# Patient Record
Sex: MALE | Race: ASIAN | NOT HISPANIC OR LATINO | Employment: OTHER | URBAN - METROPOLITAN AREA
[De-identification: names, ages, dates, MRNs, and addresses within clinical notes are randomized per-mention and may not be internally consistent; named-entity substitution may affect disease eponyms.]

---

## 2017-01-04 ENCOUNTER — COMMUNICATION - HEALTHEAST (OUTPATIENT)
Dept: HOME HEALTH SERVICES | Facility: HOME HEALTH | Age: 82
End: 2017-01-04

## 2017-01-04 ENCOUNTER — HOME CARE/HOSPICE - HEALTHEAST (OUTPATIENT)
Dept: HOME HEALTH SERVICES | Facility: HOME HEALTH | Age: 82
End: 2017-01-04

## 2017-01-09 ENCOUNTER — COMMUNICATION - HEALTHEAST (OUTPATIENT)
Dept: HOME HEALTH SERVICES | Facility: HOME HEALTH | Age: 82
End: 2017-01-09

## 2017-02-06 ENCOUNTER — HOME CARE/HOSPICE - HEALTHEAST (OUTPATIENT)
Dept: HOME HEALTH SERVICES | Facility: HOME HEALTH | Age: 82
End: 2017-02-06

## 2017-02-27 ENCOUNTER — COMMUNICATION - HEALTHEAST (OUTPATIENT)
Dept: HOME HEALTH SERVICES | Facility: HOME HEALTH | Age: 82
End: 2017-02-27

## 2017-02-27 ENCOUNTER — HOME CARE/HOSPICE - HEALTHEAST (OUTPATIENT)
Dept: HOME HEALTH SERVICES | Facility: HOME HEALTH | Age: 82
End: 2017-02-27

## 2017-02-28 ENCOUNTER — AMBULATORY - HEALTHEAST (OUTPATIENT)
Dept: FAMILY MEDICINE | Facility: CLINIC | Age: 82
End: 2017-02-28

## 2017-02-28 DIAGNOSIS — H26.9 CATARACT: ICD-10-CM

## 2017-03-03 ENCOUNTER — RECORDS - HEALTHEAST (OUTPATIENT)
Dept: ADMINISTRATIVE | Facility: OTHER | Age: 82
End: 2017-03-03

## 2017-03-06 ENCOUNTER — HOME CARE/HOSPICE - HEALTHEAST (OUTPATIENT)
Dept: HOME HEALTH SERVICES | Facility: HOME HEALTH | Age: 82
End: 2017-03-06

## 2017-03-12 ENCOUNTER — COMMUNICATION - HEALTHEAST (OUTPATIENT)
Dept: FAMILY MEDICINE | Facility: CLINIC | Age: 82
End: 2017-03-12

## 2017-03-12 DIAGNOSIS — Z91.09 ENVIRONMENTAL ALLERGIES: ICD-10-CM

## 2017-03-12 DIAGNOSIS — M54.2 NECK PAIN: ICD-10-CM

## 2017-03-14 ENCOUNTER — COMMUNICATION - HEALTHEAST (OUTPATIENT)
Dept: FAMILY MEDICINE | Facility: CLINIC | Age: 82
End: 2017-03-14

## 2017-03-29 ENCOUNTER — COMMUNICATION - HEALTHEAST (OUTPATIENT)
Dept: NURSING | Facility: CLINIC | Age: 82
End: 2017-03-29

## 2017-03-30 ENCOUNTER — AMBULATORY - HEALTHEAST (OUTPATIENT)
Dept: CARE COORDINATION | Facility: CLINIC | Age: 82
End: 2017-03-30

## 2017-04-04 ENCOUNTER — HOME CARE/HOSPICE - HEALTHEAST (OUTPATIENT)
Dept: HOME HEALTH SERVICES | Facility: HOME HEALTH | Age: 82
End: 2017-04-04

## 2017-04-13 ENCOUNTER — OFFICE VISIT - HEALTHEAST (OUTPATIENT)
Dept: FAMILY MEDICINE | Facility: CLINIC | Age: 82
End: 2017-04-13

## 2017-04-13 DIAGNOSIS — R05.9 COUGH: ICD-10-CM

## 2017-04-15 ENCOUNTER — COMMUNICATION - HEALTHEAST (OUTPATIENT)
Dept: FAMILY MEDICINE | Facility: CLINIC | Age: 82
End: 2017-04-15

## 2017-04-15 DIAGNOSIS — Z91.09 ENVIRONMENTAL ALLERGIES: ICD-10-CM

## 2017-04-19 ENCOUNTER — OFFICE VISIT - HEALTHEAST (OUTPATIENT)
Dept: FAMILY MEDICINE | Facility: CLINIC | Age: 82
End: 2017-04-19

## 2017-04-19 ENCOUNTER — COMMUNICATION - HEALTHEAST (OUTPATIENT)
Dept: FAMILY MEDICINE | Facility: CLINIC | Age: 82
End: 2017-04-19

## 2017-04-19 DIAGNOSIS — R63.6 UNDERWEIGHT DUE TO INADEQUATE CALORIC INTAKE: ICD-10-CM

## 2017-04-19 DIAGNOSIS — I48.20 CHRONIC ATRIAL FIBRILLATION (H): ICD-10-CM

## 2017-04-19 DIAGNOSIS — R05.9 COUGH: ICD-10-CM

## 2017-04-19 DIAGNOSIS — M62.81 MUSCLE WEAKNESS (GENERALIZED): ICD-10-CM

## 2017-04-19 DIAGNOSIS — D72.829 LEUKOCYTOSIS: ICD-10-CM

## 2017-04-19 DIAGNOSIS — Z91.09 ENVIRONMENTAL ALLERGIES: ICD-10-CM

## 2017-04-26 ENCOUNTER — COMMUNICATION - HEALTHEAST (OUTPATIENT)
Dept: FAMILY MEDICINE | Facility: CLINIC | Age: 82
End: 2017-04-26

## 2017-04-28 ENCOUNTER — HOME CARE/HOSPICE - HEALTHEAST (OUTPATIENT)
Dept: HOME HEALTH SERVICES | Facility: HOME HEALTH | Age: 82
End: 2017-04-28

## 2017-04-28 ENCOUNTER — COMMUNICATION - HEALTHEAST (OUTPATIENT)
Dept: HOME HEALTH SERVICES | Facility: HOME HEALTH | Age: 82
End: 2017-04-28

## 2017-05-01 ENCOUNTER — COMMUNICATION - HEALTHEAST (OUTPATIENT)
Dept: FAMILY MEDICINE | Facility: CLINIC | Age: 82
End: 2017-05-01

## 2017-05-03 ENCOUNTER — OFFICE VISIT - HEALTHEAST (OUTPATIENT)
Dept: FAMILY MEDICINE | Facility: CLINIC | Age: 82
End: 2017-05-03

## 2017-05-03 DIAGNOSIS — M54.2 NECK PAIN: ICD-10-CM

## 2017-05-03 DIAGNOSIS — I48.20 CHRONIC ATRIAL FIBRILLATION (H): ICD-10-CM

## 2017-05-03 DIAGNOSIS — R63.6 UNDERWEIGHT DUE TO INADEQUATE CALORIC INTAKE: ICD-10-CM

## 2017-05-03 DIAGNOSIS — N48.29 PENILE INFLAMMATION: ICD-10-CM

## 2017-05-03 DIAGNOSIS — Z93.59 SUPRAPUBIC CATHETER (H): ICD-10-CM

## 2017-05-10 ENCOUNTER — HOME CARE/HOSPICE - HEALTHEAST (OUTPATIENT)
Dept: HOME HEALTH SERVICES | Facility: HOME HEALTH | Age: 82
End: 2017-05-10

## 2017-05-23 ENCOUNTER — OFFICE VISIT - HEALTHEAST (OUTPATIENT)
Dept: FAMILY MEDICINE | Facility: CLINIC | Age: 82
End: 2017-05-23

## 2017-05-23 DIAGNOSIS — M62.81 MUSCLE WEAKNESS (GENERALIZED): ICD-10-CM

## 2017-05-23 DIAGNOSIS — F43.21 GRIEF: ICD-10-CM

## 2017-05-23 DIAGNOSIS — G47.00 INSOMNIA: ICD-10-CM

## 2017-05-23 DIAGNOSIS — M54.2 NECK PAIN: ICD-10-CM

## 2017-05-23 ASSESSMENT — MIFFLIN-ST. JEOR: SCORE: 1070.31

## 2017-05-31 ENCOUNTER — RECORDS - HEALTHEAST (OUTPATIENT)
Dept: ADMINISTRATIVE | Facility: OTHER | Age: 82
End: 2017-05-31

## 2017-06-05 ENCOUNTER — HOME CARE/HOSPICE - HEALTHEAST (OUTPATIENT)
Dept: HOME HEALTH SERVICES | Facility: HOME HEALTH | Age: 82
End: 2017-06-05

## 2017-06-16 ENCOUNTER — OFFICE VISIT - HEALTHEAST (OUTPATIENT)
Dept: FAMILY MEDICINE | Facility: CLINIC | Age: 82
End: 2017-06-16

## 2017-06-16 DIAGNOSIS — M54.2 NECK PAIN: ICD-10-CM

## 2017-06-16 DIAGNOSIS — G47.00 INSOMNIA: ICD-10-CM

## 2017-06-16 DIAGNOSIS — L29.9 PRURITUS: ICD-10-CM

## 2017-06-16 DIAGNOSIS — F43.21 GRIEF: ICD-10-CM

## 2017-06-23 ENCOUNTER — AMBULATORY - HEALTHEAST (OUTPATIENT)
Dept: NURSING | Facility: CLINIC | Age: 82
End: 2017-06-23

## 2017-06-26 ENCOUNTER — COMMUNICATION - HEALTHEAST (OUTPATIENT)
Dept: HOME HEALTH SERVICES | Facility: HOME HEALTH | Age: 82
End: 2017-06-26

## 2017-06-26 ENCOUNTER — HOME CARE/HOSPICE - HEALTHEAST (OUTPATIENT)
Dept: HOME HEALTH SERVICES | Facility: HOME HEALTH | Age: 82
End: 2017-06-26

## 2017-07-05 ENCOUNTER — HOME CARE/HOSPICE - HEALTHEAST (OUTPATIENT)
Dept: HOME HEALTH SERVICES | Facility: HOME HEALTH | Age: 82
End: 2017-07-05

## 2017-07-27 ENCOUNTER — OFFICE VISIT - HEALTHEAST (OUTPATIENT)
Dept: FAMILY MEDICINE | Facility: CLINIC | Age: 82
End: 2017-07-27

## 2017-07-27 DIAGNOSIS — M54.2 NECK PAIN: ICD-10-CM

## 2017-07-27 DIAGNOSIS — F43.21 GRIEF: ICD-10-CM

## 2017-08-02 ENCOUNTER — COMMUNICATION - HEALTHEAST (OUTPATIENT)
Dept: FAMILY MEDICINE | Facility: CLINIC | Age: 82
End: 2017-08-02

## 2017-08-02 DIAGNOSIS — Z93.59 SUPRAPUBIC CATHETER (H): ICD-10-CM

## 2017-08-07 ENCOUNTER — HOME CARE/HOSPICE - HEALTHEAST (OUTPATIENT)
Dept: HOME HEALTH SERVICES | Facility: HOME HEALTH | Age: 82
End: 2017-08-07

## 2017-08-24 ENCOUNTER — COMMUNICATION - HEALTHEAST (OUTPATIENT)
Dept: HOME HEALTH SERVICES | Facility: HOME HEALTH | Age: 82
End: 2017-08-24

## 2017-08-24 ENCOUNTER — HOME CARE/HOSPICE - HEALTHEAST (OUTPATIENT)
Dept: HOME HEALTH SERVICES | Facility: HOME HEALTH | Age: 82
End: 2017-08-24

## 2017-08-25 ENCOUNTER — HOME CARE/HOSPICE - HEALTHEAST (OUTPATIENT)
Dept: HOME HEALTH SERVICES | Facility: HOME HEALTH | Age: 82
End: 2017-08-25

## 2017-09-08 ENCOUNTER — HOME CARE/HOSPICE - HEALTHEAST (OUTPATIENT)
Dept: HOME HEALTH SERVICES | Facility: HOME HEALTH | Age: 82
End: 2017-09-08

## 2017-09-25 ENCOUNTER — COMMUNICATION - HEALTHEAST (OUTPATIENT)
Dept: FAMILY MEDICINE | Facility: CLINIC | Age: 82
End: 2017-09-25

## 2017-09-25 DIAGNOSIS — M54.2 NECK PAIN: ICD-10-CM

## 2017-09-27 ENCOUNTER — COMMUNICATION - HEALTHEAST (OUTPATIENT)
Dept: FAMILY MEDICINE | Facility: CLINIC | Age: 82
End: 2017-09-27

## 2017-09-27 DIAGNOSIS — I48.20 CHRONIC ATRIAL FIBRILLATION (H): ICD-10-CM

## 2017-09-27 DIAGNOSIS — M54.2 NECK PAIN: ICD-10-CM

## 2017-09-27 DIAGNOSIS — Z91.09 ENVIRONMENTAL ALLERGIES: ICD-10-CM

## 2017-10-02 ENCOUNTER — OFFICE VISIT - HEALTHEAST (OUTPATIENT)
Dept: FAMILY MEDICINE | Facility: CLINIC | Age: 82
End: 2017-10-02

## 2017-10-02 DIAGNOSIS — Z71.85 IMMUNIZATION COUNSELING: ICD-10-CM

## 2017-10-02 DIAGNOSIS — M62.81 MUSCLE WEAKNESS (GENERALIZED): ICD-10-CM

## 2017-10-02 DIAGNOSIS — M54.2 NECK PAIN: ICD-10-CM

## 2017-10-02 DIAGNOSIS — Z91.09 ENVIRONMENTAL ALLERGIES: ICD-10-CM

## 2017-10-02 DIAGNOSIS — I48.20 CHRONIC ATRIAL FIBRILLATION (H): ICD-10-CM

## 2017-10-05 ENCOUNTER — HOME CARE/HOSPICE - HEALTHEAST (OUTPATIENT)
Dept: HOME HEALTH SERVICES | Facility: HOME HEALTH | Age: 82
End: 2017-10-05

## 2017-10-23 ENCOUNTER — HOME CARE/HOSPICE - HEALTHEAST (OUTPATIENT)
Dept: HOME HEALTH SERVICES | Facility: HOME HEALTH | Age: 82
End: 2017-10-23

## 2017-10-23 ENCOUNTER — COMMUNICATION - HEALTHEAST (OUTPATIENT)
Dept: HOME HEALTH SERVICES | Facility: HOME HEALTH | Age: 82
End: 2017-10-23

## 2017-11-01 ENCOUNTER — COMMUNICATION - HEALTHEAST (OUTPATIENT)
Dept: FAMILY MEDICINE | Facility: CLINIC | Age: 82
End: 2017-11-01

## 2017-11-01 DIAGNOSIS — N32.81 OVERACTIVE BLADDER: ICD-10-CM

## 2017-11-06 ENCOUNTER — HOME CARE/HOSPICE - HEALTHEAST (OUTPATIENT)
Dept: HOME HEALTH SERVICES | Facility: HOME HEALTH | Age: 82
End: 2017-11-06

## 2017-11-16 ENCOUNTER — COMMUNICATION - HEALTHEAST (OUTPATIENT)
Dept: FAMILY MEDICINE | Facility: CLINIC | Age: 82
End: 2017-11-16

## 2017-11-16 ENCOUNTER — HOME CARE/HOSPICE - HEALTHEAST (OUTPATIENT)
Dept: HOME HEALTH SERVICES | Facility: HOME HEALTH | Age: 82
End: 2017-11-16

## 2017-11-16 DIAGNOSIS — R63.6 UNDERWEIGHT DUE TO INADEQUATE CALORIC INTAKE: ICD-10-CM

## 2017-11-20 ENCOUNTER — OFFICE VISIT - HEALTHEAST (OUTPATIENT)
Dept: FAMILY MEDICINE | Facility: CLINIC | Age: 82
End: 2017-11-20

## 2017-11-20 ENCOUNTER — COMMUNICATION - HEALTHEAST (OUTPATIENT)
Dept: FAMILY MEDICINE | Facility: CLINIC | Age: 82
End: 2017-11-20

## 2017-11-20 DIAGNOSIS — Z09 HOSPITAL DISCHARGE FOLLOW-UP: ICD-10-CM

## 2017-11-20 DIAGNOSIS — I48.20 CHRONIC ATRIAL FIBRILLATION (H): ICD-10-CM

## 2017-11-20 DIAGNOSIS — I69.30 HISTORY OF ISCHEMIC CEREBROVASCULAR ACCIDENT (CVA) WITH RESIDUAL DEFICIT: ICD-10-CM

## 2017-11-24 ENCOUNTER — COMMUNICATION - HEALTHEAST (OUTPATIENT)
Dept: FAMILY MEDICINE | Facility: CLINIC | Age: 82
End: 2017-11-24

## 2017-11-24 DIAGNOSIS — R06.2 WHEEZING: ICD-10-CM

## 2017-11-30 ENCOUNTER — RECORDS - HEALTHEAST (OUTPATIENT)
Dept: ADMINISTRATIVE | Facility: OTHER | Age: 82
End: 2017-11-30

## 2017-12-04 ENCOUNTER — RECORDS - HEALTHEAST (OUTPATIENT)
Dept: ADMINISTRATIVE | Facility: OTHER | Age: 82
End: 2017-12-04

## 2017-12-08 ENCOUNTER — RECORDS - HEALTHEAST (OUTPATIENT)
Dept: ADMINISTRATIVE | Facility: OTHER | Age: 82
End: 2017-12-08

## 2017-12-11 ENCOUNTER — OFFICE VISIT - HEALTHEAST (OUTPATIENT)
Dept: FAMILY MEDICINE | Facility: CLINIC | Age: 82
End: 2017-12-11

## 2017-12-11 DIAGNOSIS — I48.20 CHRONIC ATRIAL FIBRILLATION (H): ICD-10-CM

## 2017-12-11 DIAGNOSIS — I69.30 HISTORY OF ISCHEMIC CEREBROVASCULAR ACCIDENT (CVA) WITH RESIDUAL DEFICIT: ICD-10-CM

## 2017-12-11 DIAGNOSIS — Z93.59 SUPRAPUBIC CATHETER (H): ICD-10-CM

## 2017-12-11 DIAGNOSIS — M54.2 NECK PAIN: ICD-10-CM

## 2017-12-11 DIAGNOSIS — I63.9 ACUTE CEREBRAL INFARCTION (H): ICD-10-CM

## 2017-12-11 DIAGNOSIS — K64.9 HEMORRHOID: ICD-10-CM

## 2017-12-27 ENCOUNTER — OFFICE VISIT - HEALTHEAST (OUTPATIENT)
Dept: FAMILY MEDICINE | Facility: CLINIC | Age: 82
End: 2017-12-27

## 2017-12-27 ENCOUNTER — COMMUNICATION - HEALTHEAST (OUTPATIENT)
Dept: FAMILY MEDICINE | Facility: CLINIC | Age: 82
End: 2017-12-27

## 2017-12-27 DIAGNOSIS — R05.9 COUGH: ICD-10-CM

## 2017-12-27 DIAGNOSIS — R09.81 SINUS CONGESTION: ICD-10-CM

## 2018-01-08 ENCOUNTER — OFFICE VISIT - HEALTHEAST (OUTPATIENT)
Dept: FAMILY MEDICINE | Facility: CLINIC | Age: 83
End: 2018-01-08

## 2018-01-08 DIAGNOSIS — R63.0 ANOREXIA: ICD-10-CM

## 2018-01-08 DIAGNOSIS — I63.9 ACUTE CEREBRAL INFARCTION (H): ICD-10-CM

## 2018-01-08 DIAGNOSIS — H54.7 DECREASED VISION: ICD-10-CM

## 2018-01-08 DIAGNOSIS — Z93.59 SUPRAPUBIC CATHETER (H): ICD-10-CM

## 2018-01-08 ASSESSMENT — MIFFLIN-ST. JEOR: SCORE: 1061.24

## 2018-01-25 ENCOUNTER — RECORDS - HEALTHEAST (OUTPATIENT)
Dept: ADMINISTRATIVE | Facility: OTHER | Age: 83
End: 2018-01-25

## 2018-02-05 ENCOUNTER — RECORDS - HEALTHEAST (OUTPATIENT)
Dept: ADMINISTRATIVE | Facility: OTHER | Age: 83
End: 2018-02-05

## 2018-02-13 ENCOUNTER — COMMUNICATION - HEALTHEAST (OUTPATIENT)
Dept: FAMILY MEDICINE | Facility: CLINIC | Age: 83
End: 2018-02-13

## 2018-02-19 ENCOUNTER — RECORDS - HEALTHEAST (OUTPATIENT)
Dept: ADMINISTRATIVE | Facility: OTHER | Age: 83
End: 2018-02-19

## 2018-02-19 ENCOUNTER — OFFICE VISIT - HEALTHEAST (OUTPATIENT)
Dept: FAMILY MEDICINE | Facility: CLINIC | Age: 83
End: 2018-02-19

## 2018-02-19 DIAGNOSIS — R63.0 ANOREXIA: ICD-10-CM

## 2018-02-19 DIAGNOSIS — Z93.59 SUPRAPUBIC CATHETER (H): ICD-10-CM

## 2018-02-19 DIAGNOSIS — Z09 HOSPITAL DISCHARGE FOLLOW-UP: ICD-10-CM

## 2018-02-19 DIAGNOSIS — I63.9 ACUTE CEREBRAL INFARCTION (H): ICD-10-CM

## 2018-02-19 DIAGNOSIS — M62.81 MUSCLE WEAKNESS (GENERALIZED): ICD-10-CM

## 2018-02-19 DIAGNOSIS — N39.0 UTI (URINARY TRACT INFECTION): ICD-10-CM

## 2018-02-19 DIAGNOSIS — K64.9 HEMORRHOID: ICD-10-CM

## 2018-02-19 DIAGNOSIS — I48.20 CHRONIC ATRIAL FIBRILLATION (H): ICD-10-CM

## 2018-02-21 ENCOUNTER — RECORDS - HEALTHEAST (OUTPATIENT)
Dept: ADMINISTRATIVE | Facility: OTHER | Age: 83
End: 2018-02-21

## 2018-03-02 ENCOUNTER — RECORDS - HEALTHEAST (OUTPATIENT)
Dept: ADMINISTRATIVE | Facility: OTHER | Age: 83
End: 2018-03-02

## 2018-04-02 ENCOUNTER — RECORDS - HEALTHEAST (OUTPATIENT)
Dept: ADMINISTRATIVE | Facility: OTHER | Age: 83
End: 2018-04-02

## 2018-04-10 ENCOUNTER — RECORDS - HEALTHEAST (OUTPATIENT)
Dept: ADMINISTRATIVE | Facility: OTHER | Age: 83
End: 2018-04-10

## 2018-04-29 ENCOUNTER — COMMUNICATION - HEALTHEAST (OUTPATIENT)
Dept: FAMILY MEDICINE | Facility: CLINIC | Age: 83
End: 2018-04-29

## 2018-04-29 DIAGNOSIS — M54.2 NECK PAIN: ICD-10-CM

## 2018-05-06 ENCOUNTER — OFFICE VISIT - HEALTHEAST (OUTPATIENT)
Dept: FAMILY MEDICINE | Facility: CLINIC | Age: 83
End: 2018-05-06

## 2018-05-06 DIAGNOSIS — R05.9 COUGH: ICD-10-CM

## 2018-06-11 ENCOUNTER — RECORDS - HEALTHEAST (OUTPATIENT)
Dept: ADMINISTRATIVE | Facility: OTHER | Age: 83
End: 2018-06-11

## 2018-07-02 ENCOUNTER — COMMUNICATION - HEALTHEAST (OUTPATIENT)
Dept: FAMILY MEDICINE | Facility: CLINIC | Age: 83
End: 2018-07-02

## 2018-07-02 DIAGNOSIS — M54.2 NECK PAIN: ICD-10-CM

## 2018-07-09 ENCOUNTER — COMMUNICATION - HEALTHEAST (OUTPATIENT)
Dept: FAMILY MEDICINE | Facility: CLINIC | Age: 83
End: 2018-07-09

## 2018-07-10 ENCOUNTER — AMBULATORY - HEALTHEAST (OUTPATIENT)
Dept: FAMILY MEDICINE | Facility: CLINIC | Age: 83
End: 2018-07-10

## 2018-07-10 ENCOUNTER — OFFICE VISIT - HEALTHEAST (OUTPATIENT)
Dept: FAMILY MEDICINE | Facility: CLINIC | Age: 83
End: 2018-07-10

## 2018-07-10 DIAGNOSIS — R05.9 COUGH: ICD-10-CM

## 2018-07-26 LAB — INR PPP: 2.4 (ref 0.9–1.1)

## 2018-07-27 ENCOUNTER — COMMUNICATION - HEALTHEAST (OUTPATIENT)
Dept: FAMILY MEDICINE | Facility: CLINIC | Age: 83
End: 2018-07-27

## 2018-07-27 DIAGNOSIS — N28.0 RENAL INFARCT (H): ICD-10-CM

## 2018-07-27 LAB — INR PPP: 1.8 (ref 0.9–1.1)

## 2018-07-28 ENCOUNTER — COMMUNICATION - HEALTHEAST (OUTPATIENT)
Dept: SCHEDULING | Facility: CLINIC | Age: 83
End: 2018-07-28

## 2018-07-30 ENCOUNTER — COMMUNICATION - HEALTHEAST (OUTPATIENT)
Dept: FAMILY MEDICINE | Facility: CLINIC | Age: 83
End: 2018-07-30

## 2018-07-30 DIAGNOSIS — N28.0 RENAL INFARCT (H): ICD-10-CM

## 2018-07-30 LAB — INR PPP: 5.4 (ref 0.9–1.1)

## 2018-07-31 ENCOUNTER — OFFICE VISIT - HEALTHEAST (OUTPATIENT)
Dept: FAMILY MEDICINE | Facility: CLINIC | Age: 83
End: 2018-07-31

## 2018-07-31 DIAGNOSIS — I10 ACCELERATED HYPERTENSION: ICD-10-CM

## 2018-07-31 DIAGNOSIS — I63.9 ACUTE CEREBRAL INFARCTION (H): ICD-10-CM

## 2018-07-31 DIAGNOSIS — K64.9 HEMORRHOID: ICD-10-CM

## 2018-07-31 DIAGNOSIS — R05.9 COUGH: ICD-10-CM

## 2018-07-31 DIAGNOSIS — Z09 HOSPITAL DISCHARGE FOLLOW-UP: ICD-10-CM

## 2018-07-31 DIAGNOSIS — N28.0 RENAL INFARCT (H): ICD-10-CM

## 2018-07-31 DIAGNOSIS — Z93.59 SUPRAPUBIC CATHETER (H): ICD-10-CM

## 2018-08-01 ENCOUNTER — COMMUNICATION - HEALTHEAST (OUTPATIENT)
Dept: FAMILY MEDICINE | Facility: CLINIC | Age: 83
End: 2018-08-01

## 2018-08-01 ENCOUNTER — RECORDS - HEALTHEAST (OUTPATIENT)
Dept: ADMINISTRATIVE | Facility: OTHER | Age: 83
End: 2018-08-01

## 2018-08-01 DIAGNOSIS — N28.0 RENAL INFARCT (H): ICD-10-CM

## 2018-08-01 LAB — INR PPP: 1.5 (ref 0.9–1.1)

## 2018-08-03 ENCOUNTER — COMMUNICATION - HEALTHEAST (OUTPATIENT)
Dept: FAMILY MEDICINE | Facility: CLINIC | Age: 83
End: 2018-08-03

## 2018-08-03 DIAGNOSIS — N28.0 RENAL INFARCT (H): ICD-10-CM

## 2018-08-03 LAB — INR PPP: 2.1 (ref 0.9–1.1)

## 2018-08-06 ENCOUNTER — COMMUNICATION - HEALTHEAST (OUTPATIENT)
Dept: FAMILY MEDICINE | Facility: CLINIC | Age: 83
End: 2018-08-06

## 2018-08-06 DIAGNOSIS — R05.9 COUGH: ICD-10-CM

## 2018-08-06 DIAGNOSIS — N28.0 RENAL INFARCT (H): ICD-10-CM

## 2018-08-06 LAB — INR PPP: 2.9 (ref 0.9–1.1)

## 2018-08-09 ENCOUNTER — COMMUNICATION - HEALTHEAST (OUTPATIENT)
Dept: FAMILY MEDICINE | Facility: CLINIC | Age: 83
End: 2018-08-09

## 2018-08-09 DIAGNOSIS — N28.0 RENAL INFARCT (H): ICD-10-CM

## 2018-08-09 LAB — INR PPP: 3.2 (ref 0.9–1.1)

## 2018-08-13 ENCOUNTER — COMMUNICATION - HEALTHEAST (OUTPATIENT)
Dept: FAMILY MEDICINE | Facility: CLINIC | Age: 83
End: 2018-08-13

## 2018-08-13 DIAGNOSIS — N28.0 RENAL INFARCT (H): ICD-10-CM

## 2018-08-13 LAB — INR PPP: 3.9 (ref 0.9–1.1)

## 2018-08-14 ENCOUNTER — OFFICE VISIT - HEALTHEAST (OUTPATIENT)
Dept: FAMILY MEDICINE | Facility: CLINIC | Age: 83
End: 2018-08-14

## 2018-08-14 DIAGNOSIS — I48.20 CHRONIC ATRIAL FIBRILLATION (H): ICD-10-CM

## 2018-08-14 DIAGNOSIS — M54.2 NECK PAIN: ICD-10-CM

## 2018-08-14 DIAGNOSIS — Z93.59 SUPRAPUBIC CATHETER (H): ICD-10-CM

## 2018-08-14 DIAGNOSIS — M62.81 MUSCLE WEAKNESS (GENERALIZED): ICD-10-CM

## 2018-08-14 DIAGNOSIS — N28.0 RENAL INFARCT (H): ICD-10-CM

## 2018-08-17 ENCOUNTER — COMMUNICATION - HEALTHEAST (OUTPATIENT)
Dept: FAMILY MEDICINE | Facility: CLINIC | Age: 83
End: 2018-08-17

## 2018-08-17 DIAGNOSIS — N28.0 RENAL INFARCT (H): ICD-10-CM

## 2018-08-17 LAB — INR PPP: 1.5 (ref 0.9–1.1)

## 2018-08-22 ENCOUNTER — COMMUNICATION - HEALTHEAST (OUTPATIENT)
Dept: FAMILY MEDICINE | Facility: CLINIC | Age: 83
End: 2018-08-22

## 2018-08-22 DIAGNOSIS — N28.0 RENAL INFARCT (H): ICD-10-CM

## 2018-08-22 LAB — INR PPP: 1.8 (ref 0.9–1.1)

## 2018-08-28 ENCOUNTER — COMMUNICATION - HEALTHEAST (OUTPATIENT)
Dept: ANTICOAGULATION | Facility: CLINIC | Age: 83
End: 2018-08-28

## 2018-08-28 DIAGNOSIS — N28.0 RENAL INFARCT (H): ICD-10-CM

## 2018-08-31 ENCOUNTER — COMMUNICATION - HEALTHEAST (OUTPATIENT)
Dept: FAMILY MEDICINE | Facility: CLINIC | Age: 83
End: 2018-08-31

## 2018-08-31 DIAGNOSIS — N28.0 RENAL INFARCT (H): ICD-10-CM

## 2018-08-31 LAB — INR PPP: 4 (ref 0.9–1.1)

## 2018-09-05 ENCOUNTER — OFFICE VISIT - HEALTHEAST (OUTPATIENT)
Dept: FAMILY MEDICINE | Facility: CLINIC | Age: 83
End: 2018-09-05

## 2018-09-05 DIAGNOSIS — L60.8 DEFORMITY OF TOENAIL: ICD-10-CM

## 2018-09-05 DIAGNOSIS — Z93.59 SUPRAPUBIC CATHETER (H): ICD-10-CM

## 2018-09-05 DIAGNOSIS — H04.123 DRY EYES: ICD-10-CM

## 2018-09-05 DIAGNOSIS — R10.9 ABDOMINAL PAIN: ICD-10-CM

## 2018-09-06 ENCOUNTER — COMMUNICATION - HEALTHEAST (OUTPATIENT)
Dept: FAMILY MEDICINE | Facility: CLINIC | Age: 83
End: 2018-09-06

## 2018-09-07 ENCOUNTER — COMMUNICATION - HEALTHEAST (OUTPATIENT)
Dept: FAMILY MEDICINE | Facility: CLINIC | Age: 83
End: 2018-09-07

## 2018-09-07 DIAGNOSIS — N28.0 RENAL INFARCT (H): ICD-10-CM

## 2018-09-07 LAB — INR PPP: 1.2 (ref 0.9–1.1)

## 2018-09-11 ENCOUNTER — COMMUNICATION - HEALTHEAST (OUTPATIENT)
Dept: FAMILY MEDICINE | Facility: CLINIC | Age: 83
End: 2018-09-11

## 2018-09-11 DIAGNOSIS — N28.0 RENAL INFARCT (H): ICD-10-CM

## 2018-09-11 LAB — INR PPP: 1.6 (ref 0.9–1.1)

## 2018-09-18 ENCOUNTER — COMMUNICATION - HEALTHEAST (OUTPATIENT)
Dept: FAMILY MEDICINE | Facility: CLINIC | Age: 83
End: 2018-09-18

## 2018-09-18 DIAGNOSIS — N28.0 RENAL INFARCT (H): ICD-10-CM

## 2018-09-18 LAB — INR PPP: 1.7 (ref 0.9–1.1)

## 2018-09-24 ENCOUNTER — COMMUNICATION - HEALTHEAST (OUTPATIENT)
Dept: FAMILY MEDICINE | Facility: CLINIC | Age: 83
End: 2018-09-24

## 2018-09-25 ENCOUNTER — COMMUNICATION - HEALTHEAST (OUTPATIENT)
Dept: FAMILY MEDICINE | Facility: CLINIC | Age: 83
End: 2018-09-25

## 2018-09-25 DIAGNOSIS — N28.0 RENAL INFARCT (H): ICD-10-CM

## 2018-09-25 LAB — INR PPP: 1.8 (ref 0.9–1.1)

## 2018-10-02 ENCOUNTER — COMMUNICATION - HEALTHEAST (OUTPATIENT)
Dept: FAMILY MEDICINE | Facility: CLINIC | Age: 83
End: 2018-10-02

## 2018-10-02 DIAGNOSIS — N28.0 RENAL INFARCT (H): ICD-10-CM

## 2018-10-02 LAB — INR PPP: 1.9 (ref 0.9–1.1)

## 2018-10-07 ENCOUNTER — COMMUNICATION - HEALTHEAST (OUTPATIENT)
Dept: FAMILY MEDICINE | Facility: CLINIC | Age: 83
End: 2018-10-07

## 2018-10-07 DIAGNOSIS — Z91.09 ENVIRONMENTAL ALLERGIES: ICD-10-CM

## 2018-10-09 ENCOUNTER — COMMUNICATION - HEALTHEAST (OUTPATIENT)
Dept: FAMILY MEDICINE | Facility: CLINIC | Age: 83
End: 2018-10-09

## 2018-10-09 DIAGNOSIS — N28.0 RENAL INFARCT (H): ICD-10-CM

## 2018-10-09 LAB — INR PPP: 2.5 (ref 0.9–1.1)

## 2018-10-16 ENCOUNTER — COMMUNICATION - HEALTHEAST (OUTPATIENT)
Dept: FAMILY MEDICINE | Facility: CLINIC | Age: 83
End: 2018-10-16

## 2018-10-16 DIAGNOSIS — N28.0 RENAL INFARCT (H): ICD-10-CM

## 2018-10-16 LAB — INR PPP: 2.3 (ref 0.9–1.1)

## 2018-10-23 ENCOUNTER — AMBULATORY - HEALTHEAST (OUTPATIENT)
Dept: NURSING | Facility: CLINIC | Age: 83
End: 2018-10-23

## 2018-10-23 ENCOUNTER — COMMUNICATION - HEALTHEAST (OUTPATIENT)
Dept: FAMILY MEDICINE | Facility: CLINIC | Age: 83
End: 2018-10-23

## 2018-10-23 DIAGNOSIS — N28.0 RENAL INFARCT (H): ICD-10-CM

## 2018-10-23 DIAGNOSIS — I10 ACCELERATED HYPERTENSION: ICD-10-CM

## 2018-10-23 LAB — INR PPP: 1.9 (ref 0.9–1.1)

## 2018-11-01 ENCOUNTER — COMMUNICATION - HEALTHEAST (OUTPATIENT)
Dept: FAMILY MEDICINE | Facility: CLINIC | Age: 83
End: 2018-11-01

## 2018-11-01 DIAGNOSIS — N28.0 RENAL INFARCT (H): ICD-10-CM

## 2018-11-01 LAB — INR PPP: 2.5 (ref 0.9–1.1)

## 2018-11-08 ENCOUNTER — COMMUNICATION - HEALTHEAST (OUTPATIENT)
Dept: FAMILY MEDICINE | Facility: CLINIC | Age: 83
End: 2018-11-08

## 2018-11-08 DIAGNOSIS — N28.0 RENAL INFARCT (H): ICD-10-CM

## 2018-11-08 LAB — INR PPP: 2 (ref 0.9–1.1)

## 2018-11-15 ENCOUNTER — COMMUNICATION - HEALTHEAST (OUTPATIENT)
Dept: FAMILY MEDICINE | Facility: CLINIC | Age: 83
End: 2018-11-15

## 2018-11-15 DIAGNOSIS — N28.0 RENAL INFARCT (H): ICD-10-CM

## 2018-11-15 LAB — INR PPP: 2.5 (ref 0.9–1.1)

## 2018-11-17 ENCOUNTER — RECORDS - HEALTHEAST (OUTPATIENT)
Dept: ADMINISTRATIVE | Facility: OTHER | Age: 83
End: 2018-11-17

## 2018-11-29 ENCOUNTER — COMMUNICATION - HEALTHEAST (OUTPATIENT)
Dept: FAMILY MEDICINE | Facility: CLINIC | Age: 83
End: 2018-11-29

## 2018-11-29 DIAGNOSIS — N28.0 RENAL INFARCT (H): ICD-10-CM

## 2018-11-29 LAB — INR PPP: 2.3 (ref 0.9–1.1)

## 2018-12-10 ENCOUNTER — COMMUNICATION - HEALTHEAST (OUTPATIENT)
Dept: FAMILY MEDICINE | Facility: CLINIC | Age: 83
End: 2018-12-10

## 2018-12-13 ENCOUNTER — COMMUNICATION - HEALTHEAST (OUTPATIENT)
Dept: FAMILY MEDICINE | Facility: CLINIC | Age: 83
End: 2018-12-13

## 2018-12-13 DIAGNOSIS — N28.0 RENAL INFARCT (H): ICD-10-CM

## 2018-12-13 LAB — INR PPP: 1.8 (ref 0.9–1.1)

## 2018-12-18 ENCOUNTER — RECORDS - HEALTHEAST (OUTPATIENT)
Dept: ADMINISTRATIVE | Facility: OTHER | Age: 83
End: 2018-12-18

## 2018-12-19 ENCOUNTER — RECORDS - HEALTHEAST (OUTPATIENT)
Dept: ADMINISTRATIVE | Facility: OTHER | Age: 83
End: 2018-12-19

## 2018-12-20 ENCOUNTER — RECORDS - HEALTHEAST (OUTPATIENT)
Dept: ADMINISTRATIVE | Facility: OTHER | Age: 83
End: 2018-12-20

## 2018-12-24 ENCOUNTER — COMMUNICATION - HEALTHEAST (OUTPATIENT)
Dept: ANTICOAGULATION | Facility: CLINIC | Age: 83
End: 2018-12-24

## 2018-12-24 ENCOUNTER — COMMUNICATION - HEALTHEAST (OUTPATIENT)
Dept: FAMILY MEDICINE | Facility: CLINIC | Age: 83
End: 2018-12-24

## 2018-12-24 DIAGNOSIS — N28.0 RENAL INFARCT (H): ICD-10-CM

## 2018-12-31 ENCOUNTER — COMMUNICATION - HEALTHEAST (OUTPATIENT)
Dept: FAMILY MEDICINE | Facility: CLINIC | Age: 83
End: 2018-12-31

## 2018-12-31 DIAGNOSIS — N28.0 RENAL INFARCT (H): ICD-10-CM

## 2018-12-31 LAB — INR PPP: 1.7 (ref 0.9–1.1)

## 2019-01-02 ENCOUNTER — OFFICE VISIT - HEALTHEAST (OUTPATIENT)
Dept: FAMILY MEDICINE | Facility: CLINIC | Age: 84
End: 2019-01-02

## 2019-01-02 DIAGNOSIS — R05.9 COUGH: ICD-10-CM

## 2019-01-02 DIAGNOSIS — Z09 HOSPITAL DISCHARGE FOLLOW-UP: ICD-10-CM

## 2019-01-02 DIAGNOSIS — K56.609 SMALL BOWEL OBSTRUCTION (H): ICD-10-CM

## 2019-01-02 DIAGNOSIS — R39.14 BENIGN PROSTATIC HYPERPLASIA WITH INCOMPLETE BLADDER EMPTYING: ICD-10-CM

## 2019-01-02 DIAGNOSIS — N40.1 BENIGN PROSTATIC HYPERPLASIA WITH INCOMPLETE BLADDER EMPTYING: ICD-10-CM

## 2019-01-02 DIAGNOSIS — Z93.59 SUPRAPUBIC CATHETER (H): ICD-10-CM

## 2019-01-02 DIAGNOSIS — I48.20 CHRONIC ATRIAL FIBRILLATION (H): ICD-10-CM

## 2019-01-05 ENCOUNTER — RECORDS - HEALTHEAST (OUTPATIENT)
Dept: ADMINISTRATIVE | Facility: OTHER | Age: 84
End: 2019-01-05

## 2019-01-07 ENCOUNTER — COMMUNICATION - HEALTHEAST (OUTPATIENT)
Dept: FAMILY MEDICINE | Facility: CLINIC | Age: 84
End: 2019-01-07

## 2019-01-07 DIAGNOSIS — N28.0 RENAL INFARCT (H): ICD-10-CM

## 2019-01-07 LAB — INR PPP: 2.8 (ref 0.9–1.1)

## 2019-01-09 ENCOUNTER — RECORDS - HEALTHEAST (OUTPATIENT)
Dept: ADMINISTRATIVE | Facility: OTHER | Age: 84
End: 2019-01-09

## 2019-01-11 ENCOUNTER — COMMUNICATION - HEALTHEAST (OUTPATIENT)
Dept: FAMILY MEDICINE | Facility: CLINIC | Age: 84
End: 2019-01-11

## 2019-01-11 DIAGNOSIS — Z93.59 SUPRAPUBIC CATHETER (H): ICD-10-CM

## 2019-01-11 DIAGNOSIS — R05.9 COUGH: ICD-10-CM

## 2019-01-14 ENCOUNTER — COMMUNICATION - HEALTHEAST (OUTPATIENT)
Dept: FAMILY MEDICINE | Facility: CLINIC | Age: 84
End: 2019-01-14

## 2019-01-14 DIAGNOSIS — N28.0 RENAL INFARCT (H): ICD-10-CM

## 2019-01-14 LAB — INR PPP: 2.9 (ref 0.9–1.1)

## 2019-01-21 ENCOUNTER — COMMUNICATION - HEALTHEAST (OUTPATIENT)
Dept: FAMILY MEDICINE | Facility: CLINIC | Age: 84
End: 2019-01-21

## 2019-01-21 DIAGNOSIS — N28.0 RENAL INFARCT (H): ICD-10-CM

## 2019-01-21 LAB — INR PPP: 1.8 (ref 0.9–1.1)

## 2019-01-28 ENCOUNTER — COMMUNICATION - HEALTHEAST (OUTPATIENT)
Dept: FAMILY MEDICINE | Facility: CLINIC | Age: 84
End: 2019-01-28

## 2019-01-28 ENCOUNTER — RECORDS - HEALTHEAST (OUTPATIENT)
Dept: ADMINISTRATIVE | Facility: OTHER | Age: 84
End: 2019-01-28

## 2019-01-28 DIAGNOSIS — N28.0 RENAL INFARCT (H): ICD-10-CM

## 2019-01-28 LAB — INR PPP: 3.9 (ref 0.9–1.1)

## 2019-02-04 ENCOUNTER — RECORDS - HEALTHEAST (OUTPATIENT)
Dept: ADMINISTRATIVE | Facility: OTHER | Age: 84
End: 2019-02-04

## 2019-02-04 ENCOUNTER — COMMUNICATION - HEALTHEAST (OUTPATIENT)
Dept: FAMILY MEDICINE | Facility: CLINIC | Age: 84
End: 2019-02-04

## 2019-02-04 DIAGNOSIS — N28.0 RENAL INFARCT (H): ICD-10-CM

## 2019-02-04 DIAGNOSIS — I10 ACCELERATED HYPERTENSION: ICD-10-CM

## 2019-02-04 LAB — INR PPP: 1.8 (ref 0.9–1.1)

## 2019-02-11 ENCOUNTER — COMMUNICATION - HEALTHEAST (OUTPATIENT)
Dept: FAMILY MEDICINE | Facility: CLINIC | Age: 84
End: 2019-02-11

## 2019-02-11 DIAGNOSIS — N28.0 RENAL INFARCT (H): ICD-10-CM

## 2019-02-11 LAB — INR PPP: 2.3 (ref 0.9–1.1)

## 2019-02-14 ENCOUNTER — OFFICE VISIT - HEALTHEAST (OUTPATIENT)
Dept: FAMILY MEDICINE | Facility: CLINIC | Age: 84
End: 2019-02-14

## 2019-02-14 DIAGNOSIS — N39.0 URINARY TRACT INFECTION WITHOUT HEMATURIA, SITE UNSPECIFIED: ICD-10-CM

## 2019-02-14 DIAGNOSIS — M62.81 MUSCLE WEAKNESS (GENERALIZED): ICD-10-CM

## 2019-02-14 DIAGNOSIS — Z93.59 SUPRAPUBIC CATHETER (H): ICD-10-CM

## 2019-02-14 DIAGNOSIS — I48.20 CHRONIC ATRIAL FIBRILLATION (H): ICD-10-CM

## 2019-02-18 ENCOUNTER — COMMUNICATION - HEALTHEAST (OUTPATIENT)
Dept: FAMILY MEDICINE | Facility: CLINIC | Age: 84
End: 2019-02-18

## 2019-02-18 DIAGNOSIS — N28.0 RENAL INFARCT (H): ICD-10-CM

## 2019-02-18 LAB — INR PPP: 2.2 (ref 0.9–1.1)

## 2019-02-19 ENCOUNTER — RECORDS - HEALTHEAST (OUTPATIENT)
Dept: ADMINISTRATIVE | Facility: OTHER | Age: 84
End: 2019-02-19

## 2019-02-25 ENCOUNTER — COMMUNICATION - HEALTHEAST (OUTPATIENT)
Dept: FAMILY MEDICINE | Facility: CLINIC | Age: 84
End: 2019-02-25

## 2019-02-25 DIAGNOSIS — I48.20 CHRONIC ATRIAL FIBRILLATION (H): ICD-10-CM

## 2019-02-25 DIAGNOSIS — N28.0 RENAL INFARCT (H): ICD-10-CM

## 2019-02-25 LAB — INR PPP: 2.1 (ref 0.9–1.1)

## 2019-03-11 ENCOUNTER — COMMUNICATION - HEALTHEAST (OUTPATIENT)
Dept: FAMILY MEDICINE | Facility: CLINIC | Age: 84
End: 2019-03-11

## 2019-03-11 DIAGNOSIS — N28.0 RENAL INFARCT (H): ICD-10-CM

## 2019-03-11 LAB — INR PPP: 2.3 (ref 0.9–1.1)

## 2019-03-19 ENCOUNTER — RECORDS - HEALTHEAST (OUTPATIENT)
Dept: ADMINISTRATIVE | Facility: OTHER | Age: 84
End: 2019-03-19

## 2019-03-25 ENCOUNTER — AMBULATORY - HEALTHEAST (OUTPATIENT)
Dept: FAMILY MEDICINE | Facility: CLINIC | Age: 84
End: 2019-03-25

## 2019-03-25 ENCOUNTER — COMMUNICATION - HEALTHEAST (OUTPATIENT)
Dept: FAMILY MEDICINE | Facility: CLINIC | Age: 84
End: 2019-03-25

## 2019-03-25 DIAGNOSIS — I48.20 CHRONIC ATRIAL FIBRILLATION (H): ICD-10-CM

## 2019-03-25 DIAGNOSIS — Z93.59 SUPRAPUBIC CATHETER (H): ICD-10-CM

## 2019-03-25 DIAGNOSIS — N28.0 RENAL INFARCT (H): ICD-10-CM

## 2019-03-25 LAB — INR PPP: 2.4 (ref 0.9–1.1)

## 2019-04-08 ENCOUNTER — AMBULATORY - HEALTHEAST (OUTPATIENT)
Dept: FAMILY MEDICINE | Facility: CLINIC | Age: 84
End: 2019-04-08

## 2019-04-08 ENCOUNTER — COMMUNICATION - HEALTHEAST (OUTPATIENT)
Dept: FAMILY MEDICINE | Facility: CLINIC | Age: 84
End: 2019-04-08

## 2019-04-08 DIAGNOSIS — N28.0 RENAL INFARCT (H): ICD-10-CM

## 2019-04-08 DIAGNOSIS — I48.20 CHRONIC ATRIAL FIBRILLATION (H): ICD-10-CM

## 2019-04-08 LAB — INR PPP: 2.8 (ref 0.9–1.1)

## 2019-04-09 ENCOUNTER — COMMUNICATION - HEALTHEAST (OUTPATIENT)
Dept: FAMILY MEDICINE | Facility: CLINIC | Age: 84
End: 2019-04-09

## 2019-04-15 ENCOUNTER — RECORDS - HEALTHEAST (OUTPATIENT)
Dept: ADMINISTRATIVE | Facility: OTHER | Age: 84
End: 2019-04-15

## 2019-04-22 ENCOUNTER — COMMUNICATION - HEALTHEAST (OUTPATIENT)
Dept: FAMILY MEDICINE | Facility: CLINIC | Age: 84
End: 2019-04-22

## 2019-04-22 DIAGNOSIS — N28.0 RENAL INFARCT (H): ICD-10-CM

## 2019-04-22 LAB — INR PPP: 2 (ref 0.9–1.1)

## 2019-04-29 ENCOUNTER — OFFICE VISIT - HEALTHEAST (OUTPATIENT)
Dept: FAMILY MEDICINE | Facility: CLINIC | Age: 84
End: 2019-04-29

## 2019-04-29 DIAGNOSIS — M62.81 MUSCLE WEAKNESS (GENERALIZED): ICD-10-CM

## 2019-05-06 ENCOUNTER — COMMUNICATION - HEALTHEAST (OUTPATIENT)
Dept: FAMILY MEDICINE | Facility: CLINIC | Age: 84
End: 2019-05-06

## 2019-05-06 DIAGNOSIS — N28.0 RENAL INFARCT (H): ICD-10-CM

## 2019-05-06 LAB — INR PPP: 2.2 (ref 0.9–1.1)

## 2019-05-08 ENCOUNTER — COMMUNICATION - HEALTHEAST (OUTPATIENT)
Dept: ANTICOAGULATION | Facility: CLINIC | Age: 84
End: 2019-05-08

## 2019-05-08 DIAGNOSIS — N28.0 RENAL INFARCT (H): ICD-10-CM

## 2019-05-08 DIAGNOSIS — I48.91 ATRIAL FIBRILLATION (H): ICD-10-CM

## 2019-05-20 ENCOUNTER — COMMUNICATION - HEALTHEAST (OUTPATIENT)
Dept: FAMILY MEDICINE | Facility: CLINIC | Age: 84
End: 2019-05-20

## 2019-05-20 DIAGNOSIS — N28.0 RENAL INFARCT (H): ICD-10-CM

## 2019-05-20 LAB — INR PPP: 2.5 (ref 0.9–1.1)

## 2019-05-22 ENCOUNTER — RECORDS - HEALTHEAST (OUTPATIENT)
Dept: ADMINISTRATIVE | Facility: OTHER | Age: 84
End: 2019-05-22

## 2019-05-23 ENCOUNTER — RECORDS - HEALTHEAST (OUTPATIENT)
Dept: ADMINISTRATIVE | Facility: OTHER | Age: 84
End: 2019-05-23

## 2019-05-28 ENCOUNTER — COMMUNICATION - HEALTHEAST (OUTPATIENT)
Dept: GERIATRIC MEDICINE | Facility: CLINIC | Age: 84
End: 2019-05-28

## 2019-06-03 ENCOUNTER — RECORDS - HEALTHEAST (OUTPATIENT)
Dept: ADMINISTRATIVE | Facility: OTHER | Age: 84
End: 2019-06-03

## 2019-06-05 ENCOUNTER — COMMUNICATION - HEALTHEAST (OUTPATIENT)
Dept: CARE COORDINATION | Facility: CLINIC | Age: 84
End: 2019-06-05

## 2019-06-07 ENCOUNTER — COMMUNICATION - HEALTHEAST (OUTPATIENT)
Dept: FAMILY MEDICINE | Facility: CLINIC | Age: 84
End: 2019-06-07

## 2019-06-07 ENCOUNTER — COMMUNICATION - HEALTHEAST (OUTPATIENT)
Dept: ANTICOAGULATION | Facility: CLINIC | Age: 84
End: 2019-06-07

## 2019-06-07 DIAGNOSIS — N28.0 RENAL INFARCT (H): ICD-10-CM

## 2019-06-10 ENCOUNTER — COMMUNICATION - HEALTHEAST (OUTPATIENT)
Dept: FAMILY MEDICINE | Facility: CLINIC | Age: 84
End: 2019-06-10

## 2019-06-10 DIAGNOSIS — N28.0 RENAL INFARCT (H): ICD-10-CM

## 2019-06-10 LAB — INR PPP: 1.7 (ref 0.9–1.1)

## 2019-06-11 ENCOUNTER — OFFICE VISIT - HEALTHEAST (OUTPATIENT)
Dept: PHARMACY | Facility: CLINIC | Age: 84
End: 2019-06-11

## 2019-06-11 ENCOUNTER — OFFICE VISIT - HEALTHEAST (OUTPATIENT)
Dept: FAMILY MEDICINE | Facility: CLINIC | Age: 84
End: 2019-06-11

## 2019-06-11 DIAGNOSIS — R15.9 INCONTINENCE OF FECES, UNSPECIFIED FECAL INCONTINENCE TYPE: ICD-10-CM

## 2019-06-11 DIAGNOSIS — I10 HYPERTENSION, UNSPECIFIED TYPE: ICD-10-CM

## 2019-06-11 DIAGNOSIS — K56.609 SMALL BOWEL OBSTRUCTION (H): ICD-10-CM

## 2019-06-11 DIAGNOSIS — I48.21 PERMANENT ATRIAL FIBRILLATION (H): ICD-10-CM

## 2019-06-11 DIAGNOSIS — Z09 HOSPITAL DISCHARGE FOLLOW-UP: ICD-10-CM

## 2019-06-11 DIAGNOSIS — J69.0 ASPIRATION PNEUMONIA OF BOTH LOWER LOBES DUE TO VOMIT (H): ICD-10-CM

## 2019-06-11 DIAGNOSIS — Z86.73 HISTORY OF CVA (CEREBROVASCULAR ACCIDENT): ICD-10-CM

## 2019-06-11 DIAGNOSIS — Z93.59 SUPRAPUBIC CATHETER (H): ICD-10-CM

## 2019-06-11 DIAGNOSIS — I48.20 CHRONIC ATRIAL FIBRILLATION (H): ICD-10-CM

## 2019-06-11 DIAGNOSIS — A04.72 C. DIFFICILE COLITIS: ICD-10-CM

## 2019-06-11 DIAGNOSIS — G62.9 NEUROPATHY: ICD-10-CM

## 2019-06-11 DIAGNOSIS — K56.609 SBO (SMALL BOWEL OBSTRUCTION) (H): ICD-10-CM

## 2019-06-12 ENCOUNTER — COMMUNICATION - HEALTHEAST (OUTPATIENT)
Dept: FAMILY MEDICINE | Facility: CLINIC | Age: 84
End: 2019-06-12

## 2019-06-13 ENCOUNTER — RECORDS - HEALTHEAST (OUTPATIENT)
Dept: ADMINISTRATIVE | Facility: OTHER | Age: 84
End: 2019-06-13

## 2019-06-13 ENCOUNTER — COMMUNICATION - HEALTHEAST (OUTPATIENT)
Dept: GERIATRIC MEDICINE | Facility: CLINIC | Age: 84
End: 2019-06-13

## 2019-06-14 ENCOUNTER — RECORDS - HEALTHEAST (OUTPATIENT)
Dept: ADMINISTRATIVE | Facility: OTHER | Age: 84
End: 2019-06-14

## 2019-06-17 ENCOUNTER — COMMUNICATION - HEALTHEAST (OUTPATIENT)
Dept: FAMILY MEDICINE | Facility: CLINIC | Age: 84
End: 2019-06-17

## 2019-06-17 DIAGNOSIS — N28.0 RENAL INFARCT (H): ICD-10-CM

## 2019-06-17 DIAGNOSIS — I48.91 ATRIAL FIBRILLATION (H): ICD-10-CM

## 2019-06-17 LAB — INR PPP: 2.3 (ref 0.9–1.1)

## 2019-06-24 ENCOUNTER — COMMUNICATION - HEALTHEAST (OUTPATIENT)
Dept: FAMILY MEDICINE | Facility: CLINIC | Age: 84
End: 2019-06-24

## 2019-06-24 DIAGNOSIS — I48.91 ATRIAL FIBRILLATION (H): ICD-10-CM

## 2019-06-24 DIAGNOSIS — N28.0 RENAL INFARCT (H): ICD-10-CM

## 2019-06-24 LAB — INR PPP: 2.5 (ref 0.9–1.1)

## 2019-06-26 ENCOUNTER — RECORDS - HEALTHEAST (OUTPATIENT)
Dept: ADMINISTRATIVE | Facility: OTHER | Age: 84
End: 2019-06-26

## 2019-06-26 ENCOUNTER — OFFICE VISIT - HEALTHEAST (OUTPATIENT)
Dept: FAMILY MEDICINE | Facility: CLINIC | Age: 84
End: 2019-06-26

## 2019-06-26 DIAGNOSIS — A04.72 C. DIFFICILE COLITIS: ICD-10-CM

## 2019-06-26 DIAGNOSIS — I48.21 PERMANENT ATRIAL FIBRILLATION (H): ICD-10-CM

## 2019-06-26 DIAGNOSIS — E63.9 POOR NUTRITION: ICD-10-CM

## 2019-06-26 DIAGNOSIS — J69.0 ASPIRATION PNEUMONIA OF BOTH LOWER LOBES DUE TO VOMIT (H): ICD-10-CM

## 2019-06-26 LAB
ERYTHROCYTE [DISTWIDTH] IN BLOOD BY AUTOMATED COUNT: 12.1 % (ref 11–14.5)
HCT VFR BLD AUTO: 39.6 % (ref 40–54)
HGB BLD-MCNC: 13.2 G/DL (ref 14–18)
MCH RBC QN AUTO: 33.1 PG (ref 27–34)
MCHC RBC AUTO-ENTMCNC: 33.3 G/DL (ref 32–36)
MCV RBC AUTO: 99 FL (ref 80–100)
PLATELET # BLD AUTO: 180 THOU/UL (ref 140–440)
PMV BLD AUTO: 8.6 FL (ref 7–10)
RBC # BLD AUTO: 3.99 MILL/UL (ref 4.4–6.2)
WBC: 8.1 THOU/UL (ref 4–11)

## 2019-06-28 ENCOUNTER — COMMUNICATION - HEALTHEAST (OUTPATIENT)
Dept: FAMILY MEDICINE | Facility: CLINIC | Age: 84
End: 2019-06-28

## 2019-07-01 ENCOUNTER — COMMUNICATION - HEALTHEAST (OUTPATIENT)
Dept: FAMILY MEDICINE | Facility: CLINIC | Age: 84
End: 2019-07-01

## 2019-07-01 DIAGNOSIS — N28.0 RENAL INFARCT (H): ICD-10-CM

## 2019-07-01 DIAGNOSIS — I48.21 PERMANENT ATRIAL FIBRILLATION (H): ICD-10-CM

## 2019-07-01 LAB — INR PPP: 3.1 (ref 0.9–1.1)

## 2019-07-08 ENCOUNTER — COMMUNICATION - HEALTHEAST (OUTPATIENT)
Dept: FAMILY MEDICINE | Facility: CLINIC | Age: 84
End: 2019-07-08

## 2019-07-08 DIAGNOSIS — N28.0 RENAL INFARCT (H): ICD-10-CM

## 2019-07-08 DIAGNOSIS — I48.21 PERMANENT ATRIAL FIBRILLATION (H): ICD-10-CM

## 2019-07-08 LAB — INR PPP: 2.6 (ref 0.9–1.1)

## 2019-07-15 ENCOUNTER — COMMUNICATION - HEALTHEAST (OUTPATIENT)
Dept: FAMILY MEDICINE | Facility: CLINIC | Age: 84
End: 2019-07-15

## 2019-07-15 DIAGNOSIS — I48.21 PERMANENT ATRIAL FIBRILLATION (H): ICD-10-CM

## 2019-07-15 DIAGNOSIS — N28.0 RENAL INFARCT (H): ICD-10-CM

## 2019-07-15 LAB — INR PPP: 2.9 (ref 0.9–1.1)

## 2019-07-18 ENCOUNTER — COMMUNICATION - HEALTHEAST (OUTPATIENT)
Dept: ANTICOAGULATION | Facility: CLINIC | Age: 84
End: 2019-07-18

## 2019-07-22 ENCOUNTER — COMMUNICATION - HEALTHEAST (OUTPATIENT)
Dept: FAMILY MEDICINE | Facility: CLINIC | Age: 84
End: 2019-07-22

## 2019-07-22 DIAGNOSIS — I48.21 PERMANENT ATRIAL FIBRILLATION (H): ICD-10-CM

## 2019-07-22 DIAGNOSIS — N28.0 RENAL INFARCT (H): ICD-10-CM

## 2019-07-22 LAB — INR PPP: 2.9 (ref 0.9–1.1)

## 2019-07-23 ENCOUNTER — OFFICE VISIT - HEALTHEAST (OUTPATIENT)
Dept: FAMILY MEDICINE | Facility: CLINIC | Age: 84
End: 2019-07-23

## 2019-07-23 DIAGNOSIS — J69.0 ASPIRATION PNEUMONIA OF BOTH LOWER LOBES DUE TO VOMIT (H): ICD-10-CM

## 2019-07-23 DIAGNOSIS — I48.21 PERMANENT ATRIAL FIBRILLATION (H): ICD-10-CM

## 2019-07-23 DIAGNOSIS — M54.2 NECK PAIN: ICD-10-CM

## 2019-07-23 DIAGNOSIS — M62.81 MUSCLE WEAKNESS (GENERALIZED): ICD-10-CM

## 2019-07-29 ENCOUNTER — COMMUNICATION - HEALTHEAST (OUTPATIENT)
Dept: FAMILY MEDICINE | Facility: CLINIC | Age: 84
End: 2019-07-29

## 2019-07-29 DIAGNOSIS — I48.21 PERMANENT ATRIAL FIBRILLATION (H): ICD-10-CM

## 2019-07-29 DIAGNOSIS — N28.0 RENAL INFARCT (H): ICD-10-CM

## 2019-07-29 LAB — INR PPP: 2.9 (ref 0.9–1.1)

## 2019-08-05 ENCOUNTER — RECORDS - HEALTHEAST (OUTPATIENT)
Dept: ADMINISTRATIVE | Facility: OTHER | Age: 84
End: 2019-08-05

## 2019-08-12 ENCOUNTER — COMMUNICATION - HEALTHEAST (OUTPATIENT)
Dept: FAMILY MEDICINE | Facility: CLINIC | Age: 84
End: 2019-08-12

## 2019-08-12 DIAGNOSIS — I48.21 PERMANENT ATRIAL FIBRILLATION (H): ICD-10-CM

## 2019-08-12 DIAGNOSIS — N28.0 RENAL INFARCT (H): ICD-10-CM

## 2019-08-12 LAB — INR PPP: 3.3 (ref 0.9–1.1)

## 2019-08-14 ENCOUNTER — COMMUNICATION - HEALTHEAST (OUTPATIENT)
Dept: FAMILY MEDICINE | Facility: CLINIC | Age: 84
End: 2019-08-14

## 2019-08-14 ENCOUNTER — OFFICE VISIT - HEALTHEAST (OUTPATIENT)
Dept: FAMILY MEDICINE | Facility: CLINIC | Age: 84
End: 2019-08-14

## 2019-08-14 DIAGNOSIS — M62.81 MUSCLE WEAKNESS (GENERALIZED): ICD-10-CM

## 2019-08-14 DIAGNOSIS — I48.20 CHRONIC ATRIAL FIBRILLATION (H): ICD-10-CM

## 2019-08-14 DIAGNOSIS — I10 ACCELERATED HYPERTENSION: ICD-10-CM

## 2019-08-14 DIAGNOSIS — Z93.59 SUPRAPUBIC CATHETER (H): ICD-10-CM

## 2019-08-19 ENCOUNTER — COMMUNICATION - HEALTHEAST (OUTPATIENT)
Dept: FAMILY MEDICINE | Facility: CLINIC | Age: 84
End: 2019-08-19

## 2019-08-19 DIAGNOSIS — N28.0 RENAL INFARCT (H): ICD-10-CM

## 2019-08-19 DIAGNOSIS — I48.21 PERMANENT ATRIAL FIBRILLATION (H): ICD-10-CM

## 2019-08-19 LAB — INR PPP: 3.1 (ref 0.9–1.1)

## 2019-08-20 ENCOUNTER — COMMUNICATION - HEALTHEAST (OUTPATIENT)
Dept: FAMILY MEDICINE | Facility: CLINIC | Age: 84
End: 2019-08-20

## 2019-08-20 DIAGNOSIS — Z93.59 SUPRAPUBIC CATHETER (H): ICD-10-CM

## 2019-08-26 ENCOUNTER — COMMUNICATION - HEALTHEAST (OUTPATIENT)
Dept: FAMILY MEDICINE | Facility: CLINIC | Age: 84
End: 2019-08-26

## 2019-08-26 DIAGNOSIS — N28.0 RENAL INFARCT (H): ICD-10-CM

## 2019-08-26 DIAGNOSIS — I48.21 PERMANENT ATRIAL FIBRILLATION (H): ICD-10-CM

## 2019-08-26 LAB — INR PPP: 2.3 (ref 0.9–1.1)

## 2019-08-29 ENCOUNTER — COMMUNICATION - HEALTHEAST (OUTPATIENT)
Dept: GERIATRIC MEDICINE | Facility: CLINIC | Age: 84
End: 2019-08-29

## 2019-08-30 ENCOUNTER — COMMUNICATION - HEALTHEAST (OUTPATIENT)
Dept: GERIATRIC MEDICINE | Facility: CLINIC | Age: 84
End: 2019-08-30

## 2019-09-03 ENCOUNTER — COMMUNICATION - HEALTHEAST (OUTPATIENT)
Dept: FAMILY MEDICINE | Facility: CLINIC | Age: 84
End: 2019-09-03

## 2019-09-03 ENCOUNTER — COMMUNICATION - HEALTHEAST (OUTPATIENT)
Dept: ANTICOAGULATION | Facility: CLINIC | Age: 84
End: 2019-09-03

## 2019-09-03 DIAGNOSIS — I48.91 ATRIAL FIBRILLATION (H): ICD-10-CM

## 2019-09-03 DIAGNOSIS — N28.0 RENAL INFARCT (H): ICD-10-CM

## 2019-09-03 LAB — INR PPP: 3 (ref 0.9–1.1)

## 2019-09-09 ENCOUNTER — RECORDS - HEALTHEAST (OUTPATIENT)
Dept: ADMINISTRATIVE | Facility: OTHER | Age: 84
End: 2019-09-09

## 2019-09-09 ENCOUNTER — COMMUNICATION - HEALTHEAST (OUTPATIENT)
Dept: FAMILY MEDICINE | Facility: CLINIC | Age: 84
End: 2019-09-09

## 2019-09-09 ENCOUNTER — OFFICE VISIT - HEALTHEAST (OUTPATIENT)
Dept: FAMILY MEDICINE | Facility: CLINIC | Age: 84
End: 2019-09-09

## 2019-09-09 DIAGNOSIS — I48.91 ATRIAL FIBRILLATION (H): ICD-10-CM

## 2019-09-09 DIAGNOSIS — Z93.59 SUPRAPUBIC CATHETER (H): ICD-10-CM

## 2019-09-09 DIAGNOSIS — N28.0 RENAL INFARCT (H): ICD-10-CM

## 2019-09-09 DIAGNOSIS — Z71.85 IMMUNIZATION COUNSELING: ICD-10-CM

## 2019-09-09 DIAGNOSIS — I48.91 ATRIAL FIBRILLATION, UNSPECIFIED TYPE (H): ICD-10-CM

## 2019-09-09 DIAGNOSIS — I10 BENIGN ESSENTIAL HYPERTENSION: ICD-10-CM

## 2019-09-09 LAB — INR PPP: 2.6 (ref 0.9–1.1)

## 2019-09-11 ENCOUNTER — RECORDS - HEALTHEAST (OUTPATIENT)
Dept: ADMINISTRATIVE | Facility: OTHER | Age: 84
End: 2019-09-11

## 2019-09-16 ENCOUNTER — COMMUNICATION - HEALTHEAST (OUTPATIENT)
Dept: FAMILY MEDICINE | Facility: CLINIC | Age: 84
End: 2019-09-16

## 2019-09-16 DIAGNOSIS — N28.0 RENAL INFARCT (H): ICD-10-CM

## 2019-09-16 DIAGNOSIS — I48.91 ATRIAL FIBRILLATION, UNSPECIFIED TYPE (H): ICD-10-CM

## 2019-09-16 LAB — INR PPP: 2.5 (ref 0.9–1.1)

## 2019-09-27 ENCOUNTER — COMMUNICATION - HEALTHEAST (OUTPATIENT)
Dept: FAMILY MEDICINE | Facility: CLINIC | Age: 84
End: 2019-09-27

## 2019-10-01 ENCOUNTER — COMMUNICATION - HEALTHEAST (OUTPATIENT)
Dept: FAMILY MEDICINE | Facility: CLINIC | Age: 84
End: 2019-10-01

## 2019-10-01 DIAGNOSIS — I48.91 ATRIAL FIBRILLATION, UNSPECIFIED TYPE (H): ICD-10-CM

## 2019-10-01 DIAGNOSIS — N28.0 RENAL INFARCT (H): ICD-10-CM

## 2019-10-01 LAB — INR PPP: 2.2 (ref 0.9–1.1)

## 2019-10-03 ENCOUNTER — COMMUNICATION - HEALTHEAST (OUTPATIENT)
Dept: FAMILY MEDICINE | Facility: CLINIC | Age: 84
End: 2019-10-03

## 2019-10-03 DIAGNOSIS — Z91.09 ENVIRONMENTAL ALLERGIES: ICD-10-CM

## 2019-10-04 ENCOUNTER — COMMUNICATION - HEALTHEAST (OUTPATIENT)
Dept: FAMILY MEDICINE | Facility: CLINIC | Age: 84
End: 2019-10-04

## 2019-10-04 DIAGNOSIS — R05.9 COUGH: ICD-10-CM

## 2019-10-10 ENCOUNTER — RECORDS - HEALTHEAST (OUTPATIENT)
Dept: ADMINISTRATIVE | Facility: OTHER | Age: 84
End: 2019-10-10

## 2019-10-13 ENCOUNTER — COMMUNICATION - HEALTHEAST (OUTPATIENT)
Dept: FAMILY MEDICINE | Facility: CLINIC | Age: 84
End: 2019-10-13

## 2019-10-13 DIAGNOSIS — Z91.09 ENVIRONMENTAL ALLERGIES: ICD-10-CM

## 2019-10-14 ENCOUNTER — COMMUNICATION - HEALTHEAST (OUTPATIENT)
Dept: FAMILY MEDICINE | Facility: CLINIC | Age: 84
End: 2019-10-14

## 2019-10-14 DIAGNOSIS — I48.91 ATRIAL FIBRILLATION, UNSPECIFIED TYPE (H): ICD-10-CM

## 2019-10-14 DIAGNOSIS — N28.0 RENAL INFARCT (H): ICD-10-CM

## 2019-10-14 LAB — INR PPP: 3.1 (ref 0.9–1.1)

## 2019-10-21 ENCOUNTER — COMMUNICATION - HEALTHEAST (OUTPATIENT)
Dept: FAMILY MEDICINE | Facility: CLINIC | Age: 84
End: 2019-10-21

## 2019-10-21 DIAGNOSIS — Z91.09 ENVIRONMENTAL ALLERGIES: ICD-10-CM

## 2019-10-28 ENCOUNTER — COMMUNICATION - HEALTHEAST (OUTPATIENT)
Dept: FAMILY MEDICINE | Facility: CLINIC | Age: 84
End: 2019-10-28

## 2019-10-28 ENCOUNTER — COMMUNICATION - HEALTHEAST (OUTPATIENT)
Dept: SCHEDULING | Facility: CLINIC | Age: 84
End: 2019-10-28

## 2019-10-28 DIAGNOSIS — N28.0 RENAL INFARCT (H): ICD-10-CM

## 2019-10-28 DIAGNOSIS — I48.91 ATRIAL FIBRILLATION, UNSPECIFIED TYPE (H): ICD-10-CM

## 2019-10-28 LAB — INR PPP: 3.7 (ref 0.9–1.1)

## 2019-10-31 ENCOUNTER — COMMUNICATION - HEALTHEAST (OUTPATIENT)
Dept: ANTICOAGULATION | Facility: CLINIC | Age: 84
End: 2019-10-31

## 2019-11-04 ENCOUNTER — COMMUNICATION - HEALTHEAST (OUTPATIENT)
Dept: FAMILY MEDICINE | Facility: CLINIC | Age: 84
End: 2019-11-04

## 2019-11-04 DIAGNOSIS — I48.91 ATRIAL FIBRILLATION, UNSPECIFIED TYPE (H): ICD-10-CM

## 2019-11-04 DIAGNOSIS — N28.0 RENAL INFARCT (H): ICD-10-CM

## 2019-11-04 LAB — INR PPP: 3.1 (ref 0.9–1.1)

## 2019-11-08 ENCOUNTER — COMMUNICATION - HEALTHEAST (OUTPATIENT)
Dept: FAMILY MEDICINE | Facility: CLINIC | Age: 84
End: 2019-11-08

## 2019-11-08 DIAGNOSIS — Z93.59 SUPRAPUBIC CATHETER (H): ICD-10-CM

## 2019-11-11 ENCOUNTER — COMMUNICATION - HEALTHEAST (OUTPATIENT)
Dept: FAMILY MEDICINE | Facility: CLINIC | Age: 84
End: 2019-11-11

## 2019-11-11 DIAGNOSIS — N28.0 RENAL INFARCT (H): ICD-10-CM

## 2019-11-11 DIAGNOSIS — I48.91 ATRIAL FIBRILLATION, UNSPECIFIED TYPE (H): ICD-10-CM

## 2019-11-11 LAB — INR PPP: 2.5 (ref 0.9–1.1)

## 2019-11-18 ENCOUNTER — COMMUNICATION - HEALTHEAST (OUTPATIENT)
Dept: FAMILY MEDICINE | Facility: CLINIC | Age: 84
End: 2019-11-18

## 2019-11-18 ENCOUNTER — RECORDS - HEALTHEAST (OUTPATIENT)
Dept: ADMINISTRATIVE | Facility: OTHER | Age: 84
End: 2019-11-18

## 2019-11-18 DIAGNOSIS — I48.91 ATRIAL FIBRILLATION, UNSPECIFIED TYPE (H): ICD-10-CM

## 2019-11-18 DIAGNOSIS — N28.0 RENAL INFARCT (H): ICD-10-CM

## 2019-11-18 LAB — INR PPP: 2.3 (ref 0.9–1.1)

## 2019-11-20 ENCOUNTER — RECORDS - HEALTHEAST (OUTPATIENT)
Dept: ADMINISTRATIVE | Facility: OTHER | Age: 84
End: 2019-11-20

## 2019-11-25 ENCOUNTER — COMMUNICATION - HEALTHEAST (OUTPATIENT)
Dept: FAMILY MEDICINE | Facility: CLINIC | Age: 84
End: 2019-11-25

## 2019-11-25 DIAGNOSIS — N28.0 RENAL INFARCT (H): ICD-10-CM

## 2019-11-25 DIAGNOSIS — I48.91 ATRIAL FIBRILLATION, UNSPECIFIED TYPE (H): ICD-10-CM

## 2019-11-25 LAB — INR PPP: 3 (ref 0.9–1.1)

## 2019-12-02 ENCOUNTER — COMMUNICATION - HEALTHEAST (OUTPATIENT)
Dept: GERIATRIC MEDICINE | Facility: CLINIC | Age: 84
End: 2019-12-02

## 2019-12-02 ENCOUNTER — COMMUNICATION - HEALTHEAST (OUTPATIENT)
Dept: FAMILY MEDICINE | Facility: CLINIC | Age: 84
End: 2019-12-02

## 2019-12-02 DIAGNOSIS — N28.0 RENAL INFARCT (H): ICD-10-CM

## 2019-12-02 DIAGNOSIS — I48.91 ATRIAL FIBRILLATION, UNSPECIFIED TYPE (H): ICD-10-CM

## 2019-12-02 LAB — INR PPP: 2.4 (ref 0.9–1.1)

## 2019-12-03 ENCOUNTER — RECORDS - HEALTHEAST (OUTPATIENT)
Dept: ADMINISTRATIVE | Facility: OTHER | Age: 84
End: 2019-12-03

## 2019-12-09 ENCOUNTER — COMMUNICATION - HEALTHEAST (OUTPATIENT)
Dept: GERIATRIC MEDICINE | Facility: CLINIC | Age: 84
End: 2019-12-09

## 2019-12-09 ENCOUNTER — RECORDS - HEALTHEAST (OUTPATIENT)
Dept: ADMINISTRATIVE | Facility: OTHER | Age: 84
End: 2019-12-09

## 2019-12-11 ENCOUNTER — COMMUNICATION - HEALTHEAST (OUTPATIENT)
Dept: FAMILY MEDICINE | Facility: CLINIC | Age: 84
End: 2019-12-11

## 2019-12-12 ENCOUNTER — COMMUNICATION - HEALTHEAST (OUTPATIENT)
Dept: ANTICOAGULATION | Facility: CLINIC | Age: 84
End: 2019-12-12

## 2019-12-12 DIAGNOSIS — N28.0 RENAL INFARCT (H): ICD-10-CM

## 2019-12-12 DIAGNOSIS — I48.91 ATRIAL FIBRILLATION, UNSPECIFIED TYPE (H): ICD-10-CM

## 2019-12-13 ENCOUNTER — COMMUNICATION - HEALTHEAST (OUTPATIENT)
Dept: CARE COORDINATION | Facility: CLINIC | Age: 84
End: 2019-12-13

## 2019-12-13 DIAGNOSIS — R05.9 COUGH: ICD-10-CM

## 2019-12-13 RX ORDER — BENZONATATE 200 MG/1
200 CAPSULE ORAL 3 TIMES DAILY PRN
Qty: 20 CAPSULE | Refills: 0 | Status: ON HOLD | OUTPATIENT
Start: 2019-12-13 | End: 2021-07-12

## 2019-12-16 ENCOUNTER — RECORDS - HEALTHEAST (OUTPATIENT)
Dept: ADMINISTRATIVE | Facility: OTHER | Age: 84
End: 2019-12-16

## 2019-12-16 ENCOUNTER — COMMUNICATION - HEALTHEAST (OUTPATIENT)
Dept: FAMILY MEDICINE | Facility: CLINIC | Age: 84
End: 2019-12-16

## 2019-12-16 DIAGNOSIS — N28.0 RENAL INFARCT (H): ICD-10-CM

## 2019-12-16 DIAGNOSIS — I48.91 ATRIAL FIBRILLATION, UNSPECIFIED TYPE (H): ICD-10-CM

## 2019-12-16 LAB — INR PPP: 2.8 (ref 0.9–1.1)

## 2019-12-17 ENCOUNTER — OFFICE VISIT - HEALTHEAST (OUTPATIENT)
Dept: FAMILY MEDICINE | Facility: CLINIC | Age: 84
End: 2019-12-17

## 2019-12-17 DIAGNOSIS — R05.9 COUGH: ICD-10-CM

## 2019-12-17 DIAGNOSIS — Z93.59 SUPRAPUBIC CATHETER (H): ICD-10-CM

## 2019-12-17 DIAGNOSIS — I48.91 ATRIAL FIBRILLATION, UNSPECIFIED TYPE (H): ICD-10-CM

## 2019-12-17 DIAGNOSIS — K56.609 SBO (SMALL BOWEL OBSTRUCTION) (H): ICD-10-CM

## 2019-12-17 DIAGNOSIS — Z09 HOSPITAL DISCHARGE FOLLOW-UP: ICD-10-CM

## 2019-12-17 ASSESSMENT — PATIENT HEALTH QUESTIONNAIRE - PHQ9: SUM OF ALL RESPONSES TO PHQ QUESTIONS 1-9: 16

## 2019-12-23 ENCOUNTER — COMMUNICATION - HEALTHEAST (OUTPATIENT)
Dept: FAMILY MEDICINE | Facility: CLINIC | Age: 84
End: 2019-12-23

## 2019-12-23 DIAGNOSIS — I48.91 ATRIAL FIBRILLATION (H): ICD-10-CM

## 2019-12-23 DIAGNOSIS — N28.0 RENAL INFARCT (H): ICD-10-CM

## 2019-12-23 LAB — INR PPP: 3.1 (ref 0.9–1.1)

## 2019-12-26 ENCOUNTER — RECORDS - HEALTHEAST (OUTPATIENT)
Dept: ADMINISTRATIVE | Facility: OTHER | Age: 84
End: 2019-12-26

## 2019-12-30 ENCOUNTER — RECORDS - HEALTHEAST (OUTPATIENT)
Dept: ADMINISTRATIVE | Facility: OTHER | Age: 84
End: 2019-12-30

## 2019-12-30 ENCOUNTER — COMMUNICATION - HEALTHEAST (OUTPATIENT)
Dept: FAMILY MEDICINE | Facility: CLINIC | Age: 84
End: 2019-12-30

## 2019-12-30 DIAGNOSIS — I48.91 ATRIAL FIBRILLATION (H): ICD-10-CM

## 2019-12-30 DIAGNOSIS — N28.0 RENAL INFARCT (H): ICD-10-CM

## 2019-12-30 LAB — INR PPP: 2.6 (ref 0.9–1.1)

## 2020-01-06 ENCOUNTER — COMMUNICATION - HEALTHEAST (OUTPATIENT)
Dept: FAMILY MEDICINE | Facility: CLINIC | Age: 85
End: 2020-01-06

## 2020-01-06 ENCOUNTER — RECORDS - HEALTHEAST (OUTPATIENT)
Dept: ADMINISTRATIVE | Facility: OTHER | Age: 85
End: 2020-01-06

## 2020-01-06 DIAGNOSIS — N28.0 RENAL INFARCT (H): ICD-10-CM

## 2020-01-06 DIAGNOSIS — I48.91 ATRIAL FIBRILLATION (H): ICD-10-CM

## 2020-01-06 LAB — INR PPP: 3 (ref 0.9–1.1)

## 2020-01-09 ENCOUNTER — OFFICE VISIT - HEALTHEAST (OUTPATIENT)
Dept: FAMILY MEDICINE | Facility: CLINIC | Age: 85
End: 2020-01-09

## 2020-01-09 DIAGNOSIS — K56.609 SBO (SMALL BOWEL OBSTRUCTION) (H): ICD-10-CM

## 2020-01-09 DIAGNOSIS — I48.20 CHRONIC ATRIAL FIBRILLATION (H): ICD-10-CM

## 2020-01-09 DIAGNOSIS — M62.81 MUSCLE WEAKNESS (GENERALIZED): ICD-10-CM

## 2020-01-09 DIAGNOSIS — R05.9 COUGH: ICD-10-CM

## 2020-01-09 DIAGNOSIS — Z93.59 SUPRAPUBIC CATHETER (H): ICD-10-CM

## 2020-01-09 DIAGNOSIS — I10 BENIGN ESSENTIAL HYPERTENSION: ICD-10-CM

## 2020-01-13 ENCOUNTER — COMMUNICATION - HEALTHEAST (OUTPATIENT)
Dept: FAMILY MEDICINE | Facility: CLINIC | Age: 85
End: 2020-01-13

## 2020-01-13 DIAGNOSIS — I48.91 ATRIAL FIBRILLATION (H): ICD-10-CM

## 2020-01-13 DIAGNOSIS — N28.0 RENAL INFARCT (H): ICD-10-CM

## 2020-01-13 LAB — INR PPP: 3.7 (ref 0.9–1.1)

## 2020-01-20 ENCOUNTER — COMMUNICATION - HEALTHEAST (OUTPATIENT)
Dept: FAMILY MEDICINE | Facility: CLINIC | Age: 85
End: 2020-01-20

## 2020-01-20 DIAGNOSIS — N28.0 RENAL INFARCT (H): ICD-10-CM

## 2020-01-20 DIAGNOSIS — I48.91 ATRIAL FIBRILLATION (H): ICD-10-CM

## 2020-01-20 LAB — INR PPP: 2.3 (ref 0.9–1.1)

## 2020-01-27 ENCOUNTER — COMMUNICATION - HEALTHEAST (OUTPATIENT)
Dept: FAMILY MEDICINE | Facility: CLINIC | Age: 85
End: 2020-01-27

## 2020-01-27 DIAGNOSIS — N28.0 RENAL INFARCT (H): ICD-10-CM

## 2020-01-27 DIAGNOSIS — I48.91 ATRIAL FIBRILLATION (H): ICD-10-CM

## 2020-01-27 LAB — INR PPP: 2.8 (ref 0.9–1.1)

## 2020-02-03 ENCOUNTER — COMMUNICATION - HEALTHEAST (OUTPATIENT)
Dept: FAMILY MEDICINE | Facility: CLINIC | Age: 85
End: 2020-02-03

## 2020-02-03 DIAGNOSIS — N28.0 RENAL INFARCT (H): ICD-10-CM

## 2020-02-03 DIAGNOSIS — I48.91 ATRIAL FIBRILLATION (H): ICD-10-CM

## 2020-02-03 LAB — INR PPP: 2.8 (ref 0.9–1.1)

## 2020-02-17 ENCOUNTER — COMMUNICATION - HEALTHEAST (OUTPATIENT)
Dept: FAMILY MEDICINE | Facility: CLINIC | Age: 85
End: 2020-02-17

## 2020-02-17 DIAGNOSIS — I48.91 ATRIAL FIBRILLATION (H): ICD-10-CM

## 2020-02-17 DIAGNOSIS — N28.0 RENAL INFARCT (H): ICD-10-CM

## 2020-02-17 LAB — INR PPP: 3 (ref 0.9–1.1)

## 2020-02-20 ENCOUNTER — COMMUNICATION - HEALTHEAST (OUTPATIENT)
Dept: GERIATRIC MEDICINE | Facility: CLINIC | Age: 85
End: 2020-02-20

## 2020-02-24 ASSESSMENT — ACTIVITIES OF DAILY LIVING (ADL)
DEPENDENT_IADLS:: CLEANING;COOKING;LAUNDRY;SHOPPING;MEAL PREPARATION;MEDICATION MANAGEMENT;MONEY MANAGEMENT;TRANSPORTATION;INCONTINENCE

## 2020-02-28 ENCOUNTER — COMMUNICATION - HEALTHEAST (OUTPATIENT)
Dept: GERIATRIC MEDICINE | Facility: CLINIC | Age: 85
End: 2020-02-28

## 2020-03-02 ENCOUNTER — COMMUNICATION - HEALTHEAST (OUTPATIENT)
Dept: FAMILY MEDICINE | Facility: CLINIC | Age: 85
End: 2020-03-02

## 2020-03-02 DIAGNOSIS — N28.0 RENAL INFARCT (H): ICD-10-CM

## 2020-03-02 DIAGNOSIS — I48.91 ATRIAL FIBRILLATION (H): ICD-10-CM

## 2020-03-02 LAB — INR PPP: 2.5 (ref 0.9–1.1)

## 2020-03-16 ENCOUNTER — COMMUNICATION - HEALTHEAST (OUTPATIENT)
Dept: FAMILY MEDICINE | Facility: CLINIC | Age: 85
End: 2020-03-16

## 2020-03-16 DIAGNOSIS — N28.0 RENAL INFARCT (H): ICD-10-CM

## 2020-03-16 DIAGNOSIS — I48.91 ATRIAL FIBRILLATION (H): ICD-10-CM

## 2020-03-16 LAB — INR PPP: 2.2 (ref 0.9–1.1)

## 2020-03-26 ENCOUNTER — RECORDS - HEALTHEAST (OUTPATIENT)
Dept: ADMINISTRATIVE | Facility: OTHER | Age: 85
End: 2020-03-26

## 2020-04-07 ENCOUNTER — COMMUNICATION - HEALTHEAST (OUTPATIENT)
Dept: FAMILY MEDICINE | Facility: CLINIC | Age: 85
End: 2020-04-07

## 2020-04-07 DIAGNOSIS — K64.9 HEMORRHOID: ICD-10-CM

## 2020-04-07 RX ORDER — HYDROCORTISONE 2.5 %
CREAM (GRAM) TOPICAL
Qty: 28 G | Refills: 2 | Status: SHIPPED | OUTPATIENT
Start: 2020-04-07 | End: 2021-12-08

## 2020-04-13 ENCOUNTER — COMMUNICATION - HEALTHEAST (OUTPATIENT)
Dept: FAMILY MEDICINE | Facility: CLINIC | Age: 85
End: 2020-04-13

## 2020-04-13 DIAGNOSIS — I48.91 ATRIAL FIBRILLATION (H): ICD-10-CM

## 2020-04-13 DIAGNOSIS — N28.0 RENAL INFARCT (H): ICD-10-CM

## 2020-04-13 LAB — INR PPP: 2.8 (ref 0.9–1.1)

## 2020-04-14 ENCOUNTER — COMMUNICATION - HEALTHEAST (OUTPATIENT)
Dept: ANTICOAGULATION | Facility: CLINIC | Age: 85
End: 2020-04-14

## 2020-04-14 DIAGNOSIS — I48.20 CHRONIC ATRIAL FIBRILLATION (H): ICD-10-CM

## 2020-04-14 DIAGNOSIS — N28.0 RENAL INFARCT (H): ICD-10-CM

## 2020-04-14 DIAGNOSIS — I63.9 ACUTE CEREBRAL INFARCTION (H): ICD-10-CM

## 2020-04-15 ENCOUNTER — RECORDS - HEALTHEAST (OUTPATIENT)
Dept: ADMINISTRATIVE | Facility: OTHER | Age: 85
End: 2020-04-15

## 2020-04-21 ENCOUNTER — COMMUNICATION - HEALTHEAST (OUTPATIENT)
Dept: FAMILY MEDICINE | Facility: CLINIC | Age: 85
End: 2020-04-21

## 2020-04-21 DIAGNOSIS — R05.9 COUGH: ICD-10-CM

## 2020-05-11 ENCOUNTER — COMMUNICATION - HEALTHEAST (OUTPATIENT)
Dept: FAMILY MEDICINE | Facility: CLINIC | Age: 85
End: 2020-05-11

## 2020-05-11 DIAGNOSIS — I48.20 CHRONIC ATRIAL FIBRILLATION (H): ICD-10-CM

## 2020-05-11 DIAGNOSIS — I48.91 ATRIAL FIBRILLATION (H): ICD-10-CM

## 2020-05-11 DIAGNOSIS — N28.0 RENAL INFARCT (H): ICD-10-CM

## 2020-05-11 LAB — INR PPP: 2.1 (ref 0.9–1.1)

## 2020-05-13 ENCOUNTER — COMMUNICATION - HEALTHEAST (OUTPATIENT)
Dept: FAMILY MEDICINE | Facility: CLINIC | Age: 85
End: 2020-05-13

## 2020-05-13 DIAGNOSIS — I48.20 CHRONIC ATRIAL FIBRILLATION (H): ICD-10-CM

## 2020-06-08 ENCOUNTER — COMMUNICATION - HEALTHEAST (OUTPATIENT)
Dept: FAMILY MEDICINE | Facility: CLINIC | Age: 85
End: 2020-06-08

## 2020-06-08 DIAGNOSIS — I48.91 ATRIAL FIBRILLATION (H): ICD-10-CM

## 2020-06-08 DIAGNOSIS — N28.0 RENAL INFARCT (H): ICD-10-CM

## 2020-06-08 LAB — INR PPP: 1.7 (ref 0.9–1.1)

## 2020-06-17 ENCOUNTER — RECORDS - HEALTHEAST (OUTPATIENT)
Dept: ADMINISTRATIVE | Facility: OTHER | Age: 85
End: 2020-06-17

## 2020-06-22 ENCOUNTER — COMMUNICATION - HEALTHEAST (OUTPATIENT)
Dept: FAMILY MEDICINE | Facility: CLINIC | Age: 85
End: 2020-06-22

## 2020-06-22 DIAGNOSIS — N28.0 RENAL INFARCT (H): ICD-10-CM

## 2020-06-22 DIAGNOSIS — I48.91 ATRIAL FIBRILLATION (H): ICD-10-CM

## 2020-06-22 LAB — INR PPP: 1.8 (ref 0.9–1.1)

## 2020-06-23 ENCOUNTER — COMMUNICATION - HEALTHEAST (OUTPATIENT)
Dept: FAMILY MEDICINE | Facility: CLINIC | Age: 85
End: 2020-06-23

## 2020-06-29 ENCOUNTER — COMMUNICATION - HEALTHEAST (OUTPATIENT)
Dept: FAMILY MEDICINE | Facility: CLINIC | Age: 85
End: 2020-06-29

## 2020-06-29 DIAGNOSIS — N28.0 RENAL INFARCT (H): ICD-10-CM

## 2020-06-29 DIAGNOSIS — I48.91 ATRIAL FIBRILLATION (H): ICD-10-CM

## 2020-06-29 LAB — INR PPP: 2 (ref 0.9–1.1)

## 2020-07-02 ENCOUNTER — RECORDS - HEALTHEAST (OUTPATIENT)
Dept: ADMINISTRATIVE | Facility: OTHER | Age: 85
End: 2020-07-02

## 2020-07-06 ENCOUNTER — COMMUNICATION - HEALTHEAST (OUTPATIENT)
Dept: FAMILY MEDICINE | Facility: CLINIC | Age: 85
End: 2020-07-06

## 2020-07-06 DIAGNOSIS — I48.91 ATRIAL FIBRILLATION (H): ICD-10-CM

## 2020-07-06 DIAGNOSIS — N28.0 RENAL INFARCT (H): ICD-10-CM

## 2020-07-06 LAB — INR PPP: 2.2 (ref 0.9–1.1)

## 2020-07-08 ENCOUNTER — RECORDS - HEALTHEAST (OUTPATIENT)
Dept: ADMINISTRATIVE | Facility: OTHER | Age: 85
End: 2020-07-08

## 2020-07-13 ENCOUNTER — COMMUNICATION - HEALTHEAST (OUTPATIENT)
Dept: FAMILY MEDICINE | Facility: CLINIC | Age: 85
End: 2020-07-13

## 2020-07-13 DIAGNOSIS — E63.9 POOR NUTRITION: ICD-10-CM

## 2020-07-20 ENCOUNTER — COMMUNICATION - HEALTHEAST (OUTPATIENT)
Dept: FAMILY MEDICINE | Facility: CLINIC | Age: 85
End: 2020-07-20

## 2020-07-20 DIAGNOSIS — N28.0 RENAL INFARCT (H): ICD-10-CM

## 2020-07-20 DIAGNOSIS — I48.91 ATRIAL FIBRILLATION (H): ICD-10-CM

## 2020-07-20 LAB — INR PPP: 1.9 (ref 0.9–1.1)

## 2020-07-21 ENCOUNTER — OFFICE VISIT - HEALTHEAST (OUTPATIENT)
Dept: FAMILY MEDICINE | Facility: CLINIC | Age: 85
End: 2020-07-21

## 2020-07-21 DIAGNOSIS — I48.20 CHRONIC ATRIAL FIBRILLATION (H): ICD-10-CM

## 2020-07-21 DIAGNOSIS — E44.0 MODERATE PROTEIN-CALORIE MALNUTRITION (H): ICD-10-CM

## 2020-07-21 DIAGNOSIS — N28.0 RENAL INFARCT (H): ICD-10-CM

## 2020-07-21 DIAGNOSIS — Z86.73 HISTORY OF CVA (CEREBROVASCULAR ACCIDENT): ICD-10-CM

## 2020-07-21 DIAGNOSIS — Z91.09 ENVIRONMENTAL ALLERGIES: ICD-10-CM

## 2020-07-21 DIAGNOSIS — I10 BENIGN ESSENTIAL HYPERTENSION: ICD-10-CM

## 2020-07-21 DIAGNOSIS — I48.91 ATRIAL FIBRILLATION, UNSPECIFIED TYPE (H): ICD-10-CM

## 2020-07-21 DIAGNOSIS — I10 ACCELERATED HYPERTENSION: ICD-10-CM

## 2020-07-21 DIAGNOSIS — J44.9 CHRONIC OBSTRUCTIVE PULMONARY DISEASE, UNSPECIFIED COPD TYPE (H): ICD-10-CM

## 2020-07-21 ASSESSMENT — PATIENT HEALTH QUESTIONNAIRE - PHQ9: SUM OF ALL RESPONSES TO PHQ QUESTIONS 1-9: 0

## 2020-07-24 ENCOUNTER — AMBULATORY - HEALTHEAST (OUTPATIENT)
Dept: LAB | Facility: CLINIC | Age: 85
End: 2020-07-24

## 2020-07-24 DIAGNOSIS — E44.0 MODERATE PROTEIN-CALORIE MALNUTRITION (H): ICD-10-CM

## 2020-07-24 DIAGNOSIS — I10 BENIGN ESSENTIAL HYPERTENSION: ICD-10-CM

## 2020-07-24 DIAGNOSIS — I48.91 ATRIAL FIBRILLATION, UNSPECIFIED TYPE (H): ICD-10-CM

## 2020-07-24 LAB
ALBUMIN SERPL-MCNC: 3.9 G/DL (ref 3.5–5)
ALP SERPL-CCNC: 71 U/L (ref 45–120)
ALT SERPL W P-5'-P-CCNC: 24 U/L (ref 0–45)
ANION GAP SERPL CALCULATED.3IONS-SCNC: 11 MMOL/L (ref 5–18)
AST SERPL W P-5'-P-CCNC: 23 U/L (ref 0–40)
BILIRUB SERPL-MCNC: 0.8 MG/DL (ref 0–1)
BUN SERPL-MCNC: 10 MG/DL (ref 8–28)
CALCIUM SERPL-MCNC: 8.4 MG/DL (ref 8.5–10.5)
CHLORIDE BLD-SCNC: 101 MMOL/L (ref 98–107)
CO2 SERPL-SCNC: 25 MMOL/L (ref 22–31)
CREAT SERPL-MCNC: 1.16 MG/DL (ref 0.7–1.3)
DIGOXIN LEVEL LHE- HISTORICAL: 0.7 NG/ML (ref 0.5–2)
ERYTHROCYTE [DISTWIDTH] IN BLOOD BY AUTOMATED COUNT: 12.1 % (ref 11–14.5)
GFR SERPL CREATININE-BSD FRML MDRD: 58 ML/MIN/1.73M2
GLUCOSE BLD-MCNC: 105 MG/DL (ref 70–125)
HCT VFR BLD AUTO: 44.4 % (ref 40–54)
HGB BLD-MCNC: 14.5 G/DL (ref 14–18)
MCH RBC QN AUTO: 32.5 PG (ref 27–34)
MCHC RBC AUTO-ENTMCNC: 32.7 G/DL (ref 32–36)
MCV RBC AUTO: 100 FL (ref 80–100)
PLATELET # BLD AUTO: 187 THOU/UL (ref 140–440)
PMV BLD AUTO: 8.8 FL (ref 7–10)
POTASSIUM BLD-SCNC: 4.6 MMOL/L (ref 3.5–5)
PROT SERPL-MCNC: 7.6 G/DL (ref 6–8)
RBC # BLD AUTO: 4.46 MILL/UL (ref 4.4–6.2)
SODIUM SERPL-SCNC: 137 MMOL/L (ref 136–145)
TSH SERPL DL<=0.005 MIU/L-ACNC: 0.49 UIU/ML (ref 0.3–5)
WBC: 9.1 THOU/UL (ref 4–11)

## 2020-07-27 ENCOUNTER — COMMUNICATION - HEALTHEAST (OUTPATIENT)
Dept: FAMILY MEDICINE | Facility: CLINIC | Age: 85
End: 2020-07-27

## 2020-08-03 ENCOUNTER — COMMUNICATION - HEALTHEAST (OUTPATIENT)
Dept: FAMILY MEDICINE | Facility: CLINIC | Age: 85
End: 2020-08-03

## 2020-08-03 DIAGNOSIS — N28.0 RENAL INFARCT (H): ICD-10-CM

## 2020-08-03 DIAGNOSIS — I48.91 ATRIAL FIBRILLATION, UNSPECIFIED TYPE (H): ICD-10-CM

## 2020-08-03 LAB — INR PPP: 2.2 (ref 0.9–1.1)

## 2020-08-12 ENCOUNTER — COMMUNICATION - HEALTHEAST (OUTPATIENT)
Dept: FAMILY MEDICINE | Facility: CLINIC | Age: 85
End: 2020-08-12

## 2020-08-12 DIAGNOSIS — E63.9 POOR NUTRITION: ICD-10-CM

## 2020-08-13 ENCOUNTER — RECORDS - HEALTHEAST (OUTPATIENT)
Dept: ADMINISTRATIVE | Facility: OTHER | Age: 85
End: 2020-08-13

## 2020-08-17 ENCOUNTER — COMMUNICATION - HEALTHEAST (OUTPATIENT)
Dept: FAMILY MEDICINE | Facility: CLINIC | Age: 85
End: 2020-08-17

## 2020-08-17 DIAGNOSIS — N28.0 RENAL INFARCT (H): ICD-10-CM

## 2020-08-17 DIAGNOSIS — I48.91 ATRIAL FIBRILLATION, UNSPECIFIED TYPE (H): ICD-10-CM

## 2020-08-17 LAB — INR PPP: 2.2 (ref 0.9–1.1)

## 2020-08-28 ENCOUNTER — COMMUNICATION - HEALTHEAST (OUTPATIENT)
Dept: FAMILY MEDICINE | Facility: CLINIC | Age: 85
End: 2020-08-28

## 2020-08-28 DIAGNOSIS — H04.123 DRY EYES: ICD-10-CM

## 2020-09-01 ENCOUNTER — COMMUNICATION - HEALTHEAST (OUTPATIENT)
Dept: GERIATRIC MEDICINE | Facility: CLINIC | Age: 85
End: 2020-09-01

## 2020-09-01 RX ORDER — POLYVINYL ALCOHOL, POVIDONE .6; .5 G/100ML; G/100ML
SOLUTION/ DROPS INTRAOCULAR
Qty: 15 ML | Refills: 3 | Status: SHIPPED | OUTPATIENT
Start: 2020-09-01

## 2020-09-14 ENCOUNTER — COMMUNICATION - HEALTHEAST (OUTPATIENT)
Dept: FAMILY MEDICINE | Facility: CLINIC | Age: 85
End: 2020-09-14

## 2020-09-14 DIAGNOSIS — N28.0 RENAL INFARCT (H): ICD-10-CM

## 2020-09-14 DIAGNOSIS — I48.91 ATRIAL FIBRILLATION, UNSPECIFIED TYPE (H): ICD-10-CM

## 2020-09-14 LAB — INR PPP: 2.5 (ref 0.9–1.1)

## 2020-09-15 ENCOUNTER — COMMUNICATION - HEALTHEAST (OUTPATIENT)
Dept: GERIATRIC MEDICINE | Facility: CLINIC | Age: 85
End: 2020-09-15

## 2020-09-15 ENCOUNTER — AMBULATORY - HEALTHEAST (OUTPATIENT)
Dept: ANTICOAGULATION | Facility: CLINIC | Age: 85
End: 2020-09-15

## 2020-09-16 ENCOUNTER — OFFICE VISIT - HEALTHEAST (OUTPATIENT)
Dept: FAMILY MEDICINE | Facility: CLINIC | Age: 85
End: 2020-09-16

## 2020-09-16 DIAGNOSIS — I48.91 ATRIAL FIBRILLATION, UNSPECIFIED TYPE (H): ICD-10-CM

## 2020-09-16 DIAGNOSIS — K21.9 GASTROESOPHAGEAL REFLUX DISEASE WITHOUT ESOPHAGITIS: ICD-10-CM

## 2020-09-16 DIAGNOSIS — I10 BENIGN ESSENTIAL HYPERTENSION: ICD-10-CM

## 2020-09-16 DIAGNOSIS — J44.9 CHRONIC OBSTRUCTIVE PULMONARY DISEASE, UNSPECIFIED COPD TYPE (H): ICD-10-CM

## 2020-10-06 ENCOUNTER — COMMUNICATION - HEALTHEAST (OUTPATIENT)
Dept: FAMILY MEDICINE | Facility: CLINIC | Age: 85
End: 2020-10-06

## 2020-10-06 DIAGNOSIS — I10 BENIGN ESSENTIAL HYPERTENSION: ICD-10-CM

## 2020-10-08 ENCOUNTER — RECORDS - HEALTHEAST (OUTPATIENT)
Dept: ADMINISTRATIVE | Facility: OTHER | Age: 85
End: 2020-10-08

## 2020-10-11 ENCOUNTER — COMMUNICATION - HEALTHEAST (OUTPATIENT)
Dept: FAMILY MEDICINE | Facility: CLINIC | Age: 85
End: 2020-10-11

## 2020-10-11 DIAGNOSIS — E63.9 POOR NUTRITION: ICD-10-CM

## 2020-10-12 ENCOUNTER — COMMUNICATION - HEALTHEAST (OUTPATIENT)
Dept: FAMILY MEDICINE | Facility: CLINIC | Age: 85
End: 2020-10-12

## 2020-10-12 DIAGNOSIS — N28.0 RENAL INFARCT (H): ICD-10-CM

## 2020-10-12 DIAGNOSIS — I48.91 ATRIAL FIBRILLATION, UNSPECIFIED TYPE (H): ICD-10-CM

## 2020-10-12 LAB — INR PPP: 3.7 (ref 0.9–1.1)

## 2020-10-19 ENCOUNTER — COMMUNICATION - HEALTHEAST (OUTPATIENT)
Dept: FAMILY MEDICINE | Facility: CLINIC | Age: 85
End: 2020-10-19

## 2020-10-19 DIAGNOSIS — I48.91 ATRIAL FIBRILLATION, UNSPECIFIED TYPE (H): ICD-10-CM

## 2020-10-19 DIAGNOSIS — N28.0 RENAL INFARCT (H): ICD-10-CM

## 2020-10-19 LAB — INR PPP: 4 (ref 0.9–1.1)

## 2020-10-22 ENCOUNTER — COMMUNICATION - HEALTHEAST (OUTPATIENT)
Dept: FAMILY MEDICINE | Facility: CLINIC | Age: 85
End: 2020-10-22

## 2020-10-22 DIAGNOSIS — N28.0 RENAL INFARCT (H): ICD-10-CM

## 2020-10-22 DIAGNOSIS — I48.91 ATRIAL FIBRILLATION, UNSPECIFIED TYPE (H): ICD-10-CM

## 2020-10-22 LAB — INR PPP: 2.3 (ref 0.9–1.1)

## 2020-10-26 ENCOUNTER — COMMUNICATION - HEALTHEAST (OUTPATIENT)
Dept: FAMILY MEDICINE | Facility: CLINIC | Age: 85
End: 2020-10-26

## 2020-10-26 DIAGNOSIS — N28.0 RENAL INFARCT (H): ICD-10-CM

## 2020-10-26 DIAGNOSIS — I48.91 ATRIAL FIBRILLATION, UNSPECIFIED TYPE (H): ICD-10-CM

## 2020-10-26 LAB — INR PPP: 2.3 (ref 0.9–1.1)

## 2020-10-29 ENCOUNTER — OFFICE VISIT - HEALTHEAST (OUTPATIENT)
Dept: FAMILY MEDICINE | Facility: CLINIC | Age: 85
End: 2020-10-29

## 2020-10-29 DIAGNOSIS — I48.91 ATRIAL FIBRILLATION, UNSPECIFIED TYPE (H): ICD-10-CM

## 2020-10-29 DIAGNOSIS — Z93.59 SUPRAPUBIC CATHETER (H): ICD-10-CM

## 2020-10-29 DIAGNOSIS — I10 BENIGN ESSENTIAL HYPERTENSION: ICD-10-CM

## 2020-10-29 DIAGNOSIS — N28.0 RENAL INFARCT (H): ICD-10-CM

## 2020-10-29 DIAGNOSIS — J44.9 CHRONIC OBSTRUCTIVE PULMONARY DISEASE, UNSPECIFIED COPD TYPE (H): ICD-10-CM

## 2020-10-29 DIAGNOSIS — Z86.73 HISTORY OF CVA (CEREBROVASCULAR ACCIDENT): ICD-10-CM

## 2020-11-02 ENCOUNTER — COMMUNICATION - HEALTHEAST (OUTPATIENT)
Dept: FAMILY MEDICINE | Facility: CLINIC | Age: 85
End: 2020-11-02

## 2020-11-02 DIAGNOSIS — I48.91 ATRIAL FIBRILLATION, UNSPECIFIED TYPE (H): ICD-10-CM

## 2020-11-02 DIAGNOSIS — N28.0 RENAL INFARCT (H): ICD-10-CM

## 2020-11-02 LAB — INR PPP: 1.7 (ref 0.9–1.1)

## 2020-11-09 ENCOUNTER — COMMUNICATION - HEALTHEAST (OUTPATIENT)
Dept: SCHEDULING | Facility: CLINIC | Age: 85
End: 2020-11-09

## 2020-11-09 DIAGNOSIS — N28.0 RENAL INFARCT (H): ICD-10-CM

## 2020-11-09 DIAGNOSIS — I48.91 ATRIAL FIBRILLATION, UNSPECIFIED TYPE (H): ICD-10-CM

## 2020-11-09 LAB — INR PPP: 2.1 (ref 0.9–1.1)

## 2020-11-16 ENCOUNTER — COMMUNICATION - HEALTHEAST (OUTPATIENT)
Dept: FAMILY MEDICINE | Facility: CLINIC | Age: 85
End: 2020-11-16

## 2020-11-16 DIAGNOSIS — N28.0 RENAL INFARCT (H): ICD-10-CM

## 2020-11-16 DIAGNOSIS — I48.91 ATRIAL FIBRILLATION, UNSPECIFIED TYPE (H): ICD-10-CM

## 2020-11-16 LAB — INR PPP: 2.4 (ref 0.9–1.1)

## 2020-11-23 ENCOUNTER — COMMUNICATION - HEALTHEAST (OUTPATIENT)
Dept: FAMILY MEDICINE | Facility: CLINIC | Age: 85
End: 2020-11-23

## 2020-11-23 DIAGNOSIS — N28.0 RENAL INFARCT (H): ICD-10-CM

## 2020-11-23 LAB — INR PPP: 2.5 (ref 0.9–1.1)

## 2020-12-02 ENCOUNTER — RECORDS - HEALTHEAST (OUTPATIENT)
Dept: ADMINISTRATIVE | Facility: OTHER | Age: 85
End: 2020-12-02

## 2020-12-03 ENCOUNTER — COMMUNICATION - HEALTHEAST (OUTPATIENT)
Dept: GERIATRIC MEDICINE | Facility: CLINIC | Age: 85
End: 2020-12-03

## 2020-12-03 ENCOUNTER — AMBULATORY - HEALTHEAST (OUTPATIENT)
Dept: ANTICOAGULATION | Facility: CLINIC | Age: 85
End: 2020-12-03

## 2020-12-07 ENCOUNTER — COMMUNICATION - HEALTHEAST (OUTPATIENT)
Dept: FAMILY MEDICINE | Facility: CLINIC | Age: 85
End: 2020-12-07

## 2020-12-07 DIAGNOSIS — I48.20 CHRONIC ATRIAL FIBRILLATION (H): ICD-10-CM

## 2020-12-07 DIAGNOSIS — N28.0 RENAL INFARCT (H): ICD-10-CM

## 2020-12-07 DIAGNOSIS — I48.91 ATRIAL FIBRILLATION (H): ICD-10-CM

## 2020-12-07 LAB — INR PPP: 2.1 (ref 0.9–1.1)

## 2020-12-14 ENCOUNTER — COMMUNICATION - HEALTHEAST (OUTPATIENT)
Dept: FAMILY MEDICINE | Facility: CLINIC | Age: 85
End: 2020-12-14

## 2020-12-14 DIAGNOSIS — N28.0 RENAL INFARCT (H): ICD-10-CM

## 2020-12-14 DIAGNOSIS — I48.91 ATRIAL FIBRILLATION (H): ICD-10-CM

## 2020-12-14 LAB — INR PPP: 1.7 (ref 0.9–1.1)

## 2020-12-17 ENCOUNTER — COMMUNICATION - HEALTHEAST (OUTPATIENT)
Dept: ADMINISTRATIVE | Facility: CLINIC | Age: 85
End: 2020-12-17

## 2020-12-17 DIAGNOSIS — K21.9 GASTROESOPHAGEAL REFLUX DISEASE WITHOUT ESOPHAGITIS: ICD-10-CM

## 2020-12-21 ENCOUNTER — COMMUNICATION - HEALTHEAST (OUTPATIENT)
Dept: ANTICOAGULATION | Facility: CLINIC | Age: 85
End: 2020-12-21

## 2020-12-21 ENCOUNTER — COMMUNICATION - HEALTHEAST (OUTPATIENT)
Dept: FAMILY MEDICINE | Facility: CLINIC | Age: 85
End: 2020-12-21

## 2020-12-21 DIAGNOSIS — M62.81 MUSCLE WEAKNESS (GENERALIZED): ICD-10-CM

## 2020-12-21 DIAGNOSIS — E63.9 POOR NUTRITION: ICD-10-CM

## 2020-12-21 DIAGNOSIS — I48.20 CHRONIC ATRIAL FIBRILLATION (H): ICD-10-CM

## 2020-12-21 DIAGNOSIS — N28.0 RENAL INFARCT (H): ICD-10-CM

## 2020-12-21 DIAGNOSIS — I48.91 ATRIAL FIBRILLATION (H): ICD-10-CM

## 2020-12-21 DIAGNOSIS — K21.9 GASTROESOPHAGEAL REFLUX DISEASE WITHOUT ESOPHAGITIS: ICD-10-CM

## 2020-12-21 LAB — INR PPP: 1.8 (ref 0.9–1.1)

## 2020-12-28 ENCOUNTER — COMMUNICATION - HEALTHEAST (OUTPATIENT)
Dept: FAMILY MEDICINE | Facility: CLINIC | Age: 85
End: 2020-12-28

## 2020-12-28 DIAGNOSIS — N28.0 RENAL INFARCT (H): ICD-10-CM

## 2020-12-28 DIAGNOSIS — I48.91 ATRIAL FIBRILLATION (H): ICD-10-CM

## 2020-12-28 LAB — INR PPP: 2.3 (ref 0.9–1.1)

## 2021-01-04 ENCOUNTER — COMMUNICATION - HEALTHEAST (OUTPATIENT)
Dept: FAMILY MEDICINE | Facility: CLINIC | Age: 86
End: 2021-01-04

## 2021-01-04 DIAGNOSIS — N28.0 RENAL INFARCT (H): ICD-10-CM

## 2021-01-04 LAB — INR PPP: 2.5 (ref 0.9–1.1)

## 2021-01-07 ENCOUNTER — RECORDS - HEALTHEAST (OUTPATIENT)
Dept: ADMINISTRATIVE | Facility: OTHER | Age: 86
End: 2021-01-07

## 2021-01-13 ENCOUNTER — OFFICE VISIT - HEALTHEAST (OUTPATIENT)
Dept: FAMILY MEDICINE | Facility: CLINIC | Age: 86
End: 2021-01-13

## 2021-01-13 DIAGNOSIS — K21.9 GASTROESOPHAGEAL REFLUX DISEASE WITHOUT ESOPHAGITIS: ICD-10-CM

## 2021-01-13 DIAGNOSIS — E44.0 MODERATE PROTEIN-CALORIE MALNUTRITION (H): ICD-10-CM

## 2021-01-13 DIAGNOSIS — I48.21 PERMANENT ATRIAL FIBRILLATION (H): ICD-10-CM

## 2021-01-13 DIAGNOSIS — Z91.09 ENVIRONMENTAL ALLERGIES: ICD-10-CM

## 2021-01-13 DIAGNOSIS — I10 BENIGN ESSENTIAL HYPERTENSION: ICD-10-CM

## 2021-01-13 DIAGNOSIS — Z93.59 SUPRAPUBIC CATHETER (H): ICD-10-CM

## 2021-01-13 DIAGNOSIS — J44.9 CHRONIC OBSTRUCTIVE PULMONARY DISEASE, UNSPECIFIED COPD TYPE (H): ICD-10-CM

## 2021-01-13 DIAGNOSIS — I48.20 CHRONIC ATRIAL FIBRILLATION (H): ICD-10-CM

## 2021-01-13 RX ORDER — WARFARIN SODIUM 2.5 MG/1
2.5 TABLET ORAL SEE ADMIN INSTRUCTIONS
Qty: 90 TABLET | Refills: 1 | Status: SHIPPED | OUTPATIENT
Start: 2021-01-13 | End: 2021-07-20

## 2021-01-13 RX ORDER — ALBUTEROL SULFATE 90 UG/1
AEROSOL, METERED RESPIRATORY (INHALATION)
Qty: 1 EACH | Refills: 3 | Status: SHIPPED | OUTPATIENT
Start: 2021-01-13 | End: 2023-05-25

## 2021-01-18 ENCOUNTER — COMMUNICATION - HEALTHEAST (OUTPATIENT)
Dept: SCHEDULING | Facility: CLINIC | Age: 86
End: 2021-01-18

## 2021-01-18 DIAGNOSIS — N28.0 RENAL INFARCT (H): ICD-10-CM

## 2021-01-18 LAB — INR PPP: 2.4 (ref 0.9–1.1)

## 2021-01-25 ENCOUNTER — OFFICE VISIT - HEALTHEAST (OUTPATIENT)
Dept: FAMILY MEDICINE | Facility: CLINIC | Age: 86
End: 2021-01-25

## 2021-01-25 ENCOUNTER — COMMUNICATION - HEALTHEAST (OUTPATIENT)
Dept: GERIATRIC MEDICINE | Facility: CLINIC | Age: 86
End: 2021-01-25

## 2021-01-25 DIAGNOSIS — J44.9 CHRONIC OBSTRUCTIVE PULMONARY DISEASE, UNSPECIFIED COPD TYPE (H): ICD-10-CM

## 2021-01-25 DIAGNOSIS — E44.0 MODERATE PROTEIN-CALORIE MALNUTRITION (H): ICD-10-CM

## 2021-01-25 DIAGNOSIS — I48.21 PERMANENT ATRIAL FIBRILLATION (H): ICD-10-CM

## 2021-01-25 DIAGNOSIS — I10 BENIGN ESSENTIAL HYPERTENSION: ICD-10-CM

## 2021-01-29 ENCOUNTER — COMMUNICATION - HEALTHEAST (OUTPATIENT)
Dept: GERIATRIC MEDICINE | Facility: CLINIC | Age: 86
End: 2021-01-29

## 2021-02-01 ENCOUNTER — COMMUNICATION - HEALTHEAST (OUTPATIENT)
Dept: FAMILY MEDICINE | Facility: CLINIC | Age: 86
End: 2021-02-01

## 2021-02-01 DIAGNOSIS — I48.91 ATRIAL FIBRILLATION (H): ICD-10-CM

## 2021-02-01 DIAGNOSIS — N28.0 RENAL INFARCT (H): ICD-10-CM

## 2021-02-01 LAB — INR PPP: 3 (ref 0.9–1.1)

## 2021-02-02 ENCOUNTER — COMMUNICATION - HEALTHEAST (OUTPATIENT)
Dept: FAMILY MEDICINE | Facility: CLINIC | Age: 86
End: 2021-02-02

## 2021-02-15 ENCOUNTER — COMMUNICATION - HEALTHEAST (OUTPATIENT)
Dept: FAMILY MEDICINE | Facility: CLINIC | Age: 86
End: 2021-02-15

## 2021-02-15 ENCOUNTER — COMMUNICATION - HEALTHEAST (OUTPATIENT)
Dept: ANTICOAGULATION | Facility: CLINIC | Age: 86
End: 2021-02-15

## 2021-02-15 DIAGNOSIS — I48.20 CHRONIC ATRIAL FIBRILLATION (H): ICD-10-CM

## 2021-02-15 DIAGNOSIS — K21.9 GASTROESOPHAGEAL REFLUX DISEASE WITHOUT ESOPHAGITIS: ICD-10-CM

## 2021-02-15 DIAGNOSIS — N28.0 RENAL INFARCT (H): ICD-10-CM

## 2021-02-15 DIAGNOSIS — E63.9 POOR NUTRITION: ICD-10-CM

## 2021-02-15 DIAGNOSIS — M62.81 MUSCLE WEAKNESS (GENERALIZED): ICD-10-CM

## 2021-02-15 LAB — INR PPP: 2.6 (ref 0.9–1.1)

## 2021-02-15 RX ORDER — MULTIVIT,CALC,MINS/IRON/FOLIC 9MG-400MCG
1 TABLET ORAL DAILY
Qty: 30 TABLET | Refills: 3 | Status: ON HOLD | OUTPATIENT
Start: 2021-02-15 | End: 2021-07-12

## 2021-02-15 RX ORDER — DIGOXIN 125 MCG
125 TABLET ORAL DAILY
Qty: 30 TABLET | Refills: 3 | Status: SHIPPED | OUTPATIENT
Start: 2021-02-15 | End: 2021-07-20

## 2021-02-15 RX ORDER — PANTOPRAZOLE SODIUM 40 MG/1
40 TABLET, DELAYED RELEASE ORAL DAILY
Qty: 30 TABLET | Refills: 3 | Status: ON HOLD | OUTPATIENT
Start: 2021-02-15 | End: 2021-07-12

## 2021-02-15 RX ORDER — WARFARIN SODIUM 1 MG/1
TABLET ORAL
Qty: 150 TABLET | Refills: 1 | Status: SHIPPED | OUTPATIENT
Start: 2021-02-15 | End: 2021-07-20

## 2021-03-01 ENCOUNTER — COMMUNICATION - HEALTHEAST (OUTPATIENT)
Dept: SCHEDULING | Facility: CLINIC | Age: 86
End: 2021-03-01

## 2021-03-01 ENCOUNTER — COMMUNICATION - HEALTHEAST (OUTPATIENT)
Dept: FAMILY MEDICINE | Facility: CLINIC | Age: 86
End: 2021-03-01

## 2021-03-01 DIAGNOSIS — L98.9 SKIN SORE: ICD-10-CM

## 2021-03-01 DIAGNOSIS — N28.0 RENAL INFARCT (H): ICD-10-CM

## 2021-03-01 DIAGNOSIS — I48.91 ATRIAL FIBRILLATION (H): ICD-10-CM

## 2021-03-01 LAB — INR PPP: 2.5 (ref 0.9–1.1)

## 2021-03-25 ENCOUNTER — AMBULATORY - HEALTHEAST (OUTPATIENT)
Dept: ANTICOAGULATION | Facility: CLINIC | Age: 86
End: 2021-03-25

## 2021-03-25 DIAGNOSIS — N28.0 RENAL INFARCT (H): ICD-10-CM

## 2021-03-25 DIAGNOSIS — I48.20 CHRONIC ATRIAL FIBRILLATION (H): ICD-10-CM

## 2021-03-29 ENCOUNTER — COMMUNICATION - HEALTHEAST (OUTPATIENT)
Dept: FAMILY MEDICINE | Facility: CLINIC | Age: 86
End: 2021-03-29

## 2021-03-29 DIAGNOSIS — N28.0 RENAL INFARCT (H): ICD-10-CM

## 2021-03-29 LAB — INR PPP: 1.3 (ref 0.9–1.1)

## 2021-03-31 ENCOUNTER — COMMUNICATION - HEALTHEAST (OUTPATIENT)
Dept: FAMILY MEDICINE | Facility: CLINIC | Age: 86
End: 2021-03-31

## 2021-04-05 ENCOUNTER — COMMUNICATION - HEALTHEAST (OUTPATIENT)
Dept: FAMILY MEDICINE | Facility: CLINIC | Age: 86
End: 2021-04-05

## 2021-04-05 DIAGNOSIS — N28.0 RENAL INFARCT (H): ICD-10-CM

## 2021-04-05 DIAGNOSIS — I48.91 ATRIAL FIBRILLATION (H): ICD-10-CM

## 2021-04-05 LAB — INR PPP: 2.9 (ref 0.9–1.1)

## 2021-04-12 ENCOUNTER — COMMUNICATION - HEALTHEAST (OUTPATIENT)
Dept: SCHEDULING | Facility: CLINIC | Age: 86
End: 2021-04-12

## 2021-04-12 ENCOUNTER — RECORDS - HEALTHEAST (OUTPATIENT)
Dept: ADMINISTRATIVE | Facility: OTHER | Age: 86
End: 2021-04-12

## 2021-04-12 DIAGNOSIS — N28.0 RENAL INFARCT (H): ICD-10-CM

## 2021-04-12 LAB — INR PPP: 2.2 (ref 0.9–1.1)

## 2021-04-22 ENCOUNTER — COMMUNICATION - HEALTHEAST (OUTPATIENT)
Dept: FAMILY MEDICINE | Facility: CLINIC | Age: 86
End: 2021-04-22

## 2021-04-22 DIAGNOSIS — K21.9 GASTROESOPHAGEAL REFLUX DISEASE WITHOUT ESOPHAGITIS: ICD-10-CM

## 2021-04-23 RX ORDER — FAMOTIDINE 20 MG/1
TABLET, FILM COATED ORAL
Qty: 90 TABLET | Refills: 3 | Status: SHIPPED | OUTPATIENT
Start: 2021-04-23 | End: 2021-07-20

## 2021-04-26 ENCOUNTER — COMMUNICATION - HEALTHEAST (OUTPATIENT)
Dept: SCHEDULING | Facility: CLINIC | Age: 86
End: 2021-04-26

## 2021-04-26 DIAGNOSIS — N28.0 RENAL INFARCT (H): ICD-10-CM

## 2021-04-26 LAB — INR PPP: 2.2 (ref 0.9–1.1)

## 2021-05-03 ENCOUNTER — RECORDS - HEALTHEAST (OUTPATIENT)
Dept: ADMINISTRATIVE | Facility: OTHER | Age: 86
End: 2021-05-03

## 2021-05-03 ENCOUNTER — AMBULATORY - HEALTHEAST (OUTPATIENT)
Dept: FAMILY MEDICINE | Facility: CLINIC | Age: 86
End: 2021-05-03

## 2021-05-03 DIAGNOSIS — Z91.09 ENVIRONMENTAL ALLERGIES: ICD-10-CM

## 2021-05-03 RX ORDER — LORATADINE 10 MG/1
10 TABLET ORAL DAILY
Qty: 90 TABLET | Refills: 1 | Status: SHIPPED | OUTPATIENT
Start: 2021-05-03 | End: 2021-07-20

## 2021-05-10 ENCOUNTER — COMMUNICATION - HEALTHEAST (OUTPATIENT)
Dept: FAMILY MEDICINE | Facility: CLINIC | Age: 86
End: 2021-05-10

## 2021-05-10 DIAGNOSIS — N28.0 RENAL INFARCT (H): ICD-10-CM

## 2021-05-10 LAB — INR PPP: 1.9 (ref 0.9–1.1)

## 2021-05-24 ENCOUNTER — COMMUNICATION - HEALTHEAST (OUTPATIENT)
Dept: SCHEDULING | Facility: CLINIC | Age: 86
End: 2021-05-24

## 2021-05-24 DIAGNOSIS — N28.0 RENAL INFARCT (H): ICD-10-CM

## 2021-05-24 LAB — INR PPP: 1.3 (ref 0.9–1.1)

## 2021-05-26 ASSESSMENT — PATIENT HEALTH QUESTIONNAIRE - PHQ9: SUM OF ALL RESPONSES TO PHQ QUESTIONS 1-9: 16

## 2021-05-27 ENCOUNTER — COMMUNICATION - HEALTHEAST (OUTPATIENT)
Dept: FAMILY MEDICINE | Facility: CLINIC | Age: 86
End: 2021-05-27

## 2021-05-27 VITALS — DIASTOLIC BLOOD PRESSURE: 70 MMHG | SYSTOLIC BLOOD PRESSURE: 118 MMHG

## 2021-05-27 DIAGNOSIS — M62.81 MUSCLE WEAKNESS (GENERALIZED): ICD-10-CM

## 2021-05-27 RX ORDER — ACETAMINOPHEN 500 MG
TABLET ORAL
Qty: 100 TABLET | Refills: 1 | Status: ON HOLD | OUTPATIENT
Start: 2021-05-27 | End: 2021-07-12

## 2021-05-27 ASSESSMENT — PATIENT HEALTH QUESTIONNAIRE - PHQ9: SUM OF ALL RESPONSES TO PHQ QUESTIONS 1-9: 0

## 2021-05-27 NOTE — TELEPHONE ENCOUNTER
ANTICOAGULATION  MANAGEMENT- Home Care/Care Facility Result    Assessment     Today's INR result of 2.4 is Therapeutic (goal INR of 2.0-3.0)        Warfarin taken as previously instructed    No new diet changes affecting INR    No new medication/supplements affecting INR    Continues to tolerate warfarin with no reported s/s of bleeding or thromboembolism     Previous INR was Therapeutic     Faxed this encounter to [facility] [phone]    Plan:     Spoke with home care nurse Clair discussed INR result and instructed:     Warfarin Dosing Instructions: Continue current warfarin dose    2.5 mg every Sun, Tue, Fri; 3 mg all other days         Next INR to be drawn: 2 weeks.     Education provided: importance of taking warfarin as instructed    Clair verbalizes understanding and agrees to warfarin dosing plan.   ?   Bari rGewal RN    Subjective/Objective:      Benny Carrillo, a 89 y.o. male is established on warfarin.     Home care/care facility RN's report of Benny INR, recent warfarin dosing, diet changes, medication changes, and symptoms is documented below.    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

## 2021-05-27 NOTE — TELEPHONE ENCOUNTER
Called Renetta to relay provider's message. Was redirected to voicemail, so left detailed message and call back number for additional questions.

## 2021-05-27 NOTE — TELEPHONE ENCOUNTER
INR result is 2.4  INR   Date Value Ref Range Status   03/11/2019 2.30 (!) 0.9 - 1.1 Final       Will the patient be seen, or did they already see, MD or CNP today? No    Most Recent Warfarin dose day/week  Sunday Monday Tuesday Wednesday Thursday Friday Saturday    3 2.5 3 3 2.5 3     Sunday Monday Tuesday Wednesday Thursday Friday Saturday   2.5             Has the patient missed any doses of Coumadin, Warfarin, Jantoven in the past 7 days? No    Has the patients medications changed since the last visit? No    Has the patient experienced any bleeding recently? No    Has the patient experienced any injuries or illness recently? No    Has the patient experienced any 'new' shortness of breath, severe headaches, or changes in vision recently? No    Has the patient had any changes in their diet, or alcohol consumption? No    Is the patient here today to prepare for any type of upcoming surgery, procedure, or for a cardioversion procedure? No    What phone number can we reach the patient at today? [facility] [phone]

## 2021-05-27 NOTE — TELEPHONE ENCOUNTER
Orders being requested: Skilled nursing visit for one time a week per clinic's INR direction  Reason service is needed/diagnosis: INR  When are orders needed by: per caller, whenever the clinic can call her back  Where to send Orders: Phone:  [phone]  Okay to leave detailed message?  Yes      Orders being requested: Skilled nursing for one time per month  Reason service is needed/diagnosis: suprapubic catheter changes  When are orders needed by: per caller, whenever the clinic can call her back  Where to send Orders: Phone:  [phone]  Okay to leave detailed message?  Yes

## 2021-05-27 NOTE — TELEPHONE ENCOUNTER
ANTICOAGULATION  MANAGEMENT- Home Care/Care Facility Result    Assessment     Today's INR result of 2.8 is Therapeutic (goal INR of 2.0-3.0)        Warfarin taken as previously instructed    No new diet changes affecting INR    No new medication/supplements affecting INR    Continues to tolerate warfarin with no reported s/s of bleeding or thromboembolism     Previous INR was Therapeutic     Faxed this encounter to [facility] [phone]    Plan:     Spoke with home care nurse Clair discussed INR result and instructed:     Warfarin Dosing Instructions: Continue current warfarin dose    2.5 mg every Sun, Tue, Fri; 3 mg all other days         Next INR to be drawn: 2 weeks.     Education provided: importance of taking warfarin as instructed    Cliar verbalizes understanding and agrees to warfarin dosing plan.   ?   Bari Grewal RN    Subjective/Objective:      Benny Carrillo, a 89 y.o. male is established on warfarin.     Home care/care facility RN's report of Benny INR, recent warfarin dosing, diet changes, medication changes, and symptoms is documented below.    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

## 2021-05-27 NOTE — TELEPHONE ENCOUNTER
RN cannot approve Refill Request    RN can NOT refill this medication med is not covered by policy/route to provider.         Lashae Torres, [facility] Triage/Med Refill 4/9/2019    Requested Prescriptions   Pending Prescriptions Disp Refills     warfarin (COUMADIN/JANTOVEN) 1 MG tablet [Pharmacy Med Name: WARFARIN 1MG (ONE)    TAB] 225 tablet 1     Sig: TAKE 2 & 1/2 (TWO & ONE-HALF) TABLETS BY MOUTH ONCE DAILY OR  AS  DIRECTED.  ADJUST  DOSE  BASED  ON  INR.       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]           Comments:            Anticoagulation Care Providers     Provider Role Specialty Phone number    Alcides Castillo MD Referring Family Medicine [phone]                Passed - Provider visit in last year     Last office visit with prescriber/PCP: 2/14/2019 Alcides Castillo MD OR same dept: 2/14/2019 Alcides Castillo MD OR same specialty: 2/14/2019 Alcides Castillo MD  Last physical: 12/10/2015 Last MTM visit: Visit date not found    Next appt within 3 mo: Visit date not found Next physical within 3 mo: Visit date not found  Prescriber OR PCP: Alcides Castillo MD  Last diagnosis associated with med order: 1. Renal infarct (H)  - warfarin (COUMADIN/JANTOVEN) 1 MG tablet [Pharmacy Med Name: WARFARIN 1MG (ONE)    TAB]; TAKE 2 & 1/2 (TWO & ONE-HALF) TABLETS BY MOUTH ONCE DAILY OR  AS  DIRECTED.  ADJUST  DOSE  BASED  ON  INR.  Dispense: 225 tablet; Refill: 1    If protocol passes may refill for 6 months if within 3 months of last  provider visit (or a total of 9 months).

## 2021-05-27 NOTE — TELEPHONE ENCOUNTER
INR result is 2.8  INR   Date Value Ref Range Status   03/25/2019 2.40 (!) 0.9 - 1.1 Final       Will the patient be seen, or did they already see, MD or CNP today? No    Most Recent Warfarin dose day/week  Sunday Monday Tuesday Wednesday Thursday Friday Saturday    3 2.5 3 3 2.5 3     Sunday Monday Tuesday Wednesday Thursday Friday Saturday   2.5             Has the patient missed any doses of Coumadin, Warfarin, Jantoven in the past 7 days? No    Has the patients medications changed since the last visit? No    Has the patient experienced any bleeding recently? No    Has the patient experienced any injuries or illness recently? No    Has the patient experienced any 'new' shortness of breath, severe headaches, or changes in vision recently? No    Has the patient had any changes in their diet, or alcohol consumption? No    Is the patient here today to prepare for any type of upcoming surgery, procedure, or for a cardioversion procedure? No    What phone number can we reach the patient at today? Clair with [facility] stat nursing [phone] .

## 2021-05-28 ENCOUNTER — COMMUNICATION - HEALTHEAST (OUTPATIENT)
Dept: FAMILY MEDICINE | Facility: CLINIC | Age: 86
End: 2021-05-28

## 2021-05-28 DIAGNOSIS — I48.91 ATRIAL FIBRILLATION (H): ICD-10-CM

## 2021-05-28 DIAGNOSIS — N28.0 RENAL INFARCT (H): ICD-10-CM

## 2021-05-28 LAB — INR PPP: 1.5 (ref 0.9–1.1)

## 2021-05-28 NOTE — TELEPHONE ENCOUNTER
INR result is   2.2    Will the patient be seen, or did they already see, MD or CNP today? No    Most Recent Warfarin dose day/week  Sunday Monday Tuesday Wednesday Thursday Friday Saturday    3 2.5 3 3 2.5 3     Sunday Monday Tuesday Wednesday Thursday Friday Saturday   2.5             Has the patient missed any doses of Coumadin, Warfarin, Jantoven in the past 7 days? No    Has the patients medications changed since the last visit? No    Has the patient experienced any bleeding recently? No    Has the patient experienced any injuries or illness recently? No    Has the patient experienced any 'new' shortness of breath, severe headaches, or changes in vision recently? No    Has the patient had any changes in their diet, or alcohol consumption? No    Is the patient here today to prepare for any type of upcoming surgery, procedure, or for a cardioversion procedure? No    What phone number can we reach the patient at today? [phone]

## 2021-05-28 NOTE — PROGRESS NOTES
Subjective:  89 y.o. male with concerns of neck pain.  Has been relatively well controlled in the past with apap  Ran out and wants refill, though his bottle indicates that he has refills available.  No upper extremity numbness or tingling.    Outpatient Medications Prior to Visit   Medication Sig Dispense Refill     albuterol (PROAIR HFA;PROVENTIL HFA;VENTOLIN HFA) 90 mcg/actuation inhaler Inhale 2 puffs every 6 (six) hours as needed for wheezing. 1 Inhaler 6     amLODIPine (NORVASC) 5 MG tablet TAKE 1/2 (ONE-HALF) TABLET BY MOUTH ONCE DAILY 45 tablet 3     artificial tears, hypromellose, (GONIOVISC) 2.5 % ophthalmic solution 2 gtts to each eye two times a day prn dry eyes 15 mL 6     digoxin (LANOXIN) 125 mcg tablet TAKE 1 TABLET BY MOUTH ONCE DAILY 90 tablet 1     finasteride (PROSCAR) 5 mg tablet Take 1 tablet (5 mg total) by mouth daily. 30 tablet 6     guaiFENesin (ROBITUSSIN) 100 mg/5 mL syrup Take 10 mL (200 mg total) by mouth 3 (three) times a day as needed for cough. 240 mL 0     hydrocortisone 2.5 % cream Apply to affected area 1-2 times daily as needed 30 g 2     loratadine (CLARITIN) 10 mg tablet Take 1 tablet (10 mg total) by mouth daily. 90 tablet 3     multivitamin with minerals (THERA-M) 9 mg iron-400 mcg Tab tablet Take 1 tablet by mouth daily. 30 tablet 11     oxybutynin (DITROPAN XL) 10 MG ER tablet TAKE ONE TABLET BY MOUTH ONCE DAILY 90 tablet 1     TAB-A-JAMISON per tablet TAKE 1 TABLET BY MOUTH ONCE DAILY 30 tablet 6     warfarin (COUMADIN/JANTOVEN) 1 MG tablet Take 2.5 to 3 tablets daily as directed 225 tablet 1     warfarin (COUMADIN/JANTOVEN) 1 MG tablet Take 2.5-3 tablets (2.5-3 mg total) by mouth See Admin Instructions. Take 3mg Monday/Thursday and 2.5mg ROW 90 tablet 5     acetaminophen (TYLENOL) 500 MG tablet Take 1 tablet (500 mg total) by mouth every 6 (six) hours as needed for pain. 80 tablet 2     No facility-administered medications prior to visit.       Social History     Tobacco Use    Smoking Status Never Smoker   Smokeless Tobacco Never Used      Objective:  /72 (Patient Site: Right Arm, Patient Position: Sitting, Cuff Size: Adult Regular)   Pulse 62   Resp 16   Wt 114 lb (51.7 kg)   BMI 19.57 kg/m    GENERAL: alert, not distressed  CHEST: clear, no rales, rhonchi, or wheezes  CARDIAC: regular without murmur, gallop, or rub    Assessment and Plan:   1. Muscle Weakness Generalized  - acetaminophen (TYLENOL) 500 MG tablet; Take 1 tablet (500 mg total) by mouth every 6 (six) hours as needed for pain.  Dispense: 100 tablet; Refill: 2     This visit was conducted with the aid of a professional .

## 2021-05-28 NOTE — TELEPHONE ENCOUNTER
ANTICOAGULATION  MANAGEMENT- Home Care/Care Facility Result    Assessment     Today's INR result of 2.50 is Therapeutic (goal INR of 2.0-3.0)        Warfarin taken as previously instructed    No new diet changes affecting INR    No new medication/supplements affecting INR    Continues to tolerate warfarin with no reported s/s of bleeding or thromboembolism     Previous INR was Therapeutic at 2.20 on 5/16/19.  (last 9 consecutive INR's therapeutic.)    Plan:     Spoke with Clair from [facility] discussed INR result and instructed:     Warfarin Dosing Instructions:   - Continue current warfarin dose 2.5 mg daily on Sun/Tues/Fri; and 3 mg daily rest of week.    Next INR to be drawn:  2 wks.  Scheduled for 6/3/19.    Education provided: importance of consistent vitamin K intake, target INR goal and significance of current INR result and importance of notifying clinic for changes in medications    Clair verbalizes understanding and agrees to warfarin dosing plan.   ?   Maribel Whitaker RN    Subjective/Objective:      Benny Carrillo, a 89 y.o. male is established on warfarin.     Home care/care facility RN's report of Benny INR, recent warfarin dosing, diet changes, medication changes, and symptoms is documented below.    Additional findings: verbally confirmed home dose with Clair and updated on anticoagulation calendar    - Clair and Benny reports doing well with no new complaints.    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

## 2021-05-28 NOTE — TELEPHONE ENCOUNTER
ANTICOAGULATION  MANAGEMENT- Home Care/Care Facility Result    Assessment     Today's INR result of 2.0 is Therapeutic (goal INR of 2.0-3.0)        Warfarin taken as previously instructed    No new diet changes affecting INR    No new medication/supplements affecting INR    Continues to tolerate warfarin with no reported s/s of bleeding or thromboembolism     Previous INR was Therapeutic     Faxed this encounter to [facility] [phone]    Plan:     Spoke with home care nurse Clair discussed INR result and instructed:     Warfarin Dosing Instructions: Continue current warfarin dose    2.5 mg every Sun, Tue, Fri; 3 mg all other days         Next INR to be drawn: 2 weeks.     Education provided: importance of taking warfarin as instructed    Clair verbalizes understanding and agrees to warfarin dosing plan.   ?   Bari Grewal RN    Subjective/Objective:      Benny Carrillo, a 89 y.o. male is established on warfarin.     Home care/care facility RN's report of Benny INR, recent warfarin dosing, diet changes, medication changes, and symptoms is documented below.    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

## 2021-05-28 NOTE — TELEPHONE ENCOUNTER
INR result is 2.5  INR   Date Value Ref Range Status   05/06/2019 2.20 (!) 0.9 - 1.1 Final       Will the patient be seen, or did they already see, MD or CNP today? No    Most Recent Warfarin dose day/week  Sunday Monday Tuesday Wednesday Thursday Friday Saturday    3 2.5 3 2.5 3 3     Sunday Monday Tuesday Wednesday Thursday Friday Saturday   2.5             Has the patient missed any doses of Coumadin, Warfarin, Jantoven in the past 7 days? No    Has the patients medications changed since the last visit? No    Has the patient experienced any bleeding recently? No    Has the patient experienced any injuries or illness recently? No    Has the patient experienced any 'new' shortness of breath, severe headaches, or changes in vision recently? No    Has the patient had any changes in their diet, or alcohol consumption? No    Is the patient here today to prepare for any type of upcoming surgery, procedure, or for a cardioversion procedure? No    What phone number can we reach the patient at today? [facility] [phone]

## 2021-05-28 NOTE — TELEPHONE ENCOUNTER
ANTICOAGULATION  MANAGEMENT- Home Care/Care Facility Result    Assessment     Today's INR result of 2.20 is Therapeutic (goal INR of 2.0-3.0)        Warfarin taken as previously instructed    No new diet changes affecting INR    No new medication/supplements affecting INR    Continues to tolerate warfarin with no reported s/s of bleeding or thromboembolism     Previous INR was Therapeutic at 2.00 on 4/22/19.    Plan:     Spoke with ERAN Self from [facility] discussed INR result and instructed:     Warfarin Dosing Instructions:   - Continue current warfarin dose 2.5 mg daily on Sun/Tues/Fri; and 3 mg daily rest of week.    Next INR to be drawn:  2 wks.  Scheduledo n 5/20/19.    Education provided: importance of consistent vitamin K intake, target INR goal and significance of current INR result and importance of notifying clinic for changes in medications    ERAN Self verbalizes understanding and agrees to warfarin dosing plan.   ?   Maribel Whitaker RN    Subjective/Objective:      Benny Carrillo, a 89 y.o. male is established on warfarin.     Home care/care facility RN's report of Benny INR, recent warfarin dosing, diet changes, medication changes, and symptoms is documented below.    Additional findings: verbally confirmed home dose with ERAN Self and updated on anticoagulation calendar.   - reported Benny, is doing well with no new complants.    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

## 2021-05-28 NOTE — TELEPHONE ENCOUNTER
INR result is 2.0  INR   Date Value Ref Range Status   04/08/2019 2.80 (!) 0.9 - 1.1 Final       Will the patient be seen, or did they already see, MD or CNP today? No    Most Recent Warfarin dose day/week  Sunday Monday Tuesday Wednesday Thursday Friday Saturday    3 2.5 3 3 2.5 3     Sunday Monday Tuesday Wednesday Thursday Friday Saturday   2.5             Has the patient missed any doses of Coumadin, Warfarin, Jantoven in the past 7 days? No    Has the patients medications changed since the last visit? No    Has the patient experienced any bleeding recently? No    Has the patient experienced any injuries or illness recently? No    Has the patient experienced any 'new' shortness of breath, severe headaches, or changes in vision recently? No    Has the patient had any changes in their diet, or alcohol consumption? No    Is the patient here today to prepare for any type of upcoming surgery, procedure, or for a cardioversion procedure? No    What phone number can we reach the patient at today? [facility] [phone]

## 2021-05-28 NOTE — TELEPHONE ENCOUNTER
Anticoagulation Annual Referral Renewal Review    Benny Carrillo's chart reviewed for annual renewal of referral to anticoagulation monitoring.        Criteria for anticoagulation nurse and/or pharmacist renewal met   Warfarin indication: Atrial Fibrillation and renal infarct Yes, per indication   Current with INR monitoring/compliant Yes Yes   Date of last office visit 4/29/19 Yes, had office visit within last year   Time in Therapeutic Range (TTR) 85.97 % Yes, TTR > 60%       Benny XANDER Doyleta met all criteria for anticoagulation management program initiated renewal.  New INR standing orders and anticoagulation referral renewal placed.      Jesika Jenkins RN  11:44 AM

## 2021-05-29 NOTE — TELEPHONE ENCOUNTER
Orders being requested: Home Care  Skilled Nursing     To continue weekly visits x 2 months  Depends are being requested by family    Nurse would assist with order to [facility].   Reason service is needed/diagnosis: for medication set up, INR and monthly subpubic cath changes.  When are orders needed by: ASAP  Where to send Orders: verbal to caller  Okay to leave detailed message?  Yes

## 2021-05-29 NOTE — PROGRESS NOTES
[facility] Care Coordination Contact    [facility] Health Plan or Product Change    CC received notification that member's health plan or health plan product changed from [facility] to are [employer] effective 06/17/2019.  CC contacted member and discussed change and face-to-face visit was offered. Member declined need for home visit. CC reviewed previous Health Risk Assessment/LTCC and POC with member and no changes noted and see POC and/or Health Risk Assessment/LTCC for changes.    Called member and introduced self as member s new CC. Confirmed with member that the welcome letter with writer's name and contact information has been received.  Reviewed LTCC/Health Risk Assessment (HRA) and POC with member. No changes noted.  Transitional HRA completed. Care Plan Summary updated and reflects current services.  Required referral authorization information communicated to CMS: Not applicable  MMIS entry completed by: Care Coordinator  Writer reviewed the following with member:  ER visits: Yes -  [facility]  Hospitalizations: Yes -  [facility]  TCU stays: No  Significant health status changes: None  Falls/Injuries: No  ADL/IADL changes: No  Changes in services: No    Follow-Up Plan: Member informed of future contact, plan to f/u with member with at next regularly scheduled contact.  Contact information shared with member and family, encouraged member to call with any questions or concerns.    Mónica Sommer, WANDA  [facility]  [phone]

## 2021-05-29 NOTE — TELEPHONE ENCOUNTER
Called and left message for Carolin to call clinic back.  Okay to give verbal orders upon return call.

## 2021-05-29 NOTE — TELEPHONE ENCOUNTER
INR result is 1.7  INR   Date Value Ref Range Status   06/04/2019 1.92 (H) 0.90 - 1.10 Final       Will the patient be seen, or did they already see, MD or CNP today? No    Most Recent Warfarin dose day/week  Sunday Monday Tuesday Wednesday Thursday Friday Saturday    3 2.5 3 3 2.5 3     Sunday Monday Tuesday Wednesday Thursday Friday Saturday   2.5             Has the patient missed any doses of Coumadin, Warfarin, Jantoven in the past 7 days? No    Has the patients medications changed since the last visit? No    Has the patient experienced any bleeding recently? No    Has the patient experienced any injuries or illness recently? Yes hospitalized 5/29- 6/4/19 stomach pain, C-diff, bladder infection.    Has the patient experienced any 'new' shortness of breath, severe headaches, or changes in vision recently? No    Has the patient had any changes in their diet, or alcohol consumption? No    Is the patient here today to prepare for any type of upcoming surgery, procedure, or for a cardioversion procedure? No    What phone number can we reach the patient at today? home phone listed in demographics.

## 2021-05-29 NOTE — PROGRESS NOTES
TRANSITIONS OF CARE (CHHAYA) LOG   CHHAYA tasks should be completed by the CC within one (1) business day of notification of each transition. Follow up contact with member is required after return to their usual care setting.  Note:  If CC finds out about the transitions fifteen (15) days or more after the member has returned to their usual care setting, no CHHAYA log is needed. However, the CC should check in with the member to discuss the transition process, any changes needed to the care plan and document it in a case note.    Member [facility]:  [facility]enny [facility] [id number]   Product: MSC+ Care Coordinator Contact:  Destiny Medeiros RN, PHN  Agency/[facility]/Care System: [facility]   Transition Communication Actions from Care Management Contact   Transition #1   Notification Date: 5/28/19 Transition Date:   5/27/19 Transition From: Home     Is this the member s usual care setting?               yes Transition To: [facility]   Transition Type:  Unplanned  Reason for Admission/Comments:  Admitted for small bowel obstruction     Shared CC contact info, care plan/services with receiving setting--Date completed: 5/28/19    Notified PCP of transition--Date completed:  5/28/19     via  EMR   Transition #2   Transition #3  (if applicable)   Notification Date: 6/6/19         Transition To:  Home  Transition Date: 6/4/19    Transition Type:    Planned  Notified PCP -- Date completed: 6/6/19              Shared CC contact info, care plan/services with receiving setting or, if applicable, home care agency--Date completed:  6/6/19  *Complete additional tasks below, if this transition is a return to usual care setting.      Comments: NA   Notification Date:  NA      Transition To:  NA  Transition Date:   NA           Transition Type:    Planned  Notified PCP--Date completed: NA        Shared CC contact info, care plan/services with receiviNAng setting or, if applicable, home care  agency--Date completed: NA      *Complete additional tasks below, if this transition is a return to usual care setting.      Comments:  NA     *Complete tasks below when the member is discharging TO their usual care setting within one (1) business day of notification.  For situations where the Care Coordinator is notified of the discharge prior to the date of discharge, the Care Coordinator must follow up with the member or designated representative to confirm that discharge actually occurred and discuss required CHHAYA tasks as outlined in the CHHAYA Instructions.  (This includes situations where it may be a  new  usual care setting for the member. (i.e., a community member who decides upon permanent nursing home placement following hospitalization and rehab).    Date completed: 6/6/19  Communicated with member or their designated representative about the following:  care transition process; about changes to the member s health status; plan of care updates; education about transitions and how to prevent unplanned transitions/readmissions  Four Pillars for Optimal Transition:    Check  Yes  - if the member, family member and/or SNF/facility staff manages the following:    If  No  provide explanation in the comments section.          [x]  Yes     []  No     Does the member have a follow-up appointment scheduled with primary care or specialist? (Mental health hospitalizations--the appt. should be w/in 7 days)   [x]  Yes     []  No     Can the member manage their medications or is there a system in place to manage medications (e.g. home care set-up)?         [x]  Yes     []  No     Can the member verbalize warning signs and symptoms to watch for and how to respond?         [x]  Yes     []  No     Does the member use a Personal Health Care Record?  Check  Yes  if visit summary, discharge summary, and/or healthcare summary are being used as a PHR.                                                                                                                                                                                     [x] Yes      [] No      Have you updated the member s care plan?  If  No  provide explanation in comments.   Comments: NA       [facility] Care Coordination Contact    CC received notification of Hospital admission.  Hospital admission occurred on 5/28/19 at [facility] with Dx of SBO  CC reached out to family Steven  regarding transition but no voice mail set up and  was unable to leave a voice mail.  No alternative phone number listed in Epic.     Reviewed and update care plan as needed.  Notified community service providers and placed services PCA on hold as needed.  Transition log initiated.   PCP notified of hospitalization via EMR.    ERAN Ferreira  [facility]   [phone]    [facility] Care Coordination Contact    CC received notification of discharge to home. Discharge occurred on (Date 6/4/19) from hospital.   CC contacted family Granddaughter Steven and reviewed discharge summary.  Member has a follow-up appointment with PCP in 7 days: Yes: scheduled on 6/10/19   Member has had a change in condition: No  Home visit needed: No  Care plan reviewed and updated.  The following home based services PCA, SNV were resumed.  New referrals placed: No  Transition log completed.   PCP notified of transition back to home via EMR.    Destiny Medeiros RN SULAIMAN  [facility]   [phone]

## 2021-05-29 NOTE — TELEPHONE ENCOUNTER
ANTICOAGULATION  MANAGEMENT- Home Care/Care Facility Result    Assessment     Today's INR result of 1.7 is Subtherapeutic (goal INR of 2.0-3.0)        Warfarin taken as previously instructed    No new diet changes affecting INR    Interaction between Prednisone and Vancomycin and warfarin may be affecting INR     Finished 4 days of Prednisone on 6/8.    Will finish Vanco tmr.     Continues to tolerate warfarin with no reported s/s of bleeding or thromboembolism     Previous INR was Subtherapeutic     Faxed this encounter to [facility] [phone]    Plan:     Spoke with home care nurse Renetta discussed INR result and instructed:     Warfarin Dosing Instructions: Take booster dose of 4 mg today only then continue current warfarin dose    2.5 mg every Sun, Tue, Fri; 3 mg all other days       Next INR to be drawn: 1 week.     Education provided: importance of taking warfarin as instructed    Renetta verbalizes understanding and agrees to warfarin dosing plan.   ?   Bari Grewal RN    Subjective/Objective:      Benny Carrillo, a 89 y.o. male is established on warfarin.     Home care/care facility RN's report of Benny INR, recent warfarin dosing, diet changes, medication changes, and symptoms is documented below.    Additional findings: verbally confirmed home dose with Renetta and updated on anticoagulation calendar    Anticoagulation Episode Summary     Current INR goal:   2.0-3.0   TTR:   74.3 % (9.1 mo)   Next INR check:   6/17/2019   INR from last check:   1.70! 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

## 2021-05-29 NOTE — TELEPHONE ENCOUNTER
ANTICOAGULATION  MANAGEMENT- Home Care/Care Facility Result    Assessment     Today's INR result of 2.3 is Therapeutic (goal INR of 2.0-3.0)        Warfarin taken as previously instructed    No new diet changes affecting INR    No new medication/supplements affecting INR     Patient finished vancomycin and prednisone last week    Continues to tolerate warfarin with no reported s/s of bleeding or thromboembolism     Previous INR was Subtherapeutic    Plan:     Spoke with Clair for home care discussed INR result and instructed:     Warfarin Dosing Instructions: Continue current warfarin dose    2.5 mg every Sun, Tue, Fri; 3 mg all other days     Next INR to be drawn: 1 week    Education provided: target INR goal and significance of current INR result    Clair RN verbalizes understanding and agrees to warfarin dosing plan.     Orders/encounter faxed to home care  ?   Radhika Crooks RN per [facility] protocol for Alcides Castillo MD      Subjective/Objective:      Benny Carrillo, a 89 y.o. male is established on warfarin.     Home care/care facility RN's report of Benny INR, recent warfarin dosing, diet changes, medication changes, and symptoms is documented below.    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

## 2021-05-29 NOTE — PROGRESS NOTES
Subjective: Patient comes in for evaluation he was hospitalized at [facility] May 27 through 6/4/2019.  Her graph he had small bowel obstruction he had aspiration pneumonia and had C. difficile colitis.  He was on Zosyn during hospital stay also then started on vancomycin liquid 125 mg 4 times a day for 10 days he is just finishing.    He saw the pharmacist today and amlodipine put on hold his pressures 118/62 he is on 5 mg half tablet a day    Patient has atrial fibrillation continues on warfarin his INR was 1.7 yesterday.    He was started on some gabapentin in the hospital for some burning legs but symptoms have improved and he is using it as needed.  I spoke with the son regarding this and I think we should just hold off for now as he is 89 years old.  If he gets recurrent symptoms can restart that.    Patient is done with the prednisone.    He has had previous CVA has chronic atrial fibrillation.    He does need some adult pull-up's size medium for his stool incontinence 150/month.  I did give a prescription.  They state that they will be going to [facility] so this was faxed to [facility].    Tobacco status: He  reports that he has never smoked. He has never used smokeless tobacco.    Patient Active Problem List    Diagnosis Date Noted     Incontinence of feces, unspecified fecal incontinence type 06/11/2019     Aspiration pneumonia of both lower lobes due to vomit (H)      Acute respiratory failure with hypoxia (H)      History of CVA (cerebrovascular accident)      Permanent atrial fibrillation (H)      SBO (small bowel obstruction) (H) 05/27/2019     Small bowel obstruction (H) 12/21/2018     Renal infarct (H) 07/22/2018     Acute pulmonary edema (H)      Acute pyelonephritis 02/10/2018     Urinary tract infection associated with cystostomy catheter, initial encounter (H) 02/10/2018     Sepsis secondary to UTI (H) 02/10/2018     Moderate malnutrition (H)      History of ischemic  cerebrovascular accident (CVA) with residual deficit 11/20/2017     Accelerated hypertension      Chronic retention of urine      Acute nonintractable headache, unspecified headache type      Acute cerebral infarction (H) 11/14/2017     Chest pain 09/14/2016     Gastroesophageal reflux disease without esophagitis 09/14/2016     Suprapubic catheter (H) 09/14/2016     Leukocytosis, unspecified type 09/14/2016     Heartburn 09/14/2016     Underweight due to inadequate caloric intake 07/28/2015     Chronic atrial fibrillation (H)      Muscle Weakness Generalized        Current Outpatient Medications   Medication Sig Dispense Refill     acetaminophen (TYLENOL) 500 MG tablet Take 1 tablet (500 mg total) by mouth every 6 (six) hours as needed for pain. 100 tablet 2     albuterol (PROAIR HFA;PROVENTIL HFA;VENTOLIN HFA) 90 mcg/actuation inhaler Inhale 2 puffs every 6 (six) hours as needed for wheezing. 1 Inhaler 6     artificial tears, hypromellose, (GONIOVISC) 2.5 % ophthalmic solution 2 gtts to each eye two times a day prn dry eyes 15 mL 6     digoxin (LANOXIN) 125 mcg tablet TAKE 1 TABLET BY MOUTH ONCE DAILY 90 tablet 1     finasteride (PROSCAR) 5 mg tablet Take 1 tablet (5 mg total) by mouth daily. 30 tablet 6     gabapentin (NEURONTIN) 100 MG capsule Take 100 mg by mouth 2 (two) times a day as needed. 20 capsule 0     guaiFENesin (ROBITUSSIN) 100 mg/5 mL syrup Take 10 mL (200 mg total) by mouth 3 (three) times a day as needed for cough. 240 mL 0     hydrocortisone 2.5 % cream Apply to affected area 1-2 times daily as needed 30 g 2     loratadine (CLARITIN) 10 mg tablet Take 1 tablet (10 mg total) by mouth daily. 90 tablet 3     multivitamin with minerals (THERA-M) 9 mg iron-400 mcg Tab tablet Take 1 tablet by mouth daily. 30 tablet 11     oxybutynin (DITROPAN XL) 10 MG ER tablet TAKE ONE TABLET BY MOUTH ONCE DAILY 90 tablet 1     vancomycin (FIRVANQ) 50 mg/mL oral solution Take 2.5 mL (125 mg total) by mouth 4 (four)  times a day for 9 days. 1 Bottle 0     warfarin (COUMADIN/JANTOVEN) 1 MG tablet Take 2.5-3 tablets (2.5-3 mg total) by mouth See Admin Instructions. Take 3mg Monday/Thursday and 2.5mg ROW (Patient taking differently: Take 2.5-3 mg by mouth See Admin Instructions. Take 2.5 mg Sunday, Tuesday, Friday and 3 mg rest of week      ) 90 tablet 5     No current facility-administered medications for this visit.        ROS:   10 point review of systems positive as outlined above, otherwise negative    Objective:    /62 (Patient Site: Left Arm, Patient Position: Sitting, Cuff Size: Adult Regular)   Pulse 64   Resp 24   Wt 116 lb (52.6 kg)   BMI 18.72 kg/m    Body mass index is 18.72 kg/m .      General appearance no acute distress    HEENT neck is negative oropharynx is clear    Heart was irregularly irregular rate 60-70    Lungs are clear throughout no rales or rhonchi.    Abdomen was soft bowel sounds normal no guarding or rebound    Extremities without edema skin was normal.    Patient denies any vomiting.    INR from yesterday 1.7    Chest x-ray from the hospital showed improving right lower lobe pneumonia.    CT of the abdomen also reviewed, this showed a small bowel obstruction on admission 5/7/2019.    Patient had normal renal function on 6/4/2019, normal potassium.    On 6/2/2019 hemoglobin was 11.9    Results for orders placed or performed in visit on 06/10/19   INR   Result Value Ref Range    INR 1.70 (!) 0.9 - 1.1       Assessment:  1. Hospital discharge follow-up     2. Small bowel obstruction (H)     3. Chronic atrial fibrillation (H)     4. History of CVA (cerebrovascular accident)     5. Aspiration pneumonia of both lower lobes due to vomit (H)     6. C. difficile colitis      5/2019   7. Incontinence of feces, unspecified fecal incontinence type       Hospital discharge follow-up for small bowel obstruction which is now resolved    Also had aspiration pneumonia treated clinically resolved    C.  difficile colitis treated clinically resolved has 1 day left.    Previous history of CVA noted.    Hypertension, will hold on amlodipine recheck in 2 to 3 weeks    Plan: With follow-up will check CBC and recheck chest x-ray, recheck blood pressure    This transcription uses voice recognition software, which may contain typographical errors.

## 2021-05-29 NOTE — TELEPHONE ENCOUNTER
Dr. Castillo:    Do you agree with telephone orders as outlined on Benny Carrillo?  I will be faxing orders to [facility].  Thank you.    Danay Whitaker, RN, BSN, PHN.

## 2021-05-29 NOTE — PROGRESS NOTES
MTM Transition of Care Encounter  Assessment & Plan                                                     Post Discharge Medication Reconciliation Status: discharge medications reconciled and changed, per note/orders (see AVS)    Medication Adherence/Access: No concerns noted.     SBO: Improved - pt now having regular bowel movements once daily.   -Complete course of Vancomycin.     Aspiration Pneumonia/Wheezing: Partially improved - pt not having shortness of breath. Symptoms improved since discharge. Reviewed albuterol is a rescue inhaler and not very effective for preventing shortness of breath.   -Pt to use Albuterol only as needed     Chronic Atrial Fibrillation: Unimproved - last INR below goal of 2.0-3.0.   -Continue warfarin per HE anticoag team.       Hypertension: Partially improved - BP well below goal <140/90 with very low dose of amlodipine. Likely okay to discontinue. Can f/u with HHN in 2-3 weeks for results of home BP readings to determine continued need.   -Hold Amlodipine.     Neuropathy: Stable - per pt report, symptoms well controlled with PRN use of gabapentin.   -Continue current therapy     Catheter: Improved - per pt no longer having urinary symptoms.   -Continue current therapy       Follow Up  No follow-ups on file.  PharmD to follow-up with HHN in 2 weeks.   Pt to schedule with PharmD as needed.       Subjective & Objective                                                       Benny Carrillo is a 89 y.o. male coming in for a transitions of care visit. he was discharged from [facility] on 6/4/19 for Small Bowel Obstruction.    Chief Complaint: Medication Management -     Medication Adherence/Access: Has a home health nurse that sets up medications weekly. [phone] - Clair       SBO: previous small bowel obstruction admitted to the hospital with small bowel obstruction, then vomited with aspiration causing acute respiratory failure. Hospitalization complicated by C diff associated diarrhea;  started on PO vanco on 6/3. Using Vancomycin 50 mg/mL - 2.5 mL (125 mg) four times a day x 9 days. Today will be the last day. No longer have diarrhea. One bowel movement per day in the early movement.        Aspiration Pneumonia/Wheezing: Prescribed Prednisone 5 mg burst which pt has completed. Does have albuterol HFA 2 puffs Q6H PRN. Using Albuterol daily to prevent shortness of breath.     Chronic Atrial Fibrillation: Using Digoxin 125 mcg daily, warfarin per HE anticoag team. Denies bleeding/bruising. Denies signs/sx of clot.     Lab Results   Component Value Date    INR 1.70 (!) 06/10/2019    INR 1.92 (H) 06/04/2019    INR 1.70 (H) 06/03/2019     Pulse Readings from Last 3 Encounters:   06/04/19 82   04/29/19 62   02/14/19 64       Hypertension: Using amlodipine 5 mg daily 0.5 tab daily. Denies dizziness/lightheadedness.      BP Readings from Last 3 Encounters:   06/11/19 98/62   06/04/19 (!) 130/92   04/29/19 106/72       Neuropathy: Using gabapentin 100 mg two times a day PRN. Does have drowsiness but does find it helpful to control pain. Doing well with PRN dosing.     Catheter: Using finasteride 5 mg daily, and oxybutynin 10 mg ER daily. Has nurse who changes the catheter monthly. Used to have frequent urination with burning, but this is now gone.       PMH: reviewed in EPIC   Allergies/ADRs: reviewed in EPIC   Alcohol:   Social History     Substance and Sexual Activity   Alcohol Use No     Tobacco:   Social History     Tobacco Use   Smoking Status Never Smoker   Smokeless Tobacco Never Used     Recent Vitals:   BP Readings from Last 3 Encounters:   06/04/19 (!) 130/92   04/29/19 106/72   02/14/19 106/70      Wt Readings from Last 3 Encounters:   06/03/19 117 lb 1.6 oz (53.1 kg)   04/29/19 114 lb (51.7 kg)   02/14/19 120 lb (54.4 kg)     ----------------    The patient declined an after visit summary    I spent 30 minutes with this patient today;  . All changes were made via collaborative practice agreement  with Alcides Castillo MD. A copy of the visit note was provided to the patient's provider.     Sergei Langford, GilbertoD  Medication Therapy Management (MTM) Pharmacist  [facility]        Current Outpatient Medications   Medication Sig Dispense Refill     acetaminophen (TYLENOL) 500 MG tablet Take 1 tablet (500 mg total) by mouth every 6 (six) hours as needed for pain. 100 tablet 2     albuterol (PROAIR HFA;PROVENTIL HFA;VENTOLIN HFA) 90 mcg/actuation inhaler Inhale 2 puffs every 6 (six) hours as needed for wheezing. 1 Inhaler 6     amLODIPine (NORVASC) 5 MG tablet TAKE 1/2 (ONE-HALF) TABLET BY MOUTH ONCE DAILY 45 tablet 3     artificial tears, hypromellose, (GONIOVISC) 2.5 % ophthalmic solution 2 gtts to each eye two times a day prn dry eyes 15 mL 6     digoxin (LANOXIN) 125 mcg tablet TAKE 1 TABLET BY MOUTH ONCE DAILY 90 tablet 1     finasteride (PROSCAR) 5 mg tablet Take 1 tablet (5 mg total) by mouth daily. 30 tablet 6     gabapentin (NEURONTIN) 100 MG capsule Take 100 mg by mouth 2 (two) times a day as needed. 20 capsule 0     guaiFENesin (ROBITUSSIN) 100 mg/5 mL syrup Take 10 mL (200 mg total) by mouth 3 (three) times a day as needed for cough. 240 mL 0     hydrocortisone 2.5 % cream Apply to affected area 1-2 times daily as needed 30 g 2     loratadine (CLARITIN) 10 mg tablet Take 1 tablet (10 mg total) by mouth daily. 90 tablet 3     multivitamin with minerals (THERA-M) 9 mg iron-400 mcg Tab tablet Take 1 tablet by mouth daily. 30 tablet 11     oxybutynin (DITROPAN XL) 10 MG ER tablet TAKE ONE TABLET BY MOUTH ONCE DAILY 90 tablet 1     predniSONE (DELTASONE) 5 MG tablet Take 10 mg daily for 2 days; then 5 mg daily for 2 days then stop. 6 tablet 0     TAB-A-JAMISON per tablet TAKE 1 TABLET BY MOUTH ONCE DAILY 30 tablet 6     vancomycin (FIRVANQ) 50 mg/mL oral solution Take 2.5 mL (125 mg total) by mouth 4 (four) times a day for 9 days. 1 Bottle 0     warfarin (COUMADIN/JANTOVEN) 1 MG tablet  Take 2.5-3 tablets (2.5-3 mg total) by mouth See Admin Instructions. Take 3mg Monday/Thursday and 2.5mg ROW (Patient taking differently: Take 2.5-3 mg by mouth See Admin Instructions. Take 2.5 mg Sunday, Tuesday, Friday and 3 mg rest of week      ) 90 tablet 5     No current facility-administered medications for this visit.

## 2021-05-29 NOTE — TELEPHONE ENCOUNTER
ANTICOAGULATION  MANAGEMENT: Discharge Continuity of Care Review    Hospital admission on  5/27 for abdominal pain.    Discharge disposition: Home with home care     INR Results:       Recent labs: (last 7 days)     05/31/19  1132 06/02/19  0714 06/03/19  0534 06/04/19  0557   INR 1.61* 1.52* 1.70* 1.92*       Warfarin inpatient management: Home regimen continued    Warfarin discharge instructions: home regimen continued     Medication Changes Affecting Anticoagulation: No    Additional Factors Affecting Anticoagulation: No    Plan     No adjustment to anticoagulation plan needed    Recommended follow up is scheduled   Has clinic visit mon 6/10, phone message to [facility] nurse Self [phone] to coordinate inr    Anticoagulation calendar updated    Perlita Brown RN

## 2021-05-29 NOTE — TELEPHONE ENCOUNTER
INR result is 2.5  INR   Date Value Ref Range Status   06/17/2019 2.30 (!) 0.9 - 1.1 Final       Will the patient be seen, or did they already see, MD or CNP today? No    Most Recent Warfarin dose day/week  Sunday Monday Tuesday Wednesday Thursday Friday Saturday    3 2.5 3 3 2.5 3     Sunday Monday Tuesday Wednesday Thursday Friday Saturday   2.5             Has the patient missed any doses of Coumadin, Warfarin, Jantoven in the past 7 days? No    Has the patients medications changed since the last visit? No    Has the patient experienced any bleeding recently? No    Has the patient experienced any injuries or illness recently? No    Has the patient experienced any 'new' shortness of breath, severe headaches, or changes in vision recently? No    Has the patient had any changes in their diet, or alcohol consumption? No    Is the patient here today to prepare for any type of upcoming surgery, procedure, or for a cardioversion procedure? No    What phone number can we reach the patient at today? Home care nurse Clair [phone].

## 2021-05-29 NOTE — TELEPHONE ENCOUNTER
INR result is 2.3 Today  INR   Date Value Ref Range Status   06/10/2019 1.70 (!) 0.9 - 1.1 Final       Will the patient be seen, or did they already see, MD or CNP today? No    Most Recent Warfarin dose day/week  Sunday Monday Tuesday Wednesday Thursday Friday Saturday   2.5 3 2.5 3 3 2.5 3     Sunday Monday Tuesday Wednesday Thursday Friday Saturday   2.5             Has the patient missed any doses of Coumadin, Warfarin, Jantoven in the past 7 days? No    Has the patients medications changed since the last visit? No, not in the past week    Has the patient experienced any bleeding recently? No    Has the patient experienced any injuries or illness recently? No, not in the past week    Has the patient experienced any 'new' shortness of breath, severe headaches, or changes in vision recently? No    Has the patient had any changes in their diet, or alcohol consumption? No    Is the patient here today to prepare for any type of upcoming surgery, procedure, or for a cardioversion procedure? No    What phone number can we reach the patient at today? Transferring over

## 2021-05-29 NOTE — TELEPHONE ENCOUNTER
Orders being requested: Skilled nursing weekly for INR/coumadin set up.  Superpubic catheter change every 3 weeks.  Reason service is needed/diagnosis: Recent hospitalization, SBO, UTI  When are orders needed by: asap  Where to send Orders: Phone:  Renetta at [phone]  Okay to leave detailed message?  Yes

## 2021-05-29 NOTE — TELEPHONE ENCOUNTER
ANTICOAGULATION  MANAGEMENT- Home Care/Care Facility Result    Assessment     Today's INR result of 2.50 is Therapeutic (goal INR of 2.0-3.0)        Warfarin taken as previously instructed    No new diet changes affecting INR    No new medication/supplements affecting INR    Continues to tolerate warfarin with no reported s/s of bleeding or thromboembolism     Previous INR was Therapeutic at 2.30 on 6/17/19.    Plan:     Spoke with Clair from [facility] discussed INR result and instructed:    1.  Requested to FAX telephone orders to [facility] - Fax# [phone].    Warfarin Dosing Instructions:   - Continue current warfarin dose 2.5 mg daily on Sunday, Tuesday, Friday; and 3 mg daily rest of week.    Next INR to be drawn:  One wk.    - scheduled on 7/1/19.    Education provided: importance of consistent vitamin K intake, target INR goal and significance of current INR result and importance of notifying clinic for changes in medications    Clair verbalizes understanding and agrees to warfarin dosing plan.   ?   Maribel Whitaker RN    Subjective/Objective:      Benny Carrillo, a 89 y.o. male is established on warfarin.     Home care/care facility RN's report of Benny INR, recent warfarin dosing, diet changes, medication changes, and symptoms is documented below.    Additional findings: verbally confirmed home dose with Clair and updated on anticoagulation calendar    - reported all is going well with patient.    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

## 2021-05-29 NOTE — PROGRESS NOTES
Product change from [facility] to [facility]  Welcome Letter mailed.  Clair Medeiros  Care Management Specialist  [facility]  [phone]

## 2021-05-29 NOTE — PROGRESS NOTES
TCM DISCHARGE FOLLOW UP CALL    Discharge Date:  6/4/2019  Reason for hospital stay (discharge diagnosis)::  SBO, Aspiration pneumonia bilateral lower lobes due to emesis, C-Diff  Are you feeling better, the same or worse since your discharge?:  Patient is feeling better (Feeling better.  Denies N/V, abd pain, fevers.  No diarrhea, but having loose stools twice a day. )  Do you feel like you have a plan in the event of a health emergency?: Yes (Call 911)    As part of your discharge plan, were  home care services ordered for you?: Yes (Resumed home care services through [facility])    Have you seen them yet, or are they scheduled to visit?: Yes ([facility] this morning, set up medications)    Do you have any follow up visits scheduled with your PCP or Specialist?:  Yes, with PCP (Dr. Castillo 6/10/19)  (RN) Is PCP appt scheduled soon enough (within 14 days of discharge date)?: Yes        Aide Oscar RN Care Manager, Population Health

## 2021-05-30 VITALS — BODY MASS INDEX: 15.98 KG/M2 | WEIGHT: 99 LBS

## 2021-05-30 VITALS — BODY MASS INDEX: 17.92 KG/M2 | WEIGHT: 111 LBS

## 2021-05-30 VITALS — WEIGHT: 113.7 LBS | BODY MASS INDEX: 18.35 KG/M2

## 2021-05-30 NOTE — TELEPHONE ENCOUNTER
ANTICOAGULATION  MANAGEMENT- Home Care/Care Facility Result    Assessment     Today's INR result of 3.10 is Supratherapeutic (goal INR of 2.0-3.0)        Warfarin taken as previously instructed    No new diet changes affecting INR    No new medication/supplements affecting INR    Continues to tolerate warfarin with no reported s/s of bleeding or thromboembolism     Previous INR was Therapeutic at 2.50 on 6/24/19.    Taylor catheter changed today.    Plan:     Spoke with ERAN Self from [facility] discussed INR result and instructed:    1.  Requested telephone orders to be FAXED to:  [phone] Nursing Dept.    Warfarin Dosing Instructions:   (has 1mg tabs)    - today, advised one time lower dose with 2mg warfarin, then continue current warfarin dose 2.5 mg daily on Sunday, Tuesday, Friday; and 3 mg daily rest of week.    Next INR to be drawn:   One wk.   - scheduled on 7/8/19    Education provided: importance of consistent vitamin K intake, target INR goal and significance of current INR result and importance of notifying clinic for changes in medications    ERAN Self verbalizes understanding and agrees to warfarin dosing plan.   ?   Maribel Whitaker RN    Subjective/Objective:      Benny Carrillo, a 89 y.o. male is established on warfarin.     Home care/care facility RN's report of Benny INR, recent warfarin dosing, diet changes, medication changes, and symptoms is documented below.    Additional findings: verbally confirmed home dose with ERAN Self and updated on anticoagulation calendar    - no new complaints from patient.    Anticoagulation Episode Summary     Current INR goal:   2.0-3.0   TTR:   74.6 % (9.8 mo)   Next INR check:   7/8/2019   INR from last check:   3.10! 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

## 2021-05-30 NOTE — TELEPHONE ENCOUNTER
INR result is 2.9  INR   Date Value Ref Range Status   07/08/2019 2.60 (!) 0.9 - 1.1 Final       Will the patient be seen, or did they already see, MD or CNP today? No    Most Recent Warfarin dose day/week  Sunday Monday Tuesday Wednesday Thursday Friday Saturday    3 2.5 3 3 2.5 3     Sunday Monday Tuesday Wednesday Thursday Friday Saturday   2.5             Has the patient missed any doses of Coumadin, Warfarin, Jantoven in the past 7 days? No    Has the patients medications changed since the last visit? No    Has the patient experienced any bleeding recently? No    Has the patient experienced any injuries or illness recently? No    Has the patient experienced any 'new' shortness of breath, severe headaches, or changes in vision recently? No    Has the patient had any changes in their diet, or alcohol consumption? No    Is the patient here today to prepare for any type of upcoming surgery, procedure, or for a cardioversion procedure? No    What phone number can we reach the patient at today? Please call Clair back with dosing..

## 2021-05-30 NOTE — PROGRESS NOTES
Subjective: This patient comes in for all up from the hospital he was seen at the emergency room for some neck pain he was treated with some cyclobenzaprine that is been helpful he did want a refill on that.  He also takes Tylenol as needed.    Patient was seen back on 6/1/2019 and had evidence of aspiration pneumonia and had chest x-ray showing right lower lobe and right middle lobe involvement.    He was followed up on 6/26/2019 and had partial clearing at the right lower lobe with some persistent findings in right middle lobe    Follows up now for recheck.    I did recheck a chest x-ray this is improved please see below    Patient has atrial fibrillation his heart irregularly irregular he had an INR    His rate is stable in the 80 range.    He continues on his digoxin    He otherwise is doing well denies any new issues or problems.,  Does use the albuterol on a as needed basis    Tobacco status: He  reports that he has never smoked. He has never used smokeless tobacco.    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     Acute pulmonary edema (H)      Acute pyelonephritis 02/10/2018     Urinary tract infection associated with cystostomy catheter, initial encounter (H) 02/10/2018     Sepsis secondary to UTI (H) 02/10/2018     Moderate malnutrition (H)      History of ischemic cerebrovascular accident (CVA) with residual deficit 11/20/2017     Accelerated hypertension      Chronic retention of urine      Acute nonintractable headache, unspecified headache type      Acute cerebral  infarction (H) 11/14/2017     Chest pain 09/14/2016     Gastroesophageal reflux disease without esophagitis 09/14/2016     Suprapubic catheter (H) 09/14/2016     Leukocytosis, unspecified type 09/14/2016     Heartburn 09/14/2016     Underweight due to inadequate caloric intake 07/28/2015     Chronic atrial fibrillation (H)      Muscle Weakness Generalized        Current Outpatient Medications   Medication Sig Dispense Refill     acetaminophen (TYLENOL) 500 MG tablet Take 1 tablet (500 mg total) by mouth every 6 (six) hours as needed for pain. 100 tablet 2     albuterol (PROAIR HFA;PROVENTIL HFA;VENTOLIN HFA) 90 mcg/actuation inhaler Inhale 2 puffs every 6 (six) hours as needed for wheezing. 1 Inhaler 6     artificial tears, hypromellose, (GONIOVISC) 2.5 % ophthalmic solution 2 gtts to each eye two times a day prn dry eyes 15 mL 6     cyclobenzaprine (FLEXERIL) 10 MG tablet Take 1 tablet (10 mg total) by mouth 2 (two) times a day as needed for muscle spasms. 20 tablet 0     digoxin (LANOXIN) 125 mcg tablet TAKE 1 TABLET BY MOUTH ONCE DAILY 90 tablet 1     finasteride (PROSCAR) 5 mg tablet Take 1 tablet (5 mg total) by mouth daily. 30 tablet 6     gabapentin (NEURONTIN) 100 MG capsule Take 100 mg by mouth 2 (two) times a day as needed. 20 capsule 0     hydrocortisone 2.5 % cream Apply to affected area 1-2 times daily as needed 30 g 2     loratadine (CLARITIN) 10 mg tablet Take 1 tablet (10 mg total) by mouth daily. 90 tablet 3     multivitamin with minerals (THERA-M) 9 mg iron-400 mcg Tab tablet Take 1 tablet by mouth daily. 30 tablet 11     oxybutynin (DITROPAN XL) 10 MG ER tablet TAKE ONE TABLET BY MOUTH ONCE DAILY 90 tablet 1     warfarin (COUMADIN/JANTOVEN) 1 MG tablet Take 2.5-3 tablets (2.5-3 mg total) by mouth See Admin Instructions. Take 3mg Monday/Thursday and 2.5mg ROW (Patient taking differently: Take 2.5-3 mg by mouth See Admin Instructions. Take 2.5 mg Sunday, Tuesday, Friday and 3 mg rest of week      )  90 tablet 5     No current facility-administered medications for this visit.        ROS:   10 point review of systems positive as discussed above otherwise negative    Objective:    /81 (Patient Site: Left Arm, Patient Position: Sitting, Cuff Size: Adult Regular)   Pulse 84   Resp 21   Wt 118 lb (53.5 kg)   BMI 19.05 kg/m    Body mass index is 19.05 kg/m .      General appearance no acute distress    Evaluation from the emergency room on 7/18/2019 at [facility] reviewed.    Meds reviewed.    Neck with some mild tenderness in the right paraspinal muscles no radicular finding no adenopathy    No thyroid fullness oropharynx is clear    Lungs had some diminished breath sounds at the bases but no rales or rhonchi.    Heart was irregularly irregular rate in the 80s per extremities without edema.    Skin is normal no rashes no skin breakdown.    INR as discussed    Chest x-ray recently reviewed today and revealed clearing of the infiltrate.    Radiologist read some small residual nodular density which was smaller likely scarring.    Some emphysematous changes were noted no effusion    Results for orders placed or performed in visit on 07/22/19   INR   Result Value Ref Range    INR 2.90 (!) 0.9 - 1.1       Assessment:  1. Neck pain  cyclobenzaprine (FLEXERIL) 10 MG tablet   2. Muscle Weakness Generalized  acetaminophen (TYLENOL) 500 MG tablet   3. Permanent atrial fibrillation (H)     4. Aspiration pneumonia of both lower lobes due to vomit (H)  XR Chest 2 Views     Neck pain improving continue Tylenol use Flexeril as needed    Atrial fibrillation rate controlled therapeutic INR no changes in meds continue warfarin and digoxin    Aspiration pneumonia clinically and now radiographically resolved.    Plan: As discussed above we will contact patient's son regarding the results we will plan to see the patient back for recheck in 6 weeks    This transcription uses voice recognition software, which may contain  typographical errors.

## 2021-05-30 NOTE — TELEPHONE ENCOUNTER
ANTICOAGULATION  MANAGEMENT- Home Care/Care Facility Result    Assessment     Today's INR result of 2.9 is Therapeutic (goal INR of 2.0-3.0)        Warfarin taken as previously instructed    No new diet changes affecting INR    No new medication/supplements affecting INR    Continues to tolerate warfarin with no reported s/s of bleeding or thromboembolism     Previous INR was Therapeutic     Faxed this encounter to [facility] [phone]    Plan:     Spoke with home care nurse Clair discussed INR result and instructed:     Warfarin Dosing Instructions: Continue current warfarin dose    2.5 mg every Sun, Tue, Fri; 3 mg all other days         Next INR to be drawn: 2 weeks.     Education provided: importance of taking warfarin as instructed    Clair verbalizes understanding and agrees to warfarin dosing plan.   ?   Bari Grewal RN    Subjective/Objective:      Benny Carrillo, a 89 y.o. male is established on warfarin.     Home care/care facility RN's report of Benny INR, recent warfarin dosing, diet changes, medication changes, and symptoms is documented below.    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

## 2021-05-30 NOTE — TELEPHONE ENCOUNTER
ANTICOAGULATION  MANAGEMENT- Home Care/Care Facility Result    Assessment     Today's INR result of 2.6 is Therapeutic (goal INR of 2.0-3.0)        Warfarin taken as previously instructed    No new diet changes affecting INR    No new medication/supplements affecting INR    Continues to tolerate warfarin with no reported s/s of bleeding or thromboembolism     Previous INR was Supratherapeutic     Faxed this encounter to [facility] [phone].    Plan:     Spoke with home care nurse Clair discussed INR result and instructed:     Warfarin Dosing Instructions: Continue current warfarin dose    2.5 mg every Sun, Tue, Fri; 3 mg all other days         Next INR to be drawn: 1 week.     Education provided: importance of taking warfarin as instructed    Clair verbalizes understanding and agrees to warfarin dosing plan.   ?   Bari Grewal RN    Subjective/Objective:      Benny Carrillo, a 89 y.o. male is established on warfarin.     Home care/care facility RN's report of Benny INR, recent warfarin dosing, diet changes, medication changes, and symptoms is documented below.    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

## 2021-05-30 NOTE — TELEPHONE ENCOUNTER
ANTICOAGULATION  MANAGEMENT: Discharge Continuity of Care Review    Emergency room visit on  7/18 for neck pain.    Discharge disposition: Home    INR Results:       Recent labs: (last 7 days)     07/15/19   INR 2.90*       Warfarin inpatient management: not applicable    Warfarin discharge instructions: home regimen continued     Medication Changes Affecting Anticoagulation: No    Additional Factors Affecting Anticoagulation: No    Plan     No adjustment to anticoagulation plan needed  Jesika Jenkins RN

## 2021-05-30 NOTE — TELEPHONE ENCOUNTER
Dr. Castillo,    Do you agree with telephone orders as outlined on Benny Carrillo?  I will be faxing orders to [facility].  Thank you.    Danay Whitaker, RN, BSN, PHN

## 2021-05-30 NOTE — PROGRESS NOTES
Subjective: This patient comes in for evaluation this 89-year-old male was seen back on 6/11/2019    He had hospitalization for aspiration pneumonia and had C. difficile    Was on some Zosyn in the hospital and vancomycin liquid he is now done with that.  He has had no further diarrhea.    Patient had aspiration in both lobes of the lungs back on 6/1/2019 white count was 13,600 at that time    Patient comes in today for follow-up he feels like his breathing's okay he is here with his son.    Patient also had small bowel obstruction in the hospital.  He is passing his stool now.    No fever no chills.  Weight is stable.    Tobacco status: He  reports that he has never smoked. He has never used smokeless tobacco.    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     Acute pulmonary edema (H)      Acute pyelonephritis 02/10/2018     Urinary tract infection associated with cystostomy catheter, initial encounter (H) 02/10/2018     Sepsis secondary to UTI (H) 02/10/2018     Moderate malnutrition (H)      History of ischemic cerebrovascular accident (CVA) with residual deficit 11/20/2017     Accelerated hypertension      Chronic retention of urine      Acute nonintractable headache, unspecified headache type      Acute cerebral infarction (H) 11/14/2017     Chest pain 09/14/2016     Gastroesophageal reflux disease without esophagitis 09/14/2016     Suprapubic catheter (H) 09/14/2016     Leukocytosis, unspecified type 09/14/2016     Heartburn 09/14/2016     Underweight due to inadequate  caloric intake 07/28/2015     Chronic atrial fibrillation (H)      Muscle Weakness Generalized        Current Outpatient Medications   Medication Sig Dispense Refill     acetaminophen (TYLENOL) 500 MG tablet Take 1 tablet (500 mg total) by mouth every 6 (six) hours as needed for pain. 100 tablet 2     albuterol (PROAIR HFA;PROVENTIL HFA;VENTOLIN HFA) 90 mcg/actuation inhaler Inhale 2 puffs every 6 (six) hours as needed for wheezing. 1 Inhaler 6     artificial tears, hypromellose, (GONIOVISC) 2.5 % ophthalmic solution 2 gtts to each eye two times a day prn dry eyes 15 mL 6     digoxin (LANOXIN) 125 mcg tablet TAKE 1 TABLET BY MOUTH ONCE DAILY 90 tablet 1     finasteride (PROSCAR) 5 mg tablet Take 1 tablet (5 mg total) by mouth daily. 30 tablet 6     gabapentin (NEURONTIN) 100 MG capsule Take 100 mg by mouth 2 (two) times a day as needed. 20 capsule 0     hydrocortisone 2.5 % cream Apply to affected area 1-2 times daily as needed 30 g 2     loratadine (CLARITIN) 10 mg tablet Take 1 tablet (10 mg total) by mouth daily. 90 tablet 3     multivitamin with minerals (THERA-M) 9 mg iron-400 mcg Tab tablet Take 1 tablet by mouth daily. 30 tablet 11     oxybutynin (DITROPAN XL) 10 MG ER tablet TAKE ONE TABLET BY MOUTH ONCE DAILY 90 tablet 1     warfarin (COUMADIN/JANTOVEN) 1 MG tablet Take 2.5-3 tablets (2.5-3 mg total) by mouth See Admin Instructions. Take 3mg Monday/Thursday and 2.5mg ROW (Patient taking differently: Take 2.5-3 mg by mouth See Admin Instructions. Take 2.5 mg Sunday, Tuesday, Friday and 3 mg rest of week      ) 90 tablet 5     No current facility-administered medications for this visit.        ROS:   10 point review of systems positive as discussed above otherwise negative    Objective:    /62 (Patient Site: Right Arm, Patient Position: Sitting, Cuff Size: Adult Small)   Pulse 68   Resp 20   Wt 115 lb (52.2 kg)   BMI 18.56 kg/m    Body mass index is 18.56 kg/m .      General appearance no acute  distress    HEENT neck without JVD oropharynx was clear    Heart was irregularly irregular rate in the 60s    Abdomen soft bowel sounds normal no guarding or rebound back without tenderness    Extremities without edema    White count down to 8100 hemoglobin stable at 13.2    Chest x-ray personally reviewed there was improvement in decreasing infiltrates.    Radiologist reading stated that there was further clearing of the right lower lobe infiltrate with persisting opacity in the right middle lobe which had a somewhat nodular appearance    Recommendation was to recheck chest x-ray in 2 to 4 weeks and consider CT if not resolving further.    Left side was okay, lungs were hyperinflated consistent with COPD    Results for orders placed or performed in visit on 06/26/19   HM2(CBC w/o Differential)   Result Value Ref Range    WBC 8.1 4.0 - 11.0 thou/uL    RBC 3.99 (L) 4.40 - 6.20 mill/uL    Hemoglobin 13.2 (L) 14.0 - 18.0 g/dL    Hematocrit 39.6 (L) 40.0 - 54.0 %    MCV 99 80 - 100 fL    MCH 33.1 27.0 - 34.0 pg    MCHC 33.3 32.0 - 36.0 g/dL    RDW 12.1 11.0 - 14.5 %    Platelets 180 140 - 440 thou/uL    MPV 8.6 7.0 - 10.0 fL       Assessment:  1. Aspiration pneumonia of both lower lobes due to vomit (H)  XR Chest 2 Views    HM2(CBC w/o Differential)   2. Permanent atrial fibrillation (H)     3. Poor nutrition  multivitamin with minerals (THERA-M) 9 mg iron-400 mcg Tab tablet   4. C. difficile colitis       Status post aspiration pneumonia improving although some persisting opacity right middle lobe    Need recheck chest x-ray in 3 weeks along with exam    No additional antibiotics at this point.    No clinical symptoms for ongoing pneumonia    Poor nutrition refill on vitamins    C. difficile colitis symptoms resolved    Atrial fibrillation rate controlled next INR next week    Plan: As outlined above    This transcription uses voice recognition software, which may contain typographical errors.

## 2021-05-30 NOTE — TELEPHONE ENCOUNTER
ANTICOAGULATION  MANAGEMENT- Home Care/Care Facility Result    Assessment     Today's INR result of 2.9 is Therapeutic (goal INR of 2.0-3.0)        Warfarin taken as previously instructed    No new diet changes affecting INR    No new medication/supplements affecting INR    Continues to tolerate warfarin with no reported s/s of bleeding or thromboembolism     Previous INR was Therapeutic     Faxed this encounter to [facility] [phone].    Plan:     Spoke with Clair with [facility] discussed INR result and instructed:     Warfarin Dosing Instructions: Continue current warfarin dose    2.5 mg every Sun, Tue, Fri; 3 mg all other days      (0 % change)    Next INR to be drawn: one week.    Education provided: importance of therapeutic range    Clair verbalizes understanding and agrees to warfarin dosing plan.   ?   Clair Gonzalez RN    Subjective/Objective:      Benny Carrillo, a 89 y.o. male is established on warfarin.     Home care/care facility RN's report of Benny INR, recent warfarin dosing, diet changes, medication changes, and symptoms is documented below.    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

## 2021-05-30 NOTE — TELEPHONE ENCOUNTER
INR result is  3.1  INR   Date Value Ref Range Status   06/24/2019 2.50 (!) 0.9 - 1.1 Final       Will the patient be seen, or did they already see, MD or CNP today? No    Most Recent Warfarin dose day/week  Sunday Monday Tuesday Wednesday Thursday Friday Saturday    3 2.5 3 3 2.5 3     Sunday Monday Tuesday Wednesday Thursday Friday Saturday   2.5             Has the patient missed any doses of Coumadin, Warfarin, Jantoven in the past 7 days? No    Has the patients medications changed since the last visit? No    Has the patient experienced any bleeding recently? No    Has the patient experienced any injuries or illness recently? No    Has the patient experienced any 'new' shortness of breath, severe headaches, or changes in vision recently? No    Has the patient had any changes in their diet, or alcohol consumption? No    Is the patient here today to prepare for any type of upcoming surgery, procedure, or for a cardioversion procedure? No    What phone number can we reach the patient at today? [phone]    Also needs order for INR faxed to [facility] [phone]

## 2021-05-30 NOTE — TELEPHONE ENCOUNTER
----- Message from Alcides Castillo MD sent at 6/28/2019  1:14 PM CDT -----  Please contact this patient's son, let him know that the chest x-ray did show an improvement but there is still some persisting density/opacity in the right middle lobe    I need to see the patient back in 3 weeks for another recheck and recheck x-ray

## 2021-05-30 NOTE — TELEPHONE ENCOUNTER
INR result is 2.9  INR   Date Value Ref Range Status   07/15/2019 2.90 (!) 0.9 - 1.1 Final       Will the patient be seen, or did they already see, MD or CNP today? No    Most Recent Warfarin dose day/week  Sunday Monday Tuesday Wednesday Thursday Friday Saturday    3 2.5 3  2.5 3     Sunday Monday Tuesday Wednesday Thursday Friday Saturday   2.5             Has the patient missed any doses of Coumadin, Warfarin, Jantoven in the past 7 days? No    Has the patients medications changed since the last visit? Yes was seen at the ED and was started on cyclobenzaprine (FLEXERIL) 10 MG tablet    Has the patient experienced any bleeding recently? No    Has the patient experienced any injuries or illness recently? Yes neck pain on 7/18    Has the patient experienced any 'new' shortness of breath, severe headaches, or changes in vision recently? No    Has the patient had any changes in their diet, or alcohol consumption? No    Is the patient here today to prepare for any type of upcoming surgery, procedure, or for a cardioversion procedure? No    What phone number can we reach the patient at today? Please call Clair back with dosing.

## 2021-05-30 NOTE — TELEPHONE ENCOUNTER
ANTICOAGULATION  MANAGEMENT- Home Care/Care Facility Result    Assessment     Today's INR result of 2.90 is Therapeutic (goal INR of 2.0-3.0)        Warfarin taken as previously instructed    No new diet changes affecting INR    No interaction expected between Flexeril and warfarin    Continues to tolerate warfarin with no reported s/s of bleeding or thromboembolism     Previous INR was Therapeutic at 2.90 on 7/15/19.    Presented in ED for Neck pain on 7/18/19.    Plan:     Spoke with Clair from [facility] discussed INR result and instructed:   1.  Requested telephone order to be FAXED to:   [phone]      Warfarin Dosing Instructions:  (has 1mg tabs)   - Continue current warfarin dose 2.5 mg daily on Sunday, Tuesday, Friday; and 3 mg daily rest of week.    Next INR to be drawn:  One wk.  Scheduled on 7/29/19.    Education provided: target INR goal and significance of current INR result, no interaction anticipated between warfarin and Flexeril and importance of notifying clinic for changes in medications    Clair  verbalizes understanding and agrees to warfarin dosing plan.   ?   Maribel Whitaker RN    Subjective/Objective:      Benny Carrillo, a 89 y.o. male is established on warfarin.     Home care/care facility RN's report of Benny INR, recent warfarin dosing, diet changes, medication changes, and symptoms is documented below.    Additional findings: verbally confirmed home dose with Clair and updated on anticoagulation calendar    - doing much better with neck pain - Taking Flexeril.   - scheduled f/u with Dr. Alcides Castillo on 7/23/19.    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

## 2021-05-30 NOTE — TELEPHONE ENCOUNTER
INR result is 2.6  INR   Date Value Ref Range Status   07/01/2019 3.10 (!) 0.9 - 1.1 Final       Will the patient be seen, or did they already see, MD or CNP today? No    Most Recent Warfarin dose day/week  Sunday Monday Tuesday Wednesday Thursday Friday Saturday    2 2.5 3 3 2.5 3     Sunday Monday Tuesday Wednesday Thursday Friday Saturday   2.5             Has the patient missed any doses of Coumadin, Warfarin, Jantoven in the past 7 days? No    Has the patients medications changed since the last visit? No    Has the patient experienced any bleeding recently? No    Has the patient experienced any injuries or illness recently? No    Has the patient experienced any 'new' shortness of breath, severe headaches, or changes in vision recently? No    Has the patient had any changes in their diet, or alcohol consumption? No    Is the patient here today to prepare for any type of upcoming surgery, procedure, or for a cardioversion procedure? No    What phone number can we reach the patient at today? [facility], [place] [phone]

## 2021-05-30 NOTE — TELEPHONE ENCOUNTER
INR result is   2.9    Will the patient be seen, or did they already see, MD or CNP today? No    Most Recent Warfarin dose day/week  Sunday Monday Tuesday Wednesday Thursday Friday Saturday    3 2.5 3 3 2.5 3     Sunday Monday Tuesday Wednesday Thursday Friday Saturday   2.5             Has the patient missed any doses of Coumadin, Warfarin, Jantoven in the past 7 days? No    Has the patients medications changed since the last visit? No    Has the patient experienced any bleeding recently? No    Has the patient experienced any injuries or illness recently? No    Has the patient experienced any 'new' shortness of breath, severe headaches, or changes in vision recently? No    Has the patient had any changes in their diet, or alcohol consumption? No    Is the patient here today to prepare for any type of upcoming surgery, procedure, or for a cardioversion procedure? No    What phone number can we reach the patient at today? [phone] .

## 2021-05-31 VITALS — WEIGHT: 115 LBS | BODY MASS INDEX: 19.74 KG/M2

## 2021-05-31 VITALS — HEIGHT: 64 IN | BODY MASS INDEX: 20.14 KG/M2 | WEIGHT: 118 LBS

## 2021-05-31 VITALS — BODY MASS INDEX: 19.74 KG/M2 | WEIGHT: 115 LBS

## 2021-05-31 VITALS — WEIGHT: 120 LBS | BODY MASS INDEX: 19.37 KG/M2

## 2021-05-31 VITALS — BODY MASS INDEX: 18.24 KG/M2 | WEIGHT: 113 LBS

## 2021-05-31 VITALS — BODY MASS INDEX: 18.4 KG/M2 | WEIGHT: 114 LBS

## 2021-05-31 VITALS — HEIGHT: 66 IN | BODY MASS INDEX: 18.16 KG/M2 | WEIGHT: 113 LBS

## 2021-05-31 NOTE — TELEPHONE ENCOUNTER
INR result is 3.3  INR   Date Value Ref Range Status   07/29/2019 2.90 (!) 0.9 - 1.1 Final       Will the patient be seen, or did they already see, MD or CNP today? No    Most Recent Warfarin dose day/week  Sunday Monday Tuesday Wednesday Thursday Friday Saturday    3 2.5 3 3 2.5 3     Sunday Monday Tuesday Wednesday Thursday Friday Saturday   2.5             Has the patient missed any doses of Coumadin, Warfarin, Jantoven in the past 7 days? No    Has the patients medications changed since the last visit? No    Has the patient experienced any bleeding recently? No    Has the patient experienced any injuries or illness recently? No    Has the patient experienced any 'new' shortness of breath, severe headaches, or changes in vision recently? No    Has the patient had any changes in their diet, or alcohol consumption? No    Is the patient here today to prepare for any type of upcoming surgery, procedure, or for a cardioversion procedure? No    What phone number can we reach the patient at today? Please contact ERAN Jackson with [facility] at [phone].

## 2021-05-31 NOTE — TELEPHONE ENCOUNTER
Per other encounter medication is on back order. The pharmacy contacted us later the same day patient came in.

## 2021-05-31 NOTE — TELEPHONE ENCOUNTER
Orders being requested: skilled nursing 1 x a week for 8 weeks  Reason service is needed/diagnosis: s/p changed monthly and INR  When are orders needed by: asap  Where to send Orders: Phone:  [phone]  Okay to leave detailed message?  Yes

## 2021-05-31 NOTE — TELEPHONE ENCOUNTER
ANTICOAGULATION  MANAGEMENT- Home Care/Care Facility Result    Assessment     Today's INR result of 3.3 is Supratherapeutic (goal INR of 2.0-3.0)        Warfarin taken as previously instructed    No new diet changes affecting INR    No new medication/supplements affecting INR    Continues to tolerate warfarin with no reported s/s of bleeding or thromboembolism     Previous INR was Therapeutic     ACN faxed this encounter to [facility] [phone].    Plan:     Spoke with [facility] nurse Renetta discussed INR result and instructed:     Warfarin Dosing Instructions: Change warfarin dose to 3 mg daily on Wed, Sat; and 2.5 mg daily rest of week  (5 % change)    Next INR to be drawn: 1 week.     Education provided: importance of taking warfarin as instructed    Renetta verbalizes understanding and agrees to warfarin dosing plan.   ?   Bari Grewal RN    Subjective/Objective:      Benny Carrillo, a 89 y.o. male is established on warfarin.     Home care/care facility RN's report of Benny INR, recent warfarin dosing, diet changes, medication changes, and symptoms is documented below.    Additional findings: verbally confirmed home dose with Renetta and updated on anticoagulation calendar    Anticoagulation Episode Summary     Current INR goal:   2.0-3.0   TTR:   74.2 % (11.2 mo)   Next INR check:   8/19/2019   INR from last check:   3.30! 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

## 2021-05-31 NOTE — TELEPHONE ENCOUNTER
Patient's son Steven stated that he went to the pharmacy on 8/14/2019 and the Pharmacy stated that they don't have the RX  (DITROPAN XL) 10 MG ER tablet. He went again on 8/16 and again today 8/20/2019 and they told him that the RX was not found for refill. Patient is out of the RX and needs a refill. Son would like a call back with a [facility]  with any questions and confirmation that it was sent and ready for . Thanks!    Refill Request  Did you contact pharmacy: Yes  Medication name: oxybutynin (DITROPAN XL) 10 MG ER tablet    Who prescribed the medication:   Pharmacy Name and Location: [facility]  Is patient out of medication: Yes  Patient notified refills processed in 72 hours:  yes  Okay to leave a detailed message: yes

## 2021-05-31 NOTE — TELEPHONE ENCOUNTER
Medication Question or Clarification  Who is calling: Pharmacy: incoming fax from [facility] pharmacy in [place]  What medication are you calling about? (include dose and sig)     oxybutynin (DITROPAN XL) 10 MG ER tablet 90 tablet 1 8/14/2019     Sig - Route: Take 1 tablet (10 mg total) by mouth daily. - Oral      Who prescribed the medication?: pcp  What is your question/concern?: This medication is on backorder, please send new rx for 5 mg Thank-you!   Pharmacy: [facility]  Okay to leave a detailed message?: Yes  Site CMT - Please call the pharmacy to obtain any additional needed information.

## 2021-05-31 NOTE — PROGRESS NOTES
[facility] Care Coordination Contact      [facility] Six-Month Telephone Assessment    6 month telephone assessment completed on Thursday, 8.29.2019.    ER visits: No  Hospitalizations: No  TCU stays: No  Significant health status changes: Member's son, Steven, denies any major changes in health. Reports that needs remain the same including need for full assistance with ADL/IADL's. Member is provided 24 hour supervision and support. Well supported with 11.5 hours a day of PCA support.   Falls/Injuries: No  ADL/IADL changes: No  Changes in services: No    Caregiver Assessment follow up:  Denies any concerns at this time. Many family members available to assist the member.     Goals: See POC in chart for goal progress documentation.      Will see member in 6 months for an annual health risk assessment.   Encouraged member to call CC with any questions or concerns in the meantime.     KESHA Sun  [facility]  [phone]

## 2021-05-31 NOTE — TELEPHONE ENCOUNTER
Good morning Dr. Castillo,    Do you agree with telephone orders as outlined on Benny Carrillo?  I will be faxing orders to [facility].  Thank you.    Danay Whitaker, RN, BSN, PHN,

## 2021-05-31 NOTE — TELEPHONE ENCOUNTER
INR result is 2.3  INR   Date Value Ref Range Status   08/19/2019 3.10 (!) 0.9 - 1.1 Final       Will the patient be seen, or did they already see, MD or CNP today? No    Most Recent Warfarin dose day/week  Sunday Monday Tuesday Wednesday Thursday Friday Saturday    2.5 2.5 2.5 2.5 2.5 3     Sunday Monday Tuesday Wednesday Thursday Friday Saturday   2.5             Has the patient missed any doses of Coumadin, Warfarin, Jantoven in the past 7 days? No    Has the patients medications changed since the last visit? No    Has the patient experienced any bleeding recently? No    Has the patient experienced any injuries or illness recently? No    Has the patient experienced any 'new' shortness of breath, severe headaches, or changes in vision recently? No    Has the patient had any changes in their diet, or alcohol consumption? No    Is the patient here today to prepare for any type of upcoming surgery, procedure, or for a cardioversion procedure? No    What phone number can we reach the patient at today? Please contact luis miguel Olson with [facility]  at [phone].

## 2021-05-31 NOTE — PROGRESS NOTES
[facility] Care Coordination Contact    Internal CC change effective 9/1/19  Krysten Evangelista  [facility]  [phone]

## 2021-05-31 NOTE — TELEPHONE ENCOUNTER
ANTICOAGULATION  MANAGEMENT- Home Care/Care Facility Result    Assessment     Today's INR result of 2.30 is Therapeutic (goal INR of 2.0-3.0)        Warfarin taken as previously instructed    No new diet changes affecting INR    No new medication/supplements affecting INR    Continues to tolerate warfarin with no reported s/s of bleeding or thromboembolism     Previous INR was Supratherapeutic at 3.10 on 8/19/19.    Reported patient doing well with no new complaints.    Plan:     Spoke with Neelima from [facility] discussed INR result and instructed:    1.  Requested to Fax: [phone]    Warfarin Dosing Instructions:   - Continue current warfarin dose 3 mg daily on Saturday; and 2.5 mg daily rest of week.    Next INR to be drawn:  One wk.   - scheduled on 9/3/19.    Education provided: importance of consistent vitamin K intake and target INR goal and significance of current INR result    Jass verbalizes understanding and agrees to warfarin dosing plan.   ?   Maribel Whitaker RN    Subjective/Objective:      Benny Carrillo, a 89 y.o. male is established on warfarin.     Home care/care facility RN's report of Benny INR, recent warfarin dosing, diet changes, medication changes, and symptoms is documented below.    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

## 2021-05-31 NOTE — TELEPHONE ENCOUNTER
ANTICOAGULATION  MANAGEMENT- Home Care/Care Facility Result    Assessment     Today's INR result of 3.10 is Supratherapeutic (goal INR of 2.0-3.0)        Warfarin taken as previously instructed    No new diet changes affecting INR    No new medication/supplements affecting INR    Continues to tolerate warfarin with no reported s/s of bleeding or thromboembolism     Previous INR was Supratherapeutic a 3.30 on 8/12/19.  (last 2 INR's supra).    Plan:     Spoke with Catrina from [facility] discussed INR result and instructed:   1.  Requested telephone order to be FAXED to:   [phone]    Warfarin Dosing Instructions:    (RN verified has 1mg tabs)    today, advised one time lower dose with 2mg warfarin,   - then change warfarin dose to 3 mg daily on Saturday; and 2.5 mg daily rest of week.   - (2.7 % change)    Next INR to be drawn:  One wk.   - scheduled on 8/26/19.    Education provided: importance of consistent vitamin K intake, target INR goal and significance of current INR result and importance of notifying clinic for changes in medications    Macie from [facility] verbalizes understanding and agrees to warfarin dosing plan.   ?   Maribel Whitaker RN    Subjective/Objective:      Benny Carrillo, a 89 y.o. male is established on warfarin.     Home care/care facility RN's report of Benny INR, recent warfarin dosing, diet changes, medication changes, and symptoms is documented below.    Additional findings: verbally confirmed home dose with Macie  and updated on anticoagulation calendar    Anticoagulation Episode Summary     Current INR goal:   2.0-3.0   TTR:   72.7 % (11.4 mo)   Next INR check:   8/26/2019   INR from last check:   3.10! 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

## 2021-05-31 NOTE — TELEPHONE ENCOUNTER
ANTICOAGULATION  MANAGEMENT    Assessment     Today's INR result of 3.0 is Therapeutic (goal INR of 2.0-3.0)        Warfarin taken as previously instructed    No new diet changes affecting INR    No new medication/supplements affecting INR    Continues to tolerate warfarin with no reported s/s of bleeding or thromboembolism     Previous INR was Therapeutic    Plan:     Spoke with home care nurse Neelima regarding INR result and instructed:     Warfarin Dosing Instructions:  Continue current warfarin dose 3 mg daily on Sat; and 2.5 mg daily rest of week  (0 % change)    Instructed patient to follow up no later than: one week    Education provided: importance of therapeutic range    Neelima verbalizes understanding and agrees to warfarin dosing plan.    Instructed to call the [facility] for any changes, questions or concerns. (#[phone])   ?   Jesika Jenkins RN    Subjective/Objective:      Benny XANDER Carrillo, a 89 y.o. male is on warfarin.     Benny reports:     Home warfarin dose: verified correct dose taken with home care nurse     Missed doses: No     Medication changes:  No     S/S of bleeding or thromboembolism:  No     New Injury or illness:  No     Changes in diet or alcohol consumption:  No     Upcoming surgery, procedure or cardioversion:  No    Anticoagulation Episode Summary     Current INR goal:   2.0-3.0   TTR:   77.6 %   Next INR check:   9/10/2019   INR from last check:   3.00 (9/3/2019)   Weekly max warfarin dose:      Target end date:   Indefinite   INR check location:      Preferred lab:      Send INR reminders to:   [facility]    Indications    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

## 2021-05-31 NOTE — TELEPHONE ENCOUNTER
INR result is 3.1  INR   Date Value Ref Range Status   08/12/2019 3.30 (!) 0.9 - 1.1 Final       Will the patient be seen, or did they already see, MD or CNP today? No    Most Recent Warfarin dose day/week  Sunday Monday Tuesday Wednesday Thursday Friday Saturday    3 mg 2.5 mg 3 mg 3 mg 2.5 mg 3 mg     Sunday Monday Tuesday Wednesday Thursday Friday Saturday   2.5 mg             Has the patient missed any doses of Coumadin, Warfarin, Jantoven in the past 7 days? No    Has the patients medications changed since the last visit? No    Has the patient experienced any bleeding recently? No    Has the patient experienced any injuries or illness recently? No    Has the patient experienced any 'new' shortness of breath, severe headaches, or changes in vision recently? No    Has the patient had any changes in their diet, or alcohol consumption? No    Is the patient here today to prepare for any type of upcoming surgery, procedure, or for a cardioversion procedure? No    What phone number can we reach the patient at today? [phone] Catrina.

## 2021-05-31 NOTE — PROGRESS NOTES
"Subjective: Patient comes in for evaluation he has a suprapubic catheter and there is a \"lump \"near the catheter is nontender they just noticed it.    He does see the urologist for catheter changes.    Patient has had some labile blood pressures.  176/106 today 138/98 recheck 136/88 patient's been on amlodipine low-dose in the past and I did restart that at 2.5 mg 1 a day    INR was 3.3 on August 12 he has a history of atrial fibrillation and rate is controlled in the 60s today.        Tobacco status: He  reports that he has never smoked. He has never used smokeless tobacco.    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     Acute pulmonary edema (H)      Acute pyelonephritis 02/10/2018     Urinary tract infection associated with cystostomy catheter, initial encounter (H) 02/10/2018     Sepsis secondary to UTI (H) 02/10/2018     Moderate malnutrition (H)      History of ischemic cerebrovascular accident (CVA) with residual deficit 11/20/2017     Accelerated hypertension      Chronic retention of urine      Acute nonintractable headache, unspecified headache type      Acute cerebral infarction (H) 11/14/2017     Chest pain 09/14/2016     Gastroesophageal reflux disease without esophagitis 09/14/2016     Suprapubic catheter (H) 09/14/2016     Leukocytosis, unspecified type 09/14/2016     Heartburn 09/14/2016     Underweight due to inadequate caloric intake 07/28/2015     Chronic atrial fibrillation (H)      Muscle Weakness Generalized        Current Outpatient Medications "   Medication Sig Dispense Refill     acetaminophen (TYLENOL) 500 MG tablet Take 1 tablet (500 mg total) by mouth every 6 (six) hours as needed for pain. 100 tablet 2     albuterol (PROAIR HFA;PROVENTIL HFA;VENTOLIN HFA) 90 mcg/actuation inhaler Inhale 2 puffs every 6 (six) hours as needed for wheezing. 1 Inhaler 6     artificial tears, hypromellose, (GONIOVISC) 2.5 % ophthalmic solution 2 gtts to each eye two times a day prn dry eyes 15 mL 6     cyclobenzaprine (FLEXERIL) 10 MG tablet Take 1 tablet (10 mg total) by mouth 2 (two) times a day as needed for muscle spasms. 20 tablet 0     digoxin (LANOXIN) 125 mcg tablet Take 1 tablet (125 mcg total) by mouth daily. 90 tablet 1     finasteride (PROSCAR) 5 mg tablet Take 1 tablet (5 mg total) by mouth daily. 30 tablet 6     gabapentin (NEURONTIN) 100 MG capsule Take 100 mg by mouth 2 (two) times a day as needed. 20 capsule 0     hydrocortisone 2.5 % cream Apply to affected area 1-2 times daily as needed 30 g 2     loratadine (CLARITIN) 10 mg tablet Take 1 tablet (10 mg total) by mouth daily. 90 tablet 3     multivitamin with minerals (THERA-M) 9 mg iron-400 mcg Tab tablet Take 1 tablet by mouth daily. 30 tablet 11     oxybutynin (DITROPAN XL) 10 MG ER tablet Take 1 tablet (10 mg total) by mouth daily. 90 tablet 1     warfarin (COUMADIN/JANTOVEN) 1 MG tablet Take 2.5-3 tablets (2.5-3 mg total) by mouth See Admin Instructions. Take 3mg Monday/Thursday and 2.5mg ROW (Patient taking differently: Take 2.5-3 mg by mouth See Admin Instructions. Take 2.5 mg Sunday, Tuesday, Friday and 3 mg rest of week      ) 90 tablet 5     amLODIPine (NORVASC) 2.5 MG tablet Take 1 tablet (2.5 mg total) by mouth daily. 30 tablet 11     No current facility-administered medications for this visit.        ROS: 10 point review of systems positive as outlined above otherwise negative denies any pain denies any blood    Objective:    /88 (Patient Site: Left Arm, Patient Position: Sitting, Cuff  Size: Adult Small)   Pulse 64   Resp 12   Wt 120 lb 8 oz (54.7 kg)   BMI 19.45 kg/m    Body mass index is 19.45 kg/m .    General appearance no acute distress vital signs are stable  HEENT neck negative oropharynx clear pupils react normally  No adenopathy    Heart was irregularly irregular rate in the 60s lungs are clear throughout no rales or rhonchi    Abdomen soft bowel sounds normal no guarding or rebound    Suprapubic catheter in place no sign of infection there is a little area about half centimeter inferiorly with a little proud flesh but nothing worrisome.    Lower extremities without edema    INR 2 days ago 3.3    Results for orders placed or performed in visit on 08/12/19   INR   Result Value Ref Range    INR 3.30 (!) 0.9 - 1.1       Assessment:  1. Suprapubic catheter (H)  oxybutynin (DITROPAN XL) 10 MG ER tablet   2. Muscle Weakness Generalized  acetaminophen (TYLENOL) 500 MG tablet   3. Chronic atrial fibrillation (H)  digoxin (LANOXIN) 125 mcg tablet   4. Accelerated hypertension  amLODIPine (NORVASC) 2.5 MG tablet     Suprapubic catheter as outlined above refill on the oxybutynin    Uses some Tylenol as needed    Atrial fibrillation controlled rate with digoxin, therapeutic INR as outlined    Hypertension, start amlodipine 2.5 mg a day    Plan: As discussed above monitor the pubic site no intervention    See anticoagulation nurses notes regarding the slightly elevated INR    Start amlodipine, recheck in 1 month check blood pressure recheck the catheter site    This transcription uses voice recognition software, which may contain typographical errors.

## 2021-06-01 ENCOUNTER — COMMUNICATION - HEALTHEAST (OUTPATIENT)
Dept: FAMILY MEDICINE | Facility: CLINIC | Age: 86
End: 2021-06-01

## 2021-06-01 ENCOUNTER — RECORDS - HEALTHEAST (OUTPATIENT)
Dept: FAMILY MEDICINE | Facility: CLINIC | Age: 86
End: 2021-06-01

## 2021-06-01 VITALS — BODY MASS INDEX: 20.08 KG/M2 | WEIGHT: 117 LBS

## 2021-06-01 VITALS — WEIGHT: 117 LBS | BODY MASS INDEX: 20.08 KG/M2

## 2021-06-01 VITALS — WEIGHT: 121 LBS | BODY MASS INDEX: 20.14 KG/M2

## 2021-06-01 VITALS — BODY MASS INDEX: 19.44 KG/M2 | WEIGHT: 116.8 LBS

## 2021-06-01 VITALS — BODY MASS INDEX: 19.91 KG/M2 | WEIGHT: 116 LBS

## 2021-06-01 DIAGNOSIS — R05.9 COUGH: ICD-10-CM

## 2021-06-01 NOTE — TELEPHONE ENCOUNTER
FYI - Status Update  Who is Calling: Home Care  Update: Patient is unable to have their INR drawn on Monday 9/30, patient will have it drawn on Tuesday 10/1/19 instead.  Okay to leave a detailed message?:  No

## 2021-06-01 NOTE — TELEPHONE ENCOUNTER
INR result is 2.5  INR   Date Value Ref Range Status   09/09/2019 2.60 (!) 0.9 - 1.1 Final       Will the patient be seen, or did they already see, MD or CNP today? No    Most Recent Warfarin dose day/week  Sunday Monday Tuesday Wednesday Thursday Friday Saturday    2.5 2.5 2.5 2.5 2.5 3     Sunday Monday Tuesday Wednesday Thursday Friday Saturday   2.5               Has the patient missed any doses of Coumadin, Warfarin, Jantoven in the past 7 days? No    Has the patients medications changed since the last visit? No    Has the patient experienced any bleeding recently? No    Has the patient experienced any injuries or illness recently? No    Has the patient experienced any 'new' shortness of breath, severe headaches, or changes in vision recently? No    Has the patient had any changes in their diet, or alcohol consumption? No    Is the patient here today to prepare for any type of upcoming surgery, procedure, or for a cardioversion procedure? No    What phone number can we reach the patient at today? home phone listed in demographics.

## 2021-06-01 NOTE — TELEPHONE ENCOUNTER
INR result is 2.6  INR   Date Value Ref Range Status   09/03/2019 3.00 (!) 0.9 - 1.1 Final       Will the patient be seen, or did they already see, MD or CNP today? Yes    Most Recent Warfarin dose day/week  Sunday Monday Tuesday Wednesday Thursday Friday Saturday    2.5 2.5 2.5 2.5 2.5 3     Sunday Monday Tuesday Wednesday Thursday Friday Saturday   2.5             Has the patient missed any doses of Coumadin, Warfarin, Jantoven in the past 7 days? No    Has the patients medications changed since the last visit? No    Has the patient experienced any bleeding recently? No    Has the patient experienced any injuries or illness recently? No    Has the patient experienced any 'new' shortness of breath, severe headaches, or changes in vision recently? No    Has the patient had any changes in their diet, or alcohol consumption? No    Is the patient here today to prepare for any type of upcoming surgery, procedure, or for a cardioversion procedure? No    What phone number can we reach the patient at today? Neelima is on the phone.

## 2021-06-01 NOTE — TELEPHONE ENCOUNTER
INR result is   2.2    Will the patient be seen, or did they already see, MD or CNP today? No    Most Recent Warfarin dose day/week  Sunday Monday Tuesday Wednesday Thursday Friday Saturday     2.5 2.5 2.5 2.5 3     Sunday Monday Tuesday Wednesday Thursday Friday Saturday   2.5 2.5            Has the patient missed any doses of Coumadin, Warfarin, Jantoven in the past 7 days? No    Has the patients medications changed since the last visit? No    Has the patient experienced any bleeding recently? No    Has the patient experienced any injuries or illness recently? No    Has the patient experienced any 'new' shortness of breath, severe headaches, or changes in vision recently? No    Has the patient had any changes in their diet, or alcohol consumption? No    Is the patient here today to prepare for any type of upcoming surgery, procedure, or for a cardioversion procedure? No    What phone number can we reach the patient at today? [phone].

## 2021-06-01 NOTE — TELEPHONE ENCOUNTER
ANTICOAGULATION  MANAGEMENT- Home Care/Care Facility Result    Assessment     Today's INR result of 2.5 is Therapeutic (goal INR of 2.0-3.0)        Warfarin taken as previously instructed    No new diet changes affecting INR    No new medication/supplements affecting INR    Continues to tolerate warfarin with no reported s/s of bleeding or thromboembolism     Previous INR was Therapeutic     Faxed this encounter to [facility] [phone].    Plan:     Spoke with home care nurse Neelima discussed INR result and instructed:     Warfarin Dosing Instructions: Continue current warfarin dose 3 mg daily on Sat; and 2.5 mg daily rest of week  (0 % change)    Next INR to be drawn: 2 weeks.    Education provided: importance of taking warfarin as instructed    Neelima verbalizes understanding and agrees to warfarin dosing plan.   ?   Bari Grewal RN    Subjective/Objective:      Benny Carrillo, a 89 y.o. male is established on warfarin.     Home care/care facility RN's report of Benny INR, recent warfarin dosing, diet changes, medication changes, and symptoms is documented below.    Additional findings: verbally confirmed home dose with Neelima and updated on anticoagulation calendar    Anticoagulation Episode Summary     Current INR goal:   2.0-3.0   TTR:   80.9 %   Next INR check:   9/30/2019   INR from last check:   2.50 (9/16/2019)   Weekly max warfarin dose:      Target end date:   Indefinite   INR check location:      Preferred lab:      Send INR reminders to:   [facility]    Indications    Renal infarct (H) [N28.0]  Atrial fibrillation (H) [I48.91]           Comments:            Anticoagulation Care Providers     Provider Role Specialty Phone number    Alcides Castillo MD Referring Family Medicine [phone]

## 2021-06-01 NOTE — TELEPHONE ENCOUNTER
ANTICOAGULATION  MANAGEMENT- Home Care/Care Facility Result    Assessment     Today's INR result of 2.2 is Therapeutic (goal INR of 2.0-3.0)        Warfarin taken as previously instructed    No new diet changes affecting INR    No new medication/supplements affecting INR    Continues to tolerate warfarin with no reported s/s of bleeding or thromboembolism     Previous INR was Therapeutic     Faxed this encounter to [facility] [phone]    Plan:     Spoke with home care nurse Neelima discussed INR result and instructed:     Warfarin Dosing Instructions: Continue current warfarin dose 3 mg daily on Sat; and 2.5 mg daily rest of week  (0 % change)    Next INR to be drawn: 2 weeks.     Education provided: importance of taking warfarin as instructed    Neelima verbalizes understanding and agrees to warfarin dosing plan.   ?   Bari Grewal RN    Subjective/Objective:      Benny Carrillo, a 89 y.o. male is established on warfarin.     Home care/care facility RN's report of Benny INR, recent warfarin dosing, diet changes, medication changes, and symptoms is documented below.    Additional findings: verbally confirmed home dose with Neelima and updated on anticoagulation calendar    Anticoagulation Episode Summary     Current INR goal:   2.0-3.0   TTR:   85.4 %   Next INR check:   10/15/2019   INR from last check:   2.20 (10/1/2019)   Weekly max warfarin dose:      Target end date:   Indefinite   INR check location:      Preferred lab:      Send INR reminders to:   [facility]    Indications    Renal infarct (H) [N28.0]  Atrial fibrillation (H) [I48.91]           Comments:            Anticoagulation Care Providers     Provider Role Specialty Phone number    Alcides Castillo MD Referring Family Medicine [phone]

## 2021-06-01 NOTE — TELEPHONE ENCOUNTER
ANTICOAGULATION  MANAGEMENT- Home Care/Care Facility Result    Assessment     Today's INR result of 2.60 is Therapeutic (goal INR of 2.0-3.0)        Warfarin taken as previously instructed    No new diet changes affecting INR    No new medication/supplements affecting INR    Continues to tolerate warfarin with no reported s/s of bleeding or thromboembolism     Previous INR was Therapeutic at 3.00 on 9/3/19.    Plan:     Spoke with Neelima with [facility] discussed INR result and instructed:     Warfarin Dosing Instructions:   - Continue current warfarin dose 3 mg daily on Saturday; and 2.5 mg daily rest of week.    Next INR to be drawn:  One wk.   Scheduled on 9/16/19.    Education provided: importance of consistent vitamin K intake, target INR goal and significance of current INR result and importance of notifying clinic for changes in medications    Neelima verbalizes understanding and agrees to warfarin dosing plan.   ?   Maribel Whitaker RN    Subjective/Objective:      Benny Carrillo, a 89 y.o. male is established on warfarin.     Home care/care facility RN's report of Benny INR, recent warfarin dosing, diet changes, medication changes, and symptoms is documented below.    Additional findings: verbally confirmed home dose with Neelima and updated on anticoagulation calendar    Anticoagulation Episode Summary     Current INR goal:   2.0-3.0   TTR:   78.8 %   Next INR check:   9/16/2019   INR from last check:   2.60 (9/9/2019)   Weekly max warfarin dose:      Target end date:   Indefinite   INR check location:      Preferred lab:      Send INR reminders to:   [facility]    Indications    Renal infarct (H) [N28.0]  Atrial fibrillation (H) [I48.91]           Comments:            Anticoagulation Care Providers     Provider Role Specialty Phone number    Alcides Castillo MD Referring Family Medicine [phone]

## 2021-06-01 NOTE — PROGRESS NOTES
"Subjective: Patient comes in for evaluation this 89-year-old male comes in with his son and .    He was treated with amlodipine back on 8/14/2019 blood pressure looks much better now    His rate is controlled he is atrial fibrillation rate in the 60s.    I saw him last time he had a \"growth\" in the suprapubic catheter area had a little bit of proud flesh.  They have been using some Neosporin on that and it settled down no abnormalities now.  They will use that on a as needed basis I did give him a prescription today.    INR was checked, 2.6 that he will continue on the same dose    Immunizations with tetanus and influenza given today.    Tobacco status: He  reports that he has never smoked. He has never used smokeless tobacco.    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     Acute pulmonary edema (H)      Acute pyelonephritis 02/10/2018     Urinary tract infection associated with cystostomy catheter, initial encounter (H) 02/10/2018     Sepsis secondary to UTI (H) 02/10/2018     Moderate malnutrition (H)      History of ischemic cerebrovascular accident (CVA) with residual deficit 11/20/2017     Accelerated hypertension      Chronic retention of urine      Acute nonintractable headache, unspecified headache type      Acute cerebral infarction (H) 11/14/2017     Chest pain 09/14/2016     Gastroesophageal reflux disease without esophagitis 09/14/2016     Suprapubic catheter (H) 09/14/2016     Leukocytosis, unspecified type " 09/14/2016     Heartburn 09/14/2016     Underweight due to inadequate caloric intake 07/28/2015     Chronic atrial fibrillation (H)      Muscle Weakness Generalized        Current Outpatient Medications   Medication Sig Dispense Refill     acetaminophen (TYLENOL) 500 MG tablet Take 1 tablet (500 mg total) by mouth every 6 (six) hours as needed for pain. 100 tablet 2     albuterol (PROAIR HFA;PROVENTIL HFA;VENTOLIN HFA) 90 mcg/actuation inhaler Inhale 2 puffs every 6 (six) hours as needed for wheezing. 1 Inhaler 6     amLODIPine (NORVASC) 2.5 MG tablet Take 1 tablet (2.5 mg total) by mouth daily. 30 tablet 11     artificial tears, hypromellose, (GONIOVISC) 2.5 % ophthalmic solution 2 gtts to each eye two times a day prn dry eyes 15 mL 6     cyclobenzaprine (FLEXERIL) 10 MG tablet Take 1 tablet (10 mg total) by mouth 2 (two) times a day as needed for muscle spasms. 20 tablet 0     digoxin (LANOXIN) 125 mcg tablet Take 1 tablet (125 mcg total) by mouth daily. 90 tablet 1     finasteride (PROSCAR) 5 mg tablet Take 1 tablet (5 mg total) by mouth daily. 30 tablet 6     gabapentin (NEURONTIN) 100 MG capsule Take 100 mg by mouth 2 (two) times a day as needed. 20 capsule 0     hydrocortisone 2.5 % cream Apply to affected area 1-2 times daily as needed 30 g 2     loratadine (CLARITIN) 10 mg tablet Take 1 tablet (10 mg total) by mouth daily. 90 tablet 3     multivitamin with minerals (THERA-M) 9 mg iron-400 mcg Tab tablet Take 1 tablet by mouth daily. 30 tablet 11     oxybutynin (DITROPAN XL) 10 MG ER tablet Take 1 tablet (10 mg total) by mouth daily. 90 tablet 1     oxybutynin (DITROPAN XL) 5 MG ER tablet Take 2 tablets (10 mg total) by mouth daily. 180 tablet 1     warfarin (COUMADIN/JANTOVEN) 1 MG tablet Take 2.5-3 tablets (2.5-3 mg total) by mouth See Admin Instructions. Take 3mg Monday/Thursday and 2.5mg ROW (Patient taking differently: Take 2.5-3 mg by mouth See Admin Instructions. Take 2.5 mg Sunday, Tuesday, Friday and  3 mg rest of week      ) 90 tablet 5     neomycin-bacitracin-polymyxin (NEOSPORIN) ointment Apply daily as needed 30 g 1     No current facility-administered medications for this visit.        ROS: 10 point review of systems positive as outlined above otherwise negative    Objective:    /60 (Patient Site: Left Arm, Patient Position: Sitting, Cuff Size: Adult Regular)   Pulse 68   Resp 24   Wt 121 lb (54.9 kg)   BMI 19.53 kg/m    Body mass index is 19.53 kg/m .      General appearance no acute distress    HEENT neck negative pharynx clear    His lungs were clear no rales or rhonchi heart was irregularly rate INR therapeutic.    No peripheral edema from the amlodipine.  Blood pressure under control.    Abdomen soft bowel sounds normal he does have a suprapubic catheter which is functioning well and no irregularity to the stomal area.    Skin otherwise normal    Results for orders placed or performed in visit on 09/09/19   INR   Result Value Ref Range    INR 2.60 (!) 0.9 - 1.1       Assessment:  1. Benign essential hypertension     2. Immunization counseling  Influenza High Dose, Seasonal 65+ yrs    Td, Preservative Free (green label)   3. Atrial fibrillation, unspecified type (H)     4. Suprapubic catheter (H)  neomycin-bacitracin-polymyxin (NEOSPORIN) ointment     Hypertension controlled on low-dose amlodipine no side effects    Immunization counseling, TD and flu shot    Atrial fibrillation rate controlled therapeutic INR    Suprapubic catheter functioning well continue to use Neosporin as needed     Plan: As outlined above follow-up in 3 months    This transcription uses voice recognition software, which may contain typographical errors.

## 2021-06-02 VITALS — BODY MASS INDEX: 20.6 KG/M2 | WEIGHT: 120 LBS

## 2021-06-02 VITALS — HEIGHT: 64 IN | BODY MASS INDEX: 20.6 KG/M2

## 2021-06-02 VITALS — WEIGHT: 119 LBS | BODY MASS INDEX: 20.43 KG/M2

## 2021-06-02 VITALS — BODY MASS INDEX: 20.09 KG/M2 | WEIGHT: 120 LBS

## 2021-06-02 NOTE — TELEPHONE ENCOUNTER
INR result is   3.7    Will the patient be seen, or did they already see, MD or CNP today? No    Most Recent Warfarin dose day/week  Sunday Monday Tuesday Wednesday Thursday Friday Saturday    2.5 2.5 2.5 2.5 2.5 3     Sunday Monday Tuesday Wednesday Thursday Friday Saturday   2.5             Has the patient missed any doses of Coumadin, Warfarin, Jantoven in the past 7 days? No    Has the patients medications changed since the last visit? No    Has the patient experienced any bleeding recently? No    Has the patient experienced any injuries or illness recently? No    Has the patient experienced any 'new' shortness of breath, severe headaches, or changes in vision recently? No    Has the patient had any changes in their diet, or alcohol consumption? No    Is the patient here today to prepare for any type of upcoming surgery, procedure, or for a cardioversion procedure? No    What phone number can we reach the patient at today? [phone]

## 2021-06-02 NOTE — TELEPHONE ENCOUNTER
Refill Approved    Rx renewed per Medication Renewal Policy. Medication was last renewed on 10/9/18.    Aide Ennis, [facility] Connection Triage/Med Refill 10/21/2019     Requested Prescriptions   Pending Prescriptions Disp Refills     ALLERGY RELIEF, LORATADINE, 10 mg tablet [Pharmacy Med Name: ALLERGY RELIEF 10MG   TAB] 30 tablet 11     Sig: TAKE 1 TABLET BY MOUTH ONCE DAILY       Antihistamine Refill Protocol Passed - 10/21/2019 11:46 AM        Passed - Patient has had office visit/physical in last year     Last office visit with prescriber/PCP: 9/9/2019 Alcides Castillo MD OR same dept: 9/9/2019 Alcides Castillo MD OR same specialty: 9/9/2019 Alcides Castillo MD  Last physical: 12/10/2015 Last MTM visit: Visit date not found   Next visit within 3 mo: Visit date not found  Next physical within 3 mo: Visit date not found  Prescriber OR PCP: Alcides Castillo MD  Last diagnosis associated with med order: 1. Environmental allergies  - ALLERGY RELIEF, LORATADINE, 10 mg tablet [Pharmacy Med Name: ALLERGY RELIEF 10MG   TAB]; TAKE 1 TABLET BY MOUTH ONCE DAILY  Dispense: 30 tablet; Refill: 11    If protocol passes may refill for 12 months if within 3 months of last provider visit (or a total of 15 months).

## 2021-06-02 NOTE — TELEPHONE ENCOUNTER
ANTICOAGULATION  MANAGEMENT- Home Care/Care Facility Result    Assessment     Today's INR result of 3.1 is Supratherapeutic (goal INR of 2.0-3.0)        Warfarin taken as previously instructed    Decreased greens/vitamink K intake may be affecting INR- will get back to normal greens.    No new medication/supplements affecting INR    Continues to tolerate warfarin with no reported s/s of bleeding or thromboembolism     Previous INR was Therapeutic     Faxed this encounter to [facility] [phone]    Plan:     Spoke with home care nurse Neelima discussed INR result and instructed:     Warfarin Dosing Instructions: Continue current warfarin dose 3 mg daily on Sat; and 2.5 mg daily rest of week  (0 % change)    Next INR to be drawn: 2 weeks     Education provided: importance of consistent vitamin K intake and importance of taking warfarin as instructed    Neelima verbalizes understanding and agrees to warfarin dosing plan.   ?   Bari Grewal RN    Subjective/Objective:      Benny Carrillo, a 89 y.o. male is established on warfarin.     Home care/care facility RN's report of Benny INR, recent warfarin dosing, diet changes, medication changes, and symptoms is documented below.    Additional findings: verbally confirmed home dose with Neelima and updated on anticoagulation calendar    Anticoagulation Episode Summary     Current INR goal:   2.0-3.0   TTR:   85.6 %   Next INR check:   10/28/2019   INR from last check:   3.10! 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

## 2021-06-02 NOTE — TELEPHONE ENCOUNTER
ANTICOAGULATION  MANAGEMENT: Discharge Review    Benny Carrillo chart reviewed for anticoagulation continuity of care    Emergency room visit on  10/30 for leg pain.    Discharge disposition: Home    INR Results:       Recent labs: (last 7 days)     10/28/19 10/30/19  0857   INR 3.70* 2.53*       Warfarin inpatient management: not applicable    Warfarin discharge instructions: home regimen continued     Medication Changes Affecting Anticoagulation: No    Additional Factors Affecting Anticoagulation: No    Plan     No adjustment to anticoagulation plan needed      Patient not contacted         Radhika Crooks RN

## 2021-06-02 NOTE — TELEPHONE ENCOUNTER
Call [facility] RN      INR Result  - 3.7 today       Parked in huddle       Call to [facility]       They will get call from nolberto Telles RN   Triage and Medication Refills

## 2021-06-02 NOTE — TELEPHONE ENCOUNTER
RN cannot approve Refill Request    RN can NOT refill this medication med is not covered by policy/route to provider. Last office visit: 9/9/2019 Alcides Castillo MD Last Physical: 12/10/2015 Last MTM visit: Visit date not found Last visit same specialty: 9/9/2019 Alcides Castillo MD.  Next visit within 3 mo: Visit date not found  Next physical within 3 mo: Visit date not found      Neelima Guardado, Care Connection Triage/Med Refill 10/4/2019    Requested Prescriptions   Pending Prescriptions Disp Refills     guaiFENesin (ROBITUSSIN) 100 mg/5 mL syrup [Pharmacy Med Name: GuaiFENesin 100MG/5ML SYR] 240 mL 0     Sig: TAKE  10 ML BY MOUTH THREE TIMES DAILY AS NEEDED FOR COUGH       There is no refill protocol information for this order

## 2021-06-02 NOTE — TELEPHONE ENCOUNTER
RN cannot approve Refill Request    RN can NOT refill this medication med is not covered by policy/route to provider. Last office visit: 9/9/2019 Alcides Castillo MD Last Physical: 12/10/2015 Last MTM visit: Visit date not found Last visit same specialty: 9/9/2019 Alcides Castillo MD.  Next visit within 3 mo: Visit date not found  Next physical within 3 mo: Visit date not found      Aide Ennis, Care Connection Triage/Med Refill 10/13/2019    Requested Prescriptions   Pending Prescriptions Disp Refills     ARTIFICIAL TEARS, POLYVIN ALC, 1.4 % ophthalmic solution [Pharmacy Med Name: ARTIFI TEARS OP 1.4%SOL]  0     Sig: INSTILL 2 DROPS INTO EACH EYE TWICE DAILY AS NEEDED DRY EYES       There is no refill protocol information for this order

## 2021-06-02 NOTE — TELEPHONE ENCOUNTER
Refill Approved    Rx renewed per Medication Renewal Policy. Medication was last renewed on 7/31/18.    Lashae Torres, Care Connection Triage/Med Refill 10/4/2019     Requested Prescriptions   Pending Prescriptions Disp Refills     ARTIFICIAL TEARS, POLYVIN ALC, 1.4 % ophthalmic solution [Pharmacy Med Name: ARTIFI TEARS OP 1.4%SOL]  0     Sig: INSTILL 2 DROPS INTO EACH EYE TWICE DAILY AS NEEDED DRY EYES       There is no refill protocol information for this order        albuterol (PROAIR HFA;PROVENTIL HFA;VENTOLIN HFA) 90 mcg/actuation inhaler [Pharmacy Med Name: ALBUTEROL HFA 90MCG (18GM) AER] 18 each 6     Sig: INHALE 2 PUFFS BY MOUTH EVERY 6 HOURS AS NEEDED FOR WHEEZING       Albuterol/Levalbuterol Refill Protocol Passed - 10/3/2019  9:03 AM        Passed - PCP or prescribing provider visit in last year     Last office visit with prescriber/PCP: 9/9/2019 Alcides Castillo MD OR same dept: 9/9/2019 Alcides Castillo MD OR same specialty: 9/9/2019 Alcides Castillo MD Last physical: 12/10/2015       Next appt within 3 mo: Visit date not found  Next physical within 3 mo: Visit date not found  Prescriber OR PCP: Alcides Castillo MD  Last diagnosis associated with med order: There are no diagnoses linked to this encounter.  If protocol passes may refill for 6 months if within 3 months of last provider visit (or a total of 9 months). If patient requesting >1 inhaler per month refill x 6 months and have patient make appointment with provider.

## 2021-06-02 NOTE — TELEPHONE ENCOUNTER
RN cannot approve Refill Request    RN can NOT refill this medication med is not covered by policy/route to provider       Lashae Torres, Care Connection Triage/Med Refill 10/4/2019    Requested Prescriptions   Pending Prescriptions Disp Refills     ARTIFICIAL TEARS, POLYVIN ALC, 1.4 % ophthalmic solution [Pharmacy Med Name: ARTIFI TEARS OP 1.4%SOL] 45 mL 3     Sig: INSTILL 2 DROPS INTO EACH EYE TWICE DAILY AS NEEDED DRY EYES       There is no refill protocol information for this order      Signed Prescriptions Disp Refills    albuterol (PROAIR HFA;PROVENTIL HFA;VENTOLIN HFA) 90 mcg/actuation inhaler 3 each 1     Sig: INHALE 2 PUFFS BY MOUTH EVERY 6 HOURS AS NEEDED FOR WHEEZING       Albuterol/Levalbuterol Refill Protocol Passed - 10/3/2019  9:03 AM        Passed - PCP or prescribing provider visit in last year     Last office visit with prescriber/PCP: 9/9/2019 Alcides Castillo MD OR same dept: 9/9/2019 Alcides Castillo MD OR same specialty: 9/9/2019 Alcides Castillo MD Last physical: 12/10/2015       Next appt within 3 mo: Visit date not found  Next physical within 3 mo: Visit date not found  Prescriber OR PCP: Alcides Castillo MD  Last diagnosis associated with med order: 1. Environmental allergies  - albuterol (PROAIR HFA;PROVENTIL HFA;VENTOLIN HFA) 90 mcg/actuation inhaler; INHALE 2 PUFFS BY MOUTH EVERY 6 HOURS AS NEEDED FOR WHEEZING  Dispense: 3 each; Refill: 1    If protocol passes may refill for 6 months if within 3 months of last provider visit (or a total of 9 months). If patient requesting >1 inhaler per month refill x 6 months and have patient make appointment with provider.

## 2021-06-02 NOTE — TELEPHONE ENCOUNTER
INR result is  3.1 today  INR   Date Value Ref Range Status   10/01/2019 2.20 (!) 0.9 - 1.1 Final       Will the patient be seen, or did they already see, MD or CNP today? No    Most Recent Warfarin dose day/week  Sunday Monday Tuesday Wednesday Thursday Friday Saturday   2.5 2.5 2.5 2.5 2.5 2.5 3     Sunday Monday Tuesday Wednesday Thursday Friday Saturday   2.5             Has the patient missed any doses of Coumadin, Warfarin, Jantoven in the past 7 days? No    Has the patients medications changed since the last visit? No    Has the patient experienced any bleeding recently? No    Has the patient experienced any injuries or illness recently? No    Has the patient experienced any 'new' shortness of breath, severe headaches, or changes in vision recently? No    Has the patient had any changes in their diet, or alcohol consumption? No    Is the patient here today to prepare for any type of upcoming surgery, procedure, or for a cardioversion procedure? No    What phone number can we reach the patient at today? Transferring over

## 2021-06-02 NOTE — TELEPHONE ENCOUNTER
ANTICOAGULATION  MANAGEMENT- Home Care/Care Facility Result    Assessment     Today's INR result of 3.70 is Supratherapeutic (goal INR of 2.0-3.0)        Warfarin taken as previously instructed    No new diet changes affecting INR    No new medication/supplements affecting INR    Continues to tolerate warfarin with no reported s/s of bleeding or thromboembolism     Previous INR was Supratherapeutic at 3.10 on 10/14/19.    Patient reported no new changes in his regiments.    Plan:     Spoke with Neelima from [facility] discussed INR result and instructed:    1.  Advised to increase his usual vegetable servings.   2.  FAX telephone orders as requested to:  [phone] ATTN:  ERAN Agarwal.    Warfarin Dosing Instructions:   - today, advised to HOLD warfarin dose today,   - then continue current warfarin dose 3 mg daily on Saturdays; and 2.5 mg daily rest of week.    Next INR to be drawn:  One wk.  Scheduled on 11/4/19.    Education provided: importance of consistent vitamin K intake, impact of vitamin K foods on INR and target INR goal and significance of current INR result    Neelima verbalizes understanding and agrees to warfarin dosing plan.   ?   Maribel Whitaker RN    Subjective/Objective:      Benny Carrillo, a 89 y.o. male is established on warfarin.     Home care/care facility RN's report of Benny INR, recent warfarin dosing, diet changes, medication changes, and symptoms is documented below.    Additional findings: none    Anticoagulation Episode Summary     Current INR goal:   2.0-3.0   TTR:   82.4 %   Next INR check:   11/4/2019   INR from last check:   3.70! 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 [phone]

## 2021-06-02 NOTE — TELEPHONE ENCOUNTER
Dr. Castillo,    Do you agree with telephone orders as outlined on Benny Carrillo?  I will be faxing orders to [facility].  Thank you.      Danay Whitaker, RN, BSN.

## 2021-06-03 VITALS — WEIGHT: 115 LBS | BODY MASS INDEX: 18.56 KG/M2

## 2021-06-03 VITALS
RESPIRATION RATE: 24 BRPM | BODY MASS INDEX: 19.53 KG/M2 | HEART RATE: 68 BPM | WEIGHT: 121 LBS | SYSTOLIC BLOOD PRESSURE: 110 MMHG | DIASTOLIC BLOOD PRESSURE: 60 MMHG

## 2021-06-03 VITALS — BODY MASS INDEX: 19.57 KG/M2 | WEIGHT: 114 LBS

## 2021-06-03 VITALS — BODY MASS INDEX: 19.05 KG/M2 | WEIGHT: 118 LBS

## 2021-06-03 VITALS — BODY MASS INDEX: 19.45 KG/M2 | WEIGHT: 120.5 LBS

## 2021-06-03 VITALS — WEIGHT: 116 LBS | BODY MASS INDEX: 18.72 KG/M2

## 2021-06-03 NOTE — TELEPHONE ENCOUNTER
INR result is 3.0  INR   Date Value Ref Range Status   11/18/2019 2.30 (!) 0.9 - 1.1 Final       Will the patient be seen, or did they already see, MD or CNP today? No    Most Recent Warfarin dose day/week  Sunday Monday Tuesday Wednesday Thursday Friday Saturday    2.5 mg 2.5 mg 2.5 mg 2.5 mg 2.5 mg 2.5 mg     Sunday Monday Tuesday Wednesday Thursday Friday Saturday   2.5 mg             Has the patient missed any doses of Coumadin, Warfarin, Jantoven in the past 7 days? No    Has the patients medications changed since the last visit? No    Has the patient experienced any bleeding recently? No    Has the patient experienced any injuries or illness recently? No    Has the patient experienced any 'new' shortness of breath, severe headaches, or changes in vision recently? No    Has the patient had any changes in their diet, or alcohol consumption? Yes extra greens    Is the patient here today to prepare for any type of upcoming surgery, procedure, or for a cardioversion procedure? No    What phone number can we reach the patient at today? [phone] Neelima.

## 2021-06-03 NOTE — TELEPHONE ENCOUNTER
ANTICOAGULATION  MANAGEMENT- Home Care/Care Facility Result    Assessment     Today's INR result of 2.4 is Therapeutic (goal INR of 2.0-3.0)        Warfarin taken as previously instructed    No new diet changes affecting INR    No new medication/supplements affecting INR    Continues to tolerate warfarin with no reported s/s of bleeding or thromboembolism     Previous INR was Therapeutic    Plan:     Spoke with home care nurse Neelima discussed INR result and instructed:     Warfarin Dosing Instructions: Continue current warfarin dose 2.5 mg daily.    Next INR to be drawn: 2 weeks.    Education provided: importance of taking warfarin as instructed    Neelima verbalizes understanding and agrees to warfarin dosing plan.   ?   Bari Grewal RN    Subjective/Objective:      Benny Carrillo, a 89 y.o. male is established on warfarin.     Home care/care facility RN's report of Benny INR, recent warfarin dosing, diet changes, medication changes, and symptoms is documented below.    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

## 2021-06-03 NOTE — TELEPHONE ENCOUNTER
Orders being requested: SupraPubic Catheter Two catheters per month 14 F  Reason service is needed/diagnosis: Patient has only one need one extra for back up.   When are orders needed by: As soon as possible.  Where to send Orders: [phone]  Okay to leave detailed message?  No

## 2021-06-03 NOTE — TELEPHONE ENCOUNTER
ANTICOAGULATION  MANAGEMENT- Home Care/Care Facility Result    Assessment     Today's INR result of 3.1 is Supratherapeutic (goal INR of 2.0-3.0)        Warfarin taken as previously instructed    No new diet changes affecting INR    No interaction expected between Flexeril and warfarin    Continues to tolerate warfarin with no reported s/s of bleeding or thromboembolism     Previous INR was Therapeutic    Plan:     Spoke with home care nurse Neelima discussed INR result and instructed:     Warfarin Dosing Instructions: Change warfarin dose to 2.5 mg daily  (2.8 % change)    Next INR to be drawn: 1 week.    Education provided: importance of taking warfarin as instructed    Neelima verbalizes understanding and agrees to warfarin dosing plan.   ?   Bari Grewal RN    Subjective/Objective:      Benny Carrillo, a 89 y.o. male is established on warfarin.     Home care/care facility RN's report of Benny INR, recent warfarin dosing, diet changes, medication changes, and symptoms is documented below.    Additional findings: verbally confirmed home dose with Neelima and updated on anticoagulation calendar    Anticoagulation Episode Summary     Current INR goal:   2.0-3.0   TTR:   81.7 %   Next INR check:   11/11/2019   INR from last check:   3.10! 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

## 2021-06-03 NOTE — TELEPHONE ENCOUNTER
ANTICOAGULATION  MANAGEMENT- Home Care/Care Facility Result    Assessment     Today's INR result of 2.3 is Therapeutic (goal INR of 2.0-3.0)        Warfarin taken as previously instructed    No new diet changes affecting INR    No new medication/supplements affecting INR    Continues to tolerate warfarin with no reported s/s of bleeding or thromboembolism     Previous INR was Therapeutic    Plan:     Spoke with home care nurse Neelima discussed INR result and instructed:     Warfarin Dosing Instructions: Continue current warfarin dose 2.5 mg daily.    Next INR to be drawn: 1 week.    Education provided: importance of taking warfarin as instructed    Neelima verbalizes understanding and agrees to warfarin dosing plan.   ?   Bari Grewal RN    Subjective/Objective:      Benny Carrillo, a 89 y.o. male is established on warfarin.     Home care/care facility RN's report of Benny INR, recent warfarin dosing, diet changes, medication changes, and symptoms is documented below.    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

## 2021-06-03 NOTE — TELEPHONE ENCOUNTER
INR result is 2.3  INR   Date Value Ref Range Status   11/11/2019 2.50 (!) 0.9 - 1.1 Final       Will the patient be seen, or did they already see, MD or CNP today? No    Most Recent Warfarin dose day/week  Sunday Monday Tuesday Wednesday Thursday Friday Saturday    2.5 2.5 2.5 2.5 2.5 2.5     Sunday Monday Tuesday Wednesday Thursday Friday Saturday   2.5             Has the patient missed any doses of Coumadin, Warfarin, Jantoven in the past 7 days? No    Has the patients medications changed since the last visit? No    Has the patient experienced any bleeding recently? No    Has the patient experienced any injuries or illness recently? No    Has the patient experienced any 'new' shortness of breath, severe headaches, or changes in vision recently? No    Has the patient had any changes in their diet, or alcohol consumption? No    Is the patient here today to prepare for any type of upcoming surgery, procedure, or for a cardioversion procedure? No    What phone number can we reach the patient at today? Neelima [phone]

## 2021-06-03 NOTE — TELEPHONE ENCOUNTER
ANTICOAGULATION  MANAGEMENT- Home Care/Care Facility Result    Assessment     Today's INR result of 2.5 is Therapeutic (goal INR of 2.0-3.0)        Warfarin taken as previously instructed    No new diet changes affecting INR    No new medication/supplements affecting INR    Continues to tolerate warfarin with no reported s/s of bleeding or thromboembolism     Previous INR was Supratherapeutic    Plan:     Spoke with home care nurse Neelima discussed INR result and instructed:     Warfarin Dosing Instructions: Continue current warfarin dose 2.5 mg daily.  Next INR to be drawn: 1 week.    Education provided: importance of taking warfarin as instructed    Neelima verbalizes understanding and agrees to warfarin dosing plan.   ?   Bari Grewal RN    Subjective/Objective:      Benny Carrillo, a 89 y.o. male is established on warfarin.     Home care/care facility RN's report of Benny INR, recent warfarin dosing, diet changes, medication changes, and symptoms is documented below.    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

## 2021-06-03 NOTE — PROGRESS NOTES
[facility] Outreach  [facility]/Voicemail       Clinical Data: Care Coordinator Outreach  Outreach attempted x 1.  Left message on patient's guardian's voicemail with update [facility]'s request for an AVS form. Encouraged Tirtha to savanah back with any questions or if she needed another copy of the form.   Plan: . Care Coordinator will remain available to Member and family if needed..    KESHA Sun  [facility]  [phone]

## 2021-06-03 NOTE — TELEPHONE ENCOUNTER
ANTICOAGULATION  MANAGEMENT- Home Care/Care Facility Result    Assessment     Today's INR result of 3.0 is Therapeutic (goal INR of 2.0-3.0)        Warfarin taken as previously instructed    No new diet changes affecting INR    No new medication/supplements affecting INR    Continues to tolerate warfarin with no reported s/s of bleeding or thromboembolism     Previous INR was Therapeutic       Plan:     Spoke with home care nurse Neelima discussed INR result and instructed:     Warfarin Dosing Instructions: Continue current warfarin dose 2.5 mg daily.    Next INR to be drawn: 1 week.    Education provided: importance of taking warfarin as instructed    Neelima verbalizes understanding and agrees to warfarin dosing plan.   ?   Bari Grewal RN    Subjective/Objective:      Benny Carrillo, a 89 y.o. male is established on warfarin.     Home care/care facility RN's report of Benny INR, recent warfarin dosing, diet changes, medication changes, and symptoms is documented below.    Additional findings: verbally confirmed home dose with Neelima and updated on anticoagulation calendar    Anticoagulation Episode Summary     Current INR goal:   2.0-3.0   TTR:   81.4 % (11.1 mo)   Next INR check:   12/2/2019   INR from last check:   3.00 (11/25/2019)   Weekly max warfarin dose:      Target end date:   Indefinite   INR check location:      Preferred lab:      Send INR reminders to:   [facility]    Indications    Renal infarct (H) [N28.0]  Atrial fibrillation (H) [I48.91]           Comments:            Anticoagulation Care Providers     Provider Role Specialty Phone number    Alcides Castillo MD Referring Family Medicine [phone]

## 2021-06-03 NOTE — TELEPHONE ENCOUNTER
INR result is   2.5    Will the patient be seen, or did they already see, MD or CNP today? No    Most Recent Warfarin dose day/week  Sunday Monday Tuesday Wednesday Thursday Friday Saturday    2.5 2.5 2.5 2.5 2.5 2.5     Sunday Monday Tuesday Wednesday Thursday Friday Saturday   2.5             Has the patient missed any doses of Coumadin, Warfarin, Jantoven in the past 7 days? No    Has the patients medications changed since the last visit? No    Has the patient experienced any bleeding recently? No    Has the patient experienced any injuries or illness recently? No    Has the patient experienced any 'new' shortness of breath, severe headaches, or changes in vision recently? No    Has the patient had any changes in their diet, or alcohol consumption? No    Is the patient here today to prepare for any type of upcoming surgery, procedure, or for a cardioversion procedure? No    What phone number can we reach the patient at today? [phone].

## 2021-06-03 NOTE — TELEPHONE ENCOUNTER
INR result is  3.1  INR   Date Value Ref Range Status   10/30/2019 2.53 (H) 0.90 - 1.10 Final       Will the patient be seen, or did they already see, MD or CNP today? No    Most Recent Warfarin dose day/week  Sunday Monday Tuesday Wednesday Thursday Friday Saturday    2.5 2.5 2.5 2.5 2.5 3     Sunday Monday Tuesday Wednesday Thursday Friday Saturday   2.5             Has the patient missed any doses of Coumadin, Warfarin, Jantoven in the past 7 days? No    Has the patients medications changed since the last visit?  Yes, added flexeril 10 mg twice a day as needed.    Has the patient experienced any bleeding recently? No    Has the patient experienced any injuries or illness recently? No    Has the patient experienced any 'new' shortness of breath, severe headaches, or changes in vision recently? No    Has the patient had any changes in their diet, or alcohol consumption? No    Is the patient here today to prepare for any type of upcoming surgery, procedure, or for a cardioversion procedure? No    What phone number can we reach the patient at today?  Neelima BARILLAS [phone]

## 2021-06-03 NOTE — TELEPHONE ENCOUNTER
INR result is   2.4    Will the patient be seen, or did they already see, MD or CNP today? No    Most Recent Warfarin dose day/week  Sunday Monday Tuesday Wednesday Thursday Friday Saturday    2.5 2.5 2.5 2.5 2.5 2.5     Sunday Monday Tuesday Wednesday Thursday Friday Saturday   2.5             Has the patient missed any doses of Coumadin, Warfarin, Jantoven in the past 7 days? No    Has the patients medications changed since the last visit? No    Has the patient experienced any bleeding recently? No    Has the patient experienced any injuries or illness recently? No    Has the patient experienced any 'new' shortness of breath, severe headaches, or changes in vision recently? No    Has the patient had any changes in their diet, or alcohol consumption? No    Is the patient here today to prepare for any type of upcoming surgery, procedure, or for a cardioversion procedure? No    What phone number can we reach the patient at today? [phone].

## 2021-06-04 ENCOUNTER — COMMUNICATION - HEALTHEAST (OUTPATIENT)
Dept: FAMILY MEDICINE | Facility: CLINIC | Age: 86
End: 2021-06-04

## 2021-06-04 VITALS
SYSTOLIC BLOOD PRESSURE: 102 MMHG | DIASTOLIC BLOOD PRESSURE: 64 MMHG | HEART RATE: 84 BPM | TEMPERATURE: 96.5 F | BODY MASS INDEX: 19.37 KG/M2 | WEIGHT: 120 LBS

## 2021-06-04 VITALS
OXYGEN SATURATION: 95 % | WEIGHT: 118 LBS | BODY MASS INDEX: 19.05 KG/M2 | DIASTOLIC BLOOD PRESSURE: 62 MMHG | HEART RATE: 66 BPM | SYSTOLIC BLOOD PRESSURE: 110 MMHG

## 2021-06-04 DIAGNOSIS — N28.0 RENAL INFARCT (H): ICD-10-CM

## 2021-06-04 DIAGNOSIS — I48.91 ATRIAL FIBRILLATION (H): ICD-10-CM

## 2021-06-04 LAB — INR PPP: 1.3 (ref 0.9–1.1)

## 2021-06-04 NOTE — TELEPHONE ENCOUNTER
Dr. Castillo,    Do you agree with telephone orders on Benny Carrillo?  I will be faxing orders to [facility], as requested.  Thank you.    Danay Whitaker, ANDREIN, RN

## 2021-06-04 NOTE — TELEPHONE ENCOUNTER
Orders being requested: Skilled Nursing   One Time a week for Eight weeks .  Reason service is needed/diagnosis: Medication management , INR , Suprapubic Catheter  .  When are orders needed by: As soon as possible.  Where to send Orders: Phone:  [phone]  Okay to leave detailed message?  Yes

## 2021-06-04 NOTE — TELEPHONE ENCOUNTER
ANTICOAGULATION  MANAGEMENT- Home Care/Care Facility Result    Assessment     Today's INR result of 2.80 is Therapeutic (goal INR of 2.0-3.0)        Warfarin taken as previously instructed    No new diet changes affecting INR    No new medication/supplements affecting INR    Continues to tolerate warfarin with no reported s/s of bleeding or thromboembolism     Previous INR was Therapeutic at 2.88 on 12/11/19.    Plan:     Spoke with ERAN Ortez from [facility] discussed INR result and instructed:    1.  FAX telephone orders to:  [facility] for [facility] [phone].    Warfarin Dosing Instructions: Continue current warfarin dose 2.5 mg daily.    Next INR to be drawn:  One wk.   Scheduled on 12/23/19    Education provided: importance of consistent vitamin K intake, target INR goal and significance of current INR result and importance of notifying clinic for changes in medications    ERAN De Souza  verbalizes understanding and agrees to warfarin dosing plan.   ?   Maribel Whitaker RN    Subjective/Objective:      Benny Carrillo, a 89 y.o. male is established on warfarin.     Home care/care facility RN's report of Benny INR, recent warfarin dosing, diet changes, medication changes, and symptoms is documented below.    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

## 2021-06-04 NOTE — TELEPHONE ENCOUNTER
New Appointment Needed  What is the reason for the visit:    Inpatient/ED Follow Up Appt Request  At what hospital or facility were you seen?: [facility]  What is the reason you were seen?: Small Bowel Obstruction  What date were you admitted?: 12/8/19  What date were you discharged?: 12/11/19  What was the recommended timeframe for your follow up appointment?: 3-5 days  Provider Preference: PCP only  How soon do you need to be seen?: As soon as possible  Okay to leave a detailed message:  Yes

## 2021-06-04 NOTE — TELEPHONE ENCOUNTER
ANTICOAGULATION  MANAGEMENT- Home Care/Care Facility Result    Assessment     Today's INR result of 3.00 is Therapeutic (goal INR of 2.0-3.0)        Warfarin taken as previously instructed    No new diet changes affecting INR    No new medication/supplements affecting INR    Continues to tolerate warfarin with no reported s/s of bleeding or thromboembolism     Previous INR was Therapeutic at 2.60 on 12/30/19.    Reported no new changes with diet or medications.    Plan:     Spoke with Neelima from [facility] discussed INR result and instructed:    1.  Advised extra servimg of vegetables or salads tonight.   2.  Requested to FAX telephone orders to:  [phone].    Warfarin Dosing Instructions:   - Continue current warfarin dose 2.5 mg daily.    Next INR to be drawn: one wk.  Scheduled on 1/13/20.    Education provided: importance of consistent vitamin K intake, impact of vitamin K foods on INR and target INR goal and significance of current INR result    Neelima verbalizes understanding and agrees to warfarin dosing plan.   ?   Maribel Whitaker RN    Subjective/Objective:      Benny Carrillo, a 89 y.o. male is established on warfarin.     Home care/care facility RN's report of Benny INR, recent warfarin dosing, diet changes, medication changes, and symptoms is documented below.    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

## 2021-06-04 NOTE — PROGRESS NOTES
TCM DISCHARGE FOLLOW UP CALL    Discharge Date:  12/11/2019  Reason for hospital stay (discharge diagnosis)::  SBO  Are you feeling better, the same or worse since your discharge?:  Patient is feeling worse  Why are you feeling worse?:  Abd pain from SBO has resolved. Pt has frequent dry cough. See phone note sent to [facility].  Do you feel like you have a plan in the event of a health emergency?: Yes (Son and Dtr-in-law)    As part of your discharge plan, were  home care services ordered for you?: No (Pt does have PCA services)    Did you receive any new medications, or was there a change to your medications?: No    Do you have any follow up visits scheduled with your PCP or Specialist?:  Yes, with PCP (12/17)  RN NOTES::  Son had no questions re: low fiber low residue diet

## 2021-06-04 NOTE — TELEPHONE ENCOUNTER
ANTICOAGULATION  MANAGEMENT- Home Care/Care Facility Result    Assessment     Today's INR result of 2.6 is Therapeutic (goal INR of 2.0-3.0)        Warfarin taken as previously instructed    No new diet changes affecting INR    No new medication/supplements affecting INR    Continues to tolerate warfarin with no reported s/s of bleeding or thromboembolism     Previous INR was Supratherapeutic    Plan:     Spoke with Home Care Nurse Neelima discussed INR result and instructed:     Warfarin Dosing Instructions: Continue current warfarin dose 2.5 mg  daily  (0 % change)    Next INR to be drawn: one week    Education provided: importance of therapeutic range    Kingsley verbalizes understanding and agrees to warfarin dosing plan.   ?   Clair Gonzalez RN    Subjective/Objective:      Benny Carrillo, a 89 y.o. male is established on warfarin.     Home care/care facility RN's report of Benny INR, recent warfarin dosing, diet changes, medication changes, and symptoms is documented below.    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

## 2021-06-04 NOTE — TELEPHONE ENCOUNTER
INR result is 2.6  INR   Date Value Ref Range Status   12/23/2019 3.10 (!) 0.9 - 1.1 Final       Will the patient be seen, or did they already see, MD or CNP today? No    Most Recent Warfarin dose day/week  Sunday Monday Tuesday Wednesday Thursday Friday Saturday    2 2.5 2.5 2.5 2.5 2.5     Sunday Monday Tuesday Wednesday Thursday Friday Saturday   2.5             Has the patient missed any doses of Coumadin, Warfarin, Jantoven in the past 7 days? No    Has the patients medications changed since the last visit? No    Has the patient experienced any bleeding recently? No    Has the patient experienced any injuries or illness recently? No    Has the patient experienced any 'new' shortness of breath, severe headaches, or changes in vision recently? No    Has the patient had any changes in their diet, or alcohol consumption? No    Is the patient here today to prepare for any type of upcoming surgery, procedure, or for a cardioversion procedure? No    What phone number can we reach the patient at today? Please contact luis miguel Ortez with [facility]  at [phone].

## 2021-06-04 NOTE — TELEPHONE ENCOUNTER
INR result is 3.0  INR   Date Value Ref Range Status   12/30/2019 2.60 (!) 0.9 - 1.1 Final       Will the patient be seen, or did they already see, MD or CNP today? No    Most Recent Warfarin dose day/week  Sunday Monday Tuesday Wednesday Thursday Friday Saturday    2.5 mg 2.5 mg 2.5 mg 2.5 mg 2.5 mg 2.5 mg     Sunday Monday Tuesday Wednesday Thursday Friday Saturday   2.5 mg             Has the patient missed any doses of Coumadin, Warfarin, Jantoven in the past 7 days? No    Has the patients medications changed since the last visit? No    Has the patient experienced any bleeding recently? No    Has the patient experienced any injuries or illness recently? No    Has the patient experienced any 'new' shortness of breath, severe headaches, or changes in vision recently? No    Has the patient had any changes in their diet, or alcohol consumption? No    Is the patient here today to prepare for any type of upcoming surgery, procedure, or for a cardioversion procedure? No    What phone number can we reach the patient at today? [phone] Neelima.

## 2021-06-04 NOTE — PROGRESS NOTES
[facility] Care Coordination Contact  10:12 AM 12/12/19    CC received notification of discharge to home. Discharge occurred on (Date12/11/2019) from [facility].   CC contacted adult daughter Steven Carrillo and reviewed discharge summary.   Member's family reports that he was able to have a bowel movement last night and reports an overall improvement in pain levels and functioning. No major medication changes. Member continues on therapeutic dose of coumadin at 2.5 mg with routine SN visit scheduled for Monday, 12/16.   Family did request additional catheter leg bags. Family reports that if they do not change the bag 3x per week there is a potent odor to the bag that has not been countered by cleaning measures. Request that they discuss this during the appointment on Tuesday as we will need an order for additional bags. [facility] to discuss concerns as well. Spoke with   Nurse  who reports that she will add education on catheter cleaning to the care plan and review necessity for additional bags.   Member has a follow-up appointment with PCP in 7 days: Yes: scheduled on December 17, 2019 at 8:40 am   Member has had a change in condition: No-Member still able to ambulate with stand by assist and walker. Deny need for restorative treatments or home visit.   Home visit needed: No  Care plan reviewed and updated.  The following home based services PCA RN were resumed.  New referrals placed: No  Transition log completed.   PCP notified of transition back to home via EMR.    KESHA Sun  [facility]  [phone]

## 2021-06-04 NOTE — PROGRESS NOTES
[facility] Care Coordination Contact  TRANSITIONS OF CARE (CHHAYA) LOG   CHHAYA tasks should be completed by the CC within one (1) business day of notification of each transition. Follow up contact with member is required after return to their usual care setting.  Note:  If CC finds out about the transitions fifteen (15) days or more after the member has returned to their usual care setting, no CHHAYA log is needed. However, the CC should check in with the member to discuss the transition process, any changes needed to the care plan and document it in a case note.    Member Name:  Benny Carrillo [facility] Name:  Faustino O/Health Plan Member ID#: [id number]   Product: [facility] Care Coordinator Contact:  KESHA King Agency/County/Care System: [facility]   Transition Communication Actions from Care Management Contact   Transition #1   Notification Date: 12/09/19 Transition Date:   12/08/19 Transition From: Home     Is this the member s usual care setting?               yes Transition To: Hospital, [facility]   Transition Type:  Unplanned  Reason for Admission/Comments:  .CC received notification of Hospital admission.  Hospital admission occurred on 12/08/19 at [facility] with Dx of Abdominal pain  CC contacted Hospital /discharge planner (Care Management Dept for [facility]) and left a message with this CC contact information, reviewed community POC as well requested to be notified of concerns, care conferences and discharge planning.  CC reached out to family Guardian Steven regarding transition and offered support as needed.  Reviewed and update care plan as needed.  Notified community service providers and placed services None on hold as needed.  Transition log initiated.   PCP notified of hospitalization via EMR.    Cherise Anand [facility]  [phone]      12/10/19-Care coordinator spoke with Vijaya in care management. She reports member has small bowel  obstruction. They are looking at d/c in a few days.        Shared CC contact info, care plan/services with receiving setting--Date completed: 12/09/2019   Notified PCP of transition--Date completed:  12/08/19      via  EMR   Transition #2   Transition #3  (if applicable)   Notification Date:          Transition To:    Transition Date:      Transition Type:      Notified PCP -- Date completed:               Shared CC contact info, care plan/services with receiving setting or, if applicable, home care agency--Date completed:    *Complete additional tasks below, if this transition is a return to usual care setting.      Comments:      Notification Date:          Transition To:    Transition Date:              Transition Type:      Notified PCP--Date completed:          Shared CC contact info, care plan/services with receiving setting or, if applicable, home care agency--Date completed:       *Complete additional tasks below, if this transition is a return to usual care setting.      Comments:       *Complete tasks below when the member is discharging TO their usual care setting within one (1) business day of notification.  For situations where the Care Coordinator is notified of the discharge prior to the date of discharge, the Care Coordinator must follow up with the member or designated representative to confirm that discharge actually occurred and discuss required CHHAYA tasks as outlined in the CHHAYA Instructions.  (This includes situations where it may be a  new  usual care setting for the member. (i.e., a community member who decides upon permanent nursing home placement following hospitalization and rehab).    Date completed:   Communicated with member or their designated representative about the following:  care transition process; about changes to the member s health status; plan of care updates; education about transitions and how to prevent unplanned transitions/readmissions  Four Pillars for Optimal Transition:     Check  Yes  - if the member, family member and/or SNF/facility staff manages the following:    If  No  provide explanation in the comments section.          []  Yes     []  No     Does the member have a follow-up appointment scheduled with primary care or specialist? (Mental health hospitalizations--the appt. should be w/in 7 days)   []  Yes     []  No     Can the member manage their medications or is there a system in place to manage medications (e.g. home care set-up)?         []  Yes     []  No     Can the member verbalize warning signs and symptoms to watch for and how to respond?         []  Yes     []  No     Does the member use a Personal Health Care Record?  Check  Yes  if visit summary, discharge summary, and/or healthcare summary are being used as a PHR.                                                                                                                                                                                    [] Yes      [] No      Have you updated the member s care plan?  If  No  provide explanation in comments.   Comments:

## 2021-06-04 NOTE — TELEPHONE ENCOUNTER
ANTICOAGULATION  MANAGEMENT- Home Care/Care Facility Result    Assessment     Today's INR result of 3.1 is Supratherapeutic (goal INR of 2.0-3.0)        Warfarin taken as previously instructed    No new diet changes affecting INR    No interaction expected between Robitussin and warfarin    Continues to tolerate warfarin with no reported s/s of bleeding or thromboembolism     Previous INR was Therapeutic    Plan:     Spoke with Neelima (home care nurse) discussed INR result and instructed:     Warfarin Dosing Instructions: one time lowering dose of 2mg tonight then continue current warfarin dose 2.5 mg daily   (0 % change)    Next INR to be drawn: one week    Education provided: importance of therapeutic range    Neelima verbalizes understanding and agrees to warfarin dosing plan.   ?   Jesika Jenkins RN    Subjective/Objective:      Benny Carrillo, a 89 y.o. male is established on warfarin.     Home care/care facility RN's report of Benny INR, recent warfarin dosing, diet changes, medication changes, and symptoms is documented below.    Additional findings: none    Anticoagulation Episode Summary     Current INR goal:   2.0-3.0   TTR:   82.5 % (11.2 mo)   Next INR check:   12/30/2019   INR from last check:   3.10! 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

## 2021-06-04 NOTE — TELEPHONE ENCOUNTER
I have set up a rx for Benzonatate, but the system won't let me e-prescribe.  Can someone call this in?

## 2021-06-04 NOTE — PROGRESS NOTES
Assessment/ Plan  1. Hospital discharge follow-up  Small bowel obstruction, recurrent problem, resolved    2. SBO (small bowel obstruction) (H)  Probably due to adhesions related to previous cholecystectomy, again symptoms resolved, patient doing well    3. Atrial fibrillation, unspecified type (H)  INR yesterday    4. Cough  Requesting to cough medicines, I agreed to 1.  - guaiFENesin (ROBITUSSIN) 100 mg/5 mL syrup; TAKE  10 ML BY MOUTH THREE TIMES DAILY AS NEEDED FOR COUGH  Dispense: 240 mL; Refill: 0    5. Suprapubic catheter (H)  Patient with suprapubic catheter in place, by his account for 6 years.  I think it is unlikely that he is getting benefit from finasteride or oxybutynin and is at some risk for use of oxybutynin based on age.  Will stop this medication.  Body mass index is 19.37 kg/m .    Subjective  CC:  Chief Complaint   Patient presents with     Hospital Visit Follow Up     HPI:  89-year-old here for follow-up from hospitalization at [facility] between 12/8 and 12/11/2019.  Admitted for small bowel obstruction with additional problems of suprapubic catheter, malnutrition, chronic atrial fibrillation.  Patient had had previous SBO.  Anticoagulated on Coumadin for the A. fib, history of cholecystectomy.  Admitted for evaluation of abdominal pain.  CT showed SBO with similar pattern with transitioning zone on the right.  Refused NG placement.  Felt likely to be adhesions.  Initially elevated lactate at 3.8, improved to 1.1.  Improved with bowel rest, passing flatus at the time of discharge.  INR remained therapeutic, patient is on oxybutynin  Patient Active Problem List   Diagnosis     Chronic atrial fibrillation     Muscle Weakness Generalized     Underweight due to inadequate caloric intake     Chest pain     Gastroesophageal reflux disease without esophagitis     Suprapubic catheter (H)     Leukocytosis, unspecified type     Heartburn     Acute cerebral infarction (H)     Accelerated hypertension      Chronic retention of urine     Acute nonintractable headache, unspecified headache type     History of ischemic cerebrovascular accident (CVA) with residual deficit     Acute pyelonephritis     Urinary tract infection associated with cystostomy catheter, initial encounter (H)     Moderate malnutrition (H)     Sepsis secondary to UTI (H)     Acute pulmonary edema (H)     Renal infarct (H)     Small bowel obstruction (H)     SBO (small bowel obstruction) (H)     Aspiration pneumonia of both lower lobes due to vomit (H)     Acute respiratory failure with hypoxia (H)     History of CVA (cerebrovascular accident)     Permanent atrial fibrillation     Incontinence of feces, unspecified fecal incontinence type     Atrial fibrillation (H)     Benign essential hypertension     Current medications reviewed as follows:  Current Outpatient Medications on File Prior to Visit   Medication Sig     acetaminophen (TYLENOL) 500 MG tablet Take 1 tablet (500 mg total) by mouth every 6 (six) hours as needed for pain.     albuterol (PROAIR HFA;PROVENTIL HFA;VENTOLIN HFA) 90 mcg/actuation inhaler INHALE 2 PUFFS BY MOUTH EVERY 6 HOURS AS NEEDED FOR WHEEZING     amLODIPine (NORVASC) 2.5 MG tablet Take 1 tablet (2.5 mg total) by mouth daily.     artificial tears, hypromellose, (GONIOVISC) 2.5 % ophthalmic solution 2 gtts to each eye two times a day prn dry eyes     ARTIFICIAL TEARS, POLYVIN ALC, 1.4 % ophthalmic solution INSTILL 2 DROPS INTO EACH EYE TWICE DAILY AS NEEDED DRY EYES     benzonatate (TESSALON) 200 MG capsule Take 1 capsule (200 mg total) by mouth 3 (three) times a day as needed for cough.     catheter 14 Fr Misc Suprapubic catheter. 14 French.     digoxin (LANOXIN) 125 mcg tablet Take 1 tablet (125 mcg total) by mouth daily.     hydrocortisone 2.5 % cream Apply to affected area 1-2 times daily as needed     loratadine (ALLERGY RELIEF, LORATADINE,) 10 mg tablet Take 1 tablet (10 mg total) by mouth daily.     multivitamin with  minerals (THERA-M) 9 mg iron-400 mcg Tab tablet Take 1 tablet by mouth daily.     warfarin ANTICOAGULANT (COUMADIN/JANTOVEN) 2.5 MG tablet Take 2.5 mg by mouth See Admin Instructions. Take 2.5 mg daily     [DISCONTINUED] finasteride (PROSCAR) 5 mg tablet Take 1 tablet (5 mg total) by mouth daily.     [DISCONTINUED] oxybutynin (DITROPAN XL) 10 MG ER tablet Take 1 tablet (10 mg total) by mouth daily.     [DISCONTINUED] guaiFENesin (ROBITUSSIN) 100 mg/5 mL syrup TAKE  10 ML BY MOUTH THREE TIMES DAILY AS NEEDED FOR COUGH     No current facility-administered medications on file prior to visit.      Social History     Tobacco Use   Smoking Status Never Smoker   Smokeless Tobacco Never Used     Social History     Social History Narrative     Not on file     Patient Care Team:  Alcides Castillo MD as PCP - General (Family Medicine)  Monet Talley, RN as Registered Nurse (Home Health Services)  Robin De La Vega MD as Physician (Urology)  Steven Carrillo, Legal Guardian  [employer] as Patient Care Assistant (Home Health Services)  [facility]  Alissa Carrillo as Patient Care Assistant  Alana Carrillo as Patient Care Assistant  ERAN Magana as Registered Nurse (Home Health Services)  Antonette Pillai as   Alcides Castillo MD as Assigned PCP  Izabella Correa SW as Lead Care Coordinator  Sergei Langford, PharmD as Pharmacist (Pharmacist)  ROS  Full 10 system review including constitutional, respiratory, cardiac, gi, urinary, rheumatologic, neurologic, reproductive, dermatologic psychiatric is  performed  and is otherwise negative         Objective  Physical Exam  Vitals:    12/17/19 0841   BP: 102/64   Patient Site: Right Arm   Patient Position: Sitting   Cuff Size: Adult Regular   Pulse: 84   Temp: (!) 96.5  F (35.8  C)   TempSrc: Oral   Weight: 120 lb (54.4 kg)     Gen- alert, oriented/ appropriately responsive  HEENT- normal cephalic, atraumatic.   Chest- Normal inspiration and expiration.     Clear to ascultation.    No chest wall deformity or scar.  CV- Heart regular rate and rhythm  normal tones, no murmurs   No gallops or rubs.  Ext- appear well perfused, no edema  Skin- warm and dry,   no visualized rash    Diagnostics  None    Please note: Voice recognition software was used in this dictation.  It may therefore contain typographical errors.

## 2021-06-04 NOTE — TELEPHONE ENCOUNTER
ANTICOAGULATION  MANAGEMENT: Discharge Review    Benny Carrillo chart reviewed for anticoagulation continuity of care    Hospital admission on  12/8 to 12/11 for recurrent small bowel obstruction.    Discharge disposition: Home with Home Care    INR Results:       Recent labs: (last 7 days)     12/08/19 2035 12/09/19  0348 12/10/19  0549 12/11/19  0551   INR 2.08* 2.16* 2.65* 2.88*       Warfarin inpatient management: less warfarin administered than maintenance regimen    Warfarin discharge instructions: home regimen continued     Medication Changes Affecting Anticoagulation: No    Additional Factors Affecting Anticoagulation: Yes: clear liquid diet while hospitalized, advanced as tolerated    Plan     No adjustment to anticoagulation plan needed      Patient not contacted, home care resumption at discharge    Anticoagulation calendar updated    Radhika Crooks RN

## 2021-06-04 NOTE — TELEPHONE ENCOUNTER
Dr. Castillo,    Do you agree with treatment plan on Benny Gerardo?  I will be faxing telephone orders to [facility], as requested.  Thank you.    Danay hWitaker, RN, BSN

## 2021-06-04 NOTE — TELEPHONE ENCOUNTER
INR result is 2.8  INR   Date Value Ref Range Status   12/11/2019 2.88 (H) 0.90 - 1.10 Final       Will the patient be seen, or did they already see, MD or CNP today? No    Most Recent Warfarin dose day/week  Sunday Monday Tuesday Wednesday Thursday Friday Saturday    2.5 2.5 2.5 2.5 2.5 2.5     Sunday Monday Tuesday Wednesday Thursday Friday Saturday   2.5             Has the patient missed any doses of Coumadin, Warfarin, Jantoven in the past 7 days? No    Has the patients medications changed since the last visit? No    Has the patient experienced any bleeding recently? No    Has the patient experienced any injuries or illness recently? No    Has the patient experienced any 'new' shortness of breath, severe headaches, or changes in vision recently? No    Has the patient had any changes in their diet, or alcohol consumption? No    Is the patient here today to prepare for any type of upcoming surgery, procedure, or for a cardioversion procedure? No    What phone number can we reach the patient at today? Please contact luis miguel Olson with [facility] at [phone]

## 2021-06-04 NOTE — TELEPHONE ENCOUNTER
Pt was hospitalized 12/8-12/11 for SBO. During routine hospital discharge phone call, son reports pt has a frequent dry cough that keeps pt awake at night. Occas sputum. Denies fever. He has chest and abd muscle discomfort when he coughs. Pt has an appt to see Dr Wade on 12/17. Abd pain from SBO is gone. Appetite poor.  Son is requesting something for pt's cough. Pharmacy is [facility] in [place].

## 2021-06-04 NOTE — TELEPHONE ENCOUNTER
INR result is     3.1  INR   Date Value Ref Range Status   12/16/2019 2.80 (!) 0.9 - 1.1 Final       Will the patient be seen, or did they already see, MD or CNP today? No    Most Recent Warfarin dose day/week  Sunday Monday Tuesday Wednesday Thursday Friday Saturday    2.5 2.5 2.5 2.5 2.5 2.5     Sunday Monday Tuesday Wednesday Thursday Friday Saturday   2.5             Has the patient missed any doses of Coumadin, Warfarin, Jantoven in the past 7 days? No    Has the patients medications changed since the last visit? Yes added Robittusim    Has the patient experienced any bleeding recently? No    Has the patient experienced any injuries or illness recently? No    Has the patient experienced any 'new' shortness of breath, severe headaches, or changes in vision recently? No    Has the patient had any changes in their diet, or alcohol consumption? No    Is the patient here today to prepare for any type of upcoming surgery, procedure, or for a cardioversion procedure? No    What phone number can we reach the patient at today? home phone listed in demographics.

## 2021-06-05 NOTE — TELEPHONE ENCOUNTER
Dr. Castillo,    Do you agree with telephone orders on Benny Carrillo?  I will be faxing orders as requested to [facility].  Thank you.      Danay Whitaker, BSN, RN.

## 2021-06-05 NOTE — TELEPHONE ENCOUNTER
INR result is   INR   Date Value Ref Range Status   01/20/2020 2.30 (!) 0.9 - 1.1 Final     1/27/20          (no clots detected - not clotting)    Will the patient be seen, or did they already see, MD or CNP today? No    Most Recent Warfarin dose day/week  Sunday Monday Tuesday Wednesday Thursday Friday Saturday    2 mg 2.5 mg 2 mg 2.5 mg 2 mg 2.5 mg     Sunday Monday Tuesday Wednesday Thursday Friday Saturday   2.5 mg             Has the patient missed any doses of Coumadin, Warfarin, Jantoven in the past 7 days? No    Has the patients medications changed since the last visit? No    Has the patient experienced any bleeding recently? No    Has the patient experienced any injuries or illness recently? No    Has the patient experienced any 'new' shortness of breath, severe headaches, or changes in vision recently? No    Has the patient had any changes in their diet, or alcohol consumption? No    Is the patient here today to prepare for any type of upcoming surgery, procedure, or for a cardioversion procedure? No    What phone number can we reach the patient at today? Neelima Parnell [facility]  [phone].

## 2021-06-05 NOTE — TELEPHONE ENCOUNTER
INR result is     2.3  INR   Date Value Ref Range Status   01/13/2020 3.70 (!) 0.9 - 1.1 Final       Will the patient be seen, or did they already see, MD or CNP today? No    Most Recent Warfarin dose day/week  Sunday Monday Tuesday Wednesday Thursday Friday Saturday    1 2.5 2 2.5 2 2.5     Sunday Monday Tuesday Wednesday Thursday Friday Saturday   2.5             Has the patient missed any doses of Coumadin, Warfarin, Jantoven in the past 7 days? No    Has the patients medications changed since the last visit? No    Has the patient experienced any bleeding recently? No    Has the patient experienced any injuries or illness recently? No    Has the patient experienced any 'new' shortness of breath, severe headaches, or changes in vision recently? No    Has the patient had any changes in their diet, or alcohol consumption? No    Is the patient here today to prepare for any type of upcoming surgery, procedure, or for a cardioversion procedure? No    What phone number can we reach the patient at today? home phone listed in demographics.

## 2021-06-05 NOTE — TELEPHONE ENCOUNTER
INR result is 2.8  INR   Date Value Ref Range Status   01/27/2020 2.80 (!) 0.9 - 1.1 Final       Will the patient be seen, or did they already see, MD or CNP today? No    Most Recent Warfarin dose day/week  Sunday Monday Tuesday Wednesday Thursday Friday Saturday    2 2.5 2 2.5 2 2.5     Sunday Monday Tuesday Wednesday Thursday Friday Saturday   2.5             Has the patient missed any doses of Coumadin, Warfarin, Jantoven in the past 7 days? No    Has the patients medications changed since the last visit? No    Has the patient experienced any bleeding recently? No    Has the patient experienced any injuries or illness recently? No    Has the patient experienced any 'new' shortness of breath, severe headaches, or changes in vision recently? No    Has the patient had any changes in their diet, or alcohol consumption? No    Is the patient here today to prepare for any type of upcoming surgery, procedure, or for a cardioversion procedure? No    What phone number can we reach the patient at today? home phone listed in demographics.

## 2021-06-05 NOTE — TELEPHONE ENCOUNTER
Orders being requested:   Re-certification of ongoing Skilled Nursing  1x a week for 60 days.  Reason service is needed/diagnosis:   Suprapubic catheter care  INR checks  When are orders needed by: Asap  Where to send Orders: Phone:  Verbal orders to: Tamica benavidez [facility], [phone]  Okay to leave detailed message?  Yes

## 2021-06-05 NOTE — TELEPHONE ENCOUNTER
"Spoke with Neelima -   - she reported INR reading as \" no clots detected \" on her INR monitor.     - ACN not understanding what this means.  INR should detect a reading with numbers.   (Otherwise, will need to verify with venous draw for confirmation.)       - Neelima will repeat INR test and call back.      "

## 2021-06-05 NOTE — TELEPHONE ENCOUNTER
INR result is    2.8  INR   Date Value Ref Range Status   01/20/2020 2.30 (!) 0.9 - 1.1 Final       Will the patient be seen, or did they already see, MD or CNP today? No    Most Recent Warfarin dose day/week  Sunday Monday Tuesday Wednesday Thursday Friday Saturday    2 2.5 2 2.5 2 2.5     Sunday Monday Tuesday Wednesday Thursday Friday Saturday   2.5             Has the patient missed any doses of Coumadin, Warfarin, Jantoven in the past 7 days? No    Has the patients medications changed since the last visit? No    Has the patient experienced any bleeding recently? No    Has the patient experienced any injuries or illness recently? No    Has the patient experienced any 'new' shortness of breath, severe headaches, or changes in vision recently? No    Has the patient had any changes in their diet, or alcohol consumption? No    Is the patient here today to prepare for any type of upcoming surgery, procedure, or for a cardioversion procedure? No    What phone number can we reach the patient at today? home phone listed in demographics.

## 2021-06-05 NOTE — TELEPHONE ENCOUNTER
INR result is  3.7  INR   Date Value Ref Range Status   01/06/2020 3.00 (!) 0.9 - 1.1 Final       Will the patient be seen, or did they already see, MD or CNP today? No    Most Recent Warfarin dose day/week  Sunday Monday Tuesday Wednesday Thursday Friday Saturday    2.5 2.5 2.5 2.5 2.5 2.5     Sunday Monday Tuesday Wednesday Thursday Friday Saturday   2.5             Has the patient missed any doses of Coumadin, Warfarin, Jantoven in the past 7 days? No    Has the patients medications changed since the last visit? No    Has the patient experienced any bleeding recently? No    Has the patient experienced any injuries or illness recently? No    Has the patient experienced any 'new' shortness of breath, severe headaches, or changes in vision recently? No    Has the patient had any changes in their diet, or alcohol consumption? No    Is the patient here today to prepare for any type of upcoming surgery, procedure, or for a cardioversion procedure? No    What phone number can we reach the patient at today? [facility] [phone]

## 2021-06-05 NOTE — TELEPHONE ENCOUNTER
ANTICOAGULATION  MANAGEMENT- Home Care/Care Facility Result    Assessment     Today's INR result of 2.30 is Therapeutic (goal INR of 2.0-3.0)        Warfarin taken as previously instructed    No new diet changes affecting INR    No new medication/supplements affecting INR    Continues to tolerate warfarin with no reported s/s of bleeding or thromboembolism     Previous INR was Supratherapeutic at 3.70 on 1/20/2020.    Patient reported no new complaints or concerns.    Plan:     Spoke with AMRIT Ortez from [facility] discussed INR result and instructed:    1.  They requested to fax telephone orders to:  [phone] ATTN:  Any BARILLAS.    Warfarin Dosing Instructions:   - Continue current warfarin dose 2 mg daily on Mon/Wed/Fri; and 2.5 mg daily rest of week.    Next INR to be drawn:  One wk.  Scheduled on 1/27/2020.    Education provided: importance of consistent vitamin K intake, target INR goal and significance of current INR result and importance of notifying clinic for changes in medications    AMRIT Ortez verbalizes understanding and agrees to warfarin dosing plan.   ?   Maribel Whitaker RN    Subjective/Objective:      Benny Carrillo, a 89 y.o. male is established on warfarin.     Home care/care facility RN's report of Benny INR, recent warfarin dosing, diet changes, medication changes, and symptoms is documented below.    Additional findings: none    Anticoagulation Episode Summary     Current INR goal:   2.0-3.0   TTR:   80.1 % (11.5 mo)   Next INR check:   1/27/2020   INR from last check:   2.30 (1/20/2020)   Weekly max warfarin dose:      Target end date:   Indefinite   INR check location:      Preferred lab:      Send INR reminders to:   [facility]    Indications    Renal infarct (H) [N28.0]  Atrial fibrillation (H) [I48.91]           Comments:            Anticoagulation Care Providers     Provider Role Specialty Phone number    Alcides Castillo MD Referring Family Medicine  [phone]

## 2021-06-05 NOTE — TELEPHONE ENCOUNTER
ANTICOAGULATION  MANAGEMENT- Home Care/Care Facility Result    Assessment     Today's INR result of 3.7 is Supratherapeutic (goal INR of 2.0-3.0)        Warfarin taken as previously instructed    No new diet changes affecting INR    No new medication/supplements affecting INR    Continues to tolerate warfarin with no reported s/s of bleeding or thromboembolism     Previous INR was Therapeutic     Faxed this encounter to [facility] [phone].    Plan:     Spoke with home care nurse Neelima discussed INR result and instructed:     Warfarin Dosing Instructions: Take lower dose of 1 mg today only then change warfarin dose to 2 mg daily on Mon, Wed, Fri; and 2.5 mg daily rest of week  (8.6 % change)    Next INR to be drawn: 1 week.    Education provided: importance of taking warfarin as instructed    Neelima verbalizes understanding and agrees to warfarin dosing plan.   ?   Bari Grewal RN    Subjective/Objective:      Benny Carrillo, a 89 y.o. male is established on warfarin.     Home care/care facility RN's report of Benny INR, recent warfarin dosing, diet changes, medication changes, and symptoms is documented below.    Additional findings: verbally confirmed home dose with Neelima and updated on anticoagulation calendar    Anticoagulation Episode Summary     Current INR goal:   2.0-3.0   TTR:   81.0 % (11.5 mo)   Next INR check:   1/20/2020   INR from last check:   3.70! (1/13/2020)   Weekly max warfarin dose:      Target end date:   Indefinite   INR check location:      Preferred lab:      Send INR reminders to:   [facility]    Indications    Renal infarct (H) [N28.0]  Atrial fibrillation (H) [I48.91]           Comments:            Anticoagulation Care Providers     Provider Role Specialty Phone number    Alcides Castillo MD Referring Family Medicine [phone]

## 2021-06-05 NOTE — TELEPHONE ENCOUNTER
ANTICOAGULATION  MANAGEMENT- Home Care/Care Facility Result    Assessment     Today's INR result of 2.8 is Therapeutic (goal INR of 2.0-3.0)        Warfarin taken as previously instructed    No new diet changes affecting INR    No new medication/supplements affecting INR    Continues to tolerate warfarin with no reported s/s of bleeding or thromboembolism     Previous INR was Therapeutic    Plan:     Spoke with home care nurse Neelima discussed INR result and instructed:     Warfarin Dosing Instructions: Continue current warfarin dose    2 mg every Mon, Wed, Fri; 2.5 mg all other days         Next INR to be drawn: 1 week.    Education provided: importance of taking warfarin as instructed    Neelima verbalizes understanding and agrees to warfarin dosing plan.   ?   Bari Grewal RN    Subjective/Objective:      Benny Carrillo, a 89 y.o. male is established on warfarin.     Home care/care facility RN's report of Benny INR, recent warfarin dosing, diet changes, medication changes, and symptoms is documented below.    Additional findings: verbally confirmed home dose with Neelima and updated on anticoagulation calendar    Anticoagulation Episode Summary     Current INR goal:   2.0-3.0   TTR:   81.1 % (11.5 mo)   Next INR check:   2/3/2020   INR from last check:   2.80 (1/27/2020)   Weekly max warfarin dose:      Target end date:   Indefinite   INR check location:      Preferred lab:      Send INR reminders to:   [facility]    Indications    Renal infarct (H) [N28.0]  Atrial fibrillation (H) [I48.91]           Comments:            Anticoagulation Care Providers     Provider Role Specialty Phone number    Alcides Castillo MD Referring Family Medicine [phone]

## 2021-06-05 NOTE — PROGRESS NOTES
Subjective: Patient comes in for follow-up.  He follows up on his multiple medical conditions.    Has hypertension which seems to be under control he is on amlodipine    His atrial fibrillation continues he is on warfarin gets INR is at home.  Please see anticoagulations nurses notes he had one last Monday    He is on digoxin 0.125 mg once a day rate is controlled with that rates in the 60s.    He has a suprapubic catheter he has been off oxybutynin as well as finasteride he has not had any increased symptoms.    Patient does have some pain in the mid abdomen though and that is intermittent is on the right side.  He said previous small bowel obstruction.  He has had no vomiting and is has a fairly benign exam now.    He does have adhesions from his previous cholecystectomy.  He was hospitalized this fall with a small bowel obstruction.    Son was informed on what to monitor for regarding that.  Again no vomiting.  No fever    Tobacco status: He  reports that he has never smoked. He has never used smokeless tobacco.    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     Acute pulmonary edema (H)      Acute pyelonephritis 02/10/2018     Urinary tract infection associated with cystostomy catheter, initial encounter (H) 02/10/2018     Sepsis secondary to UTI (H) 02/10/2018     Moderate malnutrition (H)      History of ischemic cerebrovascular accident (CVA) with residual deficit 11/20/2017     Accelerated hypertension      Chronic retention of urine       Acute nonintractable headache, unspecified headache type      Acute cerebral infarction (H) 11/14/2017     Chest pain 09/14/2016     Gastroesophageal reflux disease without esophagitis 09/14/2016     Suprapubic catheter (H) 09/14/2016     Leukocytosis, unspecified type 09/14/2016     Heartburn 09/14/2016     Underweight due to inadequate caloric intake 07/28/2015     Chronic atrial fibrillation      Muscle Weakness Generalized        Current Outpatient Medications   Medication Sig Dispense Refill     acetaminophen (TYLENOL) 500 MG tablet 2 po three times a day prn pain 100 tablet 2     albuterol (PROAIR HFA;PROVENTIL HFA;VENTOLIN HFA) 90 mcg/actuation inhaler INHALE 2 PUFFS BY MOUTH EVERY 6 HOURS AS NEEDED FOR WHEEZING 3 each 1     amLODIPine (NORVASC) 2.5 MG tablet Take 1 tablet (2.5 mg total) by mouth daily. 30 tablet 11     artificial tears, hypromellose, (GONIOVISC) 2.5 % ophthalmic solution 2 gtts to each eye two times a day prn dry eyes 15 mL 6     ARTIFICIAL TEARS, POLYVIN ALC, 1.4 % ophthalmic solution INSTILL 2 DROPS INTO EACH EYE TWICE DAILY AS NEEDED DRY EYES 15 mL 6     benzonatate (TESSALON) 200 MG capsule Take 1 capsule (200 mg total) by mouth 3 (three) times a day as needed for cough. 20 capsule 0     catheter 14 Fr Misc Suprapubic catheter. 14 French. 1 each 0     digoxin (LANOXIN) 125 mcg (0.125 mg) tablet Take 1 tablet (125 mcg total) by mouth daily. 90 tablet 1     guaiFENesin (ROBITUSSIN) 100 mg/5 mL syrup TAKE  10 ML BY MOUTH THREE TIMES DAILY AS NEEDED FOR COUGH 240 mL 1     hydrocortisone 2.5 % cream Apply to affected area 1-2 times daily as needed 30 g 2     loratadine (ALLERGY RELIEF, LORATADINE,) 10 mg tablet Take 1 tablet (10 mg total) by mouth daily. 90 tablet 3     multivitamin with minerals (THERA-M) 9 mg iron-400 mcg Tab tablet Take 1 tablet by mouth daily. 30 tablet 11     warfarin ANTICOAGULANT (COUMADIN/JANTOVEN) 2.5 MG tablet Take 2.5 mg by mouth See Admin Instructions. Take  2.5 mg daily       No current facility-administered medications for this visit.        ROS:   10 point review of systems positive as discussed above otherwise negative    Objective:    /62 (Patient Site: Right Arm, Patient Position: Sitting, Cuff Size: Adult Regular)   Pulse 66   Wt 118 lb (53.5 kg)   SpO2 95%   BMI 19.05 kg/m    Body mass index is 19.05 kg/m .      General appearance no acute distress vital signs are stable weight is stable  HEENT oropharynx is clear pupils react normally no drainage from the nose.      Lungs were clear throughout no rales or rhonchi heart was irregularly irregular rate in the 60s O2 sat 95%    Abdomen is normal no guarding or rebound.  Suprapubic catheter in place    Lower extremities without edema.    He does get occasional cough and I did refill his son.    No joint redness warmth or swelling.    INR from earlier this week as outlined please see anticoagulation nurses notes    Results for orders placed or performed in visit on 01/06/20   INR   Result Value Ref Range    INR 3.00 (!) 0.9 - 1.1       Assessment:  1. SBO (small bowel obstruction) (H)     2. Chronic atrial fibrillation  digoxin (LANOXIN) 125 mcg (0.125 mg) tablet   3. Cough  guaiFENesin (ROBITUSSIN) 100 mg/5 mL syrup   4. Muscle Weakness Generalized  acetaminophen (TYLENOL) 500 MG tablet    DISCONTINUED: acetaminophen (TYLENOL) 500 MG tablet   5. Suprapubic catheter (H)     6. Benign essential hypertension       Previous small bowel obstruction from adhesions from cholecystectomy presently stable    Chronic atrial fibrillation continue Lanoxin rate is controlled also INR therapeutic on warfarin.    Cough refill on Robitussin    Some muscle aches and pain use Tylenol as needed    Suprapubic catheter stable catheter and now off oxybutynin and finasteride    Hypertension controlled on amlodipine    Plan: As outlined above recheck in 3 months    This transcription uses voice recognition software, which may  contain typographical errors.

## 2021-06-06 NOTE — TELEPHONE ENCOUNTER
INR result is     2.5  INR   Date Value Ref Range Status   02/17/2020 3.00 (!) 0.9 - 1.1 Final       Will the patient be seen, or did they already see, MD or CNP today? No    Most Recent Warfarin dose day/week  Sunday Monday Tuesday Wednesday Thursday Friday Saturday    2 2.5 2 2.5 2 2.5     Sunday Monday Tuesday Wednesday Thursday Friday Saturday   2.5             Has the patient missed any doses of Coumadin, Warfarin, Jantoven in the past 7 days? No    Has the patients medications changed since the last visit? No    Has the patient experienced any bleeding recently? No    Has the patient experienced any injuries or illness recently? No    Has the patient experienced any 'new' shortness of breath, severe headaches, or changes in vision recently? No    Has the patient had any changes in their diet, or alcohol consumption? No    Is the patient here today to prepare for any type of upcoming surgery, procedure, or for a cardioversion procedure? No    What phone number can we reach the patient at today? home phone listed in demographics.

## 2021-06-06 NOTE — PROGRESS NOTES
[facility] Care Coordination Contact    Received after visit chart from care coordinator.  Completed following tasks: Mailed copy of care plan to client.    [facility]:  Emailed completed PCA assessment to [facility].  Faxed copy of PCA assessment to [facility] and mailed copy to member.  Faxed MD Communication to PCP.     Clair Medeiros  Care Management Specialist  [facility]  [phone]

## 2021-06-06 NOTE — TELEPHONE ENCOUNTER
ANTICOAGULATION  MANAGEMENT- Home Care/Care Facility Result    Assessment     Today's INR result of 2.50 is Therapeutic (goal INR of 2.0-3.0)        Warfarin taken as previously instructed    No new diet changes affecting INR    No new medication/supplements affecting INR    Continues to tolerate warfarin with no reported s/s of bleeding or thromboembolism     Previous INR was Therapeutic at 3.00 on 2/17/20    Reported doing well with no new complaints or concerns.    Plan:     Spoke with ERAN Regan from [facility] discussed INR result and instructed:    1.  FAXED telephone orders to [phone].    Warfarin Dosing Instructions:   - Continue current warfarin dose 2 mg daily on Mon/Wed/Fri; and 2.5 mg daily rest of week.    Next INR to be drawn:  2 wks.  Scheduled on 3/16/20    Education provided: importance of consistent vitamin K intake and target INR goal and significance of current INR result    ERAN Ortez verbalizes understanding and agrees to warfarin dosing plan.   ?   Maribel Whitaker RN    Subjective/Objective:      Benny Carrillo, a 89 y.o. male is established on warfarin.     Home care/care facility RN's report of Benny INR, recent warfarin dosing, diet changes, medication changes, and symptoms is documented below.    Additional findings: none    Anticoagulation Episode Summary     Current INR goal:   2.0-3.0   TTR:   83.3 % (11.5 mo)   Next INR check:   3/16/2020   INR from last check:   2.50 (3/2/2020)   Weekly max warfarin dose:      Target end date:   Indefinite   INR check location:      Preferred lab:      Send INR reminders to:   [facility]    Indications    Renal infarct (H) [N28.0]  Atrial fibrillation (H) [I48.91]           Comments:            Anticoagulation Care Providers     Provider Role Specialty Phone number    Alcides Castillo MD Referring Family Medicine [phone]

## 2021-06-06 NOTE — PROGRESS NOTES
Outreach to [facility] to request different leg bag based on families preference and request. Also, requested additional bags-they will send over an order/request to PCP to review in order to increase leg delivery to 2x per month.      Outreach to [facility] pharmacy to inquire about coverage for Robitussin. [facility] pharmacy reported that they are unable to bill that prescription, inquired about generic and they were unsure of an alternative that is covered by [place]. Will update pt's son.     KESHA Sun  [facility]  [phone]

## 2021-06-06 NOTE — TELEPHONE ENCOUNTER
ANTICOAGULATION  MANAGEMENT- Home Care/Care Facility Result    Assessment     Today's INR result of 3.0 is Therapeutic (goal INR of 2.0-3.0)        Warfarin taken as previously instructed    No new diet changes affecting INR    No new medication/supplements affecting INR    Continues to tolerate warfarin with no reported s/s of bleeding or thromboembolism     Previous INR was Therapeutic    Plan:     Spoke with home care nurse Neelima discussed INR result and instructed:     Warfarin Dosing Instructions: Continue current warfarin dose    2 mg every Mon, Wed, Fri; 2.5 mg all other days         Next INR to be drawn: 2 weeks.    Education provided: importance of taking warfarin as instructed    Neelima verbalizes understanding and agrees to warfarin dosing plan.   ?   Bari Grewal RN    Subjective/Objective:      Benny Carrillo, a 89 y.o. male is established on warfarin.     Home care/care facility RN's report of Benny INR, recent warfarin dosing, diet changes, medication changes, and symptoms is documented below.    Additional findings: verbally confirmed home dose with Neelima and updated on anticoagulation calendar    Anticoagulation Episode Summary     Current INR goal:   2.0-3.0   TTR:   83.3 % (11.5 mo)   Next INR check:   3/2/2020   INR from last check:   3.00 (2/17/2020)   Weekly max warfarin dose:      Target end date:   Indefinite   INR check location:      Preferred lab:      Send INR reminders to:   [facility]    Indications    Renal infarct (H) [N28.0]  Atrial fibrillation (H) [I48.91]           Comments:            Anticoagulation Care Providers     Provider Role Specialty Phone number    Alcides Castillo MD Referring Family Medicine [phone]

## 2021-06-06 NOTE — TELEPHONE ENCOUNTER
INR result is 2.2  INR   Date Value Ref Range Status   03/02/2020 2.50 (!) 0.9 - 1.1 Final       Will the patient be seen, or did they already see, MD or CNP today? No    Most Recent Warfarin dose day/week  Sunday Monday Tuesday Wednesday Thursday Friday Saturday    2 mg 2.5 mg 2 mg 2.5 mg 2 mg 2.5 mg     Sunday Monday Tuesday Wednesday Thursday Friday Saturday   2.5 mg             Has the patient missed any doses of Coumadin, Warfarin, Jantoven in the past 7 days? No    Has the patients medications changed since the last visit? No    Has the patient experienced any bleeding recently? No    Has the patient experienced any injuries or illness recently? No    Has the patient experienced any 'new' shortness of breath, severe headaches, or changes in vision recently? No    Has the patient had any changes in their diet, or alcohol consumption? No    Is the patient here today to prepare for any type of upcoming surgery, procedure, or for a cardioversion procedure? No    What phone number can we reach the patient at today? [phone] [place]

## 2021-06-06 NOTE — PROGRESS NOTES
[facility] Care Coordination Contact  [facility] Home Visit Assessment     Home visit for Health Risk Assessment with Benny TERRELL Gerardo completed on 2/24/2020    Type of residence:: Private home - stairs  Current living arrangement:: I live in a private home with family     Assessment completed with:: Patient, Family    Current Care Plan  Member currently receiving the following home care services: Skilled Nursing   Member currently receiving the following community resources: PCA    Medication Review  Medication reconciliation completed in Epic: YES  Medication set-up & administration: Family/informal caregiver sets up weekly  Family caregiver administers medications - Steven currently sets up all medications in a pill box.   SN visits for INR draws weekly and sets up recommended coumadin dosing in a separate pill box for family to administer.   Medication Risk Assessment Medication (1 or more, place referral to MTM):  N/A: No risk factors identified  MTM Referral Placed: No: No risk factors idenified    Mental/Behavioral Health   Depression Screening: See PHQ assessment flowsheet.   Mental health DX:: No      Mental Health Diagnosis: No  Mental Health Services: None: No further intervention needed at this time.    Falls Assessment:   Fallen 2 or more times in the past year?: No   Any fall with injury in the past year?: No    ADL/IADL Dependencies:   Dependent ADLs:: Bathing, Dressing, Eating, Grooming, Incontinence, Positioning, Transfers, Wheelchair-with assist, Toileting  Dependent IADLs:: Cleaning, Cooking, Laundry, Shopping, Meal Preparation, Medication Management, Money Management, Transportation, Incontinence    [facility] sponsored benefits: Shared information re: Silver Sneakers/gym memberships, ASA, Calcium +D.    PCA Assessment completed at visit: Yes    Elderly Waiver Eligibility: Yes, but member declines EW service; will not open to EW    Care Plan & Recommendations: Benny Carrillo is  a 89 y.o. old male with past medical history of recurrent bowel obstruction, atrial fibrillation  managed with coumadin, cholecystectomy, hypertension, acute cerebral infarction with residual effect, and a chronic supra-pubic catheter.     Benny resides in a home with his son Steven, daughter in law, Owen, and grand daughter. Grandsons are available as well to assist with transportation, appointments, and translating.  Was able to clarify that Steven is not Benny's legal guardian, he is his representative for Social Security and Medical Assistance. Based on Benny's does not appear to be able to complete a Health Care Directive related to confusion.     Benny receives PCA services to keep him supported in the home. Benny's PCAs are his daughter in law, Owen, and granddaughter. SN visits are and occur weekly to assist with managing suprabupic catheter and INR draws and coumadin dosing. No additional services are being requested at this time.     See [facility] for detailed assessment information.    Follow-Up Plan: Member informed of future contact, plan to f/u with member with a 6 month telephone assessment.  Contact information shared with member and family, encouraged member to call with any questions or concerns at any time.    KESHA Sun  [employer]  [phone]

## 2021-06-06 NOTE — TELEPHONE ENCOUNTER
INR result is   3.0    Will the patient be seen, or did they already see, MD or CNP today? No    Most Recent Warfarin dose day/week  Sunday Monday Tuesday Wednesday Thursday Friday Saturday    2 2.5 2 2.5 2 2.5     Sunday Monday Tuesday Wednesday Thursday Friday Saturday   2.5             Has the patient missed any doses of Coumadin, Warfarin, Jantoven in the past 7 days? No    Has the patients medications changed since the last visit? No    Has the patient experienced any bleeding recently? No    Has the patient experienced any injuries or illness recently? No    Has the patient experienced any 'new' shortness of breath, severe headaches, or changes in vision recently? No    Has the patient had any changes in their diet, or alcohol consumption? No    Is the patient here today to prepare for any type of upcoming surgery, procedure, or for a cardioversion procedure? No    What phone number can we reach the patient at today? [phone].

## 2021-06-07 NOTE — TELEPHONE ENCOUNTER
ANTICOAGULATION  MANAGEMENT- Home Care/Care Facility Result    Assessment     Today's INR result of 2.8 is Therapeutic (goal INR of 2.0-3.0)        Warfarin taken as previously instructed    No new diet changes affecting INR    No new medication/supplements affecting INR    Continues to tolerate warfarin with no reported s/s of bleeding or thromboembolism     Previous INR was Therapeutic    Plan:     Spoke with home care nurse Neelima discussed INR result and instructed:     Warfarin Dosing Instructions: Continue current warfarin dose    2 mg every Mon, Wed, Fri; 2.5 mg all other days         Next INR to be drawn: 4 weeks.    Education provided: importance of taking warfarin as instructed    Neelima verbalizes understanding and agrees to warfarin dosing plan.   ?   Bari Grewal RN    Subjective/Objective:      Benny Carrillo, a 89 y.o. male is established on warfarin.     Home care/care facility RN's report of Benny INR, recent warfarin dosing, diet changes, medication changes, and symptoms is documented below.    Additional findings: verbally confirmed home dose with Neelima and updated on anticoagulation calendar    Anticoagulation Episode Summary     Current INR goal:   2.0-3.0   TTR:   83.3 % (11.5 mo)   Next INR check:   5/11/2020   INR from last check:   2.80 (4/13/2020)   Weekly max warfarin dose:      Target end date:   Indefinite   INR check location:      Preferred lab:      Send INR reminders to:   [facility]    Indications    Renal infarct (H) [N28.0]  Atrial fibrillation (H) [I48.91]           Comments:            Anticoagulation Care Providers     Provider Role Specialty Phone number    Alcides Castillo MD Referring Family Medicine [phone]

## 2021-06-07 NOTE — TELEPHONE ENCOUNTER
INR result is   2.8    Will the patient be seen, or did they already see, MD or CNP today? No    Most Recent Warfarin dose day/week  Sunday Monday Tuesday Wednesday Thursday Friday Saturday    2 2.5 2 2.5 2 2.5     Sunday Monday Tuesday Wednesday Thursday Friday Saturday   2.5             Has the patient missed any doses of Coumadin, Warfarin, Jantoven in the past 7 days? No    Has the patients medications changed since the last visit? No    Has the patient experienced any bleeding recently? No    Has the patient experienced any injuries or illness recently? No    Has the patient experienced any 'new' shortness of breath, severe headaches, or changes in vision recently? No    Has the patient had any changes in their diet, or alcohol consumption? No    Is the patient here today to prepare for any type of upcoming surgery, procedure, or for a cardioversion procedure? No    What phone number can we reach the patient at today? [phone]

## 2021-06-07 NOTE — TELEPHONE ENCOUNTER
Requested Prescriptions     Pending Prescriptions Disp Refills     hydrocortisone 2.5 % cream [Pharmacy Med Name: Hydrocortisone 2.5 % External Cream] 28 g 0     Sig: APPLY  CREAM TO AFFECTED AREA ONCE TO TWICE DAILY AS NEEDED       Tanya Santana

## 2021-06-07 NOTE — TELEPHONE ENCOUNTER
Orders being requested:   Re-certification  Skilled Nursing  1x a week for 8 weeks    Suprapubic Catheter change 1x a month    Reason service is needed/diagnosis:  INR Check  Medication Management    Chronic Atrial Fibrillation  Chronic Retention of Urine    When are orders needed by: Asajoselin    Where to send Orders: Phone:  Verbal orders to: Renetta with [facility], [phone]     Okay to leave detailed message?  Yes

## 2021-06-08 NOTE — TELEPHONE ENCOUNTER
ANTICOAGULATION  MANAGEMENT- Home Care/Care Facility Result    Assessment     Today's INR result of 2.10 is Therapeutic (goal INR of 2.0-3.0)        Warfarin taken as previously instructed    No new diet changes affecting INR    No new medication/supplements affecting INR    Continues to tolerate warfarin with no reported s/s of bleeding or thromboembolism     Previous INR was Therapeutic at 2.80 on 4/13/20.    Reported doing well with no new complaints.    Plan:     Spoke with Neelima BARILLAS from [facility] discussed INR result and instructed:    1.  FAX telephone orders:  [phone]    Warfarin Dosing Instructions:   - Continue current warfarin dose 2 mg daily on Mon/Wed/Fri; and 2.5 mg daily rest of week.    Next INR to be drawn:  4 wks.   - scheduled on 6/8/20    Education provided: importance of consistent vitamin K intake and target INR goal and significance of current INR result    Neelima verbalizes understanding and agrees to warfarin dosing plan.   ?   Maribel Whitaker RN    Subjective/Objective:      Benny Carrillo, a 89 y.o. male is established on warfarin.     Home care/care facility RN's report of Benny INR, recent warfarin dosing, diet changes, medication changes, and symptoms is documented below.    Additional findings: none    Anticoagulation Episode Summary     Current INR goal:   2.0-3.0   TTR:   83.3 % (11.5 mo)   Next INR check:   6/8/2020   INR from last check:   2.10 (5/11/2020)   Weekly max warfarin dose:      Target end date:   Indefinite   INR check location:      Preferred lab:      Send INR reminders to:   [facility]    Indications    Renal infarct (H) [N28.0]  Atrial fibrillation (H) [I48.91]           Comments:            Anticoagulation Care Providers     Provider Role Specialty Phone number    Alcides Castillo MD Referring Family Medicine [phone]

## 2021-06-08 NOTE — TELEPHONE ENCOUNTER
INR result is 1.7  INR   Date Value Ref Range Status   05/11/2020 2.10 (!) 0.9 - 1.1 Final       Will the patient be seen, or did they already see, MD or CNP today? No    Most Recent Warfarin dose day/week  Sunday Monday Tuesday Wednesday Thursday Friday Saturday    2 2.5 2 2.5 2 2.5     Sunday Monday Tuesday Wednesday Thursday Friday Saturday   2.5             Has the patient missed any doses of Coumadin, Warfarin, Jantoven in the past 7 days? No    Has the patients medications changed since the last visit? No    Has the patient experienced any bleeding recently? No    Has the patient experienced any injuries or illness recently? No    Has the patient experienced any 'new' shortness of breath, severe headaches, or changes in vision recently? No    Has the patient had any changes in their diet, or alcohol consumption? No    Is the patient here today to prepare for any type of upcoming surgery, procedure, or for a cardioversion procedure? No    What phone number can we reach the patient at today? Please contact Renetta with [facility] stat nursing at [phone]

## 2021-06-08 NOTE — TELEPHONE ENCOUNTER
INR result is 2.1  INR   Date Value Ref Range Status   04/13/2020 2.80 (!) 0.9 - 1.1 Final       Will the patient be seen, or did they already see, MD or CNP today? No    Most Recent Warfarin dose day/week  Sunday Monday Tuesday Wednesday Thursday Friday Saturday    2 2.5 2 2.5 2 2.5     Sunday Monday Tuesday Wednesday Thursday Friday Saturday   2.5             Has the patient missed any doses of Coumadin, Warfarin, Jantoven in the past 7 days? No    Has the patients medications changed since the last visit? No    Has the patient experienced any bleeding recently? No    Has the patient experienced any injuries or illness recently? No    Has the patient experienced any 'new' shortness of breath, severe headaches, or changes in vision recently? No    Has the patient had any changes in their diet, or alcohol consumption? No    Is the patient here today to prepare for any type of upcoming surgery, procedure, or for a cardioversion procedure? No    What phone number can we reach the patient at today? Please contact Neelima with [facility] at [phone].

## 2021-06-08 NOTE — TELEPHONE ENCOUNTER
ANTICOAGULATION  MANAGEMENT- Home Care/Care Facility Result    Assessment     Today's INR result of 1.7 is Subtherapeutic (goal INR of 2.0-3.0)        Warfarin taken as previously instructed    No new diet changes affecting INR    No new medication/supplements affecting INR    Continues to tolerate warfarin with no reported s/s of bleeding or thromboembolism     Previous INR was Therapeutic    Plan:     Spoke with home care nurse Renetta discussed INR result and instructed:     Warfarin Dosing Instructions: Change warfarin dose to 2 mg daily on Fri; and 2.5 mg daily rest of week  (6 % change)    Next INR to be drawn: 2 weeks.    Education provided: importance of taking warfarin as instructed    Renetta verbalizes understanding and agrees to warfarin dosing plan.   ?   Bari Grewal RN    Subjective/Objective:      Benny Carrillo, a 89 y.o. male is established on warfarin.     Home care/care facility RN's report of Benny INR, recent warfarin dosing, diet changes, medication changes, and symptoms is documented below.    Additional findings: verbally confirmed home dose with Renetta and updated on anticoagulation calendar    Anticoagulation Episode Summary     Current INR goal:   2.0-3.0   TTR:   78.0 % (11.9 mo)   Next INR check:   6/22/2020   INR from last check:   1.70! 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

## 2021-06-09 NOTE — TELEPHONE ENCOUNTER
ANTICOAGULATION  MANAGEMENT- Home Care/Care Facility Result    Assessment     Today's INR result of 1.8 is Subtherapeutic (goal INR of 2.0-3.0)        Less warfarin taken than instructed which may be affecting INR    No new diet changes affecting INR    No new medication/supplements affecting INR    Continues to tolerate warfarin with no reported s/s of bleeding or thromboembolism     Previous INR was Subtherapeutic    Plan:     Spoke with home care nurse Neelima discussed INR result and instructed:     Warfarin Dosing Instructions: Take booster dose of 3 mg today only then continue current warfarin dose 2 mg daily on Fri; and 2.5 mg daily rest of week.    Next INR to be drawn: 1 week.     Education provided: importance of taking warfarin as instructed    Neelima verbalizes understanding and agrees to warfarin dosing plan.   ?   Bari Grewal RN    Subjective/Objective:      Benny Carrillo, a 89 y.o. male is established on warfarin.     Home care/care facility RN's report of Benny INR, recent warfarin dosing, diet changes, medication changes, and symptoms is documented below.    Additional findings: verbally confirmed home dose with Neelima and updated on anticoagulation calendar    Anticoagulation Episode Summary     Current INR goal:   2.0-3.0   TTR:   75.1 % (11.9 mo)   Next INR check:   6/29/2020   INR from last check:   1.80! 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

## 2021-06-09 NOTE — PROGRESS NOTES
ID:   Spoke to: Steven, patient's son/Legal Guardian     During Care Conferences, Dr. Castillo gave permission to graduate patient from [facility]    Patient is doing well and there are no new needs  Discussed graduating patient  Steven was in agreement    Reviewed the Care Team in patient's chart to ensure it is accurate  Explained I will mail the Wellness Plan and he should receive it in about 2 weeks  He understands he can call me with any questions  If in the future patient needs a Care Guide again, Steven understands he can ask Dr. Castillo and the patient can be enrolled again  _______________  Graduated  Preferred Phone: [phone]  Preferred : Steven Carrillo (Son/Legal Guardian)    Preferred : Antonette Pillai    Chronic Medical Diagnosis:  Constipation  Small Bowel Obstruction  Elevated Digoxin Level  Bradycardia  Urinary Obstruction  Atrial Fibrillation  Generalized Muscle Weakness  Underweight    Transportation:  Insurance Company: [employer]#: [id number]  Transportation Scheduling: [employer]  Phone: [phone]  Son, Steven, provides transportation with his own car.  Winter months are more difficult.    Financial:  Social Security Income: $550/month    Social Support:  Son: Steven Carrillo  Grandson: [facility]    Interpersonal:  Culturally     Household:  Self  Son: Steven Carrillo  Granddaughter (Steven's daughter)   Wife: Belkys Carrillo    Medications managed by family    Preventative Measures:  Medical Insurance Provider: [employer]  PMI#: [id number]    Oriental orthodox/Spiritual Belief System: Amish    Safety:  4 Wheel Walker  Wheel Chair  2 Person Transfers/ambulation assistance    Current Services/Support:  PCA Agency: [email or web address]  Phone: [phone]  10 hours PCA services per day; 2 PCAs do 5 hours a day each  Skilled Nursing: [facility] RN Case Manager: Monet Talley 1 visit every month for SP cath  change/recertification  [facility] Care Coordinator: Kelly Schoenecker

## 2021-06-09 NOTE — TELEPHONE ENCOUNTER
ANTICOAGULATION  MANAGEMENT- Home Care/Care Facility Result    Assessment     Today's INR result of 2.0 is Therapeutic (goal INR of 2.0-3.0)        Warfarin taken as previously instructed    No new diet changes affecting INR    No new medication/supplements affecting INR    Continues to tolerate warfarin with no reported s/s of bleeding or thromboembolism     Previous INR was Subtherapeutic    Plan:     Spoke with home care nurse Neelima discussed INR result and instructed:     Warfarin Dosing Instructions: Change warfarin dose to 2.5 mg daily (same amount pt took the past 7 days)    Next INR to be drawn: 1 week.    Education provided: importance of taking warfarin as instructed    Neelima verbalizes understanding and agrees to warfarin dosing plan.   ?   Bari Grewal RN    Subjective/Objective:      Benny Carrillo, a 89 y.o. male is established on warfarin.     Home care/care facility RN's report of Benny INR, recent warfarin dosing, diet changes, medication changes, and symptoms is documented below.    Additional findings: verbally confirmed home dose with Neelima and updated on anticoagulation calendar    Anticoagulation Episode Summary     Current INR goal:   2.0-3.0   TTR:   73.2 % (11.9 mo)   Next INR check:   7/6/2020   INR from last check:   2.00 (6/29/2020)   Weekly max warfarin dose:      Target end date:   Indefinite   INR check location:      Preferred lab:      Send INR reminders to:   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

## 2021-06-09 NOTE — TELEPHONE ENCOUNTER
ANTICOAGULATION  MANAGEMENT- Home Care/Care Facility Result    Assessment     Today's INR result of 2.2 is Therapeutic (goal INR of 2.0-3.0)        Warfarin taken as previously instructed    No new diet changes affecting INR    No new medication/supplements affecting INR    Continues to tolerate warfarin with no reported s/s of bleeding or thromboembolism     Previous INR was Therapeutic    Plan:     Spoke with home care nurse Neelima discussed INR result and instructed:     Warfarin Dosing Instructions: Continue current warfarin dose 2.5 mg daily.    Next INR to be drawn: 2 weeks.     Education provided: importance of taking warfarin as instructed    Neelima verbalizes understanding and agrees to warfarin dosing plan.   ?   Bari Grewal RN    Subjective/Objective:      Benny Carrillo, a 89 y.o. male is established on warfarin.     Home care/care facility RN's report of Benny INR, recent warfarin dosing, diet changes, medication changes, and symptoms is documented below.    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

## 2021-06-09 NOTE — TELEPHONE ENCOUNTER
Please contact this patient's son.    Patient is due for follow-up virtual visit with me.  Video preferred.    Please schedule    Let him know I did refill the vitamins

## 2021-06-09 NOTE — TELEPHONE ENCOUNTER
INR result is   2.0    Will the patient be seen, or did they already see, MD or CNP today? No    Most Recent Warfarin dose day/week  Sunday Monday Tuesday Wednesday Thursday Friday Saturday    3 2.5 2.5 2.5 2 2.5     Sunday Monday Tuesday Wednesday Thursday Friday Saturday   2.5             Has the patient missed any doses of Coumadin, Warfarin, Jantoven in the past 7 days? No    Has the patients medications changed since the last visit? No    Has the patient experienced any bleeding recently? No    Has the patient experienced any injuries or illness recently? No    Has the patient experienced any 'new' shortness of breath, severe headaches, or changes in vision recently? No    Has the patient had any changes in their diet, or alcohol consumption? No    Is the patient here today to prepare for any type of upcoming surgery, procedure, or for a cardioversion procedure? No    What phone number can we reach the patient at today? [phone]

## 2021-06-09 NOTE — TELEPHONE ENCOUNTER
INR result is 1.9  INR   Date Value Ref Range Status   07/06/2020 2.20 (!) 0.9 - 1.1 Final       Will the patient be seen, or did they already see, MD or CNP today? No    Most Recent Warfarin dose day/week  Sunday Monday Tuesday Wednesday Thursday Friday Saturday    2.5 2.5 2.5 2.5 2.5 2.5     Sunday Monday Tuesday Wednesday Thursday Friday Saturday   2.5             Has the patient missed any doses of Coumadin, Warfarin, Jantoven in the past 7 days? No    Has the patients medications changed since the last visit? No    Has the patient experienced any bleeding recently? No    Has the patient experienced any injuries or illness recently? No    Has the patient experienced any 'new' shortness of breath, severe headaches, or changes in vision recently? No    Has the patient had any changes in their diet, or alcohol consumption? No    Is the patient here today to prepare for any type of upcoming surgery, procedure, or for a cardioversion procedure? No    What phone number can we reach the patient at today? Neelima

## 2021-06-09 NOTE — TELEPHONE ENCOUNTER
ANTICOAGULATION  MANAGEMENT- Home Care/Care Facility Result    Assessment     Today's INR result of 1.9 is Subtherapeutic (goal INR of 2.0-3.0)        Warfarin taken as previously instructed    No new diet changes affecting INR    No new medication/supplements affecting INR    Continues to tolerate warfarin with no reported s/s of bleeding or thromboembolism     Previous INR was Therapeutic    Plan:     Spoke with home care nurse Neelima discussed INR result and instructed:     Warfarin Dosing Instructions: Change warfarin dose to 3 mg daily on Mon, Fri; and 2.5 mg daily rest of week  (5.7 % change)    Next INR to be drawn: 2 weeks.    Education provided: importance of taking warfarin as instructed    Neelima verbalizes understanding and agrees to warfarin dosing plan.   ?   Bari Grewal RN    Subjective/Objective:      Benny Carrillo, a 89 y.o. male is established on warfarin.     Home care/care facility RN's report of Benny INR, recent warfarin dosing, diet changes, medication changes, and symptoms is documented below.    Additional findings: verbally confirmed home dose with Neelima and updated on anticoagulation calendar    Anticoagulation Episode Summary     Current INR goal:   2.0-3.0   TTR:   72.6 % (11.9 mo)   Next INR check:   8/3/2020   INR from last check:   1.90! 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

## 2021-06-09 NOTE — TELEPHONE ENCOUNTER
INR result is 1.8  INR   Date Value Ref Range Status   06/08/2020 1.70 (!) 0.9 - 1.1 Final       Will the patient be seen, or did they already see, MD or CNP today? No    Most Recent Warfarin dose day/week  Sunday Monday Tuesday Wednesday Thursday Friday Saturday    2.5 2 2 2 2.5 2     Sunday Monday Tuesday Wednesday Thursday Friday Saturday   2             Has the patient missed any doses of Coumadin, Warfarin, Jantoven in the past 7 days? No    Has the patients medications changed since the last visit? No    Has the patient experienced any bleeding recently? No    Has the patient experienced any injuries or illness recently? No    Has the patient experienced any 'new' shortness of breath, severe headaches, or changes in vision recently? No    Has the patient had any changes in their diet, or alcohol consumption? No    Is the patient here today to prepare for any type of upcoming surgery, procedure, or for a cardioversion procedure? No    What phone number can we reach the patient at today? Please contact Neelima with [facility] [employer]  at [phone]

## 2021-06-09 NOTE — TELEPHONE ENCOUNTER
INR result is 2.2  INR   Date Value Ref Range Status   06/29/2020 2.00 (!) 0.9 - 1.1 Final       Will the patient be seen, or did they already see, MD or CNP today? No    Most Recent Warfarin dose day/week  Sunday Monday Tuesday Wednesday Thursday Friday Saturday    2.5 2.5 2.5 2.5 2.5 2.5     Sunday Monday Tuesday Wednesday Thursday Friday Saturday   2.5             Has the patient missed any doses of Coumadin, Warfarin, Jantoven in the past 7 days? No    Has the patients medications changed since the last visit? No    Has the patient experienced any bleeding recently? No    Has the patient experienced any injuries or illness recently? No    Has the patient experienced any 'new' shortness of breath, severe headaches, or changes in vision recently? No    Has the patient had any changes in their diet, or alcohol consumption? No    Is the patient here today to prepare for any type of upcoming surgery, procedure, or for a cardioversion procedure? No    What phone number can we reach the patient at today? Neelima [phone]

## 2021-06-09 NOTE — PROGRESS NOTES
"Benny Carrillo is a 89 y.o. male who is being evaluated via a billable telephone visit.      The patient has been notified of following:     \"This telephone visit will be conducted via a call between you and your physician/provider. We have found that certain health care needs can be provided without the need for a physical exam.  This service lets us provide the care you need with a short phone conversation.  If a prescription is necessary we can send it directly to your pharmacy.  If lab work is needed we can place an order for that and you can then stop by our lab to have the test done at a later time.    Telephone visits are billed at different rates depending on your insurance coverage. During this emergency period, for some insurers they may be billed the same as an in-person visit.  Please reach out to your insurance provider with any questions.    If during the course of the call the physician/provider feels a telephone visit is not appropriate, you will not be charged for this service.\"    Patient has given verbal consent to a Telephone visit? Yes    What phone number would you like to be contacted at? [phone]    Patient would like to receive their AVS by AVS Preference: Mail a copy.    Additional provider notes:       Subjective: This patient had a virtual visit, video, due to the coronavirus pandemic.    This patient has ongoing atrial fibrillation he has had previous CVA, continues on warfarin.  INR yesterday was 1.9    Blood pressure has been controlled yesterday at 118/70.    Patient continues on his digoxin amlodipine loratadine albuterol as well as the warfarin.    His weight is dropped a little further is down to 117 pounds previously 121.  We discussed increasing protein in the diet including protein shakes such as Ensure or boost.    Patient has no COVID-19 symptoms or exposures.  He has some weakness and get short of breath with increased activity but there is been no shortness of breath " with laying flat and he does not have any peripheral edema.    We will have him come in for labs including CBC CMP digoxin and TSH.    No new focal weakness.    No fever chills cough congestion    Tobacco status: He  reports that he has never smoked. He has never used smokeless tobacco.    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     Acute pulmonary edema (H)      Acute pyelonephritis 02/10/2018     Urinary tract infection associated with cystostomy catheter, initial encounter (H) 02/10/2018     Sepsis secondary to UTI (H) 02/10/2018     Moderate malnutrition (H)      History of ischemic cerebrovascular accident (CVA) with residual deficit 11/20/2017     Accelerated hypertension      Chronic retention of urine      Acute nonintractable headache, unspecified headache type      Acute cerebral infarction (H) 11/14/2017     Chest pain 09/14/2016     Gastroesophageal reflux disease without esophagitis 09/14/2016     Suprapubic catheter (H) 09/14/2016     Leukocytosis, unspecified type 09/14/2016     Heartburn 09/14/2016     Underweight due to inadequate caloric intake 07/28/2015     Chronic atrial fibrillation      Muscle Weakness Generalized        Current Outpatient Medications   Medication Sig Dispense Refill     acetaminophen (TYLENOL) 500 MG tablet 2 po three times a day prn pain 100 tablet 2     albuterol (PROAIR HFA;PROVENTIL HFA;VENTOLIN HFA) 90  mcg/actuation inhaler INHALE 2 PUFFS BY MOUTH EVERY 6 HOURS AS NEEDED FOR WHEEZING 1 each 3     amLODIPine (NORVASC) 2.5 MG tablet Take 1 tablet (2.5 mg total) by mouth daily. 30 tablet 5     artificial tears, hypromellose, (GONIOVISC) 2.5 % ophthalmic solution 2 gtts to each eye two times a day prn dry eyes 15 mL 6     ARTIFICIAL TEARS, POLYVIN ALC, 1.4 % ophthalmic solution INSTILL 2 DROPS INTO EACH EYE TWICE DAILY AS NEEDED DRY EYES 15 mL 6     benzonatate (TESSALON) 200 MG capsule Take 1 capsule (200 mg total) by mouth 3 (three) times a day as needed for cough. 20 capsule 0     catheter 14 Fr Misc Suprapubic catheter. 14 French. 1 each 0     digoxin (LANOXIN) 125 mcg (0.125 mg) tablet Take 1 tablet (125 mcg total) by mouth daily. 30 tablet 5     guaiFENesin (ROBAFEN) 100 mg/5 mL syrup TAKE  10 ML BY MOUTH THREE TIMES DAILY AS NEEDED FOR COUGH 240 mL 0     hydrocortisone 2.5 % cream APPLY  CREAM TO AFFECTED AREA ONCE TO TWICE DAILY AS NEEDED 28 g 2     loratadine (ALLERGY RELIEF, LORATADINE,) 10 mg tablet Take 1 tablet (10 mg total) by mouth daily. 30 tablet 5     THERA-M 9 mg iron-400 mcg Tab tablet Take 1 tablet by mouth once daily 30 tablet 0     warfarin ANTICOAGULANT (COUMADIN/JANTOVEN) 1 MG tablet Take as directed with the 2.5 mg dose. Adjust dose based on INR results as directed. 90 tablet 1     warfarin ANTICOAGULANT (COUMADIN/JANTOVEN) 2.5 MG tablet Take 1 tablet (2.5 mg total) by mouth See Admin Instructions. Take 2.5 mg daily or per inr results 90 tablet 1     No current facility-administered medications for this visit.        ROS:   10 point review of systems positive as discussed above otherwise negative    Objective:    /70   There is no height or weight on file to calculate BMI.      This was a telephone visit there is no visual exam    No new focal weakness    Lungs: Nonlabored breathing at rest.  Occasional wheezing with activity    Heart: No palpitations or rapid heartbeat.  Has history of  atrial fibrillation.    Lower extremities without edema    Skin without open areas by history    No abdominal pain.    No joint redness warmth or swelling.    Patient will come in for labs CBC CMP digoxin and TSH    INR yesterday was 1.9        Results for orders placed or performed in visit on 07/20/20   INR   Result Value Ref Range    INR 1.90 (!) 0.9 - 1.1       Assessment:  1. Benign essential hypertension  Comprehensive Metabolic Panel   2. Moderate protein-calorie malnutrition (H)  Comprehensive Metabolic Panel    Thyroid Stimulating Hormone (TSH)   3. Renal infarct (H)     4. Chronic obstructive pulmonary disease, unspecified COPD type (H)     5. Atrial fibrillation, unspecified type (H)  Digoxin (Lanoxin )    HM2(CBC w/o Differential)   6. History of CVA (cerebrovascular accident)     7. Accelerated hypertension  amLODIPine (NORVASC) 2.5 MG tablet   8. Chronic atrial fibrillation (H)  digoxin (LANOXIN) 125 mcg (0.125 mg) tablet    warfarin ANTICOAGULANT (COUMADIN/JANTOVEN) 2.5 MG tablet    warfarin ANTICOAGULANT (COUMADIN/JANTOVEN) 1 MG tablet   9. Environmental allergies  albuterol (PROAIR HFA;PROVENTIL HFA;VENTOLIN HFA) 90 mcg/actuation inhaler    loratadine (ALLERGY RELIEF, LORATADINE,) 10 mg tablet     Atrial fibrillation with previous CVA continue on warfarin    Hypertension has been controlled per home nurses notes.    Protein calorie malnutrition, had Ensure or boost.    COPD continue as needed albuterol    Refill on loratadine also for allergies.    Await lab work and contact patient's son.    Plan: Call with results and discuss follow-up    This transcription uses voice recognition software, which may contain typographical errors.      Phone call duration: 21 minutes, from 9:04 AM through 9:25 AM    Alcides Castillo MD

## 2021-06-10 NOTE — PROGRESS NOTES
My Clinic Care Coordination Wellness Plan  This Graduation Wellness Plan provides private information in regards to the work I have done with my Care Team from my Primary Care Clinic.  This document provides insight on the goals I have accomplished.  My Care Team congratulates me on my journey to maintain wellness.  This document will help guide me on my journey to maintain a healthy lifestyle.  I will use this to help me overcome any barriers I may encounter.  If I should have any questions or concerns, I will continue to contact the members of my Care Team or contact my Primary Care Clinic [place] [address]  [phone]    My Main : Steven Carrillo (Son/Legal Guardian)  My Preferred Method of Contact:  Phone: [phone]    My Primary/Preferred Language:  Arabic  :  Uriah Mcdonough  Contact Information:  [phone]  Agency: [employer]    Preferred Learning Style:  Face to face discussion, Pictures/Diagrams and Hands on teaching    Emergency Contact: Extended Emergency Contact Information  Primary Emergency Contact: Steven Carrillo  Address: [address] [place] [address] [address]           [place], [place] [address] [place] [address]  Home Phone: [phone]  Relation: Legal Guardian  Secondary Emergency Contact: [facility]  Home Phone: [phone]  Relation: Grandchild     PCP:  Alcides Castillo MD  Specialists:    Care Team            Alcides Castillo MD PCP - General, Family Medicine    [phone]     Monet Talley, RN Registered Nurse, Home Health Services    [facility] RN Case Manager; SNV once a month    Robin De La Vega MD Physician, Urology    [phone]     [facility]; [address] [place] [address]; АНДРЕЙ signed 6/30/16    Steven Carrillo, Legal Guardian     [phone]     Son/Emergency Contact    [employer]     [phone]     Supplies  provided: Semi Electric Hospital Bed; Urinary leg bag w/valve 32oz; Non-sterile gloves    [employer]. Patient Care Assistant, [facility]    [phone]     PCA  Agency; 10 hours/day; 2 PCAs 5 hours each; АНДРЕЙ signed 6/30/16    [facility]     [phone]     Grandson/Emergency Contact; Patient/Family Contact Form signed 6/30/16    Alissa Carrillo Patient Care Assistant    [phone]     PCA through [employer] (PCA Agency); Provides 5 hrs/day of services; АНДРЕЙ signed 6/30/16    Alana Carrillo Patient Care Assistant    [phone]     PCA through [employer] (PCA Agency); Provides 5 hrs/day of services; АНДРЕЙ signed 6/30/16    Izzy RN Registered Nurse, [facility]    [phone]     Supervising RN for PCAs from [employer] (PCA Agency); АНДРЕЙ signed 6/30/16    Antonette Pillai     [phone]     Preferred     Kelly Schoenecker, [facility] MSC[facility] Care Coordinator     [phone]     [facility]/MSC[facility] Care Coordination        Accomplishments:  Goals       COMPLETED: I have transportation to and from my medical appointments. (pt-stated)            Personal Plan:  My son, Steven, will continue to drive me to my appointments.  If my family needs to schedule transportation through my [facility] insurance we will call [facility] [employer] at [phone]  My insurance ID number is: [id number]        COMPLETED: I was able to learn what the [facility]  agency RN is going to be doing versus the  Home Care RN. (pt-stated)            Personal Plan:  My family and I will continue to work with the [facility] RN for skilled nursing visits.  If we have any questions in regards to what she is doing we will either ask her or contact [facility] at [phone].  We will continue to meet with the RN that verifies my needs are getting met through my PCA services.  The PCA agency is [employer] and their phone number is [phone].  My family will  contact the agency the nurse works for if we every have any questions as to what she is doing or what her role is.        COMPLETED: I was able to obtain a hospital bed. (pt-stated)            Personal Plan:  I received a hospital bed through [employer].  If my family has any new questions around the bed they will contact [employer] at [phone].            Advanced Directive/Living Will: The patient was given information regarding Adanced Directives/Living Will    Clinical Emergency Plans:  Atrial Fibrillation  You have been diagnosed with an abnormal heart rhythm called atrial fibrillation. With this condition, your heart s 2 upper chambers quiver rather than squeeze the blood out in a normal pattern. This leads to an irregular and sometimes rapid heartbeat. Some people will develop associated symptoms such as a flip-flopping heartbeat, chest pain, lightheadedness, or shortness of breath. Other people may have no symptoms at all. Atrial fibrillation is serious because it affects the heart s ability to fill with blood as it should. Blood clots may form. This increases the risk for stroke. Untreated atrial fibrillation can also lead to heart failure. Atrial fibrillation can be controlled. With treatment, most people with atrial fibrillation lead normal lives.     Home care    Take your medicines exactly as directed. Don t skip doses.    Work with your doctor to find the right medicines and doses for you.    Learn to take your own pulse. Keep a record of your results. Ask your doctor which pulse rates mean that you need medical attention. Slowing your pulse is often the goal of treatment. Ask your doctor if it s OK for you to use an automatic machine to check your pulse at home. Sometimes these machines don t count the pulse correctly when you have atrial fibrillation.    Limit your intake of coffee, tea, cola, and other beverages with caffeine. Talk with your doctor about whether you should eliminate  caffeine.    Avoid over-the-counter medicines that have caffeine in them.    Let your doctor know what medicines you take, including prescription and over-the-counter medicines, as well as any supplements. They interfere with some medicines given for atrial fibrillation.    Ask your doctor about whether you can drink alcohol. Some people need to avoid alcohol to better treat atrial fibrillation. If you are taking blood-thinner medicines, alcohol may interfere with them by increasing their effect.    Never take stimulants such as amphetamines or cocaine. These drugs can speed up your heart rate and trigger atrial fibrillation.     Follow-up care  Follow up with your doctor or as advised.   Follow up with your doctor, or as advised.     When should Icall my healthcare provider  Call your healthcare provider right away if you have any of the following:    Weakness    Dizziness    Fainting    Fatigue    Shortness of breath    Chest pain with increased activity    A change in the usual regularity of your heartbeat, or an unusually fast heartbeat          Emergency Plan Recommendation:  When to Use the Emergency Department (ED)  An emergency means you could die if you don t get care quickly. Or you could be hurt permanently (disabled). Read below to know when to use -- and when not to use -- an emergency department (also called ED).     Dangers to your life  Here are examples of emergencies. These need immediate care:  A hard time breathing  Severe chest pain  Choking  Severe bleeding  Suddenly not able to move or speak  Blacking out (fainting)`  Poisoning     Dangers of permanent injuries  Here are other emergencies. These also need immediate care:  Deep cuts or severe burns  Broken bones, or sudden severe pain and swelling in a joint     When it s an emergency  If you have an emergency, follow these steps:     1. Go to the nearest emergency department  If you can, go to the hospital ED closest to you right away.  If you  cannot get there right away, or if it is not safe to take yourself, call 911 or your police emergency number.  2. Call your primary care doctor  Tell your doctor about the emergency. Call within 24 hours of going to the ED.  If you cannot call, have someone call for you.  Go to your doctor (not the ED) for any follow-up care.     When it s not an emergency  If a problem is not an emergency, follow these steps:     1. Call your primary care doctor  If you don t know the name of your doctor, call your health plan.  If you cannot call, have someone call for you.  2. Follow instructions  Your doctor will tell you what you should do.  You may be told to see your doctor right away. You may be told to go to the ED. Or you may be told to go to an urgent care center.  Follow your doctor s advice.    All [facility] clinic patients have access to a Nurse 24 hours a day, 7 days a week.  If you have questions or want advice from a Nurse, please know [facility] is here for you.  You can call your clinic, [phone], and they will connect you or you can call [facility] at [phone].  [facility] also has Walk In Care clinics in multiple locations.  Call the number listed above for more information about our Walk In Care clinics or visit the [facility] website at [email or web address].

## 2021-06-10 NOTE — TELEPHONE ENCOUNTER
ANTICOAGULATION  MANAGEMENT- Home Care/Care Facility Result    Assessment     Today's INR result of 2.2 is Therapeutic (goal INR of 2.0-3.0)        Warfarin taken as previously instructed    No new diet changes affecting INR    No new medication/supplements affecting INR    Continues to tolerate warfarin with no reported s/s of bleeding or thromboembolism     Previous INR was Therapeutic    Plan:     Spoke with home care nurse Neelima discussed INR result and instructed:     Warfarin Dosing Instructions: Continue current warfarin dose    3 mg every Mon, Fri; 2.5 mg all other days         Next INR to be drawn: 4 weeks.     Education provided: importance of following up for INR monitoring at instructed interval and importance of taking warfarin as instructed    Neelima verbalizes understanding and agrees to warfarin dosing plan.   ?   Bari Grewal RN    Subjective/Objective:      Benny Carrillo, a 89 y.o. male is established on warfarin.     Home care/care facility RN's report of Benny INR, recent warfarin dosing, diet changes, medication changes, and symptoms is documented below.    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

## 2021-06-10 NOTE — PROGRESS NOTES
Subjective: This patient comes in for evaluation.  His wife  about 6 weeks ago he has had some increased grief poor sleep.  He has not been eating very well lately although his weight is up a little    We discussed options elected to put on mirtazapine 15 mg at bedtime recheck in 3 weeks    I do think he would benefit from some increased PCA hours as he has had depression anorexia and requires more help with ADLs.  I did fill out a note regarding that will await their recommendation    Tobacco status: He  reports that he has never smoked. He has never used smokeless tobacco.    Patient Active Problem List    Diagnosis Date Noted     Chest pain 2016     Gastroesophageal reflux disease without esophagitis 2016     Suprapubic catheter 2016     Leukocytosis 2016     Heartburn 2016     Underweight due to inadequate caloric intake 2015     Chronic atrial fibrillation      Muscle Weakness Generalized        Current Outpatient Prescriptions   Medication Sig Dispense Refill     acetaminophen (TYLENOL) 500 MG tablet Take 1 tablet (500 mg total) by mouth every 6 (six) hours as needed for pain. 30 tablet 5     albuterol (VENTOLIN HFA) 90 mcg/actuation inhaler Inhale 1 puff every 6 (six) hours as needed for wheezing. 1 Inhaler 5     aspirin 81 mg chewable tablet Chew 1 tablet (81 mg total) daily. 90 tablet 1     digoxin (LANOXIN) 125 mcg tablet Take 1 tablet (125 mcg total) by mouth daily. 90 tablet 1     finasteride (PROSCAR) 5 mg tablet Take 1 tablet (5 mg total) by mouth daily. 30 tablet 6     lidocaine (XYLOCAINE) 5 % ointment Use with changing of urinary catheter 35.44 g 2     loratadine (ALLERGY RELIEF, LORATADINE,) 10 mg tablet TAKE ONE TABLET BY MOUTH ONCE DAILY 30 tablet 5     multivitamin (DAILY-JAMISON) per tablet TAKE ONE TABLET BY MOUTH ONCE DAILY 30 tablet 5     oxybutynin (DITROPAN XL) 10 MG ER tablet TAKE ONE TABLET BY MOUTH ONCE DAILY 90 tablet 0     traMADol (ULTRAM) 50 mg  "tablet TAKE ONE TABLET BY MOUTH TWICE DAILY AS NEEDED FOR PAIN 60 tablet 0     mirtazapine (REMERON) 15 MG tablet Take 1 tablet (15 mg total) by mouth at bedtime. 30 tablet 2     No current facility-administered medications for this visit.        ROS: 10 point review of systems positive as outlined above otherwise negative    Objective:    /88 (Patient Site: Right Arm, Patient Position: Sitting, Cuff Size: Adult Regular)  Pulse 72  Temp 97.9  F (36.6  C) (Oral)   Resp 16  Ht 5' 6\" (1.676 m)  Wt 113 lb (51.3 kg)  BMI 18.24 kg/m2  Body mass index is 18.24 kg/(m^2).      Back in April labs were stable please see below    Weight is at 113 HEENT neck supple without adenopathy    Lungs clear no rales or rhonchi heart was irregularly irregular, rate in the 70s    Abdomen soft nontender, no masses    Lanoxin level was therapeutic at 1.2    Extremities without edema.    Skin was normal    He said some generalized weakness no focal.        Results for orders placed or performed in visit on 04/19/17   Digoxin (Lanoxin )   Result Value Ref Range    Digoxin 1.2 0.5 - 2.0 ng/mL   Comprehensive Metabolic Panel   Result Value Ref Range    Sodium 135 (L) 136 - 145 mmol/L    Potassium 5.1 (H) 3.5 - 5.0 mmol/L    Chloride 102 98 - 107 mmol/L    CO2 25 22 - 31 mmol/L    Anion Gap, Calculation 8 5 - 18 mmol/L    Glucose 107 70 - 125 mg/dL    BUN 14 8 - 28 mg/dL    Creatinine 0.90 0.70 - 1.30 mg/dL    GFR MDRD Af Amer >60 >60 mL/min/1.73m2    GFR MDRD Non Af Amer >60 >60 mL/min/1.73m2    Bilirubin, Total 0.5 0.0 - 1.0 mg/dL    Calcium 8.0 (L) 8.5 - 10.5 mg/dL    Protein, Total 6.9 6.0 - 8.0 g/dL    Albumin 2.9 (L) 3.5 - 5.0 g/dL    Alkaline Phosphatase 87 45 - 120 U/L    AST 22 0 - 40 U/L    ALT 25 0 - 45 U/L   HM1 (CBC with Diff)   Result Value Ref Range    WBC 9.8 4.0 - 11.0 thou/uL    RBC 3.89 (L) 4.40 - 6.20 mill/uL    Hemoglobin 12.2 (L) 14.0 - 18.0 g/dL    Hematocrit 36.9 (L) 40.0 - 54.0 %    MCV 95 80 - 100 fL    MCH " 31.3 27.0 - 34.0 pg    MCHC 33.0 32.0 - 36.0 g/dL    RDW 11.3 11.0 - 14.5 %    Platelets 374 140 - 440 thou/uL    MPV 7.4 7.0 - 10.0 fL    Neutrophils % 73 (H) 50 - 70 %    Lymphocytes % 15 (L) 20 - 40 %    Monocytes % 10 2 - 10 %    Eosinophils % 2 0 - 6 %    Basophils % 1 0 - 2 %    Neutrophils Absolute 7.1 2.0 - 7.7 thou/uL    Lymphocytes Absolute 1.5 0.8 - 4.4 thou/uL    Monocytes Absolute 1.0 (H) 0.0 - 0.9 thou/uL    Eosinophils Absolute 0.2 0.0 - 0.4 thou/uL    Basophils Absolute 0.1 0.0 - 0.2 thou/uL       Assessment:  1. Grief  mirtazapine (REMERON) 15 MG tablet   2. Muscle Weakness Generalized  acetaminophen (TYLENOL) 500 MG tablet   3. Neck pain  traMADol (ULTRAM) 50 mg tablet   4. Insomnia  mirtazapine (REMERON) 15 MG tablet     Start mirtazapine at bedtime continue Tylenol as needed he has used tramadol as well    See how he does.    Monitor weight    Recheck in 3 weeks    Plan: As outlined above    This transcription uses voice recognition software, which may contain typographical errors.

## 2021-06-10 NOTE — PROGRESS NOTES
Subjective: This patient comes in for follow-up on a number of medical issues.  Patient needed refill on a 2.5% hydrocortisone cream he uses it around the penis he gets inflammation there which has worked well he has been on it for a number of years.  He has a suprapubic catheter so he does not urinate from the penis.    Patient had labs on 4/19/2017 I reviewed them with the patient and the son dig level was 1.2 CMP was normal.  He has a history of atrial fibrillation he is on Lanoxin.  Rate is controlled.  He is not on blood thinners.    Patient was on Z-Michi for bronchitis, 2 treatments he is doing better now.    Otherwise denies additional issues or problems.    Med reconciliation was done.  He did need a refill on tramadol and his multivitamin and Lanoxin.    Tobacco status: He  reports that he has never smoked. He has never used smokeless tobacco.    Patient Active Problem List    Diagnosis Date Noted     Chest pain 09/14/2016     Gastroesophageal reflux disease without esophagitis 09/14/2016     Suprapubic catheter 09/14/2016     Leukocytosis 09/14/2016     Heartburn 09/14/2016     Underweight due to inadequate caloric intake 07/28/2015     Chronic atrial fibrillation      Muscle Weakness Generalized        Current Outpatient Prescriptions   Medication Sig Dispense Refill     acetaminophen (TYLENOL) 500 MG tablet Take 1 tablet (500 mg total) by mouth every 6 (six) hours as needed for pain. 30 tablet 5     albuterol (VENTOLIN HFA) 90 mcg/actuation inhaler Inhale 1 puff every 6 (six) hours as needed for wheezing. 1 Inhaler 5     aspirin 81 mg chewable tablet Chew 1 tablet (81 mg total) daily. 90 tablet 1     digoxin (LANOXIN) 125 mcg tablet Take 1 tablet (125 mcg total) by mouth daily. 90 tablet 1     lidocaine (XYLOCAINE) 5 % ointment Use with changing of urinary catheter 35.44 g 2     loratadine (ALLERGY RELIEF, LORATADINE,) 10 mg tablet TAKE ONE TABLET BY MOUTH ONCE DAILY 30 tablet 5     multivitamin  (DAILY-JAMISON) per tablet TAKE ONE TABLET BY MOUTH ONCE DAILY 30 tablet 5     oxybutynin (DITROPAN XL) 10 MG ER tablet TAKE ONE TABLET BY MOUTH ONCE DAILY 90 tablet 0     traMADol (ULTRAM) 50 mg tablet TAKE ONE TABLET BY MOUTH TWICE DAILY AS NEEDED FOR PAIN 60 tablet 0     finasteride (PROSCAR) 5 mg tablet Take 1 tablet (5 mg total) by mouth daily. 30 tablet 6     hydrocortisone (ANUSOL-HC) 2.5 % rectal cream Apply to affected area bid 60 g 2     No current facility-administered medications for this visit.        ROS:   10 point review of systems positive as outlined otherwise negative    Objective:    /80 (Patient Site: Right Arm, Patient Position: Sitting, Cuff Size: Adult Regular)  Pulse 60  Resp 12  Wt 111 lb (50.3 kg)  BMI 17.92 kg/m2  Body mass index is 17.92 kg/(m^2).      General appearance no acute distress weight 111  HEENT: Neck is supple oropharynx was clear nose without congestion.  Vital signs with a pulse at 60 irregularly irregular    Lungs sounded clear throughout no rales rhonchi heart was irregularly irregular as discussed    Abdomen soft nontender no epigastric pain.  He has a suprapubic catheter.    Extremities without edema skin was normal and lower extremities.    Reviewed from 4/19/2017 outlined below.    Results for orders placed or performed in visit on 04/19/17   Digoxin (Lanoxin )   Result Value Ref Range    Digoxin 1.2 0.5 - 2.0 ng/mL   Comprehensive Metabolic Panel   Result Value Ref Range    Sodium 135 (L) 136 - 145 mmol/L    Potassium 5.1 (H) 3.5 - 5.0 mmol/L    Chloride 102 98 - 107 mmol/L    CO2 25 22 - 31 mmol/L    Anion Gap, Calculation 8 5 - 18 mmol/L    Glucose 107 70 - 125 mg/dL    BUN 14 8 - 28 mg/dL    Creatinine 0.90 0.70 - 1.30 mg/dL    GFR MDRD Af Amer >60 >60 mL/min/1.73m2    GFR MDRD Non Af Amer >60 >60 mL/min/1.73m2    Bilirubin, Total 0.5 0.0 - 1.0 mg/dL    Calcium 8.0 (L) 8.5 - 10.5 mg/dL    Protein, Total 6.9 6.0 - 8.0 g/dL    Albumin 2.9 (L) 3.5 - 5.0 g/dL     Alkaline Phosphatase 87 45 - 120 U/L    AST 22 0 - 40 U/L    ALT 25 0 - 45 U/L   HM1 (CBC with Diff)   Result Value Ref Range    WBC 9.8 4.0 - 11.0 thou/uL    RBC 3.89 (L) 4.40 - 6.20 mill/uL    Hemoglobin 12.2 (L) 14.0 - 18.0 g/dL    Hematocrit 36.9 (L) 40.0 - 54.0 %    MCV 95 80 - 100 fL    MCH 31.3 27.0 - 34.0 pg    MCHC 33.0 32.0 - 36.0 g/dL    RDW 11.3 11.0 - 14.5 %    Platelets 374 140 - 440 thou/uL    MPV 7.4 7.0 - 10.0 fL    Neutrophils % 73 (H) 50 - 70 %    Lymphocytes % 15 (L) 20 - 40 %    Monocytes % 10 2 - 10 %    Eosinophils % 2 0 - 6 %    Basophils % 1 0 - 2 %    Neutrophils Absolute 7.1 2.0 - 7.7 thou/uL    Lymphocytes Absolute 1.5 0.8 - 4.4 thou/uL    Monocytes Absolute 1.0 (H) 0.0 - 0.9 thou/uL    Eosinophils Absolute 0.2 0.0 - 0.4 thou/uL    Basophils Absolute 0.1 0.0 - 0.2 thou/uL       Assessment:  1. Penile inflammation  hydrocortisone (ANUSOL-HC) 2.5 % rectal cream   2. Suprapubic catheter     3. Neck pain  traMADol (ULTRAM) 50 mg tablet   4. Chronic atrial fibrillation  digoxin (LANOXIN) 125 mcg tablet   5. Underweight due to inadequate caloric intake  multivitamin (DAILY-JAMISON) per tablet     Refill on hydrocortisone cream    Suprapubic catheter changed by home nurse has been doing well    Tramadol for neck pain and musculoskeletal pain stable    Chronic atrial fibrillation rate controlled he is on Lanoxin    Underweight at 111 continue multivitamin encouraged to increase caloric intake as well.    Bronchitis resolved    Plan: As discussed above    This transcription uses voice recognition software, which may contain typographical errors.

## 2021-06-10 NOTE — TELEPHONE ENCOUNTER
ANTICOAGULATION  MANAGEMENT- Home Care/Care Facility Result    Assessment     Today's INR result of 2.20 is Therapeutic (goal INR of 2.0-3.0)        Warfarin taken as previously instructed    No new diet changes affecting INR    No new medication/supplements affecting INR    Continues to tolerate warfarin with no reported s/s of bleeding or thromboembolism     Previous INR was Subtherapeutic at 1.90 on 7/20/20.    Plan:     Spoke with ERAN Ortez with [facility] discussed INR result and instructed:     Warfarin Dosing Instructions:   - Continue current warfarin dose 3 mg daily on Mon/Fri; and 2.5 mg daily rest of week.    Next INR to be drawn:  2 wks.  Scheduled on 8/17/20.    Education provided: target INR goal and significance of current INR result    ERAN Ortez verbalizes understanding and agrees to warfarin dosing plan.   ?   Maribel Whitaker RN    Subjective/Objective:      Benny Carrillo, a 89 y.o. male is established on warfarin.     Home care/care facility RN's report of Benny INR, recent warfarin dosing, diet changes, medication changes, and symptoms is documented below.    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

## 2021-06-10 NOTE — TELEPHONE ENCOUNTER
INR result is 2.2  INR   Date Value Ref Range Status   07/20/2020 1.90 (!) 0.9 - 1.1 Final       Will the patient be seen, or did they already see, MD or CNP today? No    Most Recent Warfarin dose day/week  Sunday Monday Tuesday Wednesday Thursday Friday Saturday    3 2.5 2.5 2.5 3 2.5     Sunday Monday Tuesday Wednesday Thursday Friday Saturday   2.5             Has the patient missed any doses of Coumadin, Warfarin, Jantoven in the past 7 days? No    Has the patients medications changed since the last visit? No    Has the patient experienced any bleeding recently? No    Has the patient experienced any injuries or illness recently? No    Has the patient experienced any 'new' shortness of breath, severe headaches, or changes in vision recently? No    Has the patient had any changes in their diet, or alcohol consumption? No    Is the patient here today to prepare for any type of upcoming surgery, procedure, or for a cardioversion procedure? No    What phone number can we reach the patient at today? [facility]

## 2021-06-10 NOTE — TELEPHONE ENCOUNTER
----- Message from Alcides Castillo MD sent at 7/26/2020  1:59 PM CDT -----  Please contact this patient's son, let him know that the patient's labs look great that Lanoxin level was normal thyroid was normal    The liver kidney electrolytes were good, the hemoglobin and white count were normal    No change in medicines recheck in 3 to 4 months

## 2021-06-10 NOTE — PROGRESS NOTES
[facility] Office Visit    Chief Complaint:  Chief Complaint   Patient presents with     follow up from [facility]     still coughing-     would like a refill on azithromycin 250mg     REFILL ALL MEDICATIONS         Assessment/Plan:  1. Cough  Improving with azithromycin although not totally resolved.  White blood count elevation seen last week has improved.  Reasonable to do another course of azithromycin due to his age and fragile medical state to prevent worsening of his pulmonary status.  - HM1(CBC and Differential)  - HM1 (CBC with Diff)  - azithromycin (ZITHROMAX) 250 MG tablet; Take 1 daily  Dispense: 6 tablet; Refill: 0    2. Underweight due to inadequate caloric intake  Patient continues to lose weight she was already underweight.  Follow-up with his primary care provider in 2 weeks for reevaluation.  Check on drug levels as below including cutting out some medications that may no longer be indicated.  - Comprehensive Metabolic Panel  - multivitamin (DAILY-JAMISON) per tablet; TAKE ONE TABLET BY MOUTH ONCE DAILY  Dispense: 30 tablet; Refill: 5    3. Chronic atrial fibrillation  Rate controlled with digoxin and well-controlled we will recheck his level today and adjust meds as needed due to his weight loss.  Continue baby aspirin daily for anticoagulation.  - Digoxin (Lanoxin )  - aspirin 81 mg chewable tablet; Chew 1 tablet (81 mg total) daily.  Dispense: 90 tablet; Refill: 1  - digoxin (LANOXIN) 125 mcg tablet; Take 1 tablet (125 mcg total) by mouth daily.  Dispense: 90 tablet; Refill: 1    4. Environmental allergies  Continue minimal use of his H2 blocker careful about anticholinergic effects.  - loratadine (ALLERGY RELIEF, LORATADINE,) 10 mg tablet; TAKE ONE TABLET BY MOUTH ONCE DAILY  Dispense: 30 tablet; Refill: 5    5. Muscle Weakness Generalized  Would recommend use of Tylenol only for pain relief and this seems to be adequate.  - acetaminophen (TYLENOL) 500 MG tablet; Take 1 tablet  (500 mg total) by mouth every 6 (six) hours as needed for pain.  Dispense: 30 tablet; Refill: 5    6. Leukocytosis  Improved from last week with course of azithromycin.    Return in about 2 weeks (around 5/3/2017) for Recheck.    Patient Education/AVS:  Patient Instructions   Recommend stopping the oxybutynin and Proscar due to his age and the fact he has a suprapubic catheter      HPI:   Benny Carrillo is a 89 y.o. male c/o f/u on his cough.  Seen in [facility] on 4/13/17 and asked to f/u with PMD this week.  Cough is better with azithromycin but not gone- wonering if he can have a refill of this medication.  CxR was normal. Ongoing dry cough and throat irritation.  No pain other than neck.  Urine is healthy and normal- light, clear, no blood.  Did have a fever last week- tactile for 1-2 days but better now.  Doing about 50% better this week.  Has inhaler and has been using this 2-3x/day for cough.      Continues to loose weight, no constipation, no appetite,     Pt has a hard time traveling in the car and getting in to see MD regularly.  Would like refills on his allergy pill, pain pill for neck pain 1 pill once a day, mv, tylenol, digoxin, asa,     History summarized from1-2: reviewed note from [facility] last week and summarized above  Old Records-1:na  Radiology tests reviewed-1: CXR from last week shows no acute infiltrates- fibrosis, COPD, cardiomegaly, etc.    Lab tests reviewed-1: 2017  Medicine tests reviewed-1: na    Physical Exam:  /60 (Patient Site: Right Arm, Patient Position: Sitting, Cuff Size: Adult Regular)  Pulse 71  Temp 97.2  F (36.2  C) (Oral)   Wt (!) 99 lb (44.9 kg)  SpO2 97%  BMI 15.98 kg/m2 Body mass index is 15.98 kg/(m^2). No LMP for male patient.  Vital signs reviewed  Wt Readings from Last 3 Encounters:   04/19/17 (!) 99 lb (44.9 kg)   04/13/17 113 lb 11.2 oz (51.6 kg)   11/08/16 114 lb (51.7 kg)     History   Smoking Status     Never Smoker   Smokeless Tobacco     Never Used     History    Sexual Activity     Sexual activity: Not on file     No Data Recorded  No Data Recorded  PHQ-2 Total Score: 0 (6/30/2016 10:00 AM)  No Data Recorded    All normal as below except abnormalities include: Thin frail male sitting in a wheelchair he is accompanied by his son today  used for communication.  He has good eye contact.  Appears pleasant well engaged and asks appropriate questions.  He is soft spoken.  He is dressed in very neat clothing.    General is a  89 y.o. male sitting comfortably in no apparent distress.   HEENT:  TM are clear bilaterally.  Eye, nasal, oral exams within normal   Neck: Supple without lymphadenopathy or thyromegally  CV: Regular rate and rhythm S1S2 without rubs, murmurs or gallops,   Lungs: Clear to auscultation bilaterally  Extremities: Warm, No Edema, 2+ Pedal and radial pulses bilaterally  Skin: No lesions or rashes noted  Neuro/MSK: Weak and wheelchair bound.  4 out of 5 strength in his lower extremities 5 out of 5 strength in his upper extremities.    Results for orders placed or performed in visit on 04/19/17   Digoxin (Lanoxin )   Result Value Ref Range    Digoxin 1.2 0.5 - 2.0 ng/mL   Comprehensive Metabolic Panel   Result Value Ref Range    Sodium 135 (L) 136 - 145 mmol/L    Potassium 5.1 (H) 3.5 - 5.0 mmol/L    Chloride 102 98 - 107 mmol/L    CO2 25 22 - 31 mmol/L    Anion Gap, Calculation 8 5 - 18 mmol/L    Glucose 107 70 - 125 mg/dL    BUN 14 8 - 28 mg/dL    Creatinine 0.90 0.70 - 1.30 mg/dL    GFR MDRD Af Amer >60 >60 mL/min/1.73m2    GFR MDRD Non Af Amer >60 >60 mL/min/1.73m2    Bilirubin, Total 0.5 0.0 - 1.0 mg/dL    Calcium 8.0 (L) 8.5 - 10.5 mg/dL    Protein, Total 6.9 6.0 - 8.0 g/dL    Albumin 2.9 (L) 3.5 - 5.0 g/dL    Alkaline Phosphatase 87 45 - 120 U/L    AST 22 0 - 40 U/L    ALT 25 0 - 45 U/L   HM1 (CBC with Diff)   Result Value Ref Range    WBC 9.8 4.0 - 11.0 thou/uL    RBC 3.89 (L) 4.40 - 6.20 mill/uL    Hemoglobin 12.2 (L) 14.0 - 18.0 g/dL     Hematocrit 36.9 (L) 40.0 - 54.0 %    MCV 95 80 - 100 fL    MCH 31.3 27.0 - 34.0 pg    MCHC 33.0 32.0 - 36.0 g/dL    RDW 11.3 11.0 - 14.5 %    Platelets 374 140 - 440 thou/uL    MPV 7.4 7.0 - 10.0 fL    Neutrophils % 73 (H) 50 - 70 %    Lymphocytes % 15 (L) 20 - 40 %    Monocytes % 10 2 - 10 %    Eosinophils % 2 0 - 6 %    Basophils % 1 0 - 2 %    Neutrophils Absolute 7.1 2.0 - 7.7 thou/uL    Lymphocytes Absolute 1.5 0.8 - 4.4 thou/uL    Monocytes Absolute 1.0 (H) 0.0 - 0.9 thou/uL    Eosinophils Absolute 0.2 0.0 - 0.4 thou/uL    Basophils Absolute 0.1 0.0 - 0.2 thou/uL       ROS:  10 point review of symptoms all negative except as outlined in the HPI above.    Med list and active problem list reviewed and updated as part of this encounter    Current Outpatient Prescriptions on File Prior to Visit   Medication Sig Dispense Refill     albuterol (VENTOLIN HFA) 90 mcg/actuation inhaler Inhale 1 puff every 6 (six) hours as needed for wheezing. 1 Inhaler 5     lidocaine (XYLOCAINE) 5 % ointment Use with changing of urinary catheter 35.44 g 2     traMADol (ULTRAM) 50 mg tablet TAKE ONE TABLET BY MOUTH TWICE DAILY AS NEEDED FOR PAIN 60 tablet 0     No current facility-administered medications on file prior to visit.          Mayra Lopez MD    This document was created using voice recognition software which may contain typographical errors.

## 2021-06-10 NOTE — PROGRESS NOTES
Assessment:     1. Cough  XR Chest PA and Lateral    HM1(CBC and Differential)    HM1 (CBC with Diff)    azithromycin (ZITHROMAX) 250 MG tablet          Plan:     Despite the fact that he has no evidence of infiltrate on his chest x-ray, and concerned about his elevated white blood count and given his symptoms I did elect to treat him with a course of azithromycin.  Recommend following up if symptoms are getting worse or not improving.    Subjective:       89 y.o. male originally from [place] presents for evaluation of a 3 day history of cough, nasal congestion, and sore throat.  He has been slightly short of breath.  He denies any ear pain.  He has been feeling a bit rundown but has not had any fevers.  No recent sick contacts.  Of note his wife just passed away 10 days ago and he is in the middle of making  arrangements with his family.  His grandson is here today to interpret for him.    The following portions of the patient's history were reviewed and updated as appropriate: allergies, current medications, past family history, past medical history, past social history, past surgical history and problem list.    Review of Systems  A 12 point comprehensive review of systems was negative except as noted.     Objective:        Vitals:    17 1411   BP: 108/66   Pulse: 79   Resp: 20   Temp: 97.6  F (36.4  C)   SpO2: 95%     General Appearance:    Alert, pleasant, cooperative, no distress, appears stated age   Head:    Normocephalic, without obvious abnormality, atraumatic   Eyes:    Conjunctiva/corneas clear   Ears:    Normal TM's without erythema or bulging. [place] external ear canals, both ears   Nose:   Nares normal, septum midline, mucosa normal, no drainage    or sinus tenderness   Throat:   Lips, mucosa, and tongue normal; teeth and gums normal.  No tonsilar hypertrophy or exudate.   Neck:    Cardiovascular:   Supple, symmetrical, trachea midline, no adenopathy;     thyroid:  no  enlargement/tenderness/nodules  Irregularly irregular rhythm without murmurs rubs or gallops.     Lungs:    a few scattered rhonchi are noted at the bases bilaterally.      CBC obtained and notable to have a white blood count of 20,400 with a left shift.    Chest x-ray ordered and personally reviewed by myself.  No evidence of infiltrate that I can appreciate.  There is a pulmonary nodule notable in the right base.  Hyperinflation noted.      Xr Chest Pa And Lateral    Result Date: 4/13/2017  XR CHEST PA AND LATERAL 4/13/2017 2:49 PM INDICATION: Cough. COMPARISON: None. FINDINGS: There is a calcified nodule in the right lung base consistent with old granulomatous disease. Mild fibrosis is seen in both lung bases as well. There is borderline cardiomegaly. Hyperinflation suggests COPD. The pulmonary vasculature and pleural spaces appear normal. This report was electronically interpreted by: Dr. Kimberly Schwartz MD ON 04/13/2017 at 14:55                              This note has been dictated using voice recognition software. Any grammatical or context distortions are unintentional and inherent to the software

## 2021-06-10 NOTE — TELEPHONE ENCOUNTER
Drug Change Request  Who is calling?:  Pharmacy fax  What is the current medication?:  ARTIFICIAL TEARS, POLYVIN ALC, 1.4 % ophthalmic solution   What alternative is being requested?: no alternative identified  Why the request to change?:  Current medication is on long term back order, please send alternative.  Requested Pharmacy?: [facility]  Okay to leave a detailed message?:  Yes

## 2021-06-10 NOTE — TELEPHONE ENCOUNTER
INR result is 2.2  INR   Date Value Ref Range Status   08/03/2020 2.20 (!) 0.9 - 1.1 Final       Will the patient be seen, or did they already see, MD or CNP today? No    Most Recent Warfarin dose day/week  Sunday Monday Tuesday Wednesday Thursday Friday Saturday    3 2.5 2.5 2.5 3 2.5     Sunday Monday Tuesday Wednesday Thursday Friday Saturday   2.5             Has the patient missed any doses of Coumadin, Warfarin, Jantoven in the past 7 days? No    Has the patients medications changed since the last visit? No    Has the patient experienced any bleeding recently? No    Has the patient experienced any injuries or illness recently? No    Has the patient experienced any 'new' shortness of breath, severe headaches, or changes in vision recently? No    Has the patient had any changes in their diet, or alcohol consumption? No    Is the patient here today to prepare for any type of upcoming surgery, procedure, or for a cardioversion procedure? No    What phone number can we reach the patient at today? home phone listed in demographics.

## 2021-06-11 ENCOUNTER — COMMUNICATION - HEALTHEAST (OUTPATIENT)
Dept: FAMILY MEDICINE | Facility: CLINIC | Age: 86
End: 2021-06-11

## 2021-06-11 DIAGNOSIS — N28.0 RENAL INFARCT (H): ICD-10-CM

## 2021-06-11 DIAGNOSIS — I48.91 ATRIAL FIBRILLATION (H): ICD-10-CM

## 2021-06-11 LAB — INR PPP: 2.1 (ref 0.9–1.1)

## 2021-06-11 NOTE — TELEPHONE ENCOUNTER
ANTICOAGULATION  MANAGEMENT- Home Care/Care Facility Result    Assessment     Today's INR result of 2.50 is Therapeutic (goal INR of 2.0-3.0)        Warfarin taken as previously instructed    No new diet changes affecting INR    No new medication/supplements affecting INR    Continues to tolerate warfarin with no reported s/s of bleeding or thromboembolism     Previous INR was Therapeutic at 2.20 on 8/17/20.    Only complaint - stomach ached this morning.  RN visits 1x/wk.    Plan:     Spoke with REAN Ortez with [facility] discussed INR result and instructed:    1.  Fax telephone orders to:  [phone].   2.  If stomach pains persists and worsen, to notify or call his doctor.    Warfarin Dosing Instructions:   (has 1mg tabs)   - Continue current warfarin dose 3 mg daily on Mon/Fri; and 2.5 mg daily rest of week.    Next INR to be drawn:  4 wks.  Scheduled on 10/12/20.    Education provided: importance of consistent vitamin K intake, target INR goal and significance of current INR result and importance of notifying clinic for diarrhea, nausea/vomiting, reduced intake and/or illness    ERAN Ortez verbalizes understanding and agrees to warfarin dosing plan.   ?   Maribel Whitaker RN    Subjective/Objective:      Benny Carrillo, a 89 y.o. male is established on warfarin.     Home care/care facility RN's report of Benny INR, recent warfarin dosing, diet changes, medication changes, and symptoms is documented below.    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

## 2021-06-11 NOTE — PROGRESS NOTES
Subjective: This patient comes in for evaluation he is a 89-year-old male was seen on May 23, 2017 had some grief symptoms since the death of his wife.  He had trouble sleeping he was placed on mirtazapine.  That has been working well he sleeping better his attitude is better.  His weight is stable at 113.    He does get some neck pains at times and I did refill the tramadol.    He denies any shortness of breath or chest pain he is atrial fibrillation denies any dizziness.  Rate is controlled.    He continues with his suprapubic catheter he has had urinary issues that has been stable no signs of infection.    His son thinks he started to eat a little more now.    Tobacco status: He  reports that he has never smoked. He has never used smokeless tobacco.    Patient Active Problem List    Diagnosis Date Noted     Chest pain 09/14/2016     Gastroesophageal reflux disease without esophagitis 09/14/2016     Suprapubic catheter 09/14/2016     Leukocytosis 09/14/2016     Heartburn 09/14/2016     Underweight due to inadequate caloric intake 07/28/2015     Chronic atrial fibrillation      Muscle Weakness Generalized        Current Outpatient Prescriptions   Medication Sig Dispense Refill     acetaminophen (TYLENOL) 500 MG tablet Take 1 tablet (500 mg total) by mouth every 6 (six) hours as needed for pain. 30 tablet 5     albuterol (VENTOLIN HFA) 90 mcg/actuation inhaler Inhale 1 puff every 6 (six) hours as needed for wheezing. 1 Inhaler 5     aspirin 81 mg chewable tablet Chew 1 tablet (81 mg total) daily. 90 tablet 1     digoxin (LANOXIN) 125 mcg tablet Take 1 tablet (125 mcg total) by mouth daily. 90 tablet 1     finasteride (PROSCAR) 5 mg tablet Take 1 tablet (5 mg total) by mouth daily. 30 tablet 6     lidocaine (XYLOCAINE) 5 % ointment Use with changing of urinary catheter 35.44 g 2     loratadine (ALLERGY RELIEF, LORATADINE,) 10 mg tablet TAKE ONE TABLET BY MOUTH ONCE DAILY 30 tablet 5     mirtazapine (REMERON) 15 MG  tablet Take 1 tablet (15 mg total) by mouth at bedtime. 30 tablet 2     multivitamin (DAILY-JAMISON) per tablet TAKE ONE TABLET BY MOUTH ONCE DAILY 30 tablet 5     oxybutynin (DITROPAN XL) 10 MG ER tablet TAKE ONE TABLET BY MOUTH ONCE DAILY 90 tablet 0     traMADol (ULTRAM) 50 mg tablet TAKE ONE TABLET BY MOUTH TWICE DAILY AS NEEDED FOR PAIN 60 tablet 0     hydrocortisone 2.5 % cream Apply bid to affected areas 60 g 2     No current facility-administered medications for this visit.        ROS:   10 point review of systems negative other than as outlined above    Objective:    /84 (Patient Site: Left Arm, Patient Position: Sitting, Cuff Size: Adult Small)  Pulse (!) 56  Temp 97.6  F (36.4  C) (Oral)   Resp 24  Wt 113 lb (51.3 kg)  SpO2 98%  BMI 18.24 kg/m2  Body mass index is 18.24 kg/(m^2).      General appearance no acute distress.    HEENT with some slight tenderness in the paraspinal muscles in the neck    Oropharynx clear, pupils react normally    Lungs are clear no rales or rhonchi heart irregularly irregular, rate in the 60s    Extremities without edema.,  No skin lesions    Abdomen soft bowel sounds normal no guarding or rebound.    Joints without redness warmth or swelling.    Skin he did have some itching in through the cheek area he has some mild dermatitis will use 2.5% hydrocortisone cream twice a day as needed.            Assessment:  1. Grief     2. Neck pain  traMADol (ULTRAM) 50 mg tablet   3. Insomnia     4. Pruritus  hydrocortisone 2.5 % cream     5.  Atrial fibrillation rate controlled Lanoxin level was therapeutic back in April.    Plan: Patient's grief improved with insomnia improved on the mirtazapine continue the same    Musculoskeletal neck pain intermittent tramadol use    Atrial fibrillation rate control Lanoxin level therapeutic    Pruritus on the cheek he has some mild dermatitis, use 2.5% hydrocortisone cream    This transcription uses voice recognition software, which may  contain typographical errors.

## 2021-06-11 NOTE — TELEPHONE ENCOUNTER
Dr. Castillo,     Do you agree with telephone orders on Benny Gerardo?  I will be faxing orders to [facility].  Thank you.    Danay Whitaker, BSN, RN

## 2021-06-11 NOTE — TELEPHONE ENCOUNTER
INR result is 2.5  INR   Date Value Ref Range Status   08/17/2020 2.20 (!) 0.9 - 1.1 Final       Will the patient be seen, or did they already see, MD or CNP today? No    Most Recent Warfarin dose day/week  Sunday Monday Tuesday Wednesday Thursday Friday Saturday    3 2.5 2.5 2.5 3 2.5     Sunday Monday Tuesday Wednesday Thursday Friday Saturday   2.5             Has the patient missed any doses of Coumadin, Warfarin, Jantoven in the past 7 days? No    Has the patients medications changed since the last visit? No    Has the patient experienced any bleeding recently? No    Has the patient experienced any injuries or illness recently? No    Has the patient experienced any 'new' shortness of breath, severe headaches, or changes in vision recently? No    Has the patient had any changes in their diet, or alcohol consumption? No    Is the patient here today to prepare for any type of upcoming surgery, procedure, or for a cardioversion procedure? No    What phone number can we reach the patient at today? home phone listed in demographics.

## 2021-06-11 NOTE — PROGRESS NOTES
[facility] Care Coordination Contact  CC received notification of Emergency Room visit.  ER visit occurred on 9/14/2020  at [facility] with Dx of acute gastritis without hemorrhage.    CC contacted adult son Steven and left a message encouraging Steven to call with any questions or concerns about discharge paperwork.  Member has a follow-up appointment with PCP: Yes: scheduled on 10/29  Member has had a change in condition: No  New referrals placed: No  Home Visit Needed: No  Care plan reviewed and updated.  PCP notified of ED visit via EMR.    KESHA Sun  [facility]  [phone]

## 2021-06-11 NOTE — PROGRESS NOTES
Mr. Carrillo received a notice from [facility] that his MA would end June 30th due to not completing his renewal form. His son Steven Carrillo ([phone]) was also at the visit. He stated that the paperwork had been sent in to the [facility] so they were confused as to why their received this cancellation notice. LEANA contacted [facility] worker Xin ENCARNACION at [phone] and left a voice mail message. Her fax number is [phone]. LEANA faxed Mr. Carrillo renewal form to the [facility] worker fax. It was received. Made a copy of the fax transmission for Mr. Carrillo as proof that the [facility] received his renewal today 6/23/17 prior to the 6/30/17 end date of his coverage. His coverage should now not be interrupted. I told him to let me know if he has ongoing medical insurance issues. He was very relieved.

## 2021-06-11 NOTE — PROGRESS NOTES
[facility] Care Coordination Contact      [facility] Six-Month Telephone Assessment    6 month telephone assessment completed on 9/1/2020. Spoke with Benny's granddaughter, Brittanie.     ER visits: No  Hospitalizations: No  TCU stays: No  Significant health status changes: No significant health changes noted. Benny continues to remain in bed primarily with support from family.   Falls/Injuries: No  ADL/IADL changes: No  Changes in services: No- SN visits occur on a weekly basis for catheter care and coumadin checks. Family assists with medication management and provide PCA services which remain at 11 hours per day.     Caregiver Assessment follow up:  N/A    Goals: See POC in chart for goal progress documentation.      Will see member in 6 months for an annual health risk assessment.   Encouraged member to call CC with any questions or concerns in the meantime.     KESHA Sun  [facility]  [phone]

## 2021-06-11 NOTE — PROGRESS NOTES
"Benny Carrillo is a 89 y.o. male who is being evaluated via a billable video visit.      The patient has been notified of following:     \"This video visit will be conducted via a call between you and your physician/provider. We have found that certain health care needs can be provided without the need for an in-person physical exam.  This service lets us provide the care you need with a video conversation.  If a prescription is necessary we can send it directly to your pharmacy.  If lab work is needed we can place an order for that and you can then stop by our lab to have the test done at a later time.    Video visits are billed at different rates depending on your insurance coverage. Please reach out to your insurance provider with any questions.    If during the course of the call the physician/provider feels a video visit is not appropriate, you will not be charged for this service.\"    Patient has given verbal consent to a Video visit? Yes  How would you like to obtain your AVS? AVS Preference: MyChart.  If dropped by the video visit, the video invitation should be sent to: Text to cell phone: [phone]  Will anyone else be joining your video visit? No        Video Start Time: 1:31 pm    Additional provider notes:     Subjective: This patient had a virtual visit, video, due to the coronavirus pandemic.    Patient is a 89-year-old male has previous history of reflux and hypertension and COPD also has atrial fibrillation, he continues on warfarin INR was 2.17 in the emergency room    Last EKG showed atrial fibrillation rate was in the 70s.    Patient was seen at the emergency room on 9/14/2020 for some epigastric pain/gastritis.    He had a CTA which showed enlarged prostate otherwise abdomen and pelvis unremarkable    Urine culture had a mixture of organisms he has a suprapubic catheter.    Patient is now on famotidine 20 mg a day as well as Protonix 40 mg a day    Fecal occult was negative for blood.    BMP " negative troponin negative hemoglobin 13.6 white count normal    No COVID-19 exposure or symptoms.    His abdominal symptoms have improved    Tobacco status: He  reports that he has never smoked. He has never used smokeless tobacco.    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     Acute pulmonary edema (H)      Acute pyelonephritis 02/10/2018     Urinary tract infection associated with cystostomy catheter, initial encounter (H) 02/10/2018     Sepsis secondary to UTI (H) 02/10/2018     Moderate malnutrition (H)      History of ischemic cerebrovascular accident (CVA) with residual deficit 11/20/2017     Accelerated hypertension      Chronic retention of urine      Acute nonintractable headache, unspecified headache type      Acute cerebral infarction (H) 11/14/2017     Chest pain 09/14/2016     Gastroesophageal reflux disease without esophagitis 09/14/2016     Suprapubic catheter (H) 09/14/2016     Leukocytosis, unspecified type 09/14/2016     Heartburn 09/14/2016     Underweight due to inadequate caloric intake 07/28/2015     Chronic atrial fibrillation      Muscle Weakness Generalized        Current Outpatient Medications   Medication Sig Dispense Refill     acetaminophen (TYLENOL) 500 MG tablet 2 po three times a day prn pain 100 tablet 2     albuterol (PROAIR HFA;PROVENTIL HFA;VENTOLIN HFA) 90 mcg/actuation inhaler INHALE 2 PUFFS BY MOUTH EVERY 6 HOURS AS  NEEDED FOR WHEEZING 1 each 3     amLODIPine (NORVASC) 2.5 MG tablet Take 1 tablet (2.5 mg total) by mouth daily. 30 tablet 5     artificial tears, hypromellose, (GONIOVISC) 2.5 % ophthalmic solution 2 gtts to each eye two times a day prn dry eyes 15 mL 6     ARTIFICIAL TEARS, POLYVIN ALC, 1.4 % ophthalmic solution INSTILL 2 DROPS INTO EACH EYE TWICE DAILY AS NEEDED DRY EYES 15 mL 6     benzonatate (TESSALON) 200 MG capsule Take 1 capsule (200 mg total) by mouth 3 (three) times a day as needed for cough. 20 capsule 0     catheter 14 Fr Misc Suprapubic catheter. 14 French. 1 each 0     digoxin (LANOXIN) 125 mcg (0.125 mg) tablet Take 1 tablet (125 mcg total) by mouth daily. 30 tablet 5     guaiFENesin (ROBAFEN) 100 mg/5 mL syrup TAKE  10 ML BY MOUTH THREE TIMES DAILY AS NEEDED FOR COUGH 240 mL 0     hydrocortisone 2.5 % cream APPLY  CREAM TO AFFECTED AREA ONCE TO TWICE DAILY AS NEEDED 28 g 2     loratadine (ALLERGY RELIEF, LORATADINE,) 10 mg tablet Take 1 tablet (10 mg total) by mouth daily. 30 tablet 5     polyvinyl alcohol-povidone (ARTIFICIAL TEARS,PVALCH-POVID,) 0.5-0.6 % Drop 1-2 gtts two times a day to each eye prn 15 mL 3     THERA-M 9 mg iron-400 mcg Tab tablet TAKE 1 TABLET BY MOUTH ONCE DAILY (DUE  FOR  FOLLOW-UP  VIRTUAL  VISIT  WITH  DR. SMITH) 30 tablet 1     warfarin ANTICOAGULANT (COUMADIN/JANTOVEN) 1 MG tablet Take as directed with the 2.5 mg dose. Adjust dose based on INR results as directed. 90 tablet 1     warfarin ANTICOAGULANT (COUMADIN/JANTOVEN) 2.5 MG tablet Take 1 tablet (2.5 mg total) by mouth See Admin Instructions. Take 2.5 mg daily or per inr results 90 tablet 1     famotidine (PEPCID) 20 MG tablet 1 po qd 30 tablet 2     pantoprazole (PROTONIX) 40 MG tablet Take 1 tablet (40 mg total) by mouth daily. 30 tablet 2     No current facility-administered medications for this visit.        ROS:   10 point review of systems positive as outlined above otherwise negative    Objective:    There were  no vitals taken for this visit.  There is no height or weight on file to calculate BMI.      General appearance no acute distress    HEENT neck is supple no congestion    Lungs: Nonlabored breathing no wheezing    Heart: No rapid heart rate or palpitations.  EKG as outlined patient continues on digoxin as well for rate control.    He denies abdominal pain at this time    No bloating.    Extremities without edema.    Skin without rash.    Results for orders placed or performed during the hospital encounter of 09/14/20   Culture, Urine    Specimen: Urine, Catheter   Result Value Ref Range    Culture       Mixture of organism types with no predominating organism.   Basic Metabolic Panel   Result Value Ref Range    Sodium 141 136 - 145 mmol/L    Potassium 4.4 3.5 - 5.0 mmol/L    Chloride 103 98 - 107 mmol/L    CO2 29 22 - 31 mmol/L    Anion Gap, Calculation 9 5 - 18 mmol/L    Glucose 116 70 - 125 mg/dL    Calcium 8.6 8.5 - 10.5 mg/dL    BUN 8 8 - 28 mg/dL    Creatinine 0.98 0.70 - 1.30 mg/dL    GFR MDRD Af Amer >60 >60 mL/min/1.73m2    GFR MDRD Non Af Amer >60 >60 mL/min/1.73m2   Lipase   Result Value Ref Range    Lipase 17 0 - 52 U/L   Troponin I   Result Value Ref Range    Troponin I 0.03 0.00 - 0.29 ng/mL   HM2(CBC w/o Differential)   Result Value Ref Range    WBC 8.6 4.0 - 11.0 thou/uL    RBC 4.05 (L) 4.40 - 6.20 mill/uL    Hemoglobin 13.1 (L) 14.0 - 18.0 g/dL    Hematocrit 42.6 40.0 - 54.0 %     (H) 80 - 100 fL    MCH 32.3 27.0 - 34.0 pg    MCHC 30.8 (L) 32.0 - 36.0 g/dL    RDW 13.4 11.0 - 14.5 %    Platelets 180 140 - 440 thou/uL    MPV 11.6 8.5 - 12.5 fL   INR   Result Value Ref Range    INR 2.17 (H) 0.90 - 1.10   APTT(PTT)   Result Value Ref Range    PTT 43 (H) 24 - 37 seconds   Type and Screen   Result Value Ref Range    ABORh B POS     Antibody Screen Negative Negative   Urinalysis-UC if Indicated   Result Value Ref Range    Color, UA Yellow Colorless, Yellow, Straw, Light Yellow    Clarity, UA Turbid  (!) Clear    Glucose, UA Negative Negative    Bilirubin, UA Negative Negative    Ketones, UA Negative Negative, 60 mg/dL    Specific Gravity, UA 1.004 1.001 - 1.030    Blood, UA Small (!) Negative    pH, UA 7.5 4.5 - 8.0    Protein, UA Negative Negative mg/dL    Urobilinogen, UA <2.0 E.U./dL <2.0 E.U./dL, 2.0 E.U./dL    Nitrite, UA Positive (!) Negative    Leukocytes, UA Moderate (!) Negative    Bacteria, UA Many (!) None Seen hpf    RBC, UA 0-2 None Seen, 0-2 hpf    WBC, UA 0-5 None Seen, 0-5 hpf    Squam Epithel, UA 10-25 (!) None Seen, 0-5 lpf    Amorphous, UA Moderate (!) None Seen   Hepatic Profile   Result Value Ref Range    Bilirubin, Total 0.5 0.0 - 1.0 mg/dL    Bilirubin, Direct 0.2 <=0.5 mg/dL    Protein, Total 7.4 6.0 - 8.0 g/dL    Albumin 3.5 3.5 - 5.0 g/dL    Alkaline Phosphatase 60 45 - 120 U/L    AST 20 0 - 40 U/L    ALT 13 0 - 45 U/L   POCT occult blood stool   Result Value Ref Range    POC Fecal Occult Bld Negative Negative    Lot Number [id number]     Expiration Date 2-22     Diluent/Developer Lot Number [id number]     Diluent/Developer Expiration Date 2023-6     Pos Control Valid Control Valid Control    Neg Control Valid Control Valid Control   ECG 12 lead nursing unit performed   Result Value Ref Range    SYSTOLIC BLOOD PRESSURE      DIASTOLIC BLOOD PRESSURE      VENTRICULAR RATE 72 BPM    ATRIAL RATE 47 BPM    P-R INTERVAL      QRS DURATION 110 ms    Q-T INTERVAL 366 ms    QTC CALCULATION (BEZET) 400 ms    P Axis      R AXIS -51 degrees    T AXIS -23 degrees    MUSE DIAGNOSIS       Atrial fibrillation  Left axis deviation  Incomplete right bundle branch block  Nonspecific T wave abnormality , probably digitalis effect  Abnormal ECG  When compared with ECG of 08-DEC-2019 20:57,  Nonspecific T wave abnormality now evident in Inferior leads  Confirmed by SEE ED PROVIDER NOTE FOR, ECG INTERPRETATION ([id number]),  ELDA GURROLA ([id number]) on 9/15/2020 7:04:04 AM         Assessment:  1. Gastroesophageal reflux  disease without esophagitis  famotidine (PEPCID) 20 MG tablet    pantoprazole (PROTONIX) 40 MG tablet   2. Atrial fibrillation, unspecified type (H)     3. Chronic obstructive pulmonary disease, unspecified COPD type (H)     4. Benign essential hypertension       GERD continue famotidine and pantoprazole    Atrial fibrillation continue warfarin and digoxin    COPD stable, no shortness of breath    Hypertension controlled.    Suprapubic catheter because of some significant prostate enlargement, noted again on CT.  That is been stable no evidence of UTI    Plan: As discussed above continue on both famotidine and Protonix as outlined.    Recheck in 4 to 6 weeks    This transcription uses voice recognition software, which may contain typographical errors.      Video-Visit Details    Type of service:  Video Visit    Video End Time (time video stopped): 1:43 PM  Originating Location (pt. Location): Home    Distant Location (provider location):  [facility] FAMILY MEDICINE/OB     Platform used for Video Visit: Triny Castillo MD

## 2021-06-11 NOTE — TELEPHONE ENCOUNTER
Dr. Castillo, spoke to the pharmacist and per them, the alternative that you can send is the Artificial Tears- polyvinyl alcohol, povidone  0.5% or 0.6%. Thanks.

## 2021-06-11 NOTE — TELEPHONE ENCOUNTER
Please check and find out what alternative medicine they have, any artificial tears solution will do

## 2021-06-11 NOTE — PROGRESS NOTES
ANTICOAGULATION  MANAGEMENT: Discharge Review    Benny Carrillo chart reviewed for anticoagulation continuity of care    Emergency room visit on  9/14 for abdominal pain.    Discharge disposition: Assisted Living    INR Results:       Recent labs: (last 7 days)     09/14/20 09/14/20  1615   INR 2.50* 2.17*       Warfarin inpatient management: not applicable    Warfarin discharge instructions: home regimen continued     Medication Changes Affecting Anticoagulation: Yes: one time dose of Pepcid and Protonix.    Additional Factors Affecting Anticoagulation: No    Plan     No adjustment to anticoagulation plan needed      Patient not contacted    Anticoagulation calendar updated    Sol Coy RN

## 2021-06-12 NOTE — TELEPHONE ENCOUNTER
RN cannot approve Refill Request    RN can NOT refill this medication med is not covered by policy/route to provider. Last office visit: 1/9/2020 Alcides Castillo MD Last Physical: Visit date not found Last MTM visit: Visit date not found Last visit same specialty: 1/9/2020 Alcides Castillo MD.  Next visit within 3 mo: Visit date not found  Next physical within 3 mo: Visit date not found      Darlene Cuba, [facility] Triage/Med Refill 10/11/2020    Requested Prescriptions   Pending Prescriptions Disp Refills     THERA-M 9 mg iron-400 mcg Tab tablet [Pharmacy Med Name: Thera-M Oral Tablet] 30 tablet 0     Sig: Take 1 tablet by mouth once daily       There is no refill protocol information for this order

## 2021-06-12 NOTE — TELEPHONE ENCOUNTER
ANTICOAGULATION  MANAGEMENT- Home Care/Care Facility Result    Assessment     Today's INR result of 2.3 is Therapeutic (goal INR of 2.0-3.0)        Warfarin taken as previously instructed    No new diet changes affecting INR    No new medication/supplements affecting INR    Continues to tolerate warfarin with no reported s/s of bleeding or thromboembolism     Previous INR was Therapeutic    Plan:     Spoke with home care nurse Neelima discussed INR result and instructed:     Warfarin Dosing Instructions: Continue current warfarin dose 2 mg daily.    Next INR to be drawn: 1 week.    Education provided: importance of following up for INR monitoring at instructed interval and importance of taking warfarin as instructed    Neelima verbalizes understanding and agrees to warfarin dosing plan.   ?   Bari Grewal RN    Subjective/Objective:      Benny Carrillo, a 89 y.o. male is established on warfarin.     Home care/care facility RN's report of Benny INR, recent warfarin dosing, diet changes, medication changes, and symptoms is documented below.    Additional findings: verbally confirmed home dose with Neelima and updated on anticoagulation calendar    Anticoagulation Episode Summary     Current INR goal:  2.0-3.0   TTR:  73.4 % (11.9 mo)   Next INR check:  11/2/2020   INR from last check:  2.30 (10/26/2020)   Weekly max warfarin dose:     Target end date:  Indefinite   INR check location:     Preferred lab:     Send INR reminders to:  [facility]    Indications    Renal infarct (H) [N28.0]  Atrial fibrillation (H) [I48.91]           Comments:           Anticoagulation Care Providers     Provider Role Specialty Phone number    Alcides Castillo MD Referring Family Medicine [phone]

## 2021-06-12 NOTE — TELEPHONE ENCOUNTER
Patient is on 2.5 mg of amlodipine a day    Increase to 5.0 mg of amlodipine daily.    I sent a new prescription to the pharmacy

## 2021-06-12 NOTE — TELEPHONE ENCOUNTER
INR result is  2.1  INR   Date Value Ref Range Status   11/02/2020 1.70 (!) 0.9 - 1.1 Final       Will the patient be seen, or did they already see, MD or CNP today? No    Most Recent Warfarin dose day/week  Sunday Monday Tuesday Wednesday Thursday Friday Saturday    3 2 2 2 2 2     Sunday Monday Tuesday Wednesday Thursday Friday Saturday   2             Has the patient missed any doses of Coumadin, Warfarin, Jantoven in the past 7 days? No    Has the patients medications changed since the last visit? No    Has the patient experienced any bleeding recently? No    Has the patient experienced any injuries or illness recently? No    Has the patient experienced any 'new' shortness of breath, severe headaches, or changes in vision recently? No    Has the patient had any changes in their diet, or alcohol consumption? No    Is the patient here today to prepare for any type of upcoming surgery, procedure, or for a cardioversion procedure? No    What phone number can we reach the patient at today? ERAN Ortez [phone]

## 2021-06-12 NOTE — TELEPHONE ENCOUNTER
INR result is 3.7   INR   Date Value Ref Range Status   09/14/2020 2.17 (H) 0.90 - 1.10 Final       Will the patient be seen, or did they already see, MD or CNP today? No    Most Recent Warfarin dose day/week  Sunday Monday Tuesday Wednesday Thursday Friday Saturday    3 mg 2.5 mg 2.5 mg 2.5 mg 3 mg 2.5 mg     Sunday Monday Tuesday Wednesday Thursday Friday Saturday   2,5 mg             Has the patient missed any doses of Coumadin, Warfarin, Jantoven in the past 7 days? No    Has the patients medications changed since the last visit? Yes Amlodipine increase 2.5 mg to 5 mg 10/7, Started pantoprazole 2 weeks back    Has the patient experienced any bleeding recently? No    Has the patient experienced any injuries or illness recently? No    Has the patient experienced any 'new' shortness of breath, severe headaches, or changes in vision recently? No    Has the patient had any changes in their diet, or alcohol consumption? No    Is the patient here today to prepare for any type of upcoming surgery, procedure, or for a cardioversion procedure? No    What phone number can we reach the patient at today? [phone] Neelima.

## 2021-06-12 NOTE — PROGRESS NOTES
"Benny Carrillo is a 89 y.o. male who is being evaluated via a billable telephone visit.      The patient has been notified of following:     \"This telephone visit will be conducted via a call between you and your physician/provider. We have found that certain health care needs can be provided without the need for a physical exam.  This service lets us provide the care you need with a short phone conversation.  If a prescription is necessary we can send it directly to your pharmacy.  If lab work is needed we can place an order for that and you can then stop by our lab to have the test done at a later time.    Telephone visits are billed at different rates depending on your insurance coverage. During this emergency period, for some insurers they may be billed the same as an in-person visit.  Please reach out to your insurance provider with any questions.    If during the course of the call the physician/provider feels a telephone visit is not appropriate, you will not be charged for this service.\"    Patient has given verbal consent to a Telephone visit? Yes    What phone number would you like to be contacted at? [phone]     Patient would like to receive their AVS by AVS Preference: Mail a copy.    Additional provider notes:     Subjective: Patient had a virtual visit, telephone, due to the coronavirus pandemic.    This patient has atrial fibrillation he has had a renal infarct and a history of CVA he is on warfarin.  He has been stable on that    His INR get little high last week but it is now down to 2.3 on Monday he gets a recheck next Monday    He has COPD but has not needed albuterol much.    There is been no significant COVID-19 symptoms or exposures.    He does have a suprapubic catheter there is been no UTI symptoms.    Otherwise unremarkable.    No fever no chills.        Tobacco status: He  reports that he has never smoked. He has never used smokeless tobacco.    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     Acute pulmonary edema (H)      Acute pyelonephritis 02/10/2018     Urinary tract infection associated with cystostomy catheter, initial encounter (H) 02/10/2018     Sepsis secondary to UTI (H) 02/10/2018     Moderate malnutrition (H)      History of ischemic cerebrovascular accident (CVA) with residual deficit 11/20/2017     Accelerated hypertension      Chronic retention of urine      Acute nonintractable headache, unspecified headache type      Acute cerebral infarction (H) 11/14/2017     Chest pain 09/14/2016     Gastroesophageal reflux disease without esophagitis 09/14/2016     Suprapubic catheter (H) 09/14/2016     Leukocytosis, unspecified type 09/14/2016     Heartburn 09/14/2016     Underweight due to inadequate caloric intake 07/28/2015     Chronic atrial fibrillation      Muscle Weakness Generalized        Current Outpatient Medications   Medication Sig Dispense Refill     acetaminophen (TYLENOL) 500 MG tablet 2 po three times a day prn pain 100 tablet 2     albuterol (PROAIR HFA;PROVENTIL HFA;VENTOLIN HFA) 90 mcg/actuation inhaler INHALE 2 PUFFS BY MOUTH EVERY 6 HOURS AS NEEDED FOR WHEEZING 1 each 3     amLODIPine (NORVASC) 5 MG tablet Take 1 tablet (5 mg total) by mouth daily. 30 tablet 3     artificial tears, hypromellose, (GONIOVISC) 2.5 % ophthalmic solution 2 gtts to each eye two times a day prn dry eyes 15 mL 6     ARTIFICIAL TEARS,  POLYVIN ALC, 1.4 % ophthalmic solution INSTILL 2 DROPS INTO EACH EYE TWICE DAILY AS NEEDED DRY EYES 15 mL 6     benzonatate (TESSALON) 200 MG capsule Take 1 capsule (200 mg total) by mouth 3 (three) times a day as needed for cough. 20 capsule 0     catheter 14 Fr Misc Suprapubic catheter. 14 French. 1 each 0     digoxin (LANOXIN) 125 mcg (0.125 mg) tablet Take 1 tablet (125 mcg total) by mouth daily. 30 tablet 5     famotidine (PEPCID) 20 MG tablet 1 po qd 30 tablet 2     guaiFENesin (ROBAFEN) 100 mg/5 mL syrup TAKE  10 ML BY MOUTH THREE TIMES DAILY AS NEEDED FOR COUGH 240 mL 0     hydrocortisone 2.5 % cream APPLY  CREAM TO AFFECTED AREA ONCE TO TWICE DAILY AS NEEDED 28 g 2     loratadine (ALLERGY RELIEF, LORATADINE,) 10 mg tablet Take 1 tablet (10 mg total) by mouth daily. 30 tablet 5     pantoprazole (PROTONIX) 40 MG tablet Take 1 tablet (40 mg total) by mouth daily. 30 tablet 2     polyvinyl alcohol-povidone (ARTIFICIAL TEARS,PVALCH-POVID,) 0.5-0.6 % Drop 1-2 gtts two times a day to each eye prn 15 mL 3     THERA-M 9 mg iron-400 mcg Tab tablet Take 1 tablet by mouth once daily 30 tablet 2     warfarin ANTICOAGULANT (COUMADIN/JANTOVEN) 1 MG tablet Take as directed with the 2.5 mg dose. Adjust dose based on INR results as directed. 90 tablet 1     warfarin ANTICOAGULANT (COUMADIN/JANTOVEN) 2.5 MG tablet Take 1 tablet (2.5 mg total) by mouth See Admin Instructions. Take 2.5 mg daily or per inr results 90 tablet 1     No current facility-administered medications for this visit.        ROS:   10 point review of systems positive as outlined above otherwise negative    Objective:    There were no vitals taken for this visit.  There is no height or weight on file to calculate BMI.      General: No acute distress    HEENT no nasal drainage no sore throat.    Lungs, he is breathing well there is no labored breathing no wheezing.    Heart, no palpitations he is in chronic atrial fibrillation no dizziness or rapid heart  rate    No abdominal pain or bloating    Extremities without edema.    He has no back pain to speak of.    His catheter is working well he has a suprapubic catheter.    Renal function was normal in September    Results for orders placed or performed in visit on 10/26/20   INR   Result Value Ref Range    INR 2.30 (!) 0.9 - 1.1       Assessment:  1. Atrial fibrillation, unspecified type (H)     2. Renal infarct (H)     3. Benign essential hypertension     4. Chronic obstructive pulmonary disease, unspecified COPD type (H)     5. History of CVA (cerebrovascular accident)       Atrial fibrillation sounds are because of rate controlled INR is now therapeutic    Continue on that.  He has previous CVA no significant residual    History of renal infarct, normal renal function no pain    No blood in the urine now    COPD stable as needed albuterol.    Hypertension was slightly up last time we will recheck that down the line    Plan: Recheck in 2 months    This transcription uses voice recognition software, which may contain typographical errors.      Phone call duration: 11 minutes from 3:58 PM through 4:09 PM    Alcides Castillo MD

## 2021-06-12 NOTE — TELEPHONE ENCOUNTER
INR result is 1.7  INR   Date Value Ref Range Status   10/26/2020 2.30 (!) 0.9 - 1.1 Final       Will the patient be seen, or did they already see, MD or CNP today? No    Most Recent Warfarin dose day/week  Sunday Monday Tuesday Wednesday Thursday Friday Saturday    2 mg 2 mg 2 mg 2 mg 2 mg 2 mg     Sunday Monday Tuesday Wednesday Thursday Friday Saturday   2 mg             Has the patient missed any doses of Coumadin, Warfarin, Jantoven in the past 7 days? No    Has the patients medications changed since the last visit? No    Has the patient experienced any bleeding recently? No    Has the patient experienced any injuries or illness recently? No    Has the patient experienced any 'new' shortness of breath, severe headaches, or changes in vision recently? No    Has the patient had any changes in their diet, or alcohol consumption? No    Is the patient here today to prepare for any type of upcoming surgery, procedure, or for a cardioversion procedure? No    What phone number can we reach the patient at today? [phone] [place]

## 2021-06-12 NOTE — TELEPHONE ENCOUNTER
ANTICOAGULATION  MANAGEMENT- Home Care/Care Facility Result    Assessment     Today's INR result of 2.3 is Therapeutic (goal INR of 2.0-3.0)        Warfarin taken as previously instructed    No new diet changes affecting INR    No new medication/supplements affecting INR    Continues to tolerate warfarin with no reported s/s of bleeding or thromboembolism     Previous INR was Supratherapeutic    Plan:     Spoke with home care nurse Neelima discussed INR result and instructed:     Warfarin Dosing Instructions: Continue current warfarin dose 2 mg daily.    Next INR to be drawn: Mon 10/26.    Education provided: importance of following up for INR monitoring at instructed interval and importance of taking warfarin as instructed    Neelima verbalizes understanding and agrees to warfarin dosing plan.   ?   Bari Grewal RN    Subjective/Objective:      Benny Carrillo, a 89 y.o. male is established on warfarin.     Home care/care facility RN's report of Benny INR, recent warfarin dosing, diet changes, medication changes, and symptoms is documented below.    Additional findings: verbally confirmed home dose with Neelima and updated on anticoagulation calendar    Anticoagulation Episode Summary     Current INR goal:  2.0-3.0   TTR:  72.3 % (11.9 mo)   Next INR check:  10/26/2020   INR from last check:  2.30 (10/22/2020)   Weekly max warfarin dose:     Target end date:  Indefinite   INR check location:     Preferred lab:     Send IN[facility] reminders to:  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

## 2021-06-12 NOTE — TELEPHONE ENCOUNTER
ANTICOAGULATION  MANAGEMENT- Home Care/Care Facility Result    Assessment     Today's INR result of 2.1 is Therapeutic (goal INR of 2.0-3.0)        Warfarin taken as previously instructed    No new diet changes affecting INR    No new medication/supplements affecting INR    Continues to tolerate warfarin with no reported s/s of bleeding or thromboembolism     Previous INR was Subtherapeutic    Plan:     Spoke with home care nurse Neelima discussed INR result and instructed:     Warfarin Dosing Instructions: Continue current warfarin dose 3 mg daily on Mon; and 2 mg daily rest of week  (0 % change)    Next INR to be drawn: 1 week.    Education provided: importance of following up for INR monitoring at instructed interval and importance of taking warfarin as instructed    Neelima verbalizes understanding and agrees to warfarin dosing plan.   ?   Bari Grewal RN    Subjective/Objective:      Benny Carrillo, a 89 y.o. male is established on warfarin.     Home care/care facility RN's report of Benny INR, recent warfarin dosing, diet changes, medication changes, and symptoms is documented below.    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

## 2021-06-12 NOTE — TELEPHONE ENCOUNTER
INR result is 2.3  INR   Date Value Ref Range Status   10/19/2020 4.00 (!) 0.9 - 1.1 Final       Will the patient be seen, or did they already see, MD or CNP today? No    Most Recent Warfarin dose day/week  Sunday Monday Tuesday Wednesday Thursday Friday Saturday Sunday Monday Tuesday Wednesday Thursday Friday Saturday    held held 2 mg          Has the patient missed any doses of Coumadin, Warfarin, Jantoven in the past 7 days? No    Has the patients medications changed since the last visit? No    Has the patient experienced any bleeding recently? No    Has the patient experienced any injuries or illness recently? No    Has the patient experienced any 'new' shortness of breath, severe headaches, or changes in vision recently? No    Has the patient had any changes in their diet, or alcohol consumption? No    Is the patient here today to prepare for any type of upcoming surgery, procedure, or for a cardioversion procedure? No    What phone number can we reach the patient at today? [phone] k[place].

## 2021-06-12 NOTE — TELEPHONE ENCOUNTER
FYI - Status Update  Who is Calling: Home Care  Update: Home care nurse stated that since last six weeks patients Blood Pressure is consistently high in a rang of 140/80 . Last week hsi Blood Pressure is 124/84 . Per home care nurse not other symptoms .  Okay to leave a detailed message?:  No

## 2021-06-12 NOTE — TELEPHONE ENCOUNTER
INR result is 2.3  INR   Date Value Ref Range Status   10/22/2020 2.30 (!) 0.9 - 1.1 Final       Will the patient be seen, or did they already see, MD or CNP today? No    Most Recent Warfarin dose day/week  Sunday Monday Tuesday Wednesday Thursday Friday Saturday    held held 2 2 2 2     Sunday Monday Tuesday Wednesday Thursday Friday Saturday   2             Has the patient missed any doses of Coumadin, Warfarin, Jantoven in the past 7 days? No    Has the patients medications changed since the last visit? No    Has the patient experienced any bleeding recently? No    Has the patient experienced any injuries or illness recently? No    Has the patient experienced any 'new' shortness of breath, severe headaches, or changes in vision recently? No    Has the patient had any changes in their diet, or alcohol consumption? No    Is the patient here today to prepare for any type of upcoming surgery, procedure, or for a cardioversion procedure? No    What phone number can we reach the patient at today? Neelima

## 2021-06-12 NOTE — TELEPHONE ENCOUNTER
INR result is 4.0  INR   Date Value Ref Range Status   10/12/2020 3.70 (!) 0.9 - 1.1 Final       Will the patient be seen, or did they already see, MD or CNP today? No    Most Recent Warfarin dose day/week  Sunday Monday Tuesday Wednesday Thursday Friday Saturday    HELD 2.5 2.5 2.5 2.5 2.5     Sunday Monday Tuesday Wednesday Thursday Friday Saturday   2.5             Has the patient missed any doses of Coumadin, Warfarin, Jantoven in the past 7 days? No    Has the patients medications changed since the last visit? No    Has the patient experienced any bleeding recently? No    Has the patient experienced any injuries or illness recently? No    Has the patient experienced any 'new' shortness of breath, severe headaches, or changes in vision recently? No    Has the patient had any changes in their diet, or alcohol consumption? No    Is the patient here today to prepare for any type of upcoming surgery, procedure, or for a cardioversion procedure? No    What phone number can we reach the patient at today? home phone listed in demographics.

## 2021-06-12 NOTE — TELEPHONE ENCOUNTER
FYI - Status Update  Who is Calling: Home Care  Update: Would like to speak to Danay in regards to the patient  Okay to leave a detailed message?:  Yes

## 2021-06-12 NOTE — PROGRESS NOTES
Subjective: Patient comes in for evaluation.  He is here with his son.  He is a 89-year-old male.  His wife  in the last year.  He had some group grief/depression was started on mirtazapine.  He sleeping better.  His weight is up 1 pound.    He is breathing okay he has underlying atrial fibrillation.    Otherwise no new findings or issues.    Patient has had some ongoing pains intermittently he will use tramadol mainly uses Tylenol.  Pain is in through the neck and lower back.    Tobacco status: He  reports that he has never smoked. He has never used smokeless tobacco.    Patient Active Problem List    Diagnosis Date Noted     Chest pain 2016     Gastroesophageal reflux disease without esophagitis 2016     Suprapubic catheter 2016     Leukocytosis 2016     Heartburn 2016     Underweight due to inadequate caloric intake 2015     Chronic atrial fibrillation      Muscle Weakness Generalized        Current Outpatient Prescriptions   Medication Sig Dispense Refill     acetaminophen (TYLENOL) 500 MG tablet Take 1 tablet (500 mg total) by mouth every 6 (six) hours as needed for pain. 30 tablet 5     albuterol (VENTOLIN HFA) 90 mcg/actuation inhaler Inhale 1 puff every 6 (six) hours as needed for wheezing. 1 Inhaler 5     aspirin 81 mg chewable tablet Chew 1 tablet (81 mg total) daily. 90 tablet 1     digoxin (LANOXIN) 125 mcg tablet Take 1 tablet (125 mcg total) by mouth daily. 90 tablet 1     finasteride (PROSCAR) 5 mg tablet Take 1 tablet (5 mg total) by mouth daily. 30 tablet 6     hydrocortisone 2.5 % cream Apply bid to affected areas 60 g 2     lidocaine (XYLOCAINE) 5 % ointment Use with changing of urinary catheter 35.44 g 2     loratadine (ALLERGY RELIEF, LORATADINE,) 10 mg tablet TAKE ONE TABLET BY MOUTH ONCE DAILY 30 tablet 5     mirtazapine (REMERON) 15 MG tablet Take 1 tablet (15 mg total) by mouth at bedtime. 30 tablet 2     multivitamin (DAILY-JAMISON) per tablet TAKE ONE  TABLET BY MOUTH ONCE DAILY 30 tablet 5     oxybutynin (DITROPAN XL) 10 MG ER tablet TAKE ONE TABLET BY MOUTH ONCE DAILY 90 tablet 0     traMADol (ULTRAM) 50 mg tablet TAKE ONE TABLET BY MOUTH TWICE DAILY AS NEEDED FOR PAIN 60 tablet 0     No current facility-administered medications for this visit.        ROS:   Review of systems negative other than as outlined above    Objective:    /62  Pulse 60  Temp 97  F (36.1  C)  Wt 114 lb (51.7 kg)  BMI 18.4 kg/m2  Body mass index is 18.4 kg/(m^2).      General appearance no acute distress    Weight up 1 pound vital signs are stable heart rate was in the 50-60 range atrial fibrillation.    Lungs were clear no rales or rhonchi heart irregularly irregular as outlined.    Abdomen soft nontender    Extremities without edema.    Patient has some pain in through the paraspinal muscles in his neck and mildly in through the lower back no radicular component        Assessment:  1. Grief     2. Neck pain  traMADol (ULTRAM) 50 mg tablet   3.  Atrial fibrillation rate controlled he is on digoxin he is not on an anticoagulant  Grief/depression continue on mirtazapine he sleeping better eating better better attitude per son.    Use Tylenol and range of motion exercises through the neck occasional tramadol I did refill that    Plan: Follow-up 3-4 months    This transcription uses voice recognition software, which may contain typographical errors.

## 2021-06-13 ENCOUNTER — COMMUNICATION - HEALTHEAST (OUTPATIENT)
Dept: FAMILY MEDICINE | Facility: CLINIC | Age: 86
End: 2021-06-13

## 2021-06-13 DIAGNOSIS — I10 BENIGN ESSENTIAL HYPERTENSION: ICD-10-CM

## 2021-06-13 NOTE — TELEPHONE ENCOUNTER
Patient has been requesting these refills for the last 1 -2 wks.     - please renew STAT as he is running of these medictions.      - Tylenol 500mg    - Digoxin 0.125mg    - Thera-M 9mg    - Pantoprazole 40mg     - send RX to [facility]

## 2021-06-13 NOTE — PROGRESS NOTES
Subjective: This patient comes in for follow-up.  He is 89-year-old male    He has been on his Ditropan and Proscar for his irritable overactive bladder issues also takes digoxin he has atrial fibrillation he is in atrial fibrillation chronically the rate is controlled, no congestive heart failure symptoms.    Patient denies any redness warmth through any of the joints or legs.    He does have some discomfort through the neck and takes tramadol.  He is taking that periodically.  In addition can use some Tylenol for less severe pain    No additional concerns or issues.    Tobacco status: He  reports that he has never smoked. He has never used smokeless tobacco.    Patient Active Problem List    Diagnosis Date Noted     Chest pain 09/14/2016     Gastroesophageal reflux disease without esophagitis 09/14/2016     Suprapubic catheter 09/14/2016     Leukocytosis 09/14/2016     Heartburn 09/14/2016     Underweight due to inadequate caloric intake 07/28/2015     Chronic atrial fibrillation      Muscle Weakness Generalized        Current Outpatient Prescriptions   Medication Sig Dispense Refill     acetaminophen (TYLENOL) 500 MG tablet Take 1 tablet (500 mg total) by mouth every 6 (six) hours as needed for pain. 30 tablet 5     albuterol (VENTOLIN HFA) 90 mcg/actuation inhaler Inhale 1 puff every 6 (six) hours as needed for wheezing. 1 Inhaler 5     aspirin 81 mg chewable tablet Chew 1 tablet (81 mg total) daily. 90 tablet 1     digoxin (LANOXIN) 125 mcg tablet Take 1 tablet (125 mcg total) by mouth daily. 90 tablet 1     finasteride (PROSCAR) 5 mg tablet Take 1 tablet (5 mg total) by mouth daily. 30 tablet 6     hydrocortisone 2.5 % cream Apply bid to affected areas 60 g 2     lidocaine (XYLOCAINE) 5 % ointment Use with changing of urinary catheter 35.44 g 2     loratadine (ALLERGY RELIEF, LORATADINE,) 10 mg tablet TAKE ONE TABLET BY MOUTH ONCE DAILY 30 tablet 5     mirtazapine (REMERON) 15 MG tablet Take 1 tablet (15 mg  total) by mouth at bedtime. 30 tablet 2     multivitamin (DAILY-JAMISON) per tablet TAKE ONE TABLET BY MOUTH ONCE DAILY 30 tablet 5     oxybutynin (DITROPAN XL) 10 MG ER tablet TAKE ONE TABLET BY MOUTH ONCE DAILY 90 tablet 0     traMADol (ULTRAM) 50 mg tablet TAKE ONE TABLET BY MOUTH TWICE DAILY AS NEEDED FOR PAIN 60 tablet 0     No current facility-administered medications for this visit.        ROS: 10 point review of systems negative other than as outlined above    Objective:    /70 (Patient Site: Left Arm, Patient Position: Sitting, Cuff Size: Adult Large)  Pulse 60  Temp 97.4  F (36.3  C) (Oral)   Resp 20  Wt 120 lb (54.4 kg)  BMI 19.37 kg/m2  Body mass index is 19.37 kg/(m^2).      General appearance no acute distress    Neck was supple some mild tenderness to the anterior right neck but no masses oropharynx was clear pupils react normally no scleral icterus    Labs from April with a digoxin level at 1.2.    Normal renal function.  His CBC was stable at that time we will plan to recheck him in 3 months and recheck labs with digoxin BMP CBC.    Heart was irregularly irregular rate in the 60s    Lungs are clear no rales or rhonchi abdomen is soft nontender no masses.    Urinary catheter as before    Extremities without edema joints were normal.    Lab work is discussed    Results for orders placed or performed in visit on 04/19/17   Digoxin (Lanoxin )   Result Value Ref Range    Digoxin 1.2 0.5 - 2.0 ng/mL   Comprehensive Metabolic Panel   Result Value Ref Range    Sodium 135 (L) 136 - 145 mmol/L    Potassium 5.1 (H) 3.5 - 5.0 mmol/L    Chloride 102 98 - 107 mmol/L    CO2 25 22 - 31 mmol/L    Anion Gap, Calculation 8 5 - 18 mmol/L    Glucose 107 70 - 125 mg/dL    BUN 14 8 - 28 mg/dL    Creatinine 0.90 0.70 - 1.30 mg/dL    GFR MDRD Af Amer >60 >60 mL/min/1.73m2    GFR MDRD Non Af Amer >60 >60 mL/min/1.73m2    Bilirubin, Total 0.5 0.0 - 1.0 mg/dL    Calcium 8.0 (L) 8.5 - 10.5 mg/dL    Protein, Total 6.9  6.0 - 8.0 g/dL    Albumin 2.9 (L) 3.5 - 5.0 g/dL    Alkaline Phosphatase 87 45 - 120 U/L    AST 22 0 - 40 U/L    ALT 25 0 - 45 U/L   HM1 (CBC with Diff)   Result Value Ref Range    WBC 9.8 4.0 - 11.0 thou/uL    RBC 3.89 (L) 4.40 - 6.20 mill/uL    Hemoglobin 12.2 (L) 14.0 - 18.0 g/dL    Hematocrit 36.9 (L) 40.0 - 54.0 %    MCV 95 80 - 100 fL    MCH 31.3 27.0 - 34.0 pg    MCHC 33.0 32.0 - 36.0 g/dL    RDW 11.3 11.0 - 14.5 %    Platelets 374 140 - 440 thou/uL    MPV 7.4 7.0 - 10.0 fL    Neutrophils % 73 (H) 50 - 70 %    Lymphocytes % 15 (L) 20 - 40 %    Monocytes % 10 2 - 10 %    Eosinophils % 2 0 - 6 %    Basophils % 1 0 - 2 %    Neutrophils Absolute 7.1 2.0 - 7.7 thou/uL    Lymphocytes Absolute 1.5 0.8 - 4.4 thou/uL    Monocytes Absolute 1.0 (H) 0.0 - 0.9 thou/uL    Eosinophils Absolute 0.2 0.0 - 0.4 thou/uL    Basophils Absolute 0.1 0.0 - 0.2 thou/uL       Assessment:  1. Chronic atrial fibrillation  aspirin 81 mg chewable tablet   2. Neck pain     3. Environmental allergies  loratadine (ALLERGY RELIEF, LORATADINE,) 10 mg tablet   4. Muscle Weakness Generalized  acetaminophen (TYLENOL) 500 MG tablet   5. Immunization counseling  Influenza High Dose, Seasonal 65+ yrs     Tonic A. fib he is on aspirin and digoxin rate is controlled    Neck pain refill on tramadol    Allergies stable on loratadine    Ongoing pains can use Tylenol as needed    Flu shot was given today as well    Plan: We will up in 3-4 months check lab    This transcription uses voice recognition software, which may contain typographical errors.

## 2021-06-13 NOTE — TELEPHONE ENCOUNTER
ANTICOAGULATION  MANAGEMENT- Home Care/Care Facility Result    Assessment     Today's INR result of 2.5 is Therapeutic (goal INR of 2.0-3.0)        Warfarin taken as previously instructed    No new diet changes affecting INR    No new medication/supplements affecting INR    Continues to tolerate warfarin with no reported s/s of bleeding or thromboembolism     Previous INR was Therapeutic    Plan:     Spoke with Neelima home care nurse discussed INR result and instructed:     Warfarin Dosing Instructions: Continue current warfarin dose 3 mg daily on Mondays; and 2 mg daily rest of week  (0 % change)    Next INR to be drawn: two weeks.    Education provided: importance of therapeutic range    Neelima verbalizes understanding and agrees to warfarin dosing plan.   ?   Sol Coy RN    Subjective/Objective:      Benny Carrillo, a 89 y.o. male is established on warfarin.     Home care/care facility RN's report of Benny INR, recent warfarin dosing, diet changes, medication changes, and symptoms is documented below.    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

## 2021-06-13 NOTE — TELEPHONE ENCOUNTER
INR result is 1.7  INR   Date Value Ref Range Status   12/07/2020 2.10 (!) 0.9 - 1.1 Final       Will the patient be seen, or did they already see, MD or CNP today? No    Most Recent Warfarin dose day/week  Sunday Monday Tuesday Wednesday Thursday Friday Saturday    3 2 2 2 2 2     Sunday Monday Tuesday Wednesday Thursday Friday Saturday   2             Has the patient missed any doses of Coumadin, Warfarin, Jantoven in the past 7 days? No    Has the patients medications changed since the last visit? Yes finished abx on Thursday.     Has the patient experienced any bleeding recently? No    Has the patient experienced any injuries or illness recently? No    Has the patient experienced any 'new' shortness of breath, severe headaches, or changes in vision recently? No    Has the patient had any changes in their diet, or alcohol consumption? No    Is the patient here today to prepare for any type of upcoming surgery, procedure, or for a cardioversion procedure? No    What phone number can we reach the patient at today?

## 2021-06-13 NOTE — TELEPHONE ENCOUNTER
INR result is 1.8  INR   Date Value Ref Range Status   12/14/2020 1.70 (!) 0.9 - 1.1 Final       Will the patient be seen, or did they already see, MD or CNP today? No    Most Recent Warfarin dose day/week  Sunday Monday Tuesday Wednesday Thursday Friday Saturday    4 2 2 2 2 2     Sunday Monday Tuesday Wednesday Thursday Friday Saturday   2             Has the patient missed any doses of Coumadin, Warfarin, Jantoven in the past 7 days? No    Has the patients medications changed since the last visit? No    Has the patient experienced any bleeding recently? No    Has the patient experienced any injuries or illness recently? No    Has the patient experienced any 'new' shortness of breath, severe headaches, or changes in vision recently? No    Has the patient had any changes in their diet, or alcohol consumption? No    Is the patient here today to prepare for any type of upcoming surgery, procedure, or for a cardioversion procedure? No    What phone number can we reach the patient at today? Neelima

## 2021-06-13 NOTE — TELEPHONE ENCOUNTER
INR result is 2.1  INR   Date Value Ref Range Status   12/02/2020 2.23 (H) 0.90 - 1.10 Final       Will the patient be seen, or did they already see, MD or CNP today? No    Most Recent Warfarin dose day/week  Sunday Monday Tuesday Wednesday Thursday Friday Saturday    3 2.5 2.5 2.5 2.5 2.5     Sunday Monday Tuesday Wednesday Thursday Friday Saturday   2.5             Has the patient missed any doses of Coumadin, Warfarin, Jantoven in the past 7 days? No    Has the patients medications changed since the last visit? Yes, ED on 12/1, UTI     Has the patient experienced any bleeding recently? No    Has the patient experienced any injuries or illness recently? Yes, UTI    Has the patient experienced any 'new' shortness of breath, severe headaches, or changes in vision recently? No    Has the patient had any changes in their diet, or alcohol consumption? No    Is the patient here today to prepare for any type of upcoming surgery, procedure, or for a cardioversion procedure? No    What phone number can we reach the patient at today? home phone listed in demographics.

## 2021-06-13 NOTE — TELEPHONE ENCOUNTER
ANTICOAGULATION  MANAGEMENT- Home Care/Care Facility Result    Assessment     Today's INR result of 1.80 is Subtherapeutic (goal INR of 2.0-3.0)        Warfarin taken as previously instructed    Increased greens/vitamin K intake may be affecting INR    - reported, he will continue to eat more vegetables.    No new medication/supplements affecting INR    Continues to tolerate warfarin with no reported s/s of bleeding or thromboembolism     Previous INR was Subtherapeutic at 1.70 from 12/14/20.    Plan:     Spoke with ERAN Ortez with [facility] discussed INR result and instructed:   - requested to FAX telephone (# [phone]) orders to ERAN Agarwal with [facility]     Warfarin Dosing Instructions:  (has 1mg tabs)   - today, advised one time booster with 4mg warfarin dose,   - then change warfarin dose to 3 mg daily on Mon/Thurs; and 2 mg daily rest of week.   - (6.7 % change)    Next INR to be drawn: one wk.  Scheduled on 12/28/20.    Education provided: importance of consistent vitamin K intake, impact of vitamin K foods on INR and target INR goal and significance of current INR result    ERAN Ortez verbalizes understanding and agrees to warfarin dosing plan.   ?   Maribel Whitaker RN    Subjective/Objective:      Benny Carrillo, a 89 y.o. male is established on warfarin.     Home care/care facility RN's report of Benny INR, recent warfarin dosing, diet changes, medication changes, and symptoms is documented below.    Additional findings: diet change: reported eating more vegetables now.    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

## 2021-06-13 NOTE — TELEPHONE ENCOUNTER
ANTICOAGULATION  MANAGEMENT- Home Care/Care Facility Result    Assessment     Today's INR result of 2.1 is Therapeutic (goal INR of 2.0-3.0)        Warfarin taken as previously instructed    No new diet changes affecting INR    No interaction expected between cefdinir and warfarin    Continues to tolerate warfarin with no reported s/s of bleeding or thromboembolism     Previous INR was Therapeutic    Plan:     Spoke with Neelima BARILLAS discussed INR result and instructed:     Warfarin Dosing Instructions: Continue current warfarin dose 3 mg daily on Mon; and 2 mg daily rest of week      Next INR to be drawn: 1 week    Education provided: target INR goal and significance of current INR result    Neelima verbalizes understanding and agrees to warfarin dosing plan.   ?   Radhika Crooks RN    Subjective/Objective:      Benny Carrillo, a 89 y.o. male is established on warfarin.     Home care/care facility RN's report of Benny INR, recent warfarin dosing, diet changes, medication changes, and symptoms is documented below.    Additional findings: medication changes: patient is currently on day #5 of 10 day course of cefdinir for UTI. 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

## 2021-06-13 NOTE — PROGRESS NOTES
[facility] Care Coordination Contact  CC received notification of Emergency Room visit.  ER visit occurred on 12/2/2020 at [facility] with Dx of .    CC contacted adult son Steven and reviewed discharge summary.  Member has a follow-up appointment with PCP: Yes: scheduled on 1/13  Member has had a change in condition: No  New referrals placed: No  Home Visit Needed: No  Care plan reviewed and updated.  PCP notified of ED visit via EMR.    KESHA Sun  [facility]  [phone]

## 2021-06-13 NOTE — PROGRESS NOTES
ANTICOAGULATION  MANAGEMENT: Discharge Review    Benny Carrillo chart reviewed for anticoagulation continuity of care    Emergency room visit on  12/2 for penile pain, UTI.    Discharge disposition: Home with Home Care    INR Results:       Recent labs: (last 7 days)     12/02/20  1509   INR 2.23*       Warfarin inpatient management: not applicable    Warfarin discharge instructions: home regimen continued     Medication Changes Affecting Anticoagulation: No, patient was prescribed cefdinir for UTI which does not trigger a BPA    Additional Factors Affecting Anticoagulation: No    Plan     No adjustment to anticoagulation plan needed      Patient not contacted         Radhika Crooks RN

## 2021-06-13 NOTE — TELEPHONE ENCOUNTER
ANTICOAGULATION  MANAGEMENT- Home Care/Care Facility Result    Assessment     Today's INR result of 2.40 is Therapeutic (goal INR of 2.0-3.0)        Warfarin taken as previously instructed    - RN sets up warfarin med only.  Family sets up all other medications.    No new diet changes affecting INR    No new medication/supplements affecting INR    Continues to tolerate warfarin with no reported s/s of bleeding or thromboembolism     Previous INR was Therapeutic at 2.10 on 11/9/20.    Plan:     Spoke with ERAN Ortez with [facility] discussed INR result and instructed:    1.  As requested, FAX telephone orders to [facility] [phone]    Warfarin Dosing Instructions:   - Continue current warfarin dose 3 mg daily on Mondays; and 2 mg daily rest of week.    Next INR to be drawn:  One wk.  Scheduled on 11/23/20.    Education provided: importance of consistent vitamin K intake, target INR goal and significance of current INR result and importance of notifying clinic for changes in medications    ERAN Ortez verbalizes understanding and agrees to warfarin dosing plan.   ?   Maribel Whitaker RN    Subjective/Objective:      Benny Carrillo, a 89 y.o. male is established on warfarin.     Home care/care facility RN's report of Benny INR, recent warfarin dosing, diet changes, medication changes, and symptoms is documented below.    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

## 2021-06-13 NOTE — TELEPHONE ENCOUNTER
INR result is 2.4  INR   Date Value Ref Range Status   11/09/2020 2.10 (!) 0.9 - 1.1 Final       Will the patient be seen, or did they already see, MD or CNP today? No    Most Recent Warfarin dose day/week  Sunday Monday Tuesday Wednesday Thursday Friday Saturday    3 mg 2 mg 2 mg 2 mg 2 mg 2 mg     Sunday Monday Tuesday Wednesday Thursday Friday Saturday   2 mg             Has the patient missed any doses of Coumadin, Warfarin, Jantoven in the past 7 days? No    Has the patients medications changed since the last visit? No    Has the patient experienced any bleeding recently? No    Has the patient experienced any injuries or illness recently? No    Has the patient experienced any 'new' shortness of breath, severe headaches, or changes in vision recently? No    Has the patient had any changes in their diet, or alcohol consumption? No    Is the patient here today to prepare for any type of upcoming surgery, procedure, or for a cardioversion procedure? No    What phone number can we reach the patient at today? [phone] [place]

## 2021-06-13 NOTE — TELEPHONE ENCOUNTER
medication Request  Medication name: pantoprazole  Requested Pharmacy: [facility]  Reason for request: delayed refill  When did you use medication last?:na  Patient offered appointment:  patient declined  Okay to leave a detailed message: no

## 2021-06-13 NOTE — TELEPHONE ENCOUNTER
INR result is   2.5   INR   Date Value Ref Range Status   11/16/2020 2.40 (!) 0.9 - 1.1 Final       Will the patient be seen, or did they already see, MD or CNP today? No    Most Recent Warfarin dose day/week  Sunday Monday Tuesday Wednesday Thursday Friday Saturday    3 mg 2 mg 2 mg 2 mg 2 mg 2 mg     Sunday Monday Tuesday Wednesday Thursday Friday Saturday   2 mg             Has the patient missed any doses of Coumadin, Warfarin, Jantoven in the past 7 days? No    Has the patients medications changed since the last visit? No    Has the patient experienced any bleeding recently? No    Has the patient experienced any injuries or illness recently? No    Has the patient experienced any 'new' shortness of breath, severe headaches, or changes in vision recently? No    Has the patient had any changes in their diet, or alcohol consumption? No    Is the patient here today to prepare for any type of upcoming surgery, procedure, or for a cardioversion procedure? No    What phone number can we reach the patient at today? Neelima Parnell [facility]  [phone].

## 2021-06-14 ENCOUNTER — COMMUNICATION - HEALTHEAST (OUTPATIENT)
Dept: FAMILY MEDICINE | Facility: CLINIC | Age: 86
End: 2021-06-14

## 2021-06-14 DIAGNOSIS — I10 BENIGN ESSENTIAL HYPERTENSION: ICD-10-CM

## 2021-06-14 RX ORDER — AMLODIPINE BESYLATE 5 MG/1
5 TABLET ORAL DAILY
Qty: 90 TABLET | Refills: 1 | Status: SHIPPED | OUTPATIENT
Start: 2021-06-14 | End: 2021-07-20

## 2021-06-14 NOTE — TELEPHONE ENCOUNTER
Okay for orders.    Contact me if blood pressure is less than 90/60, or if O2 sat is less than 89%

## 2021-06-14 NOTE — TELEPHONE ENCOUNTER
INR result is 2.4  INR   Date Value Ref Range Status   01/04/2021 2.50 (!) 0.9 - 1.1 Final       Will the patient be seen, or did they already see, MD or CNP today? No    Most Recent Warfarin dose day/week  Sunday Monday Tuesday Wednesday Thursday Friday Saturday    3 2 2 3 2 2     Sunday Monday Tuesday Wednesday Thursday Friday Saturday   2             Has the patient missed any doses of Coumadin, Warfarin, Jantoven in the past 7 days? No    Has the patients medications changed since the last visit? No    Has the patient experienced any bleeding recently? No    Has the patient experienced any injuries or illness recently? No    Has the patient experienced any 'new' shortness of breath, severe headaches, or changes in vision recently? No    Has the patient had any changes in their diet, or alcohol consumption? No    Is the patient here today to prepare for any type of upcoming surgery, procedure, or for a cardioversion procedure? No    What phone number can we reach the patient at today?  Neelima [facility] ERAN [phone]

## 2021-06-14 NOTE — TELEPHONE ENCOUNTER
INR result is 3.0  INR   Date Value Ref Range Status   01/18/2021 2.40 (!) 0.9 - 1.1 Final       Will the patient be seen, or did they already see, MD or CNP today? No    Most Recent Warfarin dose day/week  Sunday Monday Tuesday Wednesday Thursday Friday Saturday    3 2 2 3 2 2     Sunday Monday Tuesday Wednesday Thursday Friday Saturday   2             Has the patient missed any doses of Coumadin, Warfarin, Jantoven in the past 7 days? No    Has the patients medications changed since the last visit? No    Has the patient experienced any bleeding recently? No    Has the patient experienced any injuries or illness recently? No    Has the patient experienced any 'new' shortness of breath, severe headaches, or changes in vision recently? No    Has the patient had any changes in their diet, or alcohol consumption? No    Is the patient here today to prepare for any type of upcoming surgery, procedure, or for a cardioversion procedure? No    What phone number can we reach the patient at today? Neelima

## 2021-06-14 NOTE — PROGRESS NOTES
[facility] Care Coordination Contact  [facility] Home Visit Assessment     Home visit for Health Risk Assessment with Benny Carrillo completed on 1/25/2021    Type of residence:: Private home - no stairs  Current living arrangement:: I live in a private home with family     Assessment completed with: Patient, Family    Current Care Plan  Member currently receiving the following home care services: Skilled Nursing   Member currently receiving the following community resources: PCA    Medication Review  Medication reconciliation completed in Epic: YES  Medication set-up & administration: Family/informal caregiver sets up weekly  Family caregiver administers medications  Medication Risk Assessment Medication (1 or more, place referral to MTM):  N/A: No risk factors identified  MTM Referral Placed: No: No risk factors idenified    Mental/Behavioral Health   Depression Screening:  Unable to participate in depression screening due to physical/cognitive impairments. Family provides 24/7 support to Benny, and denies need for any additional supports.   PHQ-2 Total Score: 0  Mental health DX:: No       Falls Assessment:   Fallen 2 or more times in the past year?: No-Currently bed bound. Family assists with all transfers and mobility. Currently, a two person transfer-lifting when needed.   Any fall with injury in the past year?: No    ADL/IADL Dependencies:   Dependent ADLs:: Bathing, Dressing, Eating, Grooming, Incontinence, Positioning, Transfers, Wheelchair-with assist  Dependent IADLs:: Cleaning, Cooking, Laundry, Shopping, Meal Preparation, Medication Management, Money Management, Transportation, Incontinence   Family reports that Benny isn't currently getting out of bed. Performing all cares while he is in bed including bed baths and incontinence care. Hospital bed in place. Rotating every 2 hours to avoid any bed sores or pressure ulcers.     [facility] sponsored benefits: Shared information re: Silver  Sneakers/gym memberships, ASA, Calcium +D.    PCA Assessment completed at visit: Yes Annual PCA assessment indicated 46 units per day of PCA. This is an increase from the previous assessment.     Elderly Waiver Eligibility: No - does not meet criteria- Not requesting any services at this time.     Care Plan & Recommendations: Benny Carrillo is a 89 y.o. old male with past medical history of recurrent bowel obstruction, atrial fibrillation  managed with coumadin, cholecystectomy, hypertension, acute cerebral infarction with residual effect, and a chronic supra-pubic catheter.      Benny receives PCA services to keep him supported in the home. Benny's PCAs are his daughter in law, Floydi, and granddaughter. SN visits are and occur weekly to assist with managing suprabupic catheter and INR draws and coumadin dosing.     Benny requires assistance with all 8 ADL's from PCA/family. Benny also has complex medical needs related to catheter/wound site care. Benny does have concerns with vulnerability related to residual stroke effects.     No additional services are being requested at this time.       See [facility] for detailed assessment information.    Follow-Up Plan: Member informed of future contact, plan to f/u with member with a 6 month telephone assessment.  Contact information shared with member and family, encouraged member to call with any questions or concerns at any time.    KESHA Sun  [facility]  [phone]

## 2021-06-14 NOTE — PROGRESS NOTES
Subjective: This patient comes in for follow-up.  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  He continues to have that    He was hospitalized back in mid November    He continues in a wheelchair he has some balance issues at risk for falls.  With walking disturbance and knee visual field defect    He has atrial fibrillation rate is controlled rate in the 70s.    Patient continues on an aspirin daily I reviewed medications he is on Lanoxin as well.    He does take Proscar and oxybutynin for his bladder issues he has a suprapubic catheter.    Patient has no chest pain no shortness of breath.    I reviewed medications.    Tobacco status: He  reports that he has never smoked. He has never used smokeless tobacco.    Patient Active Problem List    Diagnosis Date Noted     History of ischemic cerebrovascular accident (CVA) with residual deficit 11/20/2017     Accelerated hypertension      Chronic retention of urine      Acute nonintractable headache, unspecified headache type      Acute cerebral infarction 11/14/2017     Chest pain 09/14/2016     Gastroesophageal reflux disease without esophagitis 09/14/2016     Suprapubic catheter 09/14/2016     Leukocytosis 09/14/2016     Heartburn 09/14/2016     Underweight due to inadequate caloric intake 07/28/2015     Chronic atrial fibrillation      Muscle Weakness Generalized        Current Outpatient Prescriptions   Medication Sig Dispense Refill     acetaminophen (TYLENOL) 500 MG tablet Take 1 tablet (500 mg total) by mouth every 6 (six) hours as needed for pain. 30 tablet 5     loratadine (ALLERGY RELIEF, LORATADINE,) 10 mg tablet TAKE ONE TABLET BY MOUTH ONCE DAILY (Patient taking differently: Take 10 mg by mouth daily. TAKE ONE TABLET BY MOUTH ONCE DAILY) 30 tablet 5     multivitamin (ONE A DAY) per tablet TAKE ONE TABLET BY MOUTH ONCE DAILY 90 tablet 3     VENTOLIN HFA 90 mcg/actuation inhaler INHALE ONE PUFF BY MOUTH EVERY 6 HOURS AS NEEDED FOR WHEEZING  18 each 4     aspirin 325 MG EC tablet Take 1 tablet (325 mg total) by mouth daily with breakfast. 60 tablet 0     digoxin (LANOXIN) 125 mcg tablet Take 1 tablet (125 mcg total) by mouth daily. 90 tablet 1     finasteride (PROSCAR) 5 mg tablet Take 1 tablet (5 mg total) by mouth daily. 30 tablet 6     hydrocortisone 2.5 % cream Apply to affected area 1-2 times daily as needed 30 g 2     oxybutynin (DITROPAN XL) 10 MG ER tablet Take 1 tablet (10 mg total) by mouth daily. 30 tablet 3     traMADol (ULTRAM) 50 mg tablet TAKE ONE TABLET BY MOUTH TWICE DAILY AS NEEDED FOR PAIN 60 tablet 0     No current facility-administered medications for this visit.        ROS:   10 point review of systems negative other than as outlined above.    Objective:    /84 (Patient Site: Left Arm, Patient Position: Sitting, Cuff Size: Adult Regular)  Pulse 72  Resp 16  Wt 115 lb (52.2 kg)  BMI 19.74 kg/m2  Body mass index is 19.74 kg/(m^2).      Lab review of hospital stay as noted below    General appearance no acute distress    HEENT he does have a left visual field cut as before    Lungs are clear no rales no rhonchi, heart was irregularly irregular rate in the 70s    Extremities without edema skin was normal.    His abdomen is soft bowel sounds normal no guarding or rebound    He was in a wheelchair.    Skin was normal no rashes        Results for orders placed or performed during the hospital encounter of 11/14/17   INR   Result Value Ref Range    INR 1.02 0.90 - 1.10   APTT(PTT)   Result Value Ref Range    PTT 30 24 - 37 seconds   Basic Metabolic Panel   Result Value Ref Range    Sodium 140 136 - 145 mmol/L    Potassium 4.6 3.5 - 5.0 mmol/L    Chloride 101 98 - 107 mmol/L    CO2 28 22 - 31 mmol/L    Anion Gap, Calculation 11 5 - 18 mmol/L    Glucose 97 70 - 125 mg/dL    Calcium 8.9 8.5 - 10.5 mg/dL    BUN 9 8 - 28 mg/dL    Creatinine 1.00 0.70 - 1.30 mg/dL    GFR MDRD Af Amer >60 >60 mL/min/1.73m2    GFR MDRD Non Af Amer >60  >60 mL/min/1.73m2   HM2(CBC w/o Differential)   Result Value Ref Range    WBC 9.2 4.0 - 11.0 thou/uL    RBC 4.00 (L) 4.40 - 6.20 mill/uL    Hemoglobin 13.2 (L) 14.0 - 18.0 g/dL    Hematocrit 39.9 (L) 40.0 - 54.0 %     80 - 100 fL    MCH 33.0 27.0 - 34.0 pg    MCHC 33.1 32.0 - 36.0 g/dL    RDW 13.2 11.0 - 14.5 %    Platelets 230 140 - 440 thou/uL    MPV 10.6 8.5 - 12.5 fL   Comprehensive metabolic panel   Result Value Ref Range    Sodium 137 136 - 145 mmol/L    Potassium 4.2 3.5 - 5.0 mmol/L    Chloride 101 98 - 107 mmol/L    CO2 28 22 - 31 mmol/L    Anion Gap, Calculation 8 5 - 18 mmol/L    Glucose 95 70 - 125 mg/dL    BUN 9 8 - 28 mg/dL    Creatinine 1.07 0.70 - 1.30 mg/dL    GFR MDRD Af Amer >60 >60 mL/min/1.73m2    GFR MDRD Non Af Amer >60 >60 mL/min/1.73m2    Bilirubin, Total 0.9 0.0 - 1.0 mg/dL    Calcium 8.6 8.5 - 10.5 mg/dL    Protein, Total 7.5 6.0 - 8.0 g/dL    Albumin 3.4 (L) 3.5 - 5.0 g/dL    Alkaline Phosphatase 62 45 - 120 U/L    AST 15 0 - 40 U/L    ALT <6 0 - 45 U/L   Digoxin (Lanoxin )   Result Value Ref Range    Digoxin 0.4 (L) 0.5 - 2.0 ng/mL   Troponin I   Result Value Ref Range    Troponin I 0.05 0.00 - 0.29 ng/mL   HM1 (CBC with Diff)   Result Value Ref Range    WBC 11.0 4.0 - 11.0 thou/uL    RBC 4.08 (L) 4.40 - 6.20 mill/uL    Hemoglobin 13.2 (L) 14.0 - 18.0 g/dL    Hematocrit 41.3 40.0 - 54.0 %     (H) 80 - 100 fL    MCH 32.4 27.0 - 34.0 pg    MCHC 32.0 32.0 - 36.0 g/dL    RDW 13.1 11.0 - 14.5 %    Platelets 205 140 - 440 thou/uL    MPV 10.0 8.5 - 12.5 fL    Neutrophils % 61 50 - 70 %    Lymphocytes % 23 20 - 40 %    Monocytes % 12 (H) 2 - 10 %    Eosinophils % 4 0 - 6 %    Basophils % 0 0 - 2 %    Neutrophils Absolute 6.7 2.0 - 7.7 thou/uL    Lymphocytes Absolute 2.5 0.8 - 4.4 thou/uL    Monocytes Absolute 1.3 (H) 0.0 - 0.9 thou/uL    Eosinophils Absolute 0.4 0.0 - 0.4 thou/uL    Basophils Absolute 0.0 0.0 - 0.2 thou/uL   Basic Metabolic Panel   Result Value Ref Range    Sodium  135 (L) 136 - 145 mmol/L    Potassium 4.0 3.5 - 5.0 mmol/L    Chloride 105 98 - 107 mmol/L    CO2 24 22 - 31 mmol/L    Anion Gap, Calculation 6 5 - 18 mmol/L    Glucose 72 70 - 125 mg/dL    Calcium 7.7 (L) 8.5 - 10.5 mg/dL    BUN 11 8 - 28 mg/dL    Creatinine 0.95 0.70 - 1.30 mg/dL    GFR MDRD Af Amer >60 >60 mL/min/1.73m2    GFR MDRD Non Af Amer >60 >60 mL/min/1.73m2   HM2(CBC w/o Differential)   Result Value Ref Range    WBC 9.3 4.0 - 11.0 thou/uL    RBC 3.52 (L) 4.40 - 6.20 mill/uL    Hemoglobin 11.3 (L) 14.0 - 18.0 g/dL    Hematocrit 35.5 (L) 40.0 - 54.0 %     (H) 80 - 100 fL    MCH 32.1 27.0 - 34.0 pg    MCHC 31.8 (L) 32.0 - 36.0 g/dL    RDW 13.0 11.0 - 14.5 %    Platelets 176 140 - 440 thou/uL    MPV 10.1 8.5 - 12.5 fL   POCT GFR   Result Value Ref Range    POC GFR AMER AF HE >60  >60 mL/min/1.73m2    POC GFR NON AMER AF >60  >60 mL/min/1.73m2   POCT Glucose   Result Value Ref Range    Glucose, POC 89 mg/dL   ECG 12 lead nursing unit performed   Result Value Ref Range    SYSTOLIC BLOOD PRESSURE  mmHg    DIASTOLIC BLOOD PRESSURE  mmHg    VENTRICULAR RATE 70 BPM    ATRIAL RATE 66 BPM    P-R INTERVAL  ms    QRS DURATION 116 ms    Q-T INTERVAL 366 ms    QTC CALCULATION (BEZET) 395 ms    P Axis  degrees    R AXIS -45 degrees    T AXIS -7 degrees    MUSE DIAGNOSIS       Atrial fibrillation  Right bundle branch block  Left anterior fascicular block  ** Bifascicular block **  Abnormal ECG  When compared with ECG of 14-SEP-2016 18:42,  No significant change was found  Confirmed by OSORIO ALVARADO MD LOC:[facility] ([id number]) on 11/15/2017 8:39:26 AM     Echo Complete   Result Value Ref Range    LV volume diastolic 36.2 62 - 150 cm3    LV volume systolic 15.7 21 - 61 cm3    IVSd 0.901 0.6 - 1.0 cm    LVIDd 3.84 4.2 - 5.8 cm    LVIDs 2.23 2.5 - 4.0 cm    LVOT diam 2 cm    LVOT mean gradient 1 mmHg    LVOT peak VTI 12.4 cm    LVOT mean kim 49.8 cm/s    LVOT peak kim 74.2 cm/s    LVOT peak gradient 2 mmHg    LV PWd 1.04 0.6 -  1.0 cm    MV E' lat kim 10.3 cm/s    MV E' med kim 7.11 cm/s    AO root 3.4 cm    LA size 3.8 cm    LA length 4.6 cm    MV decel time 212 ms    MV peak E kim 72.5 cm/s    TR peak kim 308 cm/s    LA area 2 29 cm2    LA area 1 13.4 cm2    BSA 1.55 m2    Hieght 64 in    Weight 1881.6 lbs    /80 mmHg    HR 75 bpm    IVS/PW ratio 0.9     TR peak gradent 37.9 mmHg    LV FS 41.9 28 - 44 %    Echo LVEF calculated 57 55 - 75 %    LA volume 71.8 cm3    LV mass 114.2 g    LVOT area 3.14 cm2    LVOT SV 38.9 cm3    LV systolic volume index 10.1 11 - 31 cm3/m2    LV diastolic volume index 23.4 34 - 74 cm3/m2    LA volume index 46.3 mL/m2    LV mass index 73.7 g/m2    LV SVi 25.1 ml/m2    TAPSE 1.9 cm    MV med E/e' ratio 10.2     MV lat E/e' ratio 7.0     LV CO 2.9 l/min    LV Ci 1.9 l/min/m2    Height 64.0 in    Weight 118 lbs    MV Avg E/e' Ratio 8.3 cm/s   POCT creatinine   Result Value Ref Range    POC Creatinine 1.1 mg/dL       Assessment:  1. History of ischemic cerebrovascular accident (CVA) with residual deficit     2. Neck pain  traMADol (ULTRAM) 50 mg tablet   3. Chronic atrial fibrillation  digoxin (LANOXIN) 125 mcg tablet   4. Acute cerebral infarction  aspirin 325 MG EC tablet   5. Hemorrhoid  hydrocortisone 2.5 % cream   6. Suprapubic catheter  oxybutynin (DITROPAN XL) 10 MG ER tablet    finasteride (PROSCAR) 5 mg tablet     Persistent left visual field cut from CVA, ischemic, underlying atrial fibrillation.    Continue on aspirin Lanoxin oxybutynin and finasteride.    Occasional neck pain uses tramadol.  Family takes care of him quite well they were here with him today.    Also refill on some hydrocortisone for his hemorrhoids    Plan: Follow-up in 2 months earlier if needed    This transcription uses voice recognition software, which may contain typographical errors.

## 2021-06-14 NOTE — TELEPHONE ENCOUNTER
ANTICOAGULATION  MANAGEMENT- Home Care/Care Facility Result    Assessment     Today's INR result of 2.5 is Therapeutic (goal INR of 2.0-3.0)        Warfarin taken as previously instructed    No new diet changes affecting INR    No new medication/supplements affecting INR    Continues to tolerate warfarin with no reported s/s of bleeding or thromboembolism     Previous INR was Therapeutic    Plan:     Spoke with Neelima discussed INR result and instructed:     Warfarin Dosing Instructions: Continue current warfarin dose 3 mg daily on Mondays and Thursdays; and 2 mg daily rest of week  (0 % change)    Next INR to be drawn: two weeks.    Education provided: importance of therapeutic range, target INR goal and significance of current INR result and importance of following up for INR monitoring at instructed interval    Neelima verbalizes understanding and agrees to warfarin dosing plan.   ?   Sol Coy RN    Subjective/Objective:      Benny Carrillo, a 89 y.o. male is established on warfarin.     Home care/care facility RN's report of Benny INR, recent warfarin dosing, diet changes, medication changes, and symptoms is documented below.    Additional findings: verbally confirmed home dose with Neelima and updated on anticoagulation calendar    Anticoagulation Episode Summary     Current INR goal:  2.0-3.0   TTR:  69.7 % (1 y)   Next INR check:  1/18/2021   INR from last check:  2.50 (1/4/2021)   Weekly max warfarin dose:     Target end date:  Indefinite   INR check location:     Preferred lab:     Send INR reminders to:  [facility]    Indications    Renal infarct (H) [N28.0]  Atrial fibrillation (H) [I48.91]           Comments:           Anticoagulation Care Providers     Provider Role Specialty Phone number    Alcides Castillo MD Referring Family Medicine [phone]

## 2021-06-14 NOTE — TELEPHONE ENCOUNTER
ANTICOAGULATION  MANAGEMENT- Home Care/Care Facility Result    Assessment     Today's INR result of 3.0 is Therapeutic (goal INR of 2.0-3.0)        Warfarin taken as previously instructed    No new diet changes affecting INR    No new medication/supplements affecting INR    Continues to tolerate warfarin with no reported s/s of bleeding or thromboembolism     Previous INR was Therapeutic    Plan:     Spoke with nurse Neelima discussed INR result and instructed:     Warfarin Dosing Instructions: Continue current warfarin dose 3 mg daily on mon/thu; and 2 mg daily rest of week  (0 % change)    Next INR to be drawn: two weeks    Neelima verbalizes understanding and agrees to warfarin dosing plan.   ?   Perlita Brown RN    Subjective/Objective:      Benny Carrillo, a 89 y.o. male is established on warfarin.     Home care/care facility RN's report of Benny INR, recent warfarin dosing, diet changes, medication changes, and symptoms is documented below.    Additional findings: none    Anticoagulation Episode Summary     Current INR goal:  2.0-3.0   TTR:  72.5 % (1 y)   Next INR check:  2/15/2021   INR from last check:  3.00 (2/1/2021)   Weekly max warfarin dose:     Target end date:  Indefinite   INR check location:     Preferred lab:     Send IN[facility] reminders to:  [facility]    Indications    Renal infarct (H) [N28.0]  Atrial fibrillation (H) [I48.91]           Comments:           Anticoagulation Care Providers     Provider Role Specialty Phone number    Alcides Castillo MD Referring Family Medicine [phone]

## 2021-06-14 NOTE — PROGRESS NOTES
Benny Carrillo is a 89 y.o. male who is being evaluated via a billable video visit.      How would you like to obtain your AVS? Mail a copy.  If dropped from the video visit, the video invitation should be resent by: Text to cell phone: [phone]  Will anyone else be joining your video visit? No      Video Start Time: 3:14 pm  Assessment & Plan     Benny was seen today for medication refill.    Diagnoses and all orders for this visit:    Moderate protein-calorie malnutrition (H)  -     protein (BOOST HIGH PROTEIN) Powd; 1 can po daily    Benign essential hypertension    Chronic obstructive pulmonary disease, unspecified COPD type (H)    Permanent atrial fibrillation (H)        Assessment requiring an independent historian(s) - family - son and granddaughter    Diagnosis or treatment significantly limited by social determinants of health -low socioeconomic status, non-English-speaking immigrant    21 minutes spent on the date of the encounter doing chart review, history and exam, documentation and further activities as noted above  [id number]}       Return in about 3 months (around 4/25/2021) for Recheck.    Alcides Castillo MD  [facility]    Subjective     Benny Carrillo is 89 y.o. and presents to clinic today for the following health issues   HPI   This patient had a virtual visit, video, due to the coronavirus pandemic.    He was present as well as his son and granddaughter Mey.    Patient has a history of hypertension COPD atrial fibrillation.  Also has had protein calorie malnutrition.    His last weight was 121 pounds in September 2020 he is now down to 116 pounds.  His albumin was at the bottom of normal at 3.5 back in September it is likely lower now.    He is 99-1/2 years old.    He has not liked taking Ensure and family is wondering if we can try an alternative calorie supplement.    I discussed choices elected to use boost high protein, 1 can/day.    No changes in medicines otherwise  I reviewed his med list and he is not on any medicine that should cause any decreased appetite.    We will plan to recheck in about 3 months recheck on his weight.    No COVID-19 symptoms or exposure    Review of Systems  10 point review of systems positive as outlined above otherwise negative      Objective       Vitals:  No vitals were obtained today due to virtual visit.    Physical Exam  General appearance no acute distress.    HEENT: No nasal drainage no sore throat    Lungs: Nonlabored breathing at rest no wheezing    Heart: No palpitations or rapid heart rate he has a history of atrial fibrillation he continues on warfarin.    Last INR on 1/18/2021 was 2.4.    Abdomen nontender    Extremities without edema.  No rashes.        Video-Visit Details    Type of service:  Video Visit    Video End Time (time video stopped): 3:26 PM  Originating Location (pt. Location): Home    Distant Location (provider location):  [facility]     Platform used for Video Visit: Brayden

## 2021-06-14 NOTE — TELEPHONE ENCOUNTER
Reason for Call: Request for an order or referral:    Order or referral being requested: Verbal Orders    Date needed: as soon as possible    Has the patient been seen by the PCP for this problem? YES    Additional comments: Shelia from [facility] called and needs Verbal orders for patient. Verbal orders needed are: Continued Med Management 1x / week and 1 PRN for Recertification. They also would like to know vital parameters, if and when provider would lke to know if patient's oxygen level drop and BP are too low. Please call to advise and give verbal orders.    Phone number Patient can be reached at:  Other phone number:  [phone]    Best Time:  any    Can we leave a detailed message on this number?  Yes    Call taken on 2/2/2021 at 3:39 PM by Germaine Franklin

## 2021-06-14 NOTE — PROGRESS NOTES
Subjective: This patient comes in for hospital discharge follow-up.  He was hospitalized November 14-16 at [facility].  He had ischemic CVA.  He had involvement of the right occiput he has left visual field cut.    Patient is on 325 mg of aspirin now    He has a history of balance problems.  He is in a wheelchair now his balance is worsened since this.  He has atrial fibrillation.    He needs help transferring needs help getting around.    Patient also said some speech difficulty.  He is here with grand son and son.  As well as .    His rate is controlled he is on Lanoxin loratadine he takes tramadol as needed for pain    For prostate bladder issues he takes finasteride and oxybutynin.    I reviewed the scans from the hospital, blood tests.    Med reconciliation was done as well.    Son states they are changing home care.  He will be going with [address] phone # and fax number are  [phone] and [phone]    Tobacco status: He  reports that he has never smoked. He has never used smokeless tobacco.    Patient Active Problem List    Diagnosis Date Noted     History of ischemic cerebrovascular accident (CVA) with residual deficit 11/20/2017     Accelerated hypertension      Chronic retention of urine      Acute nonintractable headache, unspecified headache type      Acute cerebral infarction 11/14/2017     Chest pain 09/14/2016     Gastroesophageal reflux disease without esophagitis 09/14/2016     Suprapubic catheter 09/14/2016     Leukocytosis 09/14/2016     Heartburn 09/14/2016     Underweight due to inadequate caloric intake 07/28/2015     Chronic atrial fibrillation      Muscle Weakness Generalized        Current Outpatient Prescriptions   Medication Sig Dispense Refill     acetaminophen (TYLENOL) 500 MG tablet Take 1 tablet (500 mg total) by mouth every 6 (six) hours as needed for pain. 30 tablet 5     albuterol (VENTOLIN HFA) 90  mcg/actuation inhaler Inhale 1 puff every 6 (six) hours as needed for wheezing. 1 Inhaler 5     aspirin 325 MG EC tablet Take 1 tablet (325 mg total) by mouth daily with breakfast. 60 tablet 0     digoxin (LANOXIN) 125 mcg tablet Take 1 tablet (125 mcg total) by mouth daily. 90 tablet 1     finasteride (PROSCAR) 5 mg tablet Take 1 tablet (5 mg total) by mouth daily. 30 tablet 6     loratadine (ALLERGY RELIEF, LORATADINE,) 10 mg tablet TAKE ONE TABLET BY MOUTH ONCE DAILY (Patient taking differently: Take 10 mg by mouth daily. TAKE ONE TABLET BY MOUTH ONCE DAILY) 30 tablet 5     multivitamin (ONE A DAY) per tablet TAKE ONE TABLET BY MOUTH ONCE DAILY 90 tablet 3     oxybutynin (DITROPAN XL) 10 MG ER tablet Take 10 mg by mouth daily.       traMADol (ULTRAM) 50 mg tablet TAKE ONE TABLET BY MOUTH TWICE DAILY AS NEEDED FOR PAIN (Patient taking differently: Take 50 mg by mouth 2 (two) times a day as needed. TAKE ONE TABLET BY MOUTH TWICE DAILY AS NEEDED FOR PAIN) 60 tablet 0     No current facility-administered medications for this visit.        ROS:   10 point review of systems negative other than as outlined above    Objective:    /88 (Patient Site: Right Arm, Patient Position: Sitting, Cuff Size: Adult Regular)  Pulse 66  There is no height or weight on file to calculate BMI.      General appearance no acute distress.    HEENT neck is supple oropharynx clear    Pupils react normally extraocular movements are good he has a field cut.  Lungs are clear no rales or rhonchi heart was irregularly irregular S1-S2, rate in the 60-80 range.    Abdomen soft nontender    Extremities without edema skin was normal    He is in a wheelchair.  Some hesitancy in his speech.    Labs from hospital as noted below also review of MRI as discussed regarding right occipital infarct.    EKG atrial fibrillation    Results for orders placed or performed during the hospital encounter of 11/14/17   INR   Result Value Ref Range    INR 1.02  0.90 - 1.10   APTT(PTT)   Result Value Ref Range    PTT 30 24 - 37 seconds   Basic Metabolic Panel   Result Value Ref Range    Sodium 140 136 - 145 mmol/L    Potassium 4.6 3.5 - 5.0 mmol/L    Chloride 101 98 - 107 mmol/L    CO2 28 22 - 31 mmol/L    Anion Gap, Calculation 11 5 - 18 mmol/L    Glucose 97 70 - 125 mg/dL    Calcium 8.9 8.5 - 10.5 mg/dL    BUN 9 8 - 28 mg/dL    Creatinine 1.00 0.70 - 1.30 mg/dL    GFR MDRD Af Amer >60 >60 mL/min/1.73m2    GFR MDRD Non Af Amer >60 >60 mL/min/1.73m2   HM2(CBC w/o Differential)   Result Value Ref Range    WBC 9.2 4.0 - 11.0 thou/uL    RBC 4.00 (L) 4.40 - 6.20 mill/uL    Hemoglobin 13.2 (L) 14.0 - 18.0 g/dL    Hematocrit 39.9 (L) 40.0 - 54.0 %     80 - 100 fL    MCH 33.0 27.0 - 34.0 pg    MCHC 33.1 32.0 - 36.0 g/dL    RDW 13.2 11.0 - 14.5 %    Platelets 230 140 - 440 thou/uL    MPV 10.6 8.5 - 12.5 fL   Comprehensive metabolic panel   Result Value Ref Range    Sodium 137 136 - 145 mmol/L    Potassium 4.2 3.5 - 5.0 mmol/L    Chloride 101 98 - 107 mmol/L    CO2 28 22 - 31 mmol/L    Anion Gap, Calculation 8 5 - 18 mmol/L    Glucose 95 70 - 125 mg/dL    BUN 9 8 - 28 mg/dL    Creatinine 1.07 0.70 - 1.30 mg/dL    GFR MDRD Af Amer >60 >60 mL/min/1.73m2    GFR MDRD Non Af Amer >60 >60 mL/min/1.73m2    Bilirubin, Total 0.9 0.0 - 1.0 mg/dL    Calcium 8.6 8.5 - 10.5 mg/dL    Protein, Total 7.5 6.0 - 8.0 g/dL    Albumin 3.4 (L) 3.5 - 5.0 g/dL    Alkaline Phosphatase 62 45 - 120 U/L    AST 15 0 - 40 U/L    ALT <6 0 - 45 U/L   Digoxin (Lanoxin )   Result Value Ref Range    Digoxin 0.4 (L) 0.5 - 2.0 ng/mL   Troponin I   Result Value Ref Range    Troponin I 0.05 0.00 - 0.29 ng/mL   HM1 (CBC with Diff)   Result Value Ref Range    WBC 11.0 4.0 - 11.0 thou/uL    RBC 4.08 (L) 4.40 - 6.20 mill/uL    Hemoglobin 13.2 (L) 14.0 - 18.0 g/dL    Hematocrit 41.3 40.0 - 54.0 %     (H) 80 - 100 fL    MCH 32.4 27.0 - 34.0 pg    MCHC 32.0 32.0 - 36.0 g/dL    RDW 13.1 11.0 - 14.5 %    Platelets  205 140 - 440 thou/uL    MPV 10.0 8.5 - 12.5 fL    Neutrophils % 61 50 - 70 %    Lymphocytes % 23 20 - 40 %    Monocytes % 12 (H) 2 - 10 %    Eosinophils % 4 0 - 6 %    Basophils % 0 0 - 2 %    Neutrophils Absolute 6.7 2.0 - 7.7 thou/uL    Lymphocytes Absolute 2.5 0.8 - 4.4 thou/uL    Monocytes Absolute 1.3 (H) 0.0 - 0.9 thou/uL    Eosinophils Absolute 0.4 0.0 - 0.4 thou/uL    Basophils Absolute 0.0 0.0 - 0.2 thou/uL   Basic Metabolic Panel   Result Value Ref Range    Sodium 135 (L) 136 - 145 mmol/L    Potassium 4.0 3.5 - 5.0 mmol/L    Chloride 105 98 - 107 mmol/L    CO2 24 22 - 31 mmol/L    Anion Gap, Calculation 6 5 - 18 mmol/L    Glucose 72 70 - 125 mg/dL    Calcium 7.7 (L) 8.5 - 10.5 mg/dL    BUN 11 8 - 28 mg/dL    Creatinine 0.95 0.70 - 1.30 mg/dL    GFR MDRD Af Amer >60 >60 mL/min/1.73m2    GFR MDRD Non Af Amer >60 >60 mL/min/1.73m2   HM2(CBC w/o Differential)   Result Value Ref Range    WBC 9.3 4.0 - 11.0 thou/uL    RBC 3.52 (L) 4.40 - 6.20 mill/uL    Hemoglobin 11.3 (L) 14.0 - 18.0 g/dL    Hematocrit 35.5 (L) 40.0 - 54.0 %     (H) 80 - 100 fL    MCH 32.1 27.0 - 34.0 pg    MCHC 31.8 (L) 32.0 - 36.0 g/dL    RDW 13.0 11.0 - 14.5 %    Platelets 176 140 - 440 thou/uL    MPV 10.1 8.5 - 12.5 fL   POCT GFR   Result Value Ref Range    POC GFR AMER AF HE >60  >60 mL/min/1.73m2    POC GFR NON AMER AF >60  >60 mL/min/1.73m2   POCT Glucose   Result Value Ref Range    Glucose, POC 89 mg/dL   ECG 12 lead nursing unit performed   Result Value Ref Range    SYSTOLIC BLOOD PRESSURE  mmHg    DIASTOLIC BLOOD PRESSURE  mmHg    VENTRICULAR RATE 70 BPM    ATRIAL RATE 66 BPM    P-R INTERVAL  ms    QRS DURATION 116 ms    Q-T INTERVAL 366 ms    QTC CALCULATION (BEZET) 395 ms    P Axis  degrees    R AXIS -45 degrees    T AXIS -7 degrees    MUSE DIAGNOSIS       Atrial fibrillation  Right bundle branch block  Left anterior fascicular block  ** Bifascicular block **  Abnormal ECG  When compared with ECG of 14-SEP-2016 18:42,  No  significant change was found  Confirmed by OSORIO ALVARADO MD LOC:SJ ([id number]) on 11/15/2017 8:39:26 AM     Echo Complete   Result Value Ref Range    LV volume diastolic 36.2 62 - 150 cm3    LV volume systolic 15.7 21 - 61 cm3    IVSd 0.901 0.6 - 1.0 cm    LVIDd 3.84 4.2 - 5.8 cm    LVIDs 2.23 2.5 - 4.0 cm    LVOT diam 2 cm    LVOT mean gradient 1 mmHg    LVOT peak VTI 12.4 cm    LVOT mean kim 49.8 cm/s    LVOT peak kim 74.2 cm/s    LVOT peak gradient 2 mmHg    LV PWd 1.04 0.6 - 1.0 cm    MV E' lat kim 10.3 cm/s    MV E' med kim 7.11 cm/s    AO root 3.4 cm    LA size 3.8 cm    LA length 4.6 cm    MV decel time 212 ms    MV peak E kim 72.5 cm/s    TR peak kim 308 cm/s    LA area 2 29 cm2    LA area 1 13.4 cm2    BSA 1.55 m2    Hieght 64 in    Weight 1881.6 lbs    /80 mmHg    HR 75 bpm    IVS/PW ratio 0.9     TR peak gradent 37.9 mmHg    LV FS 41.9 28 - 44 %    Echo LVEF calculated 57 55 - 75 %    LA volume 71.8 cm3    LV mass 114.2 g    LVOT area 3.14 cm2    LVOT SV 38.9 cm3    LV systolic volume index 10.1 11 - 31 cm3/m2    LV diastolic volume index 23.4 34 - 74 cm3/m2    LA volume index 46.3 mL/m2    LV mass index 73.7 g/m2    LV SVi 25.1 ml/m2    TAPSE 1.9 cm    MV med E/e' ratio 10.2     MV lat E/e' ratio 7.0     LV CO 2.9 l/min    LV Ci 1.9 l/min/m2    Height 64.0 in    Weight 118 lbs    MV Avg E/e' Ratio 8.3 cm/s   POCT creatinine   Result Value Ref Range    POC Creatinine 1.1 mg/dL       Assessment:  1. Hospital discharge follow-up     2. Chronic atrial fibrillation     3. History of ischemic cerebrovascular accident (CVA) with residual deficit  Ambulatory referral to Home Health    right occiput   left hemianopsia     Hospital discharge follow-up for CVA with residual left visual field cut.    Some speech difficulties.    Balance disorder and weakness    Underlying atrial fibrillation rate controlled.        Plan: Referral for home care as outlined above he will need skilled nursing, speech PT OT    This  transcription uses voice recognition software, which may contain typographical errors.

## 2021-06-14 NOTE — TELEPHONE ENCOUNTER
ANTICOAGULATION  MANAGEMENT- Home Care/Care Facility Result    Assessment     Today's INR result of 2.4 is Therapeutic (goal INR of 2.0-3.0)        Warfarin taken as previously instructed    No new diet changes affecting INR    No new medication/supplements affecting INR    Continues to tolerate warfarin with no reported s/s of bleeding or thromboembolism     Previous INR was Therapeutic    Plan:     Spoke with Neelima discussed INR result and instructed:     Warfarin Dosing Instructions: Continue current warfarin dose 3 mg daily on Mondays and Thursdays; and 2 mg daily rest of week  (0 % change)    Next INR to be drawn: two weeks.    Education provided: importance of therapeutic range, importance of following up for INR monitoring at instructed interval and importance of taking warfarin as instructed     Neelima verbalizes understanding and agrees to warfarin dosing plan.   ?   Sol Coy RN    Subjective/Objective:      Benny Carrillo, a 89 y.o. male is established on warfarin.     Home care/care facility RN's report of Benny INR, recent warfarin dosing, diet changes, medication changes, and symptoms is documented below.    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

## 2021-06-14 NOTE — TELEPHONE ENCOUNTER
ANTICOAGULATION  MANAGEMENT- Home Care/Care Facility Result    Assessment     Today's INR result of 2.30 is Therapeutic (goal INR of 2.0-3.0)        Warfarin taken as previously instructed    No new diet changes affecting INR    No new medication/supplements affecting INR    Continues to tolerate warfarin with no reported s/s of bleeding or thromboembolism     Previous INR was Subtherapeutic at 1.80 on 12/21/20.    Plan:     Spoke with ERAN Ortez with [facility] discussed INR result and instructed:     Warfarin Dosing Instructions:   - Continue current warfarin dose 3 mg daily on Mon/Thurs; and 2 mg daily rest of week.    Next INR to be drawn:  One wk.  Scheduled on 1/4/2021.    Education provided: importance of consistent vitamin K intake, target INR goal and significance of current INR result and importance of notifying clinic for changes in medications    ERAN Ortez verbalizes understanding and agrees to warfarin dosing plan.   ?   Maribel Whitaker RN    Subjective/Objective:      Benny Carrillo, a 89 y.o. male is established on warfarin.     Home care/care facility RN's report of Benny INR, recent warfarin dosing, diet changes, medication changes, and symptoms is documented below.    Additional findings: none    Anticoagulation Episode Summary     Current INR goal:  2.0-3.0   TTR:  69.7 % (1 y)   Next INR check:  1/4/2021   INR from last check:  2.30 (12/28/2020)   Weekly max warfarin dose:     Target end date:  Indefinite   INR check location:     Preferred lab:     Send INR reminders to:  [facility] [place]    Indications    Renal infarct (H) [N28.0]  Atrial fibrillation (H) [I48.91]           Comments:           Anticoagulation Care Providers     Provider Role Specialty Phone number    Alcides Castillo MD Referring Family Medicine [phone]

## 2021-06-14 NOTE — PROGRESS NOTES
[facility] Care Coordination Contact    Received after visit chart from care coordinator.  Completed following tasks: Mailed copy of care plan to client.     [facility]:  Emailed completed PCA assessment to [facility].  Faxed copy of PCA assessment to [facility] and mailed copy to member.  Faxed MD Communication to PCP.     Mailed PCA Signature page and POC Signature page to member w/ self-stamped envelope for return.    Clair Medeiros  Care Management Specialist  [facility]  [phone]

## 2021-06-14 NOTE — PROGRESS NOTES
Benny Carrillo is a 89 y.o. male who is being evaluated via a billable telephone visit.      What phone number would you like to be contacted at? [phone]   How would you like to obtain your AVS? AVS Preference: Mail a copy.  Assessment & Plan     Benny was seen today for follow-up.    Diagnoses and all orders for this visit:    Chronic obstructive pulmonary disease, unspecified COPD type (H)    Suprapubic catheter (H)    Moderate protein-calorie malnutrition (H)    Benign essential hypertension  -     amLODIPine (NORVASC) 5 MG tablet; Take 1 tablet (5 mg total) by mouth daily.    Permanent atrial fibrillation (H)    Environmental allergies  -     albuterol (PROAIR HFA;PROVENTIL HFA;VENTOLIN HFA) 90 mcg/actuation inhaler; INHALE 2 PUFFS BY MOUTH EVERY 6 HOURS AS NEEDED FOR WHEEZING  -     loratadine (ALLERGY RELIEF, LORATADINE,) 10 mg tablet; Take 1 tablet (10 mg total) by mouth daily.    Chronic atrial fibrillation (H)  -     digoxin (LANOXIN) 125 mcg (0.125 mg) tablet; Take 1 tablet (125 mcg total) by mouth daily.  -     warfarin ANTICOAGULANT (COUMADIN/JANTOVEN) 1 MG tablet; Take 2 to 3mg (2 or 3 tabs) by mouth daily as directed.  Adjust dose based on INR.  -     warfarin ANTICOAGULANT (COUMADIN/JANTOVEN) 2.5 MG tablet; Take 1 tablet (2.5 mg total) by mouth See Admin Instructions. Take 2.5 mg daily or per inr results    Gastroesophageal reflux disease without esophagitis  -     famotidine (PEPCID) 20 MG tablet; 1 po qd  -     pantoprazole (PROTONIX) 40 MG tablet; Take 1 tablet (40 mg total) by mouth daily.      Patient COPD controlled with inhalers    Hypertension borderline control last blood pressure at the emergency room was a little bit up when he had cystitis.  Continue same meds, amlodipine    Atrial fibrillation continue warfarin and digoxin INR therapeutic rate controlled    GERD continue on Pepcid.    Malnutrition encourage good healthy eating.  Presently stable regarding    Cystitis with suprapubic  catheter treated in early December, cystitis resolved      Assessment requiring an independent historian(s) - family - son.    Also reviewed notes from ER physician from 12-20.  Independent interpretation of a test performed by another physician/other qualified health care professional (not separately reported) -review labs from 12/2/2020    Diagnosis or treatment significantly limited by social determinants of health -poor socioeconomic status, non-English-speaking immigrant    22 minutes spent on the date of the encounter doing chart review, history and exam, documentation and further activities as noted above    [id number]}       No follow-ups on file.    Alcides Castillo MD  [facility]    Subjective     Benny Carrillo is 89 y.o. and presents to clinic today for the following health issues   HPI   This patient had a virtual visit, telephone, due to the coronavirus pandemic    Independent historian, patient's son was present patient has limited conversation.    Patient was in the emergency room I reviewed the ER note from 12/2/2020 patient had some cystitis and was treated with cefdinir he does have a suprapubic catheter.    Labs at that time white count 9200, hemoglobin 13.5 BMP was normal.    Patient continues on warfarin and digoxin for the atrial fibrillation.  Inhaler, albuterol for COPD    Continues on Pepcid also amlodipine for blood pressure.    No additional concerns or issues.    Last INR on 1/4/2021 was 2.5.    We will plan to get more complete labs in 3 months.    10 point review of systems positive as discussed above otherwise negative  Review of Systems      Objective       Vitals:  No vitals were obtained today due to virtual visit.    Physical Exam  General: No acute distress    HEENT no nasal congestion no cough    Lungs: Nonlabored breathing, does use albuterol as needed    No chest pressure at this time.    No cystitis symptoms or abdominal pain.    Suprapubic catheter in  place per son's history.    Patient's weight is stayed about the same, 120 pounds.    No dizziness does have a history of A. fib chronic.  Last INR 2.5 on 1/4/2021            Phone call duration: 15 minutes

## 2021-06-14 NOTE — TELEPHONE ENCOUNTER
INR result is 2.3  INR   Date Value Ref Range Status   12/21/2020 1.80 (!) 0.9 - 1.1 Final       Will the patient be seen, or did they already see, MD or CNP today? No    Most Recent Warfarin dose day/week  Sunday Monday Tuesday Wednesday Thursday Friday Saturday    4 2 2 3 2 2     Sunday Monday Tuesday Wednesday Thursday Friday Saturday   2             Has the patient missed any doses of Coumadin, Warfarin, Jantoven in the past 7 days? No    Has the patients medications changed since the last visit? No    Has the patient experienced any bleeding recently? No    Has the patient experienced any injuries or illness recently? No    Has the patient experienced any 'new' shortness of breath, severe headaches, or changes in vision recently? No    Has the patient had any changes in their diet, or alcohol consumption? No    Is the patient here today to prepare for any type of upcoming surgery, procedure, or for a cardioversion procedure? No    What phone number can we reach the patient at today? Please contact Neelima with [facility] at [phone]

## 2021-06-14 NOTE — PROGRESS NOTES
[facility] Care Coordination Contact    Mailed HCD & Goal Sheet to member.    Clair Medeiros  Care Management Specialist  [facility]  [phone]

## 2021-06-14 NOTE — TELEPHONE ENCOUNTER
INR result is 2.5  INR   Date Value Ref Range Status   12/28/2020 2.30 (!) 0.9 - 1.1 Final       Will the patient be seen, or did they already see, MD or CNP today? No    Most Recent Warfarin dose day/week  Sunday Monday Tuesday Wednesday Thursday Friday Saturday    3 2 2 3 2 2     Sunday Monday Tuesday Wednesday Thursday Friday Saturday   2             Has the patient missed any doses of Coumadin, Warfarin, Jantoven in the past 7 days? No    Has the patients medications changed since the last visit? No    Has the patient experienced any bleeding recently? No    Has the patient experienced any injuries or illness recently? No    Has the patient experienced any 'new' shortness of breath, severe headaches, or changes in vision recently? No    Has the patient had any changes in their diet, or alcohol consumption? No    Is the patient here today to prepare for any type of upcoming surgery, procedure, or for a cardioversion procedure? No    What phone number can we reach the patient at today? home phone listed in demographics.

## 2021-06-15 NOTE — PROGRESS NOTES
Assessment:      Cough   Sinus pressure      Plan:      Antibiotics as prescribed   Fluids, rest  OTC analgesics discussed       Patient Instructions   You were seen today for a possible sinus infection and cough.    Symptoms management:  - May use Tylenol or Ibuprofen for discomfort and/or fever if present  - Take antibiotics as prescribed for the full course (even if symptoms have improved)    Reasons to come back for re-evaluation:  - Develop a fever of 102  - Troubles seeing or double vision  - Swelling or redness around one or both eyes  - Sfiff neck  - Symptoms are not improving over the next 3-5 days        Subjective:       Benny Carrillo is a 89 y.o. male here for evaluation of a cough.  The cough is non-productive, without wheezing, dyspnea or hemoptysis. Onset of symptoms was 2 days ago, gradually worsening since that time.  Associated symptoms include headaches, subjective fever, and rhinorrhea. Patient does have a history of asthma. Patient has not had recent travel. Patient does not have a history of smoking. Patient has had a previous chest x-ray.     The following portions of the patient's history were reviewed and updated as appropriate: allergies, current medications and problem list.    Review of Systems  Pertinent items are noted in HPI.     Allergies  No Known Allergies      Objective:      /80  Pulse 62  Temp 97.7  F (36.5  C) (Oral)   Resp 18  Wt 115 lb (52.2 kg)  SpO2 92%  BMI 19.74 kg/m2  General appearance: alert, appears stated age, cooperative, no distress and non-toxic  Head: Normocephalic, without obvious abnormality, atraumatic, sinuses tender to percussion  Ears: normal TM's and external ear canals both ears  Nose: clear discharge present  Throat: posterior oropharynx erythematous, no tonsil swelling, no exudate; MMM, lips and tongue normal  Neck: no adenopathy and supple, symmetrical, trachea midline  Lungs: diminished lung sounds throughout. no rhonchi, rales, or  wheezing  Heart: regular rate and rhythm, S1, S2 normal, no murmur, click, rub or gallop     Lab Results    Rapid influenza was negative    I personally reviewed these results and discussed findings with the patient.    Imaging    EXAM DATE:         12/27/2017     [facility]  X-RAY CHEST, 2 VIEWS, FRONTAL AND LATERAL  12/27/2017 9:30 AM     INDICATION: Cough with fever.  COMPARISON: 04/13/2017.     FINDINGS: No significant change.     Hyperexpanded lungs. Minimal scarring without focal pulmonic consolidation. No pleural effusion or pneumothorax. Stable upper normal heart size. Mildly tortuous aorta.    I personally reviewed and discussed findings with the patient.

## 2021-06-15 NOTE — TELEPHONE ENCOUNTER
INR result is            2.6  INR   Date Value Ref Range Status   02/01/2021 3.00 (!) 0.9 - 1.1 Final       Will the patient be seen, or did they already see, MD or CNP today? No    Most Recent Warfarin dose day/week  Sunday Monday Tuesday Wednesday Thursday Friday Saturday    3 2 2 3 2 2     Sunday Monday Tuesday Wednesday Thursday Friday Saturday   2             Has the patient missed any doses of Coumadin, Warfarin, Jantoven in the past 7 days? No    Has the patients medications changed since the last visit? No    Has the patient experienced any bleeding recently? No    Has the patient experienced any injuries or illness recently? No    Has the patient experienced any 'new' shortness of breath, severe headaches, or changes in vision recently? No    Has the patient had any changes in their diet, or alcohol consumption? No    Is the patient here today to prepare for any type of upcoming surgery, procedure, or for a cardioversion procedure? No    What phone number can we reach the patient at today? home phone listed in demographics.

## 2021-06-15 NOTE — TELEPHONE ENCOUNTER
Reason for Call:  Medication or medication refill:    Do you use a [facility]?  Name of the pharmacy and phone number for the current request: No, [facility] on file    Name of the medication requested: Triple antiobiotic    Other request: Patient's nurseRenetta called to state that patient is requesting provider to send a RX for triple antiobiotic ointment be sent to the pharmacy on file for patient to use on catheter.    Can we leave a detailed message on this number? Yes    Phone number patient can be reached at: Other phone number:  [phone]    Best Time: any    Call taken on 3/1/2021 at 3:22 PM by Germaine Franklin

## 2021-06-15 NOTE — TELEPHONE ENCOUNTER
ANTICOAGULATION  MANAGEMENT- Home Care/Care Facility Result    Assessment     Today's INR result of 2.6 is Therapeutic (goal INR of 2.0-3.0)        Warfarin taken as previously instructed    No new diet changes affecting INR    No new medication/supplements affecting INR    Continues to tolerate warfarin with no reported s/s of bleeding or thromboembolism     Previous INR was Therapeutic    Plan:     Spoke with Neelima discussed INR result and instructed:     Warfarin Dosing Instructions: Continue current warfarin dose 3 mg daily on Mondays and Thursdays; and 2 mg daily rest of week  (0 % change)    Next INR to be drawn: two weeks.    Education provided: importance of therapeutic range, importance of following up for INR monitoring at instructed interval and importance of taking warfarin as instructed    Whitney verbalizes understanding and agrees to warfarin dosing plan.   ?   Sol Coy RN    Subjective/Objective:      Benny Carrillo, a 89 y.o. male is established on warfarin.     Home care/care facility RN's report of Benny INR, recent warfarin dosing, diet changes, medication changes, and symptoms is documented below.    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

## 2021-06-15 NOTE — PROGRESS NOTES
Subjective: This patient comes in for evaluation he is a 89-year-old male has had decreased appetite for a while.  Had previous CVA his weights at 118.  I discussed food supplement such as Ensure and gave him a prescription for generic.  Take 1 can per day    He needed refill on his aspirin he had previous history of CVA he has underlying atrial fibrillation he is not a candidate for warfarin.    Continues on oxybutynin regarding his bladder issues    Finally he has had some problems with his vision he had previous cataract surgery has bilateral intraocular lenses.  I will send him back to see the ophthalmologist, Dr. Swartz    Tobacco status: He  reports that he has never smoked. He has never used smokeless tobacco.    Patient Active Problem List    Diagnosis Date Noted     History of ischemic cerebrovascular accident (CVA) with residual deficit 11/20/2017     Accelerated hypertension      Chronic retention of urine      Acute nonintractable headache, unspecified headache type      Acute cerebral infarction 11/14/2017     Chest pain 09/14/2016     Gastroesophageal reflux disease without esophagitis 09/14/2016     Suprapubic catheter 09/14/2016     Leukocytosis 09/14/2016     Heartburn 09/14/2016     Underweight due to inadequate caloric intake 07/28/2015     Chronic atrial fibrillation      Muscle Weakness Generalized        Current Outpatient Prescriptions   Medication Sig Dispense Refill     acetaminophen (TYLENOL) 500 MG tablet Take 1 tablet (500 mg total) by mouth every 6 (six) hours as needed for pain. 30 tablet 5     digoxin (LANOXIN) 125 mcg tablet Take 1 tablet (125 mcg total) by mouth daily. 90 tablet 1     finasteride (PROSCAR) 5 mg tablet Take 1 tablet (5 mg total) by mouth daily. 30 tablet 6     loratadine (ALLERGY RELIEF, LORATADINE,) 10 mg tablet TAKE ONE TABLET BY MOUTH ONCE DAILY (Patient taking differently: Take 10 mg by mouth daily. TAKE ONE TABLET BY MOUTH ONCE DAILY) 30 tablet 5     multivitamin  "(ONE A DAY) per tablet TAKE ONE TABLET BY MOUTH ONCE DAILY 90 tablet 3     traMADol (ULTRAM) 50 mg tablet TAKE ONE TABLET BY MOUTH TWICE DAILY AS NEEDED FOR PAIN 60 tablet 0     VENTOLIN HFA 90 mcg/actuation inhaler INHALE ONE PUFF BY MOUTH EVERY 6 HOURS AS NEEDED FOR WHEEZING 18 each 4     aspirin 325 MG EC tablet Take 1 tablet (325 mg total) by mouth daily with breakfast. 60 tablet 0     food supplemt, lactose-reduced (ENSURE) Liqd 1 can po qd 30 Can 5     hydrocortisone 2.5 % cream Apply to affected area 1-2 times daily as needed 30 g 2     oxybutynin (DITROPAN XL) 10 MG ER tablet Take 1 tablet (10 mg total) by mouth daily. 30 tablet 3     No current facility-administered medications for this visit.        ROS:   10 point review of systems negative other than as outlined above    Objective:    /74 (Patient Site: Right Arm, Patient Position: Sitting, Cuff Size: Adult Large)  Pulse (!) 55  Temp 97.2  F (36.2  C) (Oral)   Resp 20  Ht 5' 4\" (1.626 m)  Wt 118 lb (53.5 kg)  SpO2 96% Comment: at rest with room air  BMI 20.25 kg/m2  Body mass index is 20.25 kg/(m^2).      General appearance no acute distress    Vital signs were stable blood pressure looks good O2 sat 96%  HEENT: Intraocular lenses bilaterally  Heart was irregularly irregular rate in the 55-65 range.    Abdomen is soft bowel sounds normal    Extremities without edema    Assessment:  1. Anorexia  food supplemt, lactose-reduced (ENSURE) Liqd   2. Acute cerebral infarction  aspirin 325 MG EC tablet   3. Suprapubic catheter  oxybutynin (DITROPAN XL) 10 MG ER tablet   4. Decreased vision  Ambulatory referral to Ophthalmology     Anorexia will prescribe food supplement with the Ensure liquid, generic 1 can daily    Refill on aspirin and oxybutynin    Referral to ophthalmology regarding decreased vision.    Plan: As above    This transcription uses voice recognition software, which may contain typographical errors.  "

## 2021-06-15 NOTE — TELEPHONE ENCOUNTER
INR result is  2.5  INR   Date Value Ref Range Status   02/15/2021 2.60 (!) 0.90 - 1.10 Final       Will the patient be seen, or did they already see, MD or CNP today? No    Most Recent Warfarin dose day/week  Sunday Monday Tuesday Wednesday Thursday Friday Saturday    3 2 2 3 2 2     Sunday Monday Tuesday Wednesday Thursday Friday Saturday   2             Has the patient missed any doses of Coumadin, Warfarin, Jantoven in the past 7 days? No    Has the patients medications changed since the last visit? No    Has the patient experienced any bleeding recently? No    Has the patient experienced any injuries or illness recently? No    Has the patient experienced any 'new' shortness of breath, severe headaches, or changes in vision recently? No    Has the patient had any changes in their diet, or alcohol consumption? No    Is the patient here today to prepare for any type of upcoming surgery, procedure, or for a cardioversion procedure? No    What phone number can we reach the patient at today? Neelima

## 2021-06-15 NOTE — TELEPHONE ENCOUNTER
ANTICOAGULATION  MANAGEMENT- Home Care/Care Facility Result    Assessment     Today's INR result of 2.5 is Therapeutic (goal INR of 2.0-3.0)        Warfarin taken as previously instructed    No new diet changes affecting INR    No new medication/supplements affecting INR    Continues to tolerate warfarin with no reported s/s of bleeding or thromboembolism     Previous INR was Therapeutic    Plan:     Spoke with Neelima BARILLAS discussed INR result and instructed:     Warfarin Dosing Instructions: Continue current warfarin dose    3 mg every Mon, Thu; 2 mg all other days       Next INR to be drawn: 1 month    Education provided: target INR goal and significance of current INR result, importance of notifying clinic for changes in medications, importance of notifying clinic for diarrhea, nausea/vomiting, reduced intake and/or illness and importance of notifying clinic of upcoming surgeries and procedures 2 weeks in advance    Neelima verbalizes understanding and agrees to warfarin dosing plan.   ?   Radhika Crooks RN    Subjective/Objective:      Benny Carrillo, a 89 y.o. male is established on warfarin.     Home care/care facility RN's report of Benny INR, recent warfarin dosing, diet changes, medication changes, and symptoms is documented below.    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

## 2021-06-15 NOTE — TELEPHONE ENCOUNTER
Called and spoke with Benny Carrillo' s granddaughter Brittanie, Message was given, Benny Carrillo's granddaughter  understood, no further questions.

## 2021-06-15 NOTE — TELEPHONE ENCOUNTER
Home care nurse requesting refills for patient. His digoxin, Tylenol, There M tablets and Protonix. He uses [facility] in [place]. They have requested these medications a week ago and still has not heard anything. Please help.

## 2021-06-16 PROBLEM — T83.510A URINARY TRACT INFECTION ASSOCIATED WITH CYSTOSTOMY CATHETER, INITIAL ENCOUNTER (H): Status: ACTIVE | Noted: 2018-02-10

## 2021-06-16 PROBLEM — K56.609 SMALL BOWEL OBSTRUCTION (H): Status: ACTIVE | Noted: 2018-12-21

## 2021-06-16 PROBLEM — R15.9 INCONTINENCE OF FECES, UNSPECIFIED FECAL INCONTINENCE TYPE: Status: ACTIVE | Noted: 2019-06-11

## 2021-06-16 PROBLEM — I48.91 ATRIAL FIBRILLATION (H): Status: ACTIVE | Noted: 2019-06-17

## 2021-06-16 PROBLEM — N28.0 RENAL INFARCT (H): Status: ACTIVE | Noted: 2018-07-22

## 2021-06-16 PROBLEM — A41.9 SEPSIS SECONDARY TO UTI (H): Status: ACTIVE | Noted: 2018-02-10

## 2021-06-16 PROBLEM — N39.0 URINARY TRACT INFECTION ASSOCIATED WITH CYSTOSTOMY CATHETER, INITIAL ENCOUNTER (H): Status: ACTIVE | Noted: 2018-02-10

## 2021-06-16 PROBLEM — J44.9 CHRONIC OBSTRUCTIVE PULMONARY DISEASE, UNSPECIFIED COPD TYPE (H): Status: ACTIVE | Noted: 2020-07-21

## 2021-06-16 PROBLEM — N10 ACUTE PYELONEPHRITIS: Status: ACTIVE | Noted: 2018-02-10

## 2021-06-16 PROBLEM — N39.0 SEPSIS SECONDARY TO UTI (H): Status: ACTIVE | Noted: 2018-02-10

## 2021-06-16 PROBLEM — I69.30 HISTORY OF ISCHEMIC CEREBROVASCULAR ACCIDENT (CVA) WITH RESIDUAL DEFICIT: Status: ACTIVE | Noted: 2017-11-20

## 2021-06-16 PROBLEM — I10 BENIGN ESSENTIAL HYPERTENSION: Status: ACTIVE | Noted: 2019-09-09

## 2021-06-16 PROBLEM — I63.9 ACUTE CEREBRAL INFARCTION (H): Status: ACTIVE | Noted: 2017-11-14

## 2021-06-16 PROBLEM — K56.609 SBO (SMALL BOWEL OBSTRUCTION) (H): Status: ACTIVE | Noted: 2019-05-27

## 2021-06-16 NOTE — TELEPHONE ENCOUNTER
Reason for Call:  Home Health Care    dayana with [facility] called regarding (reason for call):     Orders are needed for this patient.     PT:     OT:     Skilled Nursin time a week x 9    Pt Provider: dr joselin rashid    Phone Number Homecare Nurse can be reached at: [phone]    Can we leave a detailed message on this number? Yes     Phone number patient can be reached at: Other phone number:  [phone]*    Best Time: any    Call taken on 3/31/2021 at 9:21 AM by Vicky Hardin

## 2021-06-16 NOTE — TELEPHONE ENCOUNTER
Refill Approved    Rx renewed per Medication Renewal Policy. Medication was last renewed on 1/13/21.    Juan Jose Barker, [facility] Connection Triage/Med Refill 4/23/2021     Requested Prescriptions   Pending Prescriptions Disp Refills     famotidine (PEPCID) 20 MG tablet [Pharmacy Med Name: Famotidine 20 MG Oral Tablet] 30 tablet 0     Sig: Take 1 tablet by mouth once daily       GI Medications Refill Protocol Passed - 4/22/2021  9:40 AM        Passed - PCP or prescribing provider visit in last 12 or next 3 months.     Last office visit with prescriber/PCP: 1/9/2020 Alcides Castillo MD OR same dept: Visit date not found OR same specialty: 1/9/2020 Alcides Castillo MD  Last physical: Visit date not found Last MTM visit: Visit date not found   Next visit within 3 mo: Visit date not found  Next physical within 3 mo: Visit date not found  Prescriber OR PCP: Alcides Castillo MD  Last diagnosis associated with med order: 1. Gastroesophageal reflux disease without esophagitis  - famotidine (PEPCID) 20 MG tablet [Pharmacy Med Name: Famotidine 20 MG Oral Tablet]; Take 1 tablet by mouth once daily  Dispense: 30 tablet; Refill: 0    If protocol passes may refill for 12 months if within 3 months of last provider visit (or a total of 15 months).

## 2021-06-16 NOTE — TELEPHONE ENCOUNTER
INR result is 2.2  INR   Date Value Ref Range Status   04/05/2021 2.90 (!) 0.90 - 1.10 Final       Will the patient be seen, or did they already see, MD or CNP today? No    Most Recent Warfarin dose day/week  Sunday Monday Tuesday Wednesday Thursday Friday Saturday    3 2 2 3 2 2     Sunday Monday Tuesday Wednesday Thursday Friday Saturday   2             Has the patient missed any doses of Coumadin, Warfarin, Jantoven in the past 7 days? No    Has the patients medications changed since the last visit? No    Has the patient experienced any bleeding recently? No    Has the patient experienced any injuries or illness recently? No    Has the patient experienced any 'new' shortness of breath, severe headaches, or changes in vision recently? No    Has the patient had any changes in their diet, or alcohol consumption? No    Is the patient here today to prepare for any type of upcoming surgery, procedure, or for a cardioversion procedure? No    What phone number can we reach the patient at today? Neelima

## 2021-06-16 NOTE — TELEPHONE ENCOUNTER
INR result is   1.3    Will the patient be seen, or did they already see, MD or CNP today? No    Most Recent Warfarin dose day/week  Sunday Monday Tuesday Wednesday Thursday Friday Saturday    3 2 2 3 2 2     Sunday Monday Tuesday Wednesday Thursday Friday Saturday   2             Has the patient missed any doses of Coumadin, Warfarin, Jantoven in the past 7 days? No    Has the patients medications changed since the last visit? No    Has the patient experienced any bleeding recently? No    Has the patient experienced any injuries or illness recently? No    Has the patient experienced any 'new' shortness of breath, severe headaches, or changes in vision recently? No    Has the patient had any changes in their diet, or alcohol consumption? No    Is the patient here today to prepare for any type of upcoming surgery, procedure, or for a cardioversion procedure? No    What phone number can we reach the patient at today? [phone]

## 2021-06-16 NOTE — TELEPHONE ENCOUNTER
ANTICOAGULATION  MANAGEMENT- Home Care/Care Facility Result    Assessment     Today's INR result of 2.9 is Therapeutic (goal INR of 2.0-3.0)        Warfarin taken as previously instructed    No new diet changes affecting INR    No new medication/supplements affecting INR    Continues to tolerate warfarin with no reported s/s of bleeding or thromboembolism     Previous INR was Subtherapeutic and boosted monday    Plan:     Spoke with nurse Neelima discussed INR result and instructed:     Warfarin Dosing Instructions: Continue current warfarin dose 3 mg daily on mon/thu; and 2 mg daily rest of week      Next INR to be drawn: one week with home care      Neelima verbalizes understanding and agrees to warfarin dosing plan.   ?   Perlita Brown RN    Subjective/Objective:      Benny Carrillo, a 89 y.o. male is established on warfarin.     Home care/care facility RN's report of Benny INR, recent warfarin dosing, diet changes, medication changes, and symptoms is documented below.    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

## 2021-06-16 NOTE — TELEPHONE ENCOUNTER
ANTICOAGULATION  MANAGEMENT- Home Care/Care Facility Result    Assessment     Today's INR result of 2.2 is Therapeutic (goal INR of 2.0-3.0)        Warfarin taken as previously instructed    No new diet changes affecting INR    No new medication/supplements affecting INR    Continues to tolerate warfarin with no reported s/s of bleeding or thromboembolism     Previous INR was Therapeutic    Plan:     Spoke with Neelima discussed INR result and instructed:     Warfarin Dosing Instructions: Continue current warfarin dose 3 mg daily on Mondays and Thursdays; and 2 mg daily rest of week  (0 % change)    Next INR to be drawn: two weeks.    Education provided: importance of therapeutic range and target INR goal and significance of current INR result    Neelima verbalizes understanding and agrees to warfarin dosing plan.   ?   Sol Coy RN    Subjective/Objective:      Benny Carrillo, a 89 y.o. male is established on warfarin.     Home care/care facility RN's report of Benny INR, recent warfarin dosing, diet changes, medication changes, and symptoms is documented below.    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

## 2021-06-16 NOTE — TELEPHONE ENCOUNTER
Telephone Encounter by Clair Gonzalez RN at 2/3/2020 11:16 AM     Author: Clair Gonzalez RN Service: -- Author Type: Registered Nurse    Filed: 2/3/2020 11:23 AM Encounter Date: 2/3/2020 Status: Attested    : Clair Gonzalez RN (Registered Nurse) Cosigner: Fern Alejandro DO at 2/3/2020 12:50 PM    Attestation signed by Fern Alejandro DO at 2/3/2020 12:50 PM    agree                ANTICOAGULATION  MANAGEMENT- Home Care/Care Facility Result    Assessment     Today's INR result of 2.8 is Therapeutic (goal INR of 2.0-3.0)        Warfarin taken as previously instructed    No new diet changes affecting INR    No new medication/supplements affecting INR    Continues to tolerate warfarin with no reported s/s of bleeding or thromboembolism     Previous INR was Therapeutic    Plan:     Spoke with Home Care Nurse Tamica discussed INR result and instructed:     Warfarin Dosing Instructions: Continue current warfarin dose    2 mg every Mon, Wed, Fri; 2.5 mg all other days     (0 % change)    Next INR to be drawn: 2 weeks    Education provided: importance of therapeutic range    Tamica verbalizes understanding and agrees to warfarin dosing plan.   ?   Clair Gonzalez RN    Subjective/Objective:      Benny Carrillo, a 89 y.o. male is established on warfarin.     Home care/care facility RN's report of Benny INR, recent warfarin dosing, diet changes, medication changes, and symptoms is documented below.    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

## 2021-06-16 NOTE — TELEPHONE ENCOUNTER
Telephone Encounter by Bari Grewal RN at 3/16/2020  8:38 AM     Author: Bari Grewal RN Service: -- Author Type: RN, Care Manager    Filed: 3/16/2020  8:39 AM Encounter Date: 3/16/2020 Status: Attested    : Bari Grewal RN (RN, Care Manager) Cosigner: Jaye Christianson MD at 3/16/2020  5:43 PM    Attestation signed by Jaye Christianson MD at 3/16/2020  5:43 PM    Noted                ANTICOAGULATION  MANAGEMENT- Home Care/Care Facility Result    Assessment     Today's INR result of 2.2 is Therapeutic (goal INR of 2.0-3.0)        Warfarin taken as previously instructed    No new diet changes affecting INR    No new medication/supplements affecting INR    Continues to tolerate warfarin with no reported s/s of bleeding or thromboembolism     Previous INR was Therapeutic    Plan:     Spoke with home care nurse Neelima discussed INR result and instructed:     Warfarin Dosing Instructions: Continue current warfarin dose    2 mg every Mon, Wed, Fri; 2.5 mg all other days         Next INR to be drawn: 4 weeks.    Education provided: importance of taking warfarin as instructed    Neelima verbalizes understanding and agrees to warfarin dosing plan.   ?   Bari Grewal RN    Subjective/Objective:      Benny Carrillo, a 89 y.o. male is established on warfarin.     Home care/care facility RN's report of Benny INR, recent warfarin dosing, diet changes, medication changes, and symptoms is documented below.    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

## 2021-06-16 NOTE — PROGRESS NOTES
Subjective: This patient comes in for hospital discharge follow-up.  Patient was hospitalized for UTI/pyelonephritis he was treated with IV Rocephin and then Omnicef.  Blood cultures were negative but the urine culture did show E. coli as well as Serratia marcescens.  Was sensitive to the medication.    Chest x-ray was negative    His white count initially 13,400 now down to 10,700 at discharge BNP was 201 he did get some Lasix in the hospital because they thought he got fluid overload from the IV fluids.  He had normal renal function.    There is no shortness of breath his chest x-ray looked clear.    He has no shortness of breath now O2 sat was 93%    He has no fever no abdominal pain is eating okay the urine is flowing fine from the suprapubic catheter.    No additional concerns no leg swelling redness or warmth.    Tobacco status: He  reports that he has never smoked. He has never used smokeless tobacco.    Patient Active Problem List    Diagnosis Date Noted     Acute pulmonary edema      Acute pyelonephritis 02/10/2018     UTI (urinary tract infection) 02/10/2018     Sepsis secondary to UTI 02/10/2018     Fever in other diseases      Moderate malnutrition      History of ischemic cerebrovascular accident (CVA) with residual deficit 11/20/2017     Accelerated hypertension      Chronic retention of urine      Acute nonintractable headache, unspecified headache type      Acute cerebral infarction 11/14/2017     Chest pain 09/14/2016     Gastroesophageal reflux disease without esophagitis 09/14/2016     Suprapubic catheter 09/14/2016     Leukocytosis, unspecified type 09/14/2016     Heartburn 09/14/2016     Underweight due to inadequate caloric intake 07/28/2015     Chronic atrial fibrillation      Muscle Weakness Generalized        Current Outpatient Prescriptions   Medication Sig Dispense Refill     acetaminophen (TYLENOL) 500 MG tablet Take 1 tablet (500 mg total) by mouth every 6 (six) hours as needed for pain.  30 tablet 5     albuterol (PROAIR HFA;PROVENTIL HFA;VENTOLIN HFA) 90 mcg/actuation inhaler Inhale 2 puffs every 6 (six) hours as needed for wheezing. 1 Inhaler 6     aspirin 325 MG EC tablet Take 1 tablet (325 mg total) by mouth daily with breakfast. 60 tablet 3     digoxin (LANOXIN) 125 mcg tablet Take 1 tablet (125 mcg total) by mouth daily. 90 tablet 1     finasteride (PROSCAR) 5 mg tablet Take 1 tablet (5 mg total) by mouth daily. 30 tablet 6     food supplemt, lactose-reduced (ENSURE) Liqd 1 can po qd 30 Can 5     hydrocortisone 2.5 % cream Apply to affected area 1-2 times daily as needed 30 g 2     multivitamin with minerals (THERA-M) 9 mg iron-400 mcg Tab tablet Take 1 tablet by mouth daily. 30 tablet 3     oxybutynin (DITROPAN XL) 10 MG ER tablet Take 1 tablet (10 mg total) by mouth daily. 30 tablet 3     No current facility-administered medications for this visit.        ROS:   10 point review of systems negative other than as outlined above    Objective:    /78 (Patient Site: Left Arm, Patient Position: Sitting, Cuff Size: Adult Regular)  Pulse 80  Temp 96.9  F (36.1  C) (Oral)   Resp 24  Wt 121 lb (54.9 kg)  SpO2 93% Comment: at rest with room air  BMI 20.14 kg/m2  Body mass index is 20.14 kg/(m^2).      General appearance no acute distress    Vital signs are stable afebrile O2 sat as outlined    Neck negative no adenopathy oropharynx clear    Eyes without scleral icterus    Lungs are clear no rales or rhonchi, heart was irregularly irregular as before rate in the 80s    Abdomen soft nontender suprapubic catheter is noted    Leg bag noted    No edema    Skin was normal no rashes.    Multiple labs and cultures as outlined below reviewed.  Chest x-ray also reviewed.        Results for orders placed or performed during the hospital encounter of 02/10/18   Influenza A/B Rapid Test   Result Value Ref Range    Influenza  A, Rapid Antigen No Influenza A antigen detected No Influenza A antigen  detected    Influenza B, Rapid Antigen No Influenza B antigen detected No Influenza B antigen detected   Culture, Urine   Result Value Ref Range    Culture >100,000 col/ml Escherichia coli (!)     Culture >100,000 col/ml Serratia marcescens (!)        Susceptibility    Escherichia coli - JACLYN     Amoxicillin + Clavulanate <=4/2 Sensitive      Ampicillin <=4 Sensitive      Cefazolin 2 Sensitive      Cefepime <=1 Sensitive      Ceftriaxone <=1 Sensitive      Ciprofloxacin <=0.5 Sensitive      Gentamicin <=2 Sensitive      Levofloxacin <=1 Sensitive      Meropenem <=0.25 Sensitive      Nitrofurantoin <=16 Sensitive      Tetracycline <=2 Sensitive      Tobramycin <=2 Sensitive      Trimethoprim + Sulfamethoxazole <=0.5 Sensitive     Serratia marcescens - JACLYN     Amoxicillin + Clavulanate >16/8 Resistant      Ampicillin >16 Resistant      Cefazolin >16 Resistant      Cefepime <=1 Sensitive      Ceftriaxone <=1 Sensitive      Ciprofloxacin <=0.5 Sensitive      Gentamicin <=2 Sensitive      Levofloxacin <=1 Sensitive      Meropenem <=0.25 Sensitive      Nitrofurantoin >64 Resistant      Tetracycline >8 Resistant      Tobramycin <=2 Sensitive    Basic Metabolic Panel   Result Value Ref Range    Sodium 139 136 - 145 mmol/L    Potassium 4.5 3.5 - 5.0 mmol/L    Chloride 103 98 - 107 mmol/L    CO2 28 22 - 31 mmol/L    Anion Gap, Calculation 8 5 - 18 mmol/L    Glucose 111 70 - 125 mg/dL    Calcium 8.9 8.5 - 10.5 mg/dL    BUN 12 8 - 28 mg/dL    Creatinine 1.05 0.70 - 1.30 mg/dL    GFR MDRD Af Amer >60 >60 mL/min/1.73m2    GFR MDRD Non Af Amer >60 >60 mL/min/1.73m2   Hepatic Profile   Result Value Ref Range    Bilirubin, Total 0.7 0.0 - 1.0 mg/dL    Bilirubin, Direct 0.2 <=0.5 mg/dL    Protein, Total 8.2 (H) 6.0 - 8.0 g/dL    Albumin 3.6 3.5 - 5.0 g/dL    Alkaline Phosphatase 71 45 - 120 U/L    AST 17 0 - 40 U/L    ALT <9 0 - 45 U/L   Urinalysis   Result Value Ref Range    Color, UA Yellow Colorless, Yellow, Straw, Light Yellow     Clarity, UA Cloudy (!) Clear    Glucose, UA Negative Negative    Bilirubin, UA Negative Negative    Ketones, UA Negative Negative, 60 mg/dL    Specific Gravity, UA 1.014 1.001 - 1.030    Blood, UA Small (!) Negative    pH, UA 7.0 4.5 - 8.0    Protein, UA 50 mg/dL (!) Negative mg/dL    Urobilinogen, UA <2.0 E.U./dL <2.0 E.U./dL, 2.0 E.U./dL    Nitrite, UA Positive (!) Negative    Leukocytes, UA Large (!) Negative    Bacteria, UA Many (!) None Seen hpf    RBC, UA 10-25 (!) None Seen, 0-2 hpf    WBC, UA 25-50 (!) None Seen, 0-5 hpf    Squam Epithel, UA 10-25 (!) None Seen, 0-5 lpf    Mucus, UA Few (!) None Seen lpf   INR   Result Value Ref Range    INR 1.06 0.90 - 1.10   APTT   Result Value Ref Range    PTT 28 24 - 37 seconds   CK   Result Value Ref Range    CK, Total 74 30 - 190 U/L   HM1 (CBC with Diff)   Result Value Ref Range    WBC 13.4 (H) 4.0 - 11.0 thou/uL    RBC 4.31 (L) 4.40 - 6.20 mill/uL    Hemoglobin 13.8 (L) 14.0 - 18.0 g/dL    Hematocrit 42.7 40.0 - 54.0 %    MCV 99 80 - 100 fL    MCH 32.0 27.0 - 34.0 pg    MCHC 32.3 32.0 - 36.0 g/dL    RDW 13.5 11.0 - 14.5 %    Platelets 173 140 - 440 thou/uL    MPV 10.9 8.5 - 12.5 fL    Neutrophils % 85 (H) 50 - 70 %    Lymphocytes % 7 (L) 20 - 40 %    Monocytes % 7 2 - 10 %    Eosinophils % 1 0 - 6 %    Basophils % 0 0 - 2 %    Neutrophils Absolute 11.3 (H) 2.0 - 7.7 thou/uL    Lymphocytes Absolute 0.9 0.8 - 4.4 thou/uL    Monocytes Absolute 0.9 0.0 - 0.9 thou/uL    Eosinophils Absolute 0.1 0.0 - 0.4 thou/uL    Basophils Absolute 0.1 0.0 - 0.2 thou/uL   Lactic Acid   Result Value Ref Range    Lactic Acid 1.8 0.5 - 2.2 mmol/L   Blood Culture 1st   Result Value Ref Range    Anaerobic Blood Culture Bottle No Growth No Growth, No organisms seen, bottle returned to instrument, Specimen not received    Aerobic Blood Culture Bottle No Growth No Growth, No organisms seen, bottle returned to instrument, Specimen not received   HM2(CBC w/o Differential)   Result Value Ref Range     WBC 11.2 (H) 4.0 - 11.0 thou/uL    RBC 4.18 (L) 4.40 - 6.20 mill/uL    Hemoglobin 13.5 (L) 14.0 - 18.0 g/dL    Hematocrit 42.3 40.0 - 54.0 %     (H) 80 - 100 fL    MCH 32.3 27.0 - 34.0 pg    MCHC 31.9 (L) 32.0 - 36.0 g/dL    RDW 13.9 11.0 - 14.5 %    Platelets 161 140 - 440 thou/uL    MPV 10.8 8.5 - 12.5 fL   Basic Metabolic Panel   Result Value Ref Range    Sodium 142 136 - 145 mmol/L    Potassium 4.4 3.5 - 5.0 mmol/L    Chloride 108 (H) 98 - 107 mmol/L    CO2 24 22 - 31 mmol/L    Anion Gap, Calculation 10 5 - 18 mmol/L    Glucose 89 70 - 125 mg/dL    Calcium 8.4 (L) 8.5 - 10.5 mg/dL    BUN 11 8 - 28 mg/dL    Creatinine 1.01 0.70 - 1.30 mg/dL    GFR MDRD Af Amer >60 >60 mL/min/1.73m2    GFR MDRD Non Af Amer >60 >60 mL/min/1.73m2   BNP(B-type Natriuretic Peptide)   Result Value Ref Range     (H) 0 - 93 pg/mL   HM2(CBC w/o Differential)   Result Value Ref Range    WBC 10.7 4.0 - 11.0 thou/uL    RBC 4.30 (L) 4.40 - 6.20 mill/uL    Hemoglobin 13.8 (L) 14.0 - 18.0 g/dL    Hematocrit 42.5 40.0 - 54.0 %    MCV 99 80 - 100 fL    MCH 32.1 27.0 - 34.0 pg    MCHC 32.5 32.0 - 36.0 g/dL    RDW 13.8 11.0 - 14.5 %    Platelets 171 140 - 440 thou/uL    MPV 10.7 8.5 - 12.5 fL       Assessment:  1. Hospital discharge follow-up     2. Muscle Weakness Generalized  acetaminophen (TYLENOL) 500 MG tablet   3. Acute cerebral infarction  aspirin 325 MG EC tablet   4. Chronic atrial fibrillation  digoxin (LANOXIN) 125 mcg tablet   5. Suprapubic catheter  finasteride (PROSCAR) 5 mg tablet    oxybutynin (DITROPAN XL) 10 MG ER tablet   6. Anorexia  food supplemt, lactose-reduced (ENSURE) Liqd   7. Hemorrhoid  hydrocortisone 2.5 % cream   8. UTI (urinary tract infection)       Hospital discharge follow-up.  He had some generalized muscle weakness previous infarct  UTI treated as outlined improved    Chronic A. fib on Lanoxin.    Anorexia refill the Ensure    He said previous hemorrhage wanted refill on hydrocortisone.    Please  see labs and x-rays no evidence of any fluid overload, no indication for ongoing Lasix.      Plan: We will up on a as needed basis    This transcription uses voice recognition software, which may contain typographical errors.

## 2021-06-16 NOTE — PROGRESS NOTES
2nd attempted:  Mailed: POC Sig page and PCA Sig page to member with a stamped  return envelope.    Delano Wood  Care Management Specialist  [facility]  [phone]

## 2021-06-16 NOTE — TELEPHONE ENCOUNTER
INR result is 2.9  INR   Date Value Ref Range Status   03/29/2021 1.30 (!) 0.90 - 1.10 Final       Will the patient be seen, or did they already see, MD or CNP today? No    Most Recent Warfarin dose day/week  Sunday Monday Tuesday Wednesday Thursday Friday Saturday    6 mg 2 mg 2 mg 3 mg 2 mg 2 mg     Sunday Monday Tuesday Wednesday Thursday Friday Saturday   2  mg             Has the patient missed any doses of Coumadin, Warfarin, Jantoven in the past 7 days? No    Has the patients medications changed since the last visit? No    Has the patient experienced any bleeding recently? No    Has the patient experienced any injuries or illness recently? No    Has the patient experienced any 'new' shortness of breath, severe headaches, or changes in vision recently? No    Has the patient had any changes in their diet, or alcohol consumption? No    Is the patient here today to prepare for any type of upcoming surgery, procedure, or for a cardioversion procedure? No    What phone number can we reach the patient at today? [phone] [place]

## 2021-06-16 NOTE — TELEPHONE ENCOUNTER
Telephone Encounter by Maribel Whitaker RN at 7/1/2019  8:26 AM     Author: Maribel Whitaker RN Service: -- Author Type: Registered Nurse    Filed: 7/1/2019  9:38 AM Encounter Date: 7/1/2019 Status: Attested Addendum    : Maribel Whitaker RN (Registered Nurse)    Related Notes: Original Note by Maribel Whitaker RN (Registered Nurse) filed at 7/1/2019  8:27 AM    Cosigner: Alcides Castillo MD at 7/1/2019 10:28 AM    Attestation signed by Alcides Castillo MD at 7/1/2019 10:28 AM    Yes I agree                Good morning Dr. Castillo,    Do you agree with telephone orders as outlined on Benny Carrillo?  I will be faxing orders to [facility], as requested.  Thank you.    Danay Whitaker, RN,BSN, PHN

## 2021-06-17 NOTE — TELEPHONE ENCOUNTER
Telephone Encounter by Bari Grewal RN at 11/2/2020  8:53 AM     Author: Bari Grewal RN Service: -- Author Type: RN, Care Manager    Filed: 11/2/2020  8:54 AM Encounter Date: 11/2/2020 Status: Signed    : Bari Grewal RN (RN, Care Manager)       ANTICOAGULATION  MANAGEMENT- Home Care/Care Facility Result    Assessment     Today's INR result of 1.7 is Subtherapeutic (goal INR of 2.0-3.0)        Warfarin taken as previously instructed    No new diet changes affecting INR    No new medication/supplements affecting INR    Continues to tolerate warfarin with no reported s/s of bleeding or thromboembolism     Previous INR was Therapeutic    Plan:     Spoke with home care nurse Neelima discussed INR result and instructed:     Warfarin Dosing Instructions: Change warfarin dose to 3 mg daily on Mon; and 2 mg daily rest of week  (7 % change)    Next INR to be drawn: 1 week.    Education provided: importance of following up for INR monitoring at instructed interval and importance of taking warfarin as instructed    Neelima verbalizes understanding and agrees to warfarin dosing plan.   ?   Bari Grewal RN    Subjective/Objective:      Benny Carrillo, a 89 y.o. male is established on warfarin.     Home care/care facility RN's report of Benny INR, recent warfarin dosing, diet changes, medication changes, and symptoms is documented below.    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

## 2021-06-17 NOTE — TELEPHONE ENCOUNTER
Telephone Encounter by Sol Coy RN at 5/24/2021  2:50 PM     Author: Sol Coy RN Service: -- Author Type: Registered Nurse    Filed: 5/24/2021  2:52 PM Encounter Date: 5/24/2021 Status: Signed    : Sol Coy RN (Registered Nurse)       ANTICOAGULATION  MANAGEMENT- Home Care/Care Facility Result    Assessment     Today's INR result of 1.3 is Subtherapeutic (goal INR of 2.0-3.0)        Warfarin taken as previously instructed    No new diet changes affecting INR    No new medication/supplements affecting INR    Continues to tolerate warfarin with no reported s/s of bleeding or thromboembolism     Previous INR was Subtherapeutic    Plan:     Spoke with Shelia discussed INR result and instructed:     Warfarin Dosing Instructions: Take 5 mg today then change warfarin dose to 3 mg daily on Mondays, Wednesdays and Fridays; and 2 mg daily rest of week  (6.3 % change)    Next INR to be drawn: 5-7 days. She will recheck in 5/28    Education provided: importance of therapeutic range, target INR goal and significance of current INR result and monitoring for clotting signs and symptoms    Shelia verbalizes understanding and agrees to warfarin dosing plan.   ?   Sol Coy RN    Subjective/Objective:      Benny Carrillo, a 89 y.o. male is established on warfarin.     Home care/care facility RN's report of Benny INR, recent warfarin dosing, diet changes, medication changes, and symptoms is documented below.    Additional findings: verbally confirmed home dose with Shelia and updated on anticoagulation calendar    Anticoagulation Episode Summary     Current INR goal:  2.0-3.0   TTR:  63.8 % (1 y)   Next INR check:  5/28/2021   INR from last check:  1.30 (5/24/2021)   Weekly max warfarin dose:     Target end date:  Indefinite   INR check location:     Preferred lab:     Send INR reminders to:  [facility]    Indications    Renal infarct (H) [N28.0]  Atrial fibrillation  (H) [I48.91]           Comments:           Anticoagulation Care Providers     Provider Role Specialty Phone number    Alcides Castillo MD Referring Family Medicine [phone]

## 2021-06-17 NOTE — TELEPHONE ENCOUNTER
Telephone Encounter by Radhika Crooks RN at 4/14/2020 12:48 PM     Author: Radhika Crooks RN Service: -- Author Type: Registered Nurse    Filed: 4/14/2020 12:50 PM Encounter Date: 4/14/2020 Status: Attested    : Radhika Crooks RN (Registered Nurse) Cosigner: Alcides Castillo MD at 4/14/2020  1:09 PM    Attestation signed by Alcides Castillo MD at 4/14/2020  1:09 PM    agree                Anticoagulation Annual Referral Renewal Review    Benny Carrillo's chart reviewed for annual renewal of referral to anticoagulation monitoring.        Criteria for anticoagulation nurse and/or pharmacist renewal met   Warfarin indication: Atrial Fibrillation and renal infarct Yes, per indication   Current with INR monitoring/compliant Yes Yes   Date of last office visit 1/9/20 Yes, had office visit within last year   Time in Therapeutic Range (TTR) 83 % Yes, TTR > 60%       Benny Carrillo met all criteria for anticoagulation management program initiated renewal.  New INR standing orders and anticoagulation referral renewal placed.      Radhika Crooks RN  12:48 PM

## 2021-06-17 NOTE — TELEPHONE ENCOUNTER
Telephone Encounter by Maribel Whitaker RN at 10/12/2020  8:42 AM     Author: Maribel Whitakre RN Service: -- Author Type: Registered Nurse    Filed: 10/12/2020  8:48 AM Encounter Date: 10/12/2020 Status: Addendum    : Maribel Whitaker RN (Registered Nurse)    Related Notes: Original Note by Maribel Whitaker RN (Registered Nurse) filed at 10/12/2020  8:48 AM       ANTICOAGULATION  MANAGEMENT- Home Care/Care Facility Result    Assessment     Today's INR result of 3.70 is Supratherapeutic (goal INR of 2.0-3.0)        Warfarin taken as previously instructed    No new diet changes affecting INR    No interaction expected between Amlodipine and warfarin    Potential interaction between Pantoprazole and warfarin which may affect subsequent INRs    - started 2 wks ago Pantropazole 40mg daily for GERD.   - per [employer], Concurrent use of PANTOPRAZOLE and WARFARIN may result in increased International Normalized Ratio (INR) and prothrombin time.     Continues to tolerate warfarin with no reported s/s of bleeding or thromboembolism     Previous INR was Therapeutic at 2.17 on 9/14/20.    RN visits 1x/wk.    Plan:     Spoke with ERAN Ortez with [facility] discussed INR result and instructed:    1.  FAXED to [facility] - [phone]    Warfarin Dosing Instructions:  (mornings. Has 1mg tabs)   - HOLD warfarin dose this morning.   - then change warfarin dose to 2.5 mg daily.   - (5.4 % change)    Next INR to be drawn:  One wk.    Education provided: target INR goal and significance of current INR result, potential interaction between warfarin and Pantoprazole  and importance of notifying clinic for changes in medications    ERAN Ortez verbalizes understanding and agrees to warfarin dosing plan.   ?   Maribel Whitaker RN    Subjective/Objective:      Benny TERRELL Gerardo, a 89 y.o. male is established on warfarin.     Home care/care facility RN's report of Benny INR, recent warfarin  dosing, diet changes, medication changes, and symptoms is documented below.    Additional findings: medication changes: as mentioned above.    Anticoagulation Episode Summary     Current INR goal:  2.0-3.0   TTR:  72.0 % (11.9 mo)   Next INR check:  10/19/2020   INR from last check:  3.70 (10/12/2020)   Weekly max warfarin dose:     Target end date:  Indefinite   INR check location:     Preferred lab:     Send INR reminders to:  [facility]    Indications    Renal infarct (H) [N28.0]  Atrial fibrillation (H) [I48.91]           Comments:           Anticoagulation Care Providers     Provider Role Specialty Phone number    Alcides Castillo MD Referring Family Medicine [phone]

## 2021-06-17 NOTE — TELEPHONE ENCOUNTER
Telephone Encounter by Sol Coy RN at 5/10/2021  9:46 AM     Author: Sol Coy RN Service: -- Author Type: Registered Nurse    Filed: 5/10/2021  9:47 AM Encounter Date: 5/10/2021 Status: Signed    : Sol Coy RN (Registered Nurse)       ANTICOAGULATION  MANAGEMENT- Home Care/Care Facility Result    Assessment     Today's INR result of 1.9 is Subtherapeutic (goal INR of 2.0-3.0)        Warfarin taken as previously instructed    No new diet changes affecting INR    No new medication/supplements affecting INR    Continues to tolerate warfarin with no reported s/s of bleeding or thromboembolism     Previous INR was Therapeutic    Plan:     Spoke with Neelima discussed INR result and instructed:     Warfarin Dosing Instructions: Continue current warfarin dose 3 mg daily on Mondays and Thursdays; and 2 mg daily rest of week  (0 % change)    Next INR to be drawn: two weeks    Education provided: importance of therapeutic range and target INR goal and significance of current INR result    Neelima verbalizes understanding and agrees to warfarin dosing plan.   ?   Sol Coy RN    Subjective/Objective:      Benny Carrillo, a 89 y.o. male is established on warfarin.     Home care/care facility RN's report of Benny INR, recent warfarin dosing, diet changes, medication changes, and symptoms is documented below.    Additional findings: verbally confirmed home dose with Neelima and updated on anticoagulation calendar    Anticoagulation Episode Summary     Current INR goal:  2.0-3.0   TTR:  65.9 % (1 y)   Next INR check:  5/24/2021   INR from last check:  1.90 (5/10/2021)   Weekly max warfarin dose:     Target end date:  Indefinite   INR check location:     Preferred lab:     Send INR reminders to:  [facility]    Indications    Renal infarct (H) [N28.0]  Atrial fibrillation (H) [I48.91]           Comments:           Anticoagulation Care Providers     Provider  Role Specialty Phone number    Alcides Castillo MD Referring Family Medicine [phone]

## 2021-06-17 NOTE — TELEPHONE ENCOUNTER
Telephone Encounter by Radhika Crooks RN at 12/14/2020  8:51 AM     Author: Radhika Crooks RN Service: -- Author Type: Registered Nurse    Filed: 12/14/2020  8:53 AM Encounter Date: 12/14/2020 Status: Signed    : Radhika Crooks RN (Registered Nurse)       ANTICOAGULATION  MANAGEMENT- Home Care/Care Facility Result    Assessment     Today's INR result of 1.7 is Subtherapeutic (goal INR of 2.0-3.0)        Warfarin taken as previously instructed    No new diet changes affecting INR    No new medication/supplements affecting INR    Continues to tolerate warfarin with no reported s/s of bleeding or thromboembolism     Previous INR was Therapeutic    Plan:     Spoke with ERAN Ortez discussed INR result and instructed:     Warfarin Dosing Instructions: Boost today Mon 12/14 take 4mg then continue current warfarin dose 3 mg daily on Mon; and 2 mg daily rest of week      Next INR to be drawn: 1 week    Education provided: target INR goal and significance of current INR result    Neelima verbalizes understanding and agrees to warfarin dosing plan.   ?   Radhika Crooks RN    Subjective/Objective:      Benny Carrillo, a 89 y.o. male is established on warfarin.     Home care/care facility RN's report of Benny INR, recent warfarin dosing, diet changes, medication changes, and symptoms is documented below.    Additional findings: none    Anticoagulation Episode Summary     Current INR goal:  2.0-3.0   TTR:  71.2 % (1 y)   Next INR check:  12/21/2020   INR from last check:  1.70 (12/14/2020)   Weekly max warfarin dose:     Target end date:  Indefinite   INR check location:     Preferred lab:     Send INR reminders to:  [facility]    Indications    Renal infarct (H) [N28.0]  Atrial fibrillation (H) [I48.91]           Comments:           Anticoagulation Care Providers     Provider Role Specialty Phone number    Alcides Castillo MD Referring Family Medicine [phone]

## 2021-06-17 NOTE — TELEPHONE ENCOUNTER
ANTICOAGULATION  MANAGEMENT- Home Care/Care Facility Result    Assessment     Today's INR result of 2.2 is Therapeutic (goal INR of 2.0-3.0)        Warfarin taken as previously instructed    No new diet changes affecting INR    No new medication/supplements affecting INR    Continues to tolerate warfarin with no reported s/s of bleeding or thromboembolism     Previous INR was Therapeutic    Plan:     Spoke with Neelima discussed INR result and instructed:     Warfarin Dosing Instructions: Continue current warfarin dose 3 mg daily on Mondays and Thursdays; and 2 mg daily rest of week  (0 % change)    Next INR to be drawn: 1-2 weeks pending if any new medications started.     Education provided: importance of therapeutic range and target INR goal and significance of current INR result    Neelima verbalizes understanding and agrees to warfarin dosing plan.   ?   Sol Coy RN    Subjective/Objective:      Benny Carrillo, a 89 y.o. male is established on warfarin.     Home care/care facility RN's report of Benny INR, recent warfarin dosing, diet changes, medication changes, and symptoms is documented below.    Additional findings: He has a itching white looking rash on his back. Nurse not sure if fungal.     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

## 2021-06-17 NOTE — PROGRESS NOTES
[facility] Care Coordination    Signature pages received and filed.  Sent PCA Signature page to provider and HP.    Clair Medeiros  Care Management Specialist  [facility]  [phone]

## 2021-06-17 NOTE — TELEPHONE ENCOUNTER
INR result is  2.2  INR   Date Value Ref Range Status   04/12/2021 2.20 (!) 0.90 - 1.10 Final       Will the patient be seen, or did they already see, MD or CNP today? No    Most Recent Warfarin dose day/week  Sunday Monday Tuesday Wednesday Thursday Friday Saturday    3.0 2.0 2.0 3.0 2.0 2.0     Sunday Monday Tuesday Wednesday Thursday Friday Saturday   2.0             Has the patient missed any doses of Coumadin, Warfarin, Jantoven in the past 7 days? No    Has the patients medications changed since the last visit? No    Has the patient experienced any bleeding recently? No    Has the patient experienced any injuries or illness recently? No    Has the patient experienced any 'new' shortness of breath, severe headaches, or changes in vision recently? No    Has the patient had any changes in their diet, or alcohol consumption? No    Is the patient here today to prepare for any type of upcoming surgery, procedure, or for a cardioversion procedure? No    What phone number can we reach the patient at today?  [facility] [phone]

## 2021-06-17 NOTE — TELEPHONE ENCOUNTER
Telephone Encounter by Sol Coy RN at 3/29/2021  9:59 AM     Author: Sol Coy RN Service: -- Author Type: Registered Nurse    Filed: 3/29/2021 10:00 AM Encounter Date: 3/29/2021 Status: Signed    : Sol Coy RN (Registered Nurse)       ANTICOAGULATION  MANAGEMENT- Home Care/Care Facility Result    Assessment     Today's INR result of 1.3 is Subtherapeutic (goal INR of 2.0-3.0)        Warfarin taken as previously instructed    No new diet changes affecting INR    No new medication/supplements affecting INR    Continues to tolerate warfarin with no reported s/s of bleeding or thromboembolism     Previous INR was Therapeutic    Plan:     Spoke with Neelima discussed INR result and instructed:     Warfarin Dosing Instructions: Take 6 mg today then continue current warfarin dose 3 mg daily on Mondays and Thursdays; and 2 mg daily rest of week  (0 % change)    Next INR to be drawn: 5-7 days    Education provided: importance of therapeutic range and target INR goal and significance of current INR result    Neelima verbalizes understanding and agrees to warfarin dosing plan.   ?   Sol Coy RN    Subjective/Objective:      Benny Carrillo, a 89 y.o. male is established on warfarin.     Home care/care facility RN's report of Benny INR, recent warfarin dosing, diet changes, medication changes, and symptoms is documented below.    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

## 2021-06-17 NOTE — TELEPHONE ENCOUNTER
Telephone Encounter by Maribel Whitaker RN at 10/19/2020  8:48 AM     Author: Maribel Whitaker RN Service: -- Author Type: Registered Nurse    Filed: 10/19/2020  9:03 AM Encounter Date: 10/19/2020 Status: Addendum    : Maribel Whitaker RN (Registered Nurse)    Related Notes: Original Note by Maribel Whitaker RN (Registered Nurse) filed at 10/19/2020  8:54 AM       ANTICOAGULATION  MANAGEMENT- Home Care/Care Facility Result    Assessment     Today's INR result of 4.00 is Supratherapeutic (goal INR of 2.0-3.0)        Warfarin recently held as instructed which may be affecting INR    - HELD warfarin dose on 10/12/20 as instructed.    No new diet changes affecting INR    No new medication/supplements affecting INR    Continues to tolerate warfarin with no reported s/s of bleeding or thromboembolism     Previous INR was Supratherapeutic at 3.70 on 10/12/20.    Plan:     Spoke with ERAN Oretz with [facility] discussed INR result and instructed:     Warfarin Dosing Instructions:   (takes @ 10a.  Verified has 1mg tabs)   - HOLD warfarin for 2 days,    - then change warfarin dose to 2 mg daily.   - (20 % change)    Next INR to be drawn:  Scheduled on 10/22/20.    Education provided: importance of consistent vitamin K intake, target INR goal and significance of current INR result and monitoring for bleeding signs and symptoms    ERAN Agarwal verbalizes understanding and agrees to warfarin dosing plan.   ?   Maribel Whitaker RN    Subjective/Objective:      Benny Carrillo, a 89 y.o. male is established on warfarin.     Home care/care facility RN's report of Benny INR, recent warfarin dosing, diet changes, medication changes, and symptoms is documented below.    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

## 2021-06-17 NOTE — TELEPHONE ENCOUNTER
INR result is 1.9   INR   Date Value Ref Range Status   04/26/2021 2.20 (!) 0.90 - 1.10 Final       Will the patient be seen, or did they already see, MD or CNP today? No    Most Recent Warfarin dose day/week  Sunday Monday Tuesday Wednesday Thursday Friday Saturday    3 2 2 3 2 2     Sunday Monday Tuesday Wednesday Thursday Friday Saturday   2             Has the patient missed any doses of Coumadin, Warfarin, Jantoven in the past 7 days? No    Has the patients medications changed since the last visit? No    Has the patient experienced any bleeding recently? No    Has the patient experienced any injuries or illness recently? No    Has the patient experienced any 'new' shortness of breath, severe headaches, or changes in vision recently? No    Has the patient had any changes in their diet, or alcohol consumption? No    Is the patient here today to prepare for any type of upcoming surgery, procedure, or for a cardioversion procedure? No    What phone number can we reach the patient at today? home phone listed in demographics.

## 2021-06-17 NOTE — TELEPHONE ENCOUNTER
INR result is  1.3  INR   Date Value Ref Range Status   05/10/2021 1.90 (!) 0.90 - 1.10 Final       Will the patient be seen, or did they already see, MD or CNP today? No    Most Recent Warfarin dose day/week  Sunday Monday Tuesday Wednesday Thursday Friday Saturday    3.0 2.0 2.0 3.0 2.0 2.0     Sunday Monday Tuesday Wednesday Thursday Friday Saturday   2.0             Has the patient missed any doses of Coumadin, Warfarin, Jantoven in the past 7 days? No    Has the patients medications changed since the last visit? No    Has the patient experienced any bleeding recently? No    Has the patient experienced any injuries or illness recently? No    Has the patient experienced any 'new' shortness of breath, severe headaches, or changes in vision recently? No    Has the patient had any changes in their diet, or alcohol consumption? No    Is the patient here today to prepare for any type of upcoming surgery, procedure, or for a cardioversion procedure? No    What phone number can we reach the patient at today?  [facility] [phone]

## 2021-06-19 NOTE — LETTER
Letter by Mónica Sommer SW at      Author: Mónica Sommer SW Service: -- Author Type: --    Filed:  Encounter Date: 6/13/2019 Status: (Other)       June 13, 2019    EDUARDO STATON  [address] [address]      Dear Eduardo,    Welcome to [facility] ([facility] [place] [facility] health program. My name is WANDA Nuñez. I am your [facility] [place] [facility] care coordinator.     I will call you soon to see how you are doing and determine what needs you may have. My job is to help connect you to services, complete an assessment, and develop a care plan with you. There is no charge to you for the care coordination and assessment services. Our goal is to keep you as healthy and independent as possible.     [facility] [place] [facility] combines the benefits you may already receive from Medical Assistance, Medicare and the Prescription Drug Coverage Program.    Soon you will receive a new [facility] [place] [facility] member identification (ID) card from [facility]. When you receive it, please use this card where you get your health services.]    If you have questions, please call me at [phone]. If you reach my voice mail, leave a message and your phone number. If you are hearing impaired, please call the [place] [employer] at 591 or [phone] (nncthm-[id number]-[id number] relay service).    Sincerely,      WANDA Nuñez    E-mail: [email or web address]   Phone: [phone]      [facility] ([facility]) is a health plan that contracts with both Medicare and the [place] Medical Assistance (Medicaid) program to provide benefits of both programs to enrollees. Enrollment in [facility] depends on contract renewal.    MSC+ [id number] DHS Approved ([id number])  [id number] (11/18)

## 2021-06-19 NOTE — PROGRESS NOTES
Subjective: Patient comes in for evaluation this 89-year-old male had a renal infarct secondary to emboli from atrial fibrillation.    His heart rates in the 60-70 range today irregularly irregular    He is on Lanoxin also on warfarin.  INR was checked yesterday at the home came back at 3.9 please see anticoagulation nurses notes.    I reviewed patient's medications he does have a suprapubic catheter and has some bladder spasms he is on some oxybutynin and finasteride these were refilled    Also he has been on tramadol for the pain.  Pain is lessened regarding the renal infarct.  I told his son that he could use the tramadol as needed but once he is done with it will just use Tylenol.  I did give him a prescription for that for pain as well    Patient had elevated blood pressure in the hospital.  This likely was in response to the renal infarct and now that he is getting better perfusion and is on the warfarin his pressure has been too low on the amlodipine.  I decreased from 10 mg half tablet down to 5 mg half tablet last visit.  Today blood pressure 102/72, so we will stop amlodipine altogether.    Patient has a visual field cut from previous CVA no new findings there.    Denies any chest pain denies any dizziness.  No additional concerns no bleeding.    Tobacco status: He  reports that he has never smoked. He has never used smokeless tobacco.    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     Acute pulmonary edema (H)      Acute pyelonephritis 02/10/2018     Urinary tract infection associated with cystostomy catheter, initial encounter (H) 02/10/2018     Sepsis secondary to UTI (H) 02/10/2018     Fever in other diseases      Moderate malnutrition (H)      History of ischemic cerebrovascular accident (CVA) with residual deficit 11/20/2017     Accelerated hypertension      Chronic retention of urine      Acute nonintractable headache,  unspecified headache type      Acute cerebral infarction (H) 11/14/2017     Chest pain 09/14/2016     Gastroesophageal reflux disease without esophagitis 09/14/2016     Suprapubic catheter (H) 09/14/2016     Leukocytosis, unspecified type 09/14/2016     Heartburn 09/14/2016     Underweight due to inadequate caloric intake 07/28/2015     Chronic atrial fibrillation (H)      Muscle Weakness Generalized        Current Outpatient Prescriptions   Medication Sig Dispense Refill     acetaminophen (TYLENOL) 500 MG tablet Take 1 tablet (500 mg total) by mouth every 6 (six) hours as needed for pain. 80 tablet 2     albuterol (PROAIR HFA;PROVENTIL HFA;VENTOLIN HFA) 90 mcg/actuation inhaler Inhale 2 puffs every 6 (six) hours as needed for wheezing. 1 Inhaler 6     digoxin (LANOXIN) 125 mcg tablet Take 1 tablet (125 mcg total) by mouth daily. 90 tablet 1     finasteride (PROSCAR) 5 mg tablet Take 1 tablet (5 mg total) by mouth daily. 30 tablet 6     guaiFENesin (ROBITUSSIN) 100 mg/5 mL syrup Take 10 mL (200 mg total) by mouth 3 (three) times a day as needed for cough. 240 mL 0     hydrocortisone 2.5 % cream Apply to affected area 1-2 times daily as needed 30 g 2     loratadine (CLARITIN) 10 mg tablet Take 10 mg by mouth daily.       multivitamin with minerals (THERA-M) 9 mg iron-400 mcg Tab tablet Take 1 tablet by mouth daily. 30 tablet 11     oxybutynin (DITROPAN XL) 10 MG ER tablet Take 1 tablet (10 mg total) by mouth daily. 30 tablet 6     warfarin (COUMADIN) 1 MG tablet Take 1 to 3 tablets (1 to 3 mg) by mouth daily. Adjust dose based on INR results as directed. 100 tablet 1     No current facility-administered medications for this visit.        ROS:   10 point review of systems negative other than as outlined above    Objective:    /72 (Patient Site: Left Arm, Patient Position: Sitting, Cuff Size: Adult Regular)  Pulse (!) 56  Resp 24  Wt 117 lb (53.1 kg)  BMI 20.08 kg/m2  Body mass index is 20.08  kg/(m^2).      General appearance no acute distress vital signs were stable weight is stable blood pressure little low though at 102/72    Heart was 60-70 irregularly irregular.    Lungs are clear throughout no rales or rhonchi    Left visual field cut unchanged    Abdomen soft nontender no guarding or rebound.    Extremities without edema, skin was normal.  No rash no significant bruising    No abdominal pain no CVA pain.      Results for orders placed or performed in visit on 08/13/18   INR   Result Value Ref Range    INR 3.90 (!) 0.9 - 1.1       Assessment:  1. Renal infarct (H)     2. Muscle Weakness Generalized  acetaminophen (TYLENOL) 500 MG tablet   3. Neck pain     4. Suprapubic catheter (H)  finasteride (PROSCAR) 5 mg tablet    oxybutynin (DITROPAN XL) 10 MG ER tablet   5. Chronic atrial fibrillation (H)       Renal infarct, history of chronic A. fib on warfarin now rate is controlled with Lanoxin.  Please see anticoagulation nurses notes    Previous CVA unchanged with left visual field cut    Hypertension no longer present can stop amlodipine.  Follow-up in 4 weeks recheck blood pressure    Refill meds for his urinary symptoms    Plan: As above regarding pain control    This transcription uses voice recognition software, which may contain typographical errors.

## 2021-06-19 NOTE — PROGRESS NOTES
Subjective: Patient comes in for hospital discharge follow-up.    Patient was hospitalized with abdominal, found that he had a left renal infarct.  Secondary to embolus.    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    No acute CVA symptoms.    He does have reflux he did have elevated blood pressure.  This was on admission.  He could be from the renal infarct with the secondary hypertension.    Now that he is anticoagulated his pressures down he has been on amlodipine 10 mg half tablet a day will decrease to 5 mg half tablet a day blood pressure today 98/64 we will recheck in 2 weeks    Previous visual field cut on the left, no new symptoms neurologically    Denies any shortness of breath or chest pain the abdominal pain is better he is having normal bowel movements    Labs showed BMP over 60 on 7/25/2018    He was hospitalized at [facility] July 22 - July 25, 2018    Tobacco status: He  reports that he has never smoked. He has never used smokeless tobacco.    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     Acute pulmonary edema (H)      Acute pyelonephritis 02/10/2018     Urinary tract infection associated with cystostomy catheter, initial encounter (H) 02/10/2018     Sepsis secondary to UTI (H) 02/10/2018     Fever in other diseases      Moderate malnutrition (H)      History of ischemic cerebrovascular accident (CVA) with residual deficit 11/20/2017     Accelerated hypertension      Chronic retention of urine      Acute nonintractable headache, unspecified headache type      Acute cerebral infarction (H) 11/14/2017     Chest pain 09/14/2016     Gastroesophageal reflux disease without esophagitis 09/14/2016     Suprapubic catheter (H) 09/14/2016     Leukocytosis, unspecified type 09/14/2016     Heartburn 09/14/2016     Underweight  due to inadequate caloric intake 07/28/2015     Chronic atrial fibrillation (H)      Muscle Weakness Generalized        Current Outpatient Prescriptions   Medication Sig Dispense Refill     acetaminophen (TYLENOL) 500 MG tablet Take 1 tablet (500 mg total) by mouth every 6 (six) hours as needed for pain. 30 tablet 5     albuterol (PROAIR HFA;PROVENTIL HFA;VENTOLIN HFA) 90 mcg/actuation inhaler Inhale 2 puffs every 6 (six) hours as needed for wheezing. 1 Inhaler 6     digoxin (LANOXIN) 125 mcg tablet Take 1 tablet (125 mcg total) by mouth daily. 90 tablet 1     finasteride (PROSCAR) 5 mg tablet Take 1 tablet (5 mg total) by mouth daily. 30 tablet 6     guaiFENesin (ROBITUSSIN) 100 mg/5 mL syrup Take 10 mL (200 mg total) by mouth 3 (three) times a day as needed for cough. 118 mL 0     hydrocortisone 2.5 % cream Apply to affected area 1-2 times daily as needed 30 g 2     loratadine (CLARITIN) 10 mg tablet Take 10 mg by mouth daily.       multivitamin with minerals (THERA-M) 9 mg iron-400 mcg Tab tablet Take 1 tablet by mouth daily. 30 tablet 11     oxybutynin (DITROPAN XL) 10 MG ER tablet Take 1 tablet (10 mg total) by mouth daily. 30 tablet 3     traMADol (ULTRAM) 50 mg tablet TAKE 1 TABLET BY MOUTH TWICE DAILY AS NEEDED FOR PAIN 60 tablet 0     warfarin (COUMADIN) 1 MG tablet Take 1 to 3 tablets (1 to 3 mg) by mouth daily. Adjust dose based on INR results as directed. 40 tablet 0     amLODIPine (NORVASC) 5 MG tablet Take 0.5 tablets (2.5 mg total) by mouth daily. 15 tablet 2     No current facility-administered medications for this visit.        ROS: 10 point review of systems negative other than as outlined above he is here with his son who did most of the talking.    He did want a handicap parking as the patient has difficulty getting around I did fill out a form please see a copy in the chart  Objective:    BP 98/64 (Patient Site: Right Arm, Patient Position: Sitting, Cuff Size: Adult Regular)  Pulse 66  Temp  96.7  F (35.9  C) (Oral)   Resp 24  Wt 116 lb (52.6 kg)  SpO2 98%  BMI 19.91 kg/m2  Body mass index is 19.91 kg/(m^2).    General appearance no acute distress denies any ongoing abdominal pain    Vital signs were stable heart was irregularly irregular rate in the 60-70 range patient is on Lanoxin vital signs as outlined    Neck negative no adenopathy oropharynx clear eye exam no conjunctival injection.  No scleral icterus    Lungs were clear throughout no rales or rhonchi    Abdomen is soft bowel sounds normal no guarding or rebound back without CVA pain    Extremities without edema skin was normal.    Yesterday had an INR 5.4 he is now on warfarin next INR will be done tomorrow at home he is part of the anticoagulation nursing program.    Results for orders placed or performed in visit on 07/30/18   INR   Result Value Ref Range    INR 5.40 (!) 0.9 - 1.1       Assessment:  1. Hospital discharge follow-up     2. Acute cerebral infarction (H)     3. Suprapubic catheter (H)  finasteride (PROSCAR) 5 mg tablet   4. Cough  guaiFENesin (ROBITUSSIN) 100 mg/5 mL syrup   5. Hemorrhoid  hydrocortisone 2.5 % cream   6. Renal infarct (H)     7. Accelerated hypertension  amLODIPine (NORVASC) 5 MG tablet     Hospital discharge follow-up for acute renal infarct.  Clinically improved    Hypertension has come down.  Will back off on the amlodipine down to 5 mg half tablet a day    He needed a refill on some hemorrhoid cream and that was given    Also regarding a cough and this was given.    Patient continues on finasteride.    Elevated INR continue warfarin recommendations per anticoagulation nurses notes.    Previous cerebral infarct noted    Chronic atrial fibrillation    Plan: As outlined above plan was to continue warfarin for at least 3-6 months.    Handicap parking filled out    Recheck 2 weeks recheck blood pressure    This transcription uses voice recognition software, which may contain typographical errors.

## 2021-06-19 NOTE — LETTER
Letter by Izabella Correa SW at      Author: Izabella Correa SW Service: -- Author Type: --    Filed:  Encounter Date: 8/30/2019 Status: (Other)           August 30, 2019    EDUARDO STATON  [address] [facility] [address]      Dear Eduardo:    As a member of [facility] ([facility] ([facility]), you are provided a care coordinator. I will be your new care coordinator as of 9/1/19. I will be calling you soon to see how you are doing and determine your needs.    If you have any questions, please feel free to call me at   [phone]. If you reach my voice mail, please leave a message and your phone number. If you are hearing impaired, please call the [place] Relay at 429 or [phone] (vdydjr-ga-dnfnbz relay service).    I look forward to speaking with you soon.    Sincerely,    KESHA Sun  [phone]  [email or web address] is a health plan that contracts with both Medicare and the [place] Medical Assistance (Medicaid) program to provide benefits of both programs to enrollees. Enrollment in [facility] depends on contract renewal.      [facility]  [id number] DHS Approved ([id number])  [id number] (11/18)

## 2021-06-19 NOTE — PROGRESS NOTES
Chief Complaint   Patient presents with     Cough     dry cough that makes his chest hurt when he coughs.  Started yesterday.  Denies fevers         HPI    Patient is here for one day of nonproductive cough, causing chest tenderness. No fever, chills, shortness of breath.     ROS: Pertinent ROS noted in HPI.     No Known Allergies    Patient Active Problem List   Diagnosis     Chronic atrial fibrillation (H)     Muscle Weakness Generalized     Underweight due to inadequate caloric intake     Chest pain     Gastroesophageal reflux disease without esophagitis     Suprapubic catheter (H)     Leukocytosis, unspecified type     Heartburn     Acute cerebral infarction (H)     Accelerated hypertension     Chronic retention of urine     Acute nonintractable headache, unspecified headache type     History of ischemic cerebrovascular accident (CVA) with residual deficit     Acute pyelonephritis     UTI (urinary tract infection)     Fever in other diseases     Moderate malnutrition (H)     Sepsis secondary to UTI (H)     Acute pulmonary edema (H)       Family History   Problem Relation Age of Onset     No Medical Problems Mother      No Medical Problems Father      Diabetes       spouse       Social History     Social History     Marital status:      Spouse name: N/A     Number of children: N/A     Years of education: N/A     Occupational History     Not on file.     Social History Main Topics     Smoking status: Never Smoker     Smokeless tobacco: Never Used     Alcohol use No     Drug use: No     Sexual activity: Not on file     Other Topics Concern     Not on file     Social History Narrative         Objective:    Vitals:    07/10/18 0809   BP: 100/64   Pulse: 95   Resp: 20   Temp: 97.5  F (36.4  C)   SpO2: 94%       Gen:NAD  Throat: oropharynx clear, tonsils normal  Nose: no discharge  Neck:NAD  CV: RRR, normal S1S2,no M,R, G  Pulm: CTAB, normal effort    CXR - no focal pneumonia/acute findings per my  interpretation, discussed during visit.      Cough - Benzonatate was too expensive for patient.   -     XR Chest 2 Views  -     Discontinue: benzonatate (TESSALON) 200 MG capsule; Take 1 capsule (200 mg total) by mouth 3 (three) times a day as needed for cough.  -     guaiFENesin (ROBITUSSIN) 100 mg/5 mL syrup; Take 10 mL (200 mg total) by mouth 3 (three) times a day as needed for cough.            Suspect a viral process. Follow up as directed.

## 2021-06-20 NOTE — PROGRESS NOTES
Subjective: Patient comes in for evaluation.  He was in the emergency room August 20 6 August 27th he had indwelling catheter and had some abdominal pain.  He is on oxybutynin for some symptoms related to that    He did have a urine culture but they did not treat him they called him back on August 31 and the family stated that he was not having any symptoms.  He still is not having any symptoms probably colonization please see results of the urine culture    Additional labs BMP normal LFTs normal INR therapeutic white count slightly elevated 11,700 platelets normal hemoglobin 13.4    Patient continues on warfarin he has the atrial fibrillation and had the CVA.    No additional concerns or issues other than a deformed toenail which is thickened and painful.  This was trimmed today please see below    Tobacco status: He  reports that he has never smoked. He has never used smokeless tobacco.    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     Acute pulmonary edema (H)      Acute pyelonephritis 02/10/2018     Urinary tract infection associated with cystostomy catheter, initial encounter (H) 02/10/2018     Sepsis secondary to UTI (H) 02/10/2018     Fever in other diseases      Moderate malnutrition (H)      History of ischemic cerebrovascular accident (CVA) with residual deficit 11/20/2017     Accelerated hypertension      Chronic retention of urine      Acute nonintractable headache, unspecified headache type      Acute cerebral infarction (H) 11/14/2017     Chest pain 09/14/2016     Gastroesophageal reflux disease without esophagitis 09/14/2016     Suprapubic catheter (H) 09/14/2016     Leukocytosis, unspecified type 09/14/2016     Heartburn 09/14/2016     Underweight due to inadequate caloric intake 07/28/2015     Chronic atrial fibrillation (H)      Muscle Weakness Generalized        Current Outpatient Prescriptions   Medication Sig Dispense  Refill     acetaminophen (TYLENOL) 500 MG tablet Take 1 tablet (500 mg total) by mouth every 6 (six) hours as needed for pain. 80 tablet 2     albuterol (PROAIR HFA;PROVENTIL HFA;VENTOLIN HFA) 90 mcg/actuation inhaler Inhale 2 puffs every 6 (six) hours as needed for wheezing. 1 Inhaler 6     digoxin (LANOXIN) 125 mcg tablet Take 1 tablet (125 mcg total) by mouth daily. 90 tablet 1     finasteride (PROSCAR) 5 mg tablet Take 1 tablet (5 mg total) by mouth daily. 30 tablet 6     guaiFENesin (ROBITUSSIN) 100 mg/5 mL syrup Take 10 mL (200 mg total) by mouth 3 (three) times a day as needed for cough. 240 mL 0     hydrocortisone 2.5 % cream Apply to affected area 1-2 times daily as needed 30 g 2     loratadine (CLARITIN) 10 mg tablet Take 10 mg by mouth daily.       multivitamin with minerals (THERA-M) 9 mg iron-400 mcg Tab tablet Take 1 tablet by mouth daily. 30 tablet 11     oxybutynin (DITROPAN XL) 10 MG ER tablet Take 1 tablet (10 mg total) by mouth daily. 30 tablet 6     warfarin (COUMADIN) 1 MG tablet Take 1 to 3 tablets (1 to 3 mg) by mouth daily. Adjust dose based on INR results as directed. 100 tablet 1     artificial tears, hypromellose, (GONIOVISC) 2.5 % ophthalmic solution 2 gtts to each eye two times a day prn dry eyes 15 mL 6     No current facility-administered medications for this visit.        ROS:   Review of systems, 10 point negative other than as outlined above.  He has had some dry eyes is seen the ophthalmologist they recommended some artificial tears but they needed a prescription here and I get did give him that today    Objective:    /70 (Patient Site: Left Arm, Patient Position: Sitting, Cuff Size: Adult Small)  Pulse (!) 56  Temp 97.4  F (36.3  C) (Oral)   Resp 24  Wt 117 lb (53.1 kg)  BMI 20.08 kg/m2  Body mass index is 20.08 kg/(m^2).    General appearance in good mood here with his son talkative    Vital signs stable heart was irregularly irregular rate at 60.  HEENT: Pupils react  normally extract movements full he does complain of some dryness and some tearing at times will try the artificial tears  Lungs are clear throughout no rales or rhonchi heart irregular as outlined he is afebrile    Back without CVA pain    No abdominal pain guarding or masses.  He does have the catheter.    Extremities without edema pulses normal    Deformed thickened nail as outlined some discomfort I did trim this and relieve the pain.    Lab work from hospital as outlined above.  Urine culture as discussed    Patient also had a CT scan of his abdomen which showed possible gastroenteritis otherwise unremarkable    This is all reviewed with the patient's son        Assessment:  1. Abdominal pain     2. Suprapubic catheter (H)  oxybutynin (DITROPAN XL) 10 MG ER tablet   3. Dry eyes  artificial tears, hypromellose, (GONIOVISC) 2.5 % ophthalmic solution   4. Deformity of toenail     5.  Atrial fibrillation, history of CVA.  Continue warfarin.  See INR nurse's notes  Abdominal pain with suprapubic catheter, refill oxybutynin    No symptoms to correlate with positive urine culture will hold off on treatment as discussed in the emergency room as well    Dystrophic deformity to the toenail, trimmed.    Dry eyes were treated with artificial tears.        Plan: As outlined above    This transcription uses voice recognition software, which may contain typographical errors.

## 2021-06-20 NOTE — LETTER
Letter by Izabella Correa SW at      Author: Izabella Correa SW Service: -- Author Type: --    Filed:  Encounter Date: 2/28/2020 Status: (Other)       February 28, 2020      EDUARDO STATON  [address] [facility] [address]      Dear Eduardo:    At [facility], we are dedicated to improving your health and well-being. Enclosed is the Comprehensive Care Plan that we developed with you on 02/20/2020. Please review the Care Plan carefully.    As a reminder, some of the things we discussed at your visit include:    Your physical and mental health    Ways to reduce falls    Health care needs you may have    Dont forget to contact your care coordinator if you:    Have been hospitalized or plan to be hospitalized     Have had a fall     Have experienced a change in physical health    Are experiencing emotional problems     If you do not agree with your Care Plan, have questions about it, or have experienced a change in your needs, please call me at [phone]. If you are hearing impaired, please call the [place] Relay at 337 or [phone] (izaoyn-im-cwddda relay service).    Sincerely,    KESHA Sun  [phone]  [email or web address]") is a health plan that contracts with both Medicare and the [place] Medical Assistance (Medicaid) program to provide benefits of both programs to enrollees. Enrollment in [facility] depends on contract renewal.    [id number])     [id number] (11/18)

## 2021-06-21 ENCOUNTER — COMMUNICATION - HEALTHEAST (OUTPATIENT)
Dept: SCHEDULING | Facility: CLINIC | Age: 86
End: 2021-06-21

## 2021-06-21 DIAGNOSIS — N28.0 RENAL INFARCT (H): ICD-10-CM

## 2021-06-21 DIAGNOSIS — I48.91 ATRIAL FIBRILLATION (H): ICD-10-CM

## 2021-06-21 DIAGNOSIS — K21.9 GASTROESOPHAGEAL REFLUX DISEASE WITHOUT ESOPHAGITIS: ICD-10-CM

## 2021-06-21 LAB — INR PPP: 1.7 (ref 0.9–1.1)

## 2021-06-21 RX ORDER — PANTOPRAZOLE SODIUM 40 MG/1
40 TABLET, DELAYED RELEASE ORAL DAILY
Qty: 90 TABLET | Refills: 1 | Status: SHIPPED | OUTPATIENT
Start: 2021-06-21 | End: 2021-12-08

## 2021-06-21 NOTE — LETTER
Letter by Izabella Correa SW at      Author: Izabella Correa SW Service: -- Author Type: --    Filed:  Encounter Date: 1/29/2021 Status: (Other)       [facility] [place] [facility] Advance Care Planning       Benny TERRELL Gerardo  [address] [address]      Dear Benny,    You shared with me your interest in receiving information on Advance Care Planning and Health Care Directives. Discussing and making decisions about this part of our health is very important.  A Health Care Directive is a written document that outlines your goals, values, beliefs and choices for health care and medical treatment in the event you are unable to speak for yourself.     We greatly value the opportunity to assist you in documenting your choices and to honor your   wishes. Weve enclosed resource to help you get started thinking about your values and goals. We have several options for additional resources:       Health Care Directives and Advance Care Planning resources can be viewed and printed   for free at our web site:  [email or web address].       Free group classes on Advance Care Planning and completing a Health Care Directive are available at multiple locations and times. These classes are led by trained staff who will provide information and guide you through a Health Care Directive.  They can also review, notarize and add your Health Care Directive to your medical record. [place] for a class at [email or web address]/[email or web address] or by calling [employer] at [phone] or toll free [phone].      COPIES of completed Health Care Directives can be brought or mailed to any of our   locations, including the address listed below. You can also email a copy to [email or web address] .      Email or call me at the contact information listed below for questions, assistance, or to   make an appointment to discuss creating a Health Care Directive. You can also contact   our [employer] Department for questions or  assistance.       Sincerely,     Izabella Correa, [facility]  Care Coordinator  [facility]  [phone]

## 2021-06-21 NOTE — LETTER
Letter by Izabella Correa SW at      Author: Izabella Correa SW Service: -- Author Type: --    Filed:  Encounter Date: 1/29/2021 Status: (Other)       [facility] [employer] Advance Care Planning       Benny TERRELL Gerardo  [address] [address]      Dear Benny,    You shared with me your interest in receiving information on Advance Care Planning and Health Care Directives. Discussing and making decisions about this part of our health is very important.  A Health Care Directive is a written document that outlines your goals, values, beliefs and choices for health care and medical treatment in the event you are unable to speak for yourself.     We greatly value the opportunity to assist you in documenting your choices and to honor your   wishes. Weve enclosed resource to help you get started thinking about your values and goals. We have several options for additional resources:       Health Care Directives and Advance Care Planning resources can be viewed and printed   for free at our web site:  [email or web address].       Free group classes on Advance Care Planning and completing a Health Care Directive are available at multiple locations and times. These classes are led by trained staff who will provide information and guide you through a Health Care Directive.  They can also review, notarize and add your Health Care Directive to your medical record. [place] for a class at [email or web address] or by calling [employer] at [phone] or toll free [phone].      COPIES of completed Health Care Directives can be brought or mailed to any of our   locations, including the address listed below. You can also email a copy to [email or web address] .      Email or call me at the contact information listed below for questions, assistance, or to   make an appointment to discuss creating a Health Care Directive. You can also contact   our Nursing Home Quality Department for questions or  assistance.       Sincerely,     Izabella Correa, [facility]  Care Coordinator  [facility]  [phone]

## 2021-06-21 NOTE — LETTER
Letter by Izabella Correa SW at      Author: Izabella Correa SW Service: -- Author Type: --    Filed:  Encounter Date: 1/29/2021 Status: (Other)       January 29, 2021      EDUARDO STATON  [address] [address]      Dear Eduardo:    At [facility], we are dedicated to improving your health and well-being. Enclosed is the Comprehensive Care Plan that we developed with you on 01/25/2021. Please review the Care Plan carefully.    As a reminder, some of the things we discussed at your visit include:    Your physical and mental health    Ways to reduce falls    Health care needs you may have    Dont forget to contact your care coordinator if you:    Have been hospitalized or plan to be hospitalized     Have had a fall     Have experienced a change in physical health    Are experiencing emotional problems     If you do not agree with your Care Plan, have questions about it, or have experienced a change in your needs, please call me at [phone]. If you are hearing impaired, please call the [place] Relay at [phone] or [phone] (tnatqk-kc-vhossv relay service).    Sincerely,    KESHA Sun  [phone]  [email or web address] ([email or web address]) is a health plan that contracts with both Medicare and the [place] Medical Assistance (Medicaid) program to provide benefits of both programs to enrollees. Enrollment in [facility] depends on contract renewal.    [id number])     [id number] (11/18)

## 2021-06-22 NOTE — TELEPHONE ENCOUNTER
INR result is 2.8  INR   Date Value Ref Range Status   12/31/2018 1.70 (!) 0.9 - 1.1 Final       Will the patient be seen, or did they already see, MD or CNP today? No    Most Recent Warfarin dose day/week  Sunday Monday Tuesday Wednesday Thursday Friday Saturday    3 mg 2.5 mg 3 mg 3 mg 2.5 mg 3 mg     Sunday Monday Tuesday Wednesday Thursday Friday Saturday   2.5 mg             Has the patient missed any doses of Coumadin, Warfarin, Jantoven in the past 7 days? No    Has the patients medications changed since the last visit? No    Has the patient experienced any bleeding recently? No    Has the patient experienced any injuries or illness recently? No    Has the patient experienced any 'new' shortness of breath, severe headaches, or changes in vision recently? No    Has the patient had any changes in their diet, or alcohol consumption? No    Is the patient here today to prepare for any type of upcoming surgery, procedure, or for a cardioversion procedure? No    What phone number can we reach the patient at today? [phone] a.

## 2021-06-22 NOTE — TELEPHONE ENCOUNTER
ANTICOAGULATION  MANAGEMENT- Home Care/Care Facility Result    Assessment     Today's INR result of 2.8 is Therapeutic (goal INR of 2.0-3.0)        Warfarin taken as previously instructed    No new diet changes affecting INR    No new medication/supplements affecting INR    Continues to tolerate warfarin with no reported s/s of bleeding or thromboembolism     Previous INR was Subtherapeutic    Plan:     Spoke with Gea with [facility] discussed INR result and instructed:     Warfarin Dosing Instructions: Continue current warfarin dose    2.5 mg on Sun, Tue, Fri; 3 mg all other days      (0 % change)    Next INR to be drawn: one week    Education provided: importance of therapeutic range and target INR goal and significance of current INR result    Gea verbalizes understanding and agrees to warfarin dosing plan.   ?   Clair Gonzalez RN    Subjective/Objective:      Benny Carrillo, a 89 y.o. male is established on warfarin.     Home care/care facility RN's report of Benny INR, recent warfarin dosing, diet changes, medication changes, and symptoms is documented below.    Additional findings: none    Anticoagulation Episode Summary     Current INR goal:   2.0-3.0   TTR:   55.7 % (4.4 mo)   Next INR check:   1/14/2019   INR from last check:   2.80 (1/7/2019)   Weekly max warfarin dose:      Target end date:   1/30/2019   INR check location:      Preferred lab:      Send INR reminders to:   ANTICOAGULATION POOL A (WBY,WBE,MID,RSC)    Indications    Renal infarct (H) [N28.0]           Comments:            Anticoagulation Care Providers     Provider Role Specialty Phone number    Alcides Castillo MD Referring Family Medicine [phone]

## 2021-06-22 NOTE — PROGRESS NOTES
Subjective: This patient comes in for hospital discharge follow-up.  He was hospitalized at [facility] 12/21 through 12/23/18.    Patient has history of suprapubic catheter patient had a small bowel obstruction in the hospital that resolved spontaneously.    Scan showed possible bladder mass but this is been reviewed before by urology.  He had scans showing the very large prostate which simulates bladder mass.  He had a flexible cystoscopy in the past, sees Dr. De La Vega.    Also has atrial fibrillation previous CVA hypertension.    He is now eating his weights at 120.    Last INR on 12/31/2018 was 1.7 please see anticoagulation nurse's notes.    No chest pain no shortness of breath.    Tobacco status: He  reports that  has never smoked. he has never used smokeless tobacco.    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     Acute pulmonary edema (H)      Acute pyelonephritis 02/10/2018     Urinary tract infection associated with cystostomy catheter, initial encounter (H) 02/10/2018     Sepsis secondary to UTI (H) 02/10/2018     Fever in other diseases      Moderate malnutrition (H)      History of ischemic cerebrovascular accident (CVA) with residual deficit 11/20/2017     Accelerated hypertension      Chronic retention of urine      Acute nonintractable headache, unspecified headache type      Acute cerebral infarction (H) 11/14/2017     Chest pain 09/14/2016     Gastroesophageal reflux disease without esophagitis 09/14/2016     Suprapubic catheter (H) 09/14/2016     Leukocytosis, unspecified type 09/14/2016     Heartburn 09/14/2016     Underweight due to inadequate caloric intake 07/28/2015     Chronic atrial fibrillation (H)      Muscle Weakness Generalized        Current Outpatient Medications   Medication Sig Dispense Refill     acetaminophen (TYLENOL) 500 MG tablet Take 1 tablet (500 mg  total) by mouth every 6 (six) hours as needed for pain. 80 tablet 2     albuterol (PROAIR HFA;PROVENTIL HFA;VENTOLIN HFA) 90 mcg/actuation inhaler Inhale 2 puffs every 6 (six) hours as needed for wheezing. 1 Inhaler 6     amLODIPine (NORVASC) 5 MG tablet TAKE 1/2 (ONE-HALF) TABLET BY MOUTH ONCE DAILY 15 tablet 2     artificial tears, hypromellose, (GONIOVISC) 2.5 % ophthalmic solution 2 gtts to each eye two times a day prn dry eyes 15 mL 6     digoxin (LANOXIN) 125 mcg tablet Take 1 tablet (125 mcg total) by mouth daily. 90 tablet 1     finasteride (PROSCAR) 5 mg tablet Take 1 tablet (5 mg total) by mouth daily. 30 tablet 6     hydrocortisone 2.5 % cream Apply to affected area 1-2 times daily as needed 30 g 2     loratadine (CLARITIN) 10 mg tablet Take 1 tablet (10 mg total) by mouth daily. 90 tablet 3     multivitamin with minerals (THERA-M) 9 mg iron-400 mcg Tab tablet Take 1 tablet by mouth daily. 30 tablet 11     TAB-A-JAMISON per tablet TAKE 1 TABLET BY MOUTH ONCE DAILY 30 tablet 6     warfarin (COUMADIN/JANTOVEN) 1 MG tablet Take 2.5-3 mg by mouth See Admin Instructions. Take 3mg Monday/Thursday and 2.5mg ROW       guaiFENesin (ROBITUSSIN) 100 mg/5 mL syrup Take 10 mL (200 mg total) by mouth 3 (three) times a day as needed for cough. 240 mL 0     oxybutynin (DITROPAN XL) 10 MG ER tablet Take 1 tablet (10 mg total) by mouth daily. 30 tablet 6     No current facility-administered medications for this visit.        ROS:   10 point review of systems positive as outlined above regarding a small bowel obstruction.  Otherwise negative.    No vomiting or diarrhea.  Eating okay with normal bowel movements    Suprapubic catheter working per history    Objective:    /80 (Patient Site: Left Arm, Patient Position: Sitting, Cuff Size: Adult Regular)   Pulse 60   Wt 120 lb (54.4 kg)   SpO2 94%   BMI 20.09 kg/m    Body mass index is 20.09 kg/m .    General appearance no acute distress    HEENT neck without adenopathy  oropharynx is clear    Lungs are clear throughout no rales or rhonchi heart was irregularly irregular, rate controlled 60-70    Abdomen soft bowel sounds normal no guarding or rebound.    No masses appreciated, suprapubic catheter intact.    Extremities without edema.    Last INR as outlined    CT scan reviewed as outlined above    Also labs with normal renal function, normal liver function, normal lipase and negative troponin as outlined from the hospital stay      Results for orders placed or performed in visit on 12/31/18   INR   Result Value Ref Range    INR 1.70 (!) 0.9 - 1.1       Assessment:  1. Hospital discharge follow-up     2. Cough  guaiFENesin (ROBITUSSIN) 100 mg/5 mL syrup   3. Suprapubic catheter (H)  oxybutynin (DITROPAN XL) 10 MG ER tablet   4. Chronic atrial fibrillation (H)     5. Small bowel obstruction (H)     6. Benign prostatic hyperplasia with incomplete bladder emptying      very large prostate   simulates bladder mass on CT   cysto 2015 with urology     Discharge follow-up stable, resolved small bowel obstruction    Ongoing suprapubic catheter for obstructive symptoms from the very large prostate.  This simulates bladder mass on CT    Chronic atrial fibrillation rate controlled last INR as discussed.    Recheck in 3 months or earlier as needed    Follow-up with INR as per anticoagulation nurses    Plan: As outlined above    This transcription uses voice recognition software, which may contain typographical errors.

## 2021-06-23 NOTE — TELEPHONE ENCOUNTER
INR result is  1.8  INR   Date Value Ref Range Status   01/28/2019 3.90 (!) 0.9 - 1.1 Final       Will the patient be seen, or did they already see, MD or CNP today? No    Most Recent Warfarin dose day/week  Sunday Monday Tuesday Wednesday Thursday Friday Saturday    3 HELD 3 3 2.5 3     Sunday Monday Tuesday Wednesday Thursday Friday Saturday   2.5             Has the patient missed any doses of Coumadin, Warfarin, Jantoven in the past 7 days? No    Has the patients medications changed since the last visit? No    Has the patient experienced any bleeding recently? No    Has the patient experienced any injuries or illness recently? No    Has the patient experienced any 'new' shortness of breath, severe headaches, or changes in vision recently? No    Has the patient had any changes in their diet, or alcohol consumption? No    Is the patient here today to prepare for any type of upcoming surgery, procedure, or for a cardioversion procedure? No    What phone number can we reach the patient at today?  Clair Schmitz- ERAN [phone]- Also needs orders faxed to [phone] attention: Stephen

## 2021-06-23 NOTE — TELEPHONE ENCOUNTER
Refill Approved    Rx renewed per Medication Renewal Policy. Medication was last renewed on 10/24/18.    Lashae Torres, [facility] Triage/Med Refill 2/6/2019     Requested Prescriptions   Pending Prescriptions Disp Refills     amLODIPine (NORVASC) 5 MG tablet [Pharmacy Med Name: AMLODIPINE 5MG TAB] 15 tablet 2     Sig: TAKE 1/2 (ONE-HALF) TABLET BY MOUTH ONCE DAILY    Calcium-Channel Blockers Protocol Passed - 2/4/2019  9:41 AM       Passed - PCP or prescribing provider visit in past 12 months or next 3 months    Last office visit with prescriber/PCP: 1/2/2019 Alcides Castillo MD OR same dept: 1/2/2019 Alcides Castillo MD OR same specialty: 1/2/2019 Alcides Castillo MD  Last physical: 12/10/2015 Last MTM visit: Visit date not found   Next visit within 3 mo: Visit date not found  Next physical within 3 mo: Visit date not found  Prescriber OR PCP: Alcides Castillo MD  Last diagnosis associated with med order: 1. Accelerated hypertension  - amLODIPine (NORVASC) 5 MG tablet [Pharmacy Med Name: AMLODIPINE 5MG TAB]; TAKE 1/2 (ONE-HALF) TABLET BY MOUTH ONCE DAILY  Dispense: 15 tablet; Refill: 2    If protocol passes may refill for 12 months if within 3 months of last provider visit (or a total of 15 months).            Passed - Blood pressure filed in past 12 months    BP Readings from Last 1 Encounters:   01/02/19 110/80

## 2021-06-23 NOTE — TELEPHONE ENCOUNTER
ANTICOAGULATION  MANAGEMENT- Home Care/Care Facility Result    Assessment     Today's INR result of 2.3 is Therapeutic (goal INR of 2.0-3.0)        Warfarin taken as previously instructed    No new diet changes affecting INR    Interaction between Keflex and warfarin may be affecting INR- started 2/5 x7 days.     Continues to tolerate warfarin with no reported s/s of bleeding or thromboembolism     Previous INR was Subtherapeutic    Plan:     Spoke with home care nurse Clair discussed INR result and instructed:     Warfarin Dosing Instructions: Continue current warfarin dose    2.5 mg on Sun, Tue, Fri; 3 mg all other days         Next INR to be drawn: 1 week.    Education provided: importance of taking warfarin as instructed    Clair verbalizes understanding and agrees to warfarin dosing plan.   ?   Bari Grewal RN    Subjective/Objective:      Benny Carrillo, a 89 y.o. male is established on warfarin.     Home care/care facility RN's report of Benny INR, recent warfarin dosing, diet changes, medication changes, and symptoms is documented below.    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

## 2021-06-23 NOTE — TELEPHONE ENCOUNTER
INR result is   2.9    Will the patient be seen, or did they already see, MD or CNP today? No    Most Recent Warfarin dose day/week  Sunday Monday Tuesday Wednesday Thursday Friday Saturday    3 2.5 3 3 2.5 3     Sunday Monday Tuesday Wednesday Thursday Friday Saturday   2.5             Has the patient missed any doses of Coumadin, Warfarin, Jantoven in the past 7 days? No    Has the patients medications changed since the last visit? No    Has the patient experienced any bleeding recently? No    Has the patient experienced any injuries or illness recently? No    Has the patient experienced any 'new' shortness of breath, severe headaches, or changes in vision recently? No    Has the patient had any changes in their diet, or alcohol consumption? No    Is the patient here today to prepare for any type of upcoming surgery, procedure, or for a cardioversion procedure? No    What phone number can we reach the patient at today? [phone].

## 2021-06-23 NOTE — TELEPHONE ENCOUNTER
Robitussin filled 1/2/19 240/0  Oxybutynin filled 1/2/19 30/6  Lashae Torres RN [facility] Triage/Medication Refill

## 2021-06-23 NOTE — TELEPHONE ENCOUNTER
INR result is 2.3  INR   Date Value Ref Range Status   02/04/2019 1.80 (!) 0.9 - 1.1 Final       Will the patient be seen, or did they already see, MD or CNP today? No    Most Recent Warfarin dose day/week  Sunday Monday Tuesday Wednesday Thursday Friday Saturday    3 2.5 3 3 2.5 3     Sunday Monday Tuesday Wednesday Thursday Friday Saturday   2.5             Has the patient missed any doses of Coumadin, Warfarin, Jantoven in the past 7 days? No    Has the patients medications changed since the last visit? Yes antibiotic cephalexin 4 times a day for 7 days started on 2/5.    Has the patient experienced any bleeding recently? No    Has the patient experienced any injuries or illness recently? No    Has the patient experienced any 'new' shortness of breath, severe headaches, or changes in vision recently? No    Has the patient had any changes in their diet, or alcohol consumption? No    Is the patient here today to prepare for any type of upcoming surgery, procedure, or for a cardioversion procedure? No    What phone number can we reach the patient at today? Clair with [facility] [phone].

## 2021-06-23 NOTE — TELEPHONE ENCOUNTER
ANTICOAGULATION  MANAGEMENT- Home Care/Care Facility Result    Assessment     Today's INR result of 3.9 is Supratherapeutic (goal INR of 2.0-3.0)        Warfarin taken as previously instructed (given booster last week on 1/21)    No new diet changes affecting INR    No new medication/supplements affecting INR    Continues to tolerate warfarin with no reported s/s of bleeding or thromboembolism     Previous INR was Subtherapeutic    Plan:     Spoke with Clair (home care nurse) discussed INR result and instructed:     Warfarin Dosing Instructions: hold warfarin tomorrow (already took dose today) then continue current warfarin dose    2.5 mg on Sun, Tue, Fri; 3 mg all other days       Next INR to be drawn: 2/4    Education provided: importance of therapeutic range    Clair verbalizes understanding and agrees to warfarin dosing plan.   ?   Jesika Jenkins RN    Subjective/Objective:      Benny Carrillo, a 89 y.o. male is established on warfarin.     Home care/care facility RN's report of Benny INR, recent warfarin dosing, diet changes, medication changes, and symptoms is documented below.    Additional findings: none    Anticoagulation Episode Summary     Current INR goal:   2.0-3.0   TTR:   58.5 % (5.1 mo)   Next INR check:   2/4/2019   INR from last check:   3.90! 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

## 2021-06-23 NOTE — TELEPHONE ENCOUNTER
ANTICOAGULATION  MANAGEMENT- Home Care/Care Facility Result    Assessment     Today's INR result of 1.8 is Subtherapeutic (goal INR of 2.0-3.0)        Warfarin recently held as instructed which may be affecting INR    No new diet changes affecting INR    No new medication/supplements affecting INR    Continues to tolerate warfarin with no reported s/s of bleeding or thromboembolism     Previous INR was Supratherapeutic    Plan:     Spoke with Clair discussed INR result and instructed:     Warfarin Dosing Instructions: Continue current warfarin dose 2.5 mg daily on Sun/Tues/Thurs; and 3 mg daily rest of week  (patient had 17mg in the last week due to held dose. Maintenance dose is 19.5mg/week. Patient's INR was elevated last week potentially due to booster dose. Will skip booster dose at this time)    Next INR to be drawn: one week    Education provided: importance of therapeutic range    Clair verbalizes understanding and agrees to warfarin dosing plan.   ?   Jesika Jenkins RN    Subjective/Objective:      Benny Carrillo, a 89 y.o. male is established on warfarin.     Home care/care facility RN's report of Benny INR, recent warfarin dosing, diet changes, medication changes, and symptoms is documented below.    Additional findings: none    Anticoagulation Episode Summary     Current INR goal:   2.0-3.0   TTR:   58.0 % (5.4 mo)   Next INR check:   2/11/2019   INR from last check:   1.80! 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

## 2021-06-23 NOTE — TELEPHONE ENCOUNTER
ANTICOAGULATION  MANAGEMENT- Home Care/Care Facility Result    Assessment     Today's INR result of 1.8 is Subtherapeutic (goal INR of 2.0-3.0)        Warfarin taken as previously instructed    No new diet changes affecting INR    No new medication/supplements affecting INR    Continues to tolerate warfarin with no reported s/s of bleeding or thromboembolism     Previous INR was Therapeutic    Plan:     Spoke with Clair from [facility] discussed INR result and instructed:     Warfarin Dosing Instructions: one time booster today of 4.5mg then continue current warfarin dose 2.5 mg daily on Sun/Tues/Fri; and 3 mg daily rest of week  (0 % change)    Next INR to be drawn: one week    Education provided: importance of therapeutic range    Clair verbalizes understanding and agrees to warfarin dosing plan.   ?   Jesika Jenkins RN    Subjective/Objective:      Benny Carrillo, a 89 y.o. male is established on warfarin.     Home care/care facility RN's report of Benny INR, recent warfarin dosing, diet changes, medication changes, and symptoms is documented below.    Additional findings: none    Anticoagulation Episode Summary     Current INR goal:   2.0-3.0   TTR:   59.0 % (4.9 mo)   Next INR check:   1/28/2019   INR from last check:   1.80! 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

## 2021-06-23 NOTE — TELEPHONE ENCOUNTER
INR result is 3.9  INR   Date Value Ref Range Status   01/21/2019 1.80 (!) 0.9 - 1.1 Final       Will the patient be seen, or did they already see, MD or CNP today? No    Most Recent Warfarin dose day/week  Sunday Monday Tuesday Wednesday Thursday Friday Saturday    4.5 2.5 3 3 2.5 3     Sunday Monday Tuesday Wednesday Thursday Friday Saturday   2.5             Has the patient missed any doses of Coumadin, Warfarin, Jantoven in the past 7 days? No    Has the patients medications changed since the last visit? No    Has the patient experienced any bleeding recently? No    Has the patient experienced any injuries or illness recently? No    Has the patient experienced any 'new' shortness of breath, severe headaches, or changes in vision recently? No    Has the patient had any changes in their diet, or alcohol consumption? No    Is the patient here today to prepare for any type of upcoming surgery, procedure, or for a cardioversion procedure? No    What phone number can we reach the patient at today? [facility] nurse [phone].

## 2021-06-23 NOTE — TELEPHONE ENCOUNTER
ANTICOAGULATION  MANAGEMENT- Home Care/Care Facility Result    Assessment     Today's INR result of 2.9 is Therapeutic (goal INR of 2.0-3.0)        Warfarin taken as previously instructed    No new diet changes affecting INR    No new medication/supplements affecting INR    Continues to tolerate warfarin with no reported s/s of bleeding or thromboembolism     Previous INR was Therapeutic     Faxed to [phone]    Plan:     Spoke with nurse Clair discussed INR result and instructed:     Warfarin Dosing Instructions: Continue current warfarin dose 2.5 mg daily on sun/tue/fri; and 3 mg daily rest of week  (0 % change)    Next INR to be drawn: one week with home care visit      Clair verbalizes understanding and agrees to warfarin dosing plan.   ?   Perlita Brown RN    Subjective/Objective:      Benny Carrillo, a 89 y.o. male is established on warfarin.     Home care/care facility RN's report of Benny INR, recent warfarin dosing, diet changes, medication changes, and symptoms is documented below.    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

## 2021-06-23 NOTE — TELEPHONE ENCOUNTER
INR result is 1.8  INR   Date Value Ref Range Status   01/14/2019 2.90 (!) 0.9 - 1.1 Final       Will the patient be seen, or did they already see, MD or CNP today? No    Most Recent Warfarin dose day/week  Sunday Monday Tuesday Wednesday Thursday Friday Saturday    3 2.5 3 3 2.5 3     Sunday Monday Tuesday Wednesday Thursday Friday Saturday   2.5             Has the patient missed any doses of Coumadin, Warfarin, Jantoven in the past 7 days? No    Has the patients medications changed since the last visit? No    Has the patient experienced any bleeding recently? No    Has the patient experienced any injuries or illness recently? No    Has the patient experienced any 'new' shortness of breath, severe headaches, or changes in vision recently? No    Has the patient had any changes in their diet, or alcohol consumption? No    Is the patient here today to prepare for any type of upcoming surgery, procedure, or for a cardioversion procedure? No    What phone number can we reach the patient at today? Clair [phone] Please fax order to [phone] as well.  .

## 2021-06-24 NOTE — TELEPHONE ENCOUNTER
INR result is 2.2  INR   Date Value Ref Range Status   02/11/2019 2.30 (!) 0.9 - 1.1 Final       Will the patient be seen, or did they already see, MD or CNP today? No    Most Recent Warfarin dose day/week  Sunday Monday Tuesday Wednesday Thursday Friday Saturday    3 2.5 3 3 2.5 3     Sunday Monday Tuesday Wednesday Thursday Friday Saturday   2.5             Has the patient missed any doses of Coumadin, Warfarin, Jantoven in the past 7 days? No    Has the patients medications changed since the last visit? No    Has the patient experienced any bleeding recently? No    Has the patient experienced any injuries or illness recently? No    Has the patient experienced any 'new' shortness of breath, severe headaches, or changes in vision recently? No    Has the patient had any changes in their diet, or alcohol consumption? No    Is the patient here today to prepare for any type of upcoming surgery, procedure, or for a cardioversion procedure? No    What phone number can we reach the patient at today? [facility] [phone]

## 2021-06-24 NOTE — TELEPHONE ENCOUNTER
Refill Approved    Rx renewed per Medication Renewal Policy. Medication was last renewed on 2/19/18.    Lashae Torres, [facility] Triage/Med Refill 2/26/2019     Requested Prescriptions   Pending Prescriptions Disp Refills     digoxin (LANOXIN) 125 mcg tablet [Pharmacy Med Name: DIGOXIN 0.125MG     TAB] 30 tablet 5     Sig: TAKE 1 TABLET BY MOUTH ONCE DAILY    Refill Protocol for Digoxin Passed - 2/25/2019 10:09 AM       Passed - LFT or AST or ALT on file in last 12 months    Albumin   Date Value Ref Range Status   12/22/2018 3.4 (L) 3.5 - 5.0 g/dL Final     Bilirubin, Total   Date Value Ref Range Status   12/22/2018 0.7 0.0 - 1.0 mg/dL Final     Bilirubin, Direct   Date Value Ref Range Status   12/21/2018 0.2 <=0.5 mg/dL Final     Alkaline Phosphatase   Date Value Ref Range Status   12/22/2018 71 45 - 120 U/L Final     AST   Date Value Ref Range Status   12/22/2018 25 0 - 40 U/L Final     ALT   Date Value Ref Range Status   12/22/2018 13 0 - 45 U/L Final     Protein, Total   Date Value Ref Range Status   12/22/2018 7.5 6.0 - 8.0 g/dL Final               Passed - PCP or prescribing provider visit in past 6 months or next 3 months    Last office visit with prescriber/PCP: 2/14/2019 OR same dept: 2/14/2019 Alcides Castillo MD OR same specialty: 2/14/2019 Alcides Castillo MD Last physical: Visit date not found  Last MTM visit: Visit date not found     Next appt within 3 mo: Visit date not found  Next physical within 3 mo: Visit date not found  Prescriber OR PCP: Alcides Castillo MD  Last diagnosis associated with med order: 1. Chronic atrial fibrillation (H)  - digoxin (LANOXIN) 125 mcg tablet [Pharmacy Med Name: DIGOXIN 0.125MG     TAB]; TAKE 1 TABLET BY MOUTH ONCE DAILY  Dispense: 30 tablet; Refill: 5     If protocol passes may refill for 6 months if within 3 months of last provider visit (or a total of 9 months).         Passed - BMP on file in last 12 months    Sodium   Date Value Ref Range Status   12/23/2018  142 136 - 145 mmol/L Final     Potassium   Date Value Ref Range Status   12/23/2018 4.7 3.5 - 5.0 mmol/L Final     Chloride   Date Value Ref Range Status   12/23/2018 109 (H) 98 - 107 mmol/L Final     CO2   Date Value Ref Range Status   12/23/2018 28 22 - 31 mmol/L Final     BUN   Date Value Ref Range Status   12/23/2018 13 8 - 28 mg/dL Final     Creatinine   Date Value Ref Range Status   12/23/2018 1.16 0.70 - 1.30 mg/dL Final            Passed - CBC w/plts (hm2) on file in last 12 months    WBC   Date Value Ref Range Status   12/23/2018 9.6 4.0 - 11.0 thou/uL Final     Hemoglobin   Date Value Ref Range Status   12/23/2018 12.1 (L) 14.0 - 18.0 g/dL Final     Hematocrit   Date Value Ref Range Status   12/23/2018 37.9 (L) 40.0 - 54.0 % Final     Platelets   Date Value Ref Range Status   12/23/2018 196 140 - 440 thou/uL Final            Passed - ECG in last 12 months    ECG rhythm strip: No results found for this or any previous visit. ECG 12 lead MUSE: No results found for this or any previous visit. ECG 12 lead nursing unit:   Results for orders placed or performed during the hospital encounter of 12/21/18   ECG 12 lead nursing unit performed   Result Value Ref Range    SYSTOLIC BLOOD PRESSURE  mmHg    DIASTOLIC BLOOD PRESSURE  mmHg    VENTRICULAR RATE 77 BPM    ATRIAL RATE 82 BPM    P-R INTERVAL  ms    QRS DURATION 114 ms    Q-T INTERVAL 374 ms    QTC CALCULATION (BEZET) 423 ms    P Axis  degrees    R AXIS -61 degrees    T AXIS 40 degrees    MUSE DIAGNOSIS       Atrial fibrillation with premature ventricular or aberrantly conducted complexes  Left axis deviation  Right bundle branch block  Abnormal ECG  When compared with ECG of 22-JUL-2018 07:18,  Nonspecific T wave abnormality has replaced inverted T waves in Anterior leads  Confirmed by SHANNAN MAY MD LOC: ([id number]) on 12/22/2018 4:53:00 PM              Passed - Magnesium in last 12 months    Magnesium   Date Value Ref Range Status   12/22/2018 2.0 1.8 - 2.6  mg/dL Final             Passed - Normal digoxin level in last 12 months    No results found for: PHENOBARB         Passed - Serum creatinine in last 12 months    Creatinine   Date Value Ref Range Status   12/23/2018 1.16 0.70 - 1.30 mg/dL Final

## 2021-06-24 NOTE — TELEPHONE ENCOUNTER
ANTICOAGULATION  MANAGEMENT- Home Care/Care Facility Result    Assessment     Today's INR result of 2.1 is Therapeutic (goal INR of 2.0-3.0)        Warfarin taken as previously instructed    No new diet changes affecting INR    No new medication/supplements affecting INR    Continues to tolerate warfarin with no reported s/s of bleeding or thromboembolism     Previous INR was Therapeutic     Faxed this encounter to [facility] [phone]    Plan:     Spoke with home care nurse Clair discussed INR result and instructed:     Warfarin Dosing Instructions: Continue current warfarin dose    2.5 mg every Sun, Tue, Fri; 3 mg all other days         Next INR to be drawn: 2 weeks.    Education provided: importance of taking warfarin as instructed    Clair verbalizes understanding and agrees to warfarin dosing plan.   ?   Bari Grewal RN    Subjective/Objective:      Benny Carrillo, a 89 y.o. male is established on warfarin.     Home care/care facility RN's report of Benny INR, recent warfarin dosing, diet changes, medication changes, and symptoms is documented below.    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

## 2021-06-24 NOTE — TELEPHONE ENCOUNTER
INR result is 2.1  INR   Date Value Ref Range Status   02/18/2019 2.20 (!) 0.9 - 1.1 Final       Will the patient be seen, or did they already see, MD or CNP today? No    Most Recent Warfarin dose day/week  Sunday Monday Tuesday Wednesday Thursday Friday Saturday    3 2.5 3 3 2.5 3     Sunday Monday Tuesday Wednesday Thursday Friday Saturday   2.5             Has the patient missed any doses of Coumadin, Warfarin, Jantoven in the past 7 days? No    Has the patients medications changed since the last visit? No    Has the patient experienced any bleeding recently? No    Has the patient experienced any injuries or illness recently? No    Has the patient experienced any 'new' shortness of breath, severe headaches, or changes in vision recently? No    Has the patient had any changes in their diet, or alcohol consumption? No    Is the patient here today to prepare for any type of upcoming surgery, procedure, or for a cardioversion procedure? No    What phone number can we reach the patient at today? Please contact Clair from [facility] at [phone].

## 2021-06-24 NOTE — TELEPHONE ENCOUNTER
INR result is   2.3    Will the patient be seen, or did they already see, MD or CNP today? No    Most Recent Warfarin dose day/week  Sunday Monday Tuesday Wednesday Thursday Friday Saturday     2.5 3 3 2.5 3     Sunday Monday Tuesday Wednesday Thursday Friday Saturday   2.5             Has the patient missed any doses of Coumadin, Warfarin, Jantoven in the past 7 days? No    Has the patients medications changed since the last visit? No    Has the patient experienced any bleeding recently? No    Has the patient experienced any injuries or illness recently? No    Has the patient experienced any 'new' shortness of breath, severe headaches, or changes in vision recently? No    Has the patient had any changes in their diet, or alcohol consumption? No    Is the patient here today to prepare for any type of upcoming surgery, procedure, or for a cardioversion procedure? No    What phone number can we reach the patient at today? [phone].

## 2021-06-24 NOTE — PROGRESS NOTES
Subjective: Patient comes in for evaluation this 89-year-old male was 19.  He had a pubic catheter dysfunction and it came out.  He had some retention and had evidence of a UTI.    He was placed on cephalexin.  Cultures were not sensitive to that.  The ER folks from [facility] contacted the family and the patient was improving once he got his catheter back in.  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    INR on 2/11/2019 came back normal/therapeutic at 2.3.    Patient has a chronic A. fib rates in the 60-70 range today.    No additional concerns or issues    Tobacco status: He  reports that  has never smoked. he has never used smokeless tobacco.    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     Acute pulmonary edema (H)      Acute pyelonephritis 02/10/2018     Urinary tract infection associated with cystostomy catheter, initial encounter (H) 02/10/2018     Sepsis secondary to UTI (H) 02/10/2018     Fever in other diseases      Moderate malnutrition (H)      History of ischemic cerebrovascular accident (CVA) with residual deficit 11/20/2017     Accelerated hypertension      Chronic retention of urine      Acute nonintractable headache, unspecified headache type      Acute cerebral infarction (H) 11/14/2017     Chest pain 09/14/2016     Gastroesophageal reflux disease without esophagitis 09/14/2016     Suprapubic catheter (H) 09/14/2016     Leukocytosis, unspecified type 09/14/2016     Heartburn 09/14/2016     Underweight due to inadequate caloric intake 07/28/2015     Chronic atrial fibrillation (H)      Muscle Weakness Generalized        Current Outpatient Medications   Medication Sig Dispense Refill     acetaminophen (TYLENOL) 500 MG tablet Take 1 tablet (500 mg total) by mouth every 6 (six) hours as needed  for pain. 80 tablet 2     digoxin (LANOXIN) 125 mcg tablet Take 1 tablet (125 mcg total) by mouth daily. 90 tablet 1     finasteride (PROSCAR) 5 mg tablet Take 1 tablet (5 mg total) by mouth daily. 30 tablet 6     loratadine (CLARITIN) 10 mg tablet Take 1 tablet (10 mg total) by mouth daily. 90 tablet 3     multivitamin with minerals (THERA-M) 9 mg iron-400 mcg Tab tablet Take 1 tablet by mouth daily. 30 tablet 11     oxybutynin (DITROPAN XL) 10 MG ER tablet TAKE ONE TABLET BY MOUTH ONCE DAILY 90 tablet 1     TAB-A-JAMISON per tablet TAKE 1 TABLET BY MOUTH ONCE DAILY 30 tablet 6     albuterol (PROAIR HFA;PROVENTIL HFA;VENTOLIN HFA) 90 mcg/actuation inhaler Inhale 2 puffs every 6 (six) hours as needed for wheezing. 1 Inhaler 6     amLODIPine (NORVASC) 5 MG tablet TAKE 1/2 (ONE-HALF) TABLET BY MOUTH ONCE DAILY 45 tablet 3     artificial tears, hypromellose, (GONIOVISC) 2.5 % ophthalmic solution 2 gtts to each eye two times a day prn dry eyes 15 mL 6     guaiFENesin (ROBITUSSIN) 100 mg/5 mL syrup Take 10 mL (200 mg total) by mouth 3 (three) times a day as needed for cough. 240 mL 0     hydrocortisone 2.5 % cream Apply to affected area 1-2 times daily as needed 30 g 2     warfarin (COUMADIN/JANTOVEN) 1 MG tablet Take 2.5-3 mg by mouth See Admin Instructions. Take 3mg Monday/Thursday and 2.5mg ROW       No current facility-administered medications for this visit.        ROS:   10 point review of systems negative other than as outlined above    Objective:    /70 (Patient Site: Left Arm, Patient Position: Sitting, Cuff Size: Adult Regular)   Pulse 64   Resp 16   Wt 120 lb (54.4 kg)   BMI 20.60 kg/m    Body mass index is 20.6 kg/m .      General appearance no acute distress    Neck is supple no adenopathy oropharynx clear    Lungs are clear no rales rhonchi heart was irregularly irregular in the 60s abdomen is soft bowel sounds normal no guarding or suprapubic catheter is functioning.    I reviewed the results from  [facility] with the urine culture and evaluation.    Results for orders placed or performed in visit on 02/11/19   INR   Result Value Ref Range    INR 2.30 (!) 0.9 - 1.1       Assessment:  1. Suprapubic catheter (H)  finasteride (PROSCAR) 5 mg tablet   2. Muscle Weakness Generalized  acetaminophen (TYLENOL) 500 MG tablet   3. Chronic atrial fibrillation (H)     4. Urinary tract infection without hematuria, site unspecified       Patient needed refill on Tylenol this was given    Functioning I did refill finasteride.    UTI likely secondary to colonization no active symptoms    Chronic atrial fibrillation rate controlled therapeutic INR    Plan: As outlined above.  Recheck in 4 months    This transcription uses voice recognition software, which may contain typographical errors.

## 2021-06-24 NOTE — TELEPHONE ENCOUNTER
ANTICOAGULATION  MANAGEMENT- Home Care/Care Facility Result    Assessment     Today's INR result of 2.3 is Therapeutic (goal INR of 2.0-3.0)        Warfarin taken as previously instructed    No new diet changes affecting INR    No new medication/supplements affecting INR    Continues to tolerate warfarin with no reported s/s of bleeding or thromboembolism     Previous INR was Therapeutic     Faxed this encounter to [facility] [phone]    Plan:     Spoke with home care nurse Clair discussed INR result and instructed:     Warfarin Dosing Instructions: Continue current warfarin dose    2.5 mg every Sun, Tue, Fri; 3 mg all other days         Next INR to be drawn: 2 weeks.     Education provided: importance of taking warfarin as instructed    Clair verbalizes understanding and agrees to warfarin dosing plan.   ?   Bari Grewal RN    Subjective/Objective:      Benny Carrillo, a 89 y.o. male is established on warfarin.     Home care/care facility RN's report of Benny INR, recent warfarin dosing, diet changes, medication changes, and symptoms is documented below.    Additional findings: verbally confirmed home dose with Clair and updated on anticoagulation calendar    Anticoagulation Episode Summary     Current INR goal:   2.0-3.0   TTR:   64.1 % (6.5 mo)   Next INR check:   3/25/2019   INR from last check:   2.30 (3/11/2019)   Weekly max warfarin dose:      Target end date:   1/30/2019   INR check location:      Preferred lab:      Send INR reminders to:   ANTICOAGULATION POOL A (WBY,WBE,MID,RSC)    Indications    Renal infarct (H) [N28.0]           Comments:            Anticoagulation Care Providers     Provider Role Specialty Phone number    Alcides Castillo MD Referring Family Medicine [phone]

## 2021-06-24 NOTE — TELEPHONE ENCOUNTER
ANTICOAGULATION  MANAGEMENT- Home Care/Care Facility Result    Assessment     Today's INR result of 2.2 is Therapeutic (goal INR of 2.0-3.0)        Warfarin taken as previously instructed    No new diet changes affecting INR    No new medication/supplements affecting INR    Continues to tolerate warfarin with no reported s/s of bleeding or thromboembolism     Previous INR was Therapeutic     Faxed this encounter to [facility] [phone]    Plan:     Spoke with home care nurse Clair discussed INR result and instructed:     Warfarin Dosing Instructions: Continue current warfarin dose    2.5 mg on Sun, Tue, Fri; 3 mg all other days         Next INR to be drawn: 1 week.    Education provided: importance of taking warfarin as instructed    Clair verbalizes understanding and agrees to warfarin dosing plan.   ?   Bari Grewal RN    Subjective/Objective:      Benny Carrillo, a 89 y.o. male is established on warfarin.     Home care/care facility RN's report of Benny INR, recent warfarin dosing, diet changes, medication changes, and symptoms is documented below.    Additional findings: verbally confirmed home dose with Clair and updated on anticoagulation calendar    Anticoagulation Episode Summary     Current INR goal:   2.0-3.0   TTR:   59.8 % (5.8 mo)   Next INR check:   2/25/2019   INR from last check:   2.20 (2/18/2019)   Weekly max warfarin dose:      Target end date:   1/30/2019   INR check location:      Preferred lab:      Send INR reminders to:   ANTICOAGULATION POOL A (WBY,WBE,MID,RSC)    Indications    Renal infarct (H) [N28.0]           Comments:            Anticoagulation Care Providers     Provider Role Specialty Phone number    Alcides Castillo MD Referring Family Medicine [phone]

## 2021-06-25 ENCOUNTER — COMMUNICATION - HEALTHEAST (OUTPATIENT)
Dept: ANTICOAGULATION | Facility: CLINIC | Age: 86
End: 2021-06-25

## 2021-06-25 DIAGNOSIS — E63.9 POOR NUTRITION: ICD-10-CM

## 2021-06-25 RX ORDER — MULTIVIT,CALC,MINS/IRON/FOLIC 9MG-400MCG
1 TABLET ORAL DAILY
Qty: 30 TABLET | Refills: 3 | Status: SHIPPED | OUTPATIENT
Start: 2021-06-25 | End: 2021-07-20

## 2021-06-25 NOTE — TELEPHONE ENCOUNTER
INR result is 1.3  INR   Date Value Ref Range Status   05/28/2021 1.50 (!) 0.90 - 1.10 Final       Will the patient be seen, or did they already see, MD or CNP today? No    Most Recent Warfarin dose day/week  Sunday Monday Tuesday Wednesday Thursday Friday Saturday        5 2     Sunday Monday Tuesday Wednesday Thursday Friday Saturday   3 3 3 2 3         Has the patient missed any doses of Coumadin, Warfarin, Jantoven in the past 7 days? No    Has the patients medications changed since the last visit? No    Has the patient experienced any bleeding recently? No    Has the patient experienced any injuries or illness recently? No    Has the patient experienced any 'new' shortness of breath, severe headaches, or changes in vision recently? No    Has the patient had any changes in their diet, or alcohol consumption? No    Is the patient here today to prepare for any type of upcoming surgery, procedure, or for a cardioversion procedure? No    What phone number can we reach the patient at today? home phone listed in demographics.

## 2021-06-25 NOTE — TELEPHONE ENCOUNTER
RN cannot approve Refill Request    RN can NOT refill this medication med is not covered by policy/route to provider. Last office visit: 2020 Alcides Castillo MD Last Physical: Visit date not found Last MTM visit: Visit date not found Last visit same specialty: 2020 Alcides Castillo MD.  Next visit within 3 mo: Visit date not found  Next physical within 3 mo: Visit date not found      Priscilla Connor, [facility] Triage/Med Refill 2021    Requested Prescriptions   Pending Prescriptions Disp Refills     acetaminophen (TYLENOL) 500 MG tablet 100 tablet 1     Si po three times a day prn pain       There is no refill protocol information for this order

## 2021-06-25 NOTE — TELEPHONE ENCOUNTER
INR result is   2.1    Will the patient be seen, or did they already see, MD or CNP today? No    Most Recent Warfarin dose day/week  Sunday Monday Tuesday Wednesday Thursday Friday Saturday        4.5 3.5     Sunday Monday Tuesday Wednesday Thursday Friday Saturday   2.5 3.5 2.5 3.5 3.5         Has the patient missed any doses of Coumadin, Warfarin, Jantoven in the past 7 days? No    Has the patients medications changed since the last visit? No    Has the patient experienced any bleeding recently? No    Has the patient experienced any injuries or illness recently? No    Has the patient experienced any 'new' shortness of breath, severe headaches, or changes in vision recently? No    Has the patient had any changes in their diet, or alcohol consumption? No    Is the patient here today to prepare for any type of upcoming surgery, procedure, or for a cardioversion procedure? No    What phone number can we reach the patient at today? [phone]

## 2021-06-25 NOTE — TELEPHONE ENCOUNTER
Reason for Call:  Other      Detailed comments: dayana from home care calling to ask for verbal orders skilled nursing 1 time a week x 9 1 prn    Phone Number Patient can be reached at: Other phone number:[phone]     Best Time: any    Can we leave a detailed message on this number?: Yes    Call taken on 6/1/2021 at 1:08 PM by Vicky Hardin

## 2021-06-25 NOTE — TELEPHONE ENCOUNTER
Refill Approved    Rx renewed per Medication Renewal Policy. Medication was last renewed on 1/13/21, last OV 1/25/21.    Francine Covarrubias, [facility] Triage/Med Refill 6/13/2021     Requested Prescriptions   Pending Prescriptions Disp Refills     amLODIPine (NORVASC) 5 MG tablet [Pharmacy Med Name: amLODIPine Besylate 5 MG Oral Tablet] 30 tablet 0     Sig: Take 1 tablet by mouth once daily       Calcium-Channel Blockers Protocol Passed - 6/13/2021 10:03 AM        Passed - PCP or prescribing provider visit in past 12 months or next 3 months     Last office visit with prescriber/PCP: 1/9/2020 Alcides Castillo MD OR same dept: Visit date not found OR same specialty: 1/9/2020 Alcides Castillo MD  Last physical: Visit date not found Last MTM visit: Visit date not found   Next visit within 3 mo: Visit date not found  Next physical within 3 mo: Visit date not found  Prescriber OR PCP: Alcides Castillo MD  Last diagnosis associated with med order: 1. Benign essential hypertension  - amLODIPine (NORVASC) 5 MG tablet [Pharmacy Med Name: amLODIPine Besylate 5 MG Oral Tablet]; Take 1 tablet by mouth once daily  Dispense: 30 tablet; Refill: 0    If protocol passes may refill for 12 months if within 3 months of last provider visit (or a total of 15 months).             Passed - Blood pressure filed in past 12 months     BP Readings from Last 1 Encounters:   12/02/20 141/90

## 2021-06-25 NOTE — TELEPHONE ENCOUNTER
RN cannot approve Refill Request    RN can NOT refill this medication med is not covered by policy/route to provider. Last office visit: 1/9/2020 Alcides Castillo MD Last Physical: Visit date not found Last MTM visit: Visit date not found Last visit same specialty: 1/9/2020 Alcides Castillo MD.  Next visit within 3 mo: Visit date not found  Next physical within 3 mo: Visit date not found      Sydney Yeager, [facility] Connection Triage/Med Refill 6/1/2021    Requested Prescriptions   Pending Prescriptions Disp Refills     guaiFENesin (ROBAFEN) 100 mg/5 mL syrup [Pharmacy Med Name: Robafen 100 MG/5ML Oral Syrup] 240 mL 0     Sig: TAKE 10 ML BY MOUTH  THREE TIMES DAILY AS NEEDED FOR COUGH       There is no refill protocol information for this order

## 2021-06-25 NOTE — PROGRESS NOTES
Progress Notes by Alexandria Ferguson RN at 3/30/2017  3:57 PM     Author: Alexandria Ferguson RN Service: -- Author Type: Registered Nurse    Filed: 3/30/2017  3:58 PM Encounter Date: 3/30/2017 Status: Signed    : Alexandria Ferguson RN (Registered Nurse)       Wellness Plan:       Atrial Fibrillation  You have been diagnosed with an abnormal heart rhythm called atrial fibrillation. With this condition, your hearts 2 upper chambers quiver rather than squeeze the blood out in a normal pattern. This leads to an irregular and sometimes rapid heartbeat. Some people will develop associated symptoms such as a flip-flopping heartbeat, chest pain, lightheadedness, or shortness of breath. Other people may have no symptoms at all. Atrial fibrillation is serious because it affects the hearts ability to fill with blood as it should. Blood clots may form. This increases the risk for stroke. Untreated atrial fibrillation can also lead to heart failure. Atrial fibrillation can be controlled. With treatment, most people with atrial fibrillation lead normal lives.    Home care    Take your medicines exactly as directed. Dont skip doses.    Work with your doctor to find the right medicines and doses for you.    Learn to take your own pulse. Keep a record of your results. Ask your doctor which pulse rates mean that you need medical attention. Slowing your pulse is often the goal of treatment. Ask your doctor if its OK for you to use an automatic machine to check your pulse at home. Sometimes these machines dont count the pulse correctly when you have atrial fibrillation.    Limit your intake of coffee, tea, cola, and other beverages with caffeine. Talk with your doctor about whether you should eliminate caffeine.    Avoid over-the-counter medicines that have caffeine in them.    Let your doctor know what medicines you take, including prescription and over-the-counter medicines, as well as any supplements. They interfere with some  medicines given for atrial fibrillation.    Ask your doctor about whether you can drink alcohol. Some people need to avoid alcohol to better treat atrial fibrillation. If you are taking blood-thinner medicines, alcohol may interfere with them by increasing their effect.    Never take stimulants such as amphetamines or cocaine. These drugs can speed up your heart rate and trigger atrial fibrillation.    Follow-up care  Follow up with your doctor or as advised.   Follow up with your doctor, or as advised.     When should Icall my healthcare provider  Call your healthcare provider right away if you have any of the following:    Weakness    Dizziness    Fainting    Fatigue    Shortness of breath    Chest pain with increased activity    A change in the usual regularity of your heartbeat, or an unusually fast heartbeat     6935-9716 [employer]. [address] [place] [address], [place] [address]. All rights reserved. This information is not intended as a substitute for professional medical care. Always follow your healthcare professional's instructions.          Emergency Plan Recommendation:    When to Use the Emergency Department (ED)  An emergency means you could die if you dont get care quickly. Or you could be hurt permanently (disabled). Read below to know when to use -- and when not to use -- an emergency department (also called ED).    Dangers to your life  Here are examples of emergencies. These need immediate care:  A hard time breathing  Severe chest pain  Choking  Severe bleeding  Suddenly not able to move or speak  Blacking out (fainting)`  Poisoning    Dangers of permanent injuries  Here are other emergencies. These also need immediate care:  Deep cuts or severe burns  Broken bones, or sudden severe pain and swelling in a joint    When its an emergency  If you have an emergency, follow these steps:    1. Go to the nearest emergency department  If you can, go to the hospital ED closest to you right  away.  If you cannot get there right away, or if it is not safe to take yourself, call 911 or your police emergency number.  2. Call your primary care doctor  Tell your doctor about the emergency. Call within 24 hours of going to the ED.  If you cannot call, have someone call for you.  Go to your doctor (not the ED) for any follow-up care.    When its not an emergency  If a problem is not an emergency, follow these steps:    1. Call your primary care doctor  If you dont know the name of your doctor, call your health plan.  If you cannot call, have someone call for you.  2. Follow instructions  Your doctor will tell you what you should do.  You may be told to see your doctor right away. You may be told to go to the ED. Or you may be told to go to an urgent care center.  Follow your doctors advice.

## 2021-06-25 NOTE — TELEPHONE ENCOUNTER
INR result is   1.5  INR   Date Value Ref Range Status   05/24/2021 1.30 (!) 0.90 - 1.10 Final       Will the patient be seen, or did they already see, MD or CNP today? No    Most Recent Warfarin dose day/week  Sunday Monday Tuesday Wednesday Thursday Friday Saturday Sunday Monday Tuesday Wednesday Thursday Friday Saturday     2 mg 3 mg 2 mg         Has the patient missed any doses of Coumadin, Warfarin, Jantoven in the past 7 days? No    Has the patients medications changed since the last visit? No    Has the patient experienced any bleeding recently? No    Has the patient experienced any injuries or illness recently? No   (cough, runny nose new today)    Has the patient experienced any 'new' shortness of breath, severe headaches, or changes in vision recently? No    Has the patient had any changes in their diet, or alcohol consumption? No    Is the patient here today to prepare for any type of upcoming surgery, procedure, or for a cardioversion procedure? No    What phone number can we reach the patient at today? Sasha   [facility]  [phone].

## 2021-06-25 NOTE — TELEPHONE ENCOUNTER
ANTICOAGULATION  MANAGEMENT- Home Care/Care Facility Result    Assessment     Today's INR result of 2.1 is Therapeutic (goal INR of 2.0-3.0)        Warfarin taken as previously instructed    No new diet changes affecting INR    No new medication/supplements affecting INR    Continues to tolerate warfarin with no reported s/s of bleeding or thromboembolism     Previous INR was Subtherapeutic    Plan:     Spoke with nurse Neelima discussed INR result and instructed:     Warfarin Dosing Instructions: Change warfarin dose to 2.5 mg daily on sun; and 3.5 mg daily rest of week  (4.4 % change)    Next INR to be drawn: one week, home care wants to get back to Monday visits so 6/21 is planned      Neelima verbalizes understanding and agrees to warfarin dosing plan.   ?   Perlita Brown RN    Subjective/Objective:      Benny Carrillo, a 89 y.o. male is established on warfarin.     Home care/care facility RN's report of Benny INR, recent warfarin dosing, diet changes, medication changes, and symptoms is documented below.    Additional findings: none    Anticoagulation Episode Summary     Current INR goal:  2.0-3.0   TTR:  64.0 % (1 y)   Next INR check:  6/21/2021   INR from last check:  2.10 (6/11/2021)   Weekly max warfarin dose:     Target end date:  Indefinite   INR check location:     Preferred lab:     Send INR reminders to:  [facility]    Indications    Renal infarct (H) [N28.0]  Atrial fibrillation (H) [I48.91]           Comments:           Anticoagulation Care Providers     Provider Role Specialty Phone number    Alcides Castillo MD Referring Family Medicine [phone]

## 2021-06-25 NOTE — TELEPHONE ENCOUNTER
Reason for Call:  Medication or medication refill:    Do you use a [facility]?  Name of the pharmacy and phone number for the current request: [facility] [phone]    Name of the medication requested: acetaminophen 500mg  Other request: pharmacy stated no refills/ needed asap 2 pills left    Can we leave a detailed message on this number? Yes    Phone number patient can be reached at: Home number on file [phone] (home)    Best Time: as soon as possible     Call taken on 5/27/2021 at 12:02 PM by Ruthann Tovar

## 2021-06-25 NOTE — TELEPHONE ENCOUNTER
Reason for Call:  Medication or medication refill:    Do you use a [facility]?  Name of the pharmacy and phone number for the current request[facility]    Name of the medication requested: amlodapine    Other request: home care calling in refill request    Can we leave a detailed message on this number? Yes    Phone number patient can be reached at: Other phone number:  Neelima [phone]    Best Time: any    Call taken on 6/14/2021 at 9:36 AM by Vicky Hardin

## 2021-06-25 NOTE — TELEPHONE ENCOUNTER
RN cannot approve Refill Request    RN can NOT refill this medication med is not covered by policy/route to provider. Last office visit: 1/9/2020 Alcides Castillo MD Last Physical: Visit date not found Last MTM visit: Visit date not found Last visit same specialty: 1/9/2020 Alcides Castillo MD.  Next visit within 3 mo: Visit date not found  Next physical within 3 mo: Visit date not found      Sydney Yeager, [facility] Triage/Med Refill 6/1/2021    Requested Prescriptions   Pending Prescriptions Disp Refills     guaiFENesin (ROBAFEN) 100 mg/5 mL syrup [Pharmacy Med Name: Robafen 100 MG/5ML Oral Syrup] 240 mL 0     Sig: TAKE 10 ML BY MOUTH  THREE TIMES DAILY AS NEEDED FOR COUGH       There is no refill protocol information for this order

## 2021-06-26 NOTE — PROGRESS NOTES
Progress Notes by Ricki Garcia PA-C at 5/6/2018  9:50 AM     Author: Ricki Garcia PA-C Service: -- Author Type: Physician Assistant    Filed: 5/6/2018 11:11 AM Encounter Date: 5/6/2018 Status: Signed    : Ricki Garcia PA-C (Physician Assistant)       Subjective:      Patient ID: Benny Carrillo is a 89 y.o. male.    Chief Complaint:    HPI  Benny Carrillo is a 89 y.o. male who presents today complaining of dry nonproductive cough.  The granddaughter who is helping with the history and accompanies him in the office today does not state that he has any stridor or shortness of breath swollen feet or ankles dyspnea on exertion.  He has his normal activity level is not exerting himself any harder than usual.  No diaphoresis syncope or presyncope.  He did not have a fever in the office and they did not take an objective fever at home but rather subjective yesterday.  He denied chills or night sweats.  He is eating and drinking normally.  He has had some concern for drainage down the back of throat and irritation or itching of the throat is no known strep throat exposure.  Has had a history of seasonal allergies but no clear rhinorrhea at this time. Patient and care giver are requesting an antibiotic for his respiratory complaints.      Past Medical History:   Diagnosis Date   ? Asthma    ? Atrial fibrillation    ? Benign prostatic hyperplasia    ? Bradycardia 7/25/2015   ? Cataract    ? Citrobacter infection 7/25/2015    UTI    ? Citrobacter infection 7/25/2015    UTI    ? Constipation 1/13/2015   ? Elevated digoxin level 7/24/2015   ? GERD (gastroesophageal reflux disease)    ? Hypertension    ? Lactic acidosis    ? Muscle weakness    ? probable UTI (lower urinary tract infection)      Replacement Utility updated for latest IMO load   ? SBO (small bowel obstruction)    ? Small bowel obstruction 7/23/2015   ? Stroke    ? Underweight due to inadequate caloric intake 7/28/2015    Body mass index is 20.25 kg/(m^2).      ? Urinary obstruction        Past Surgical History:   Procedure Laterality Date   ? CHOLECYSTECTOMY     ? CYSTOTOMY     ? EYE SURGERY Bilateral     cataract removal       Family History   Problem Relation Age of Onset   ? No Medical Problems Mother    ? No Medical Problems Father    ? Diabetes       spouse       Social History   Substance Use Topics   ? Smoking status: Never Smoker   ? Smokeless tobacco: Never Used   ? Alcohol use No       Review of Systems  As above in HPI, otherwise negative.    Objective:     /82  Pulse 65  Temp 97  F (36.1  C) (Oral)   Resp 14  Wt 121 lb (54.9 kg)  SpO2 92%  BMI 20.14 kg/m2    Physical Exam  General: Patient is resting comfortably no acute distress is afebrile  He appears nonambulatory and his granddaughter is pushing him in a wheelchair.  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   Sclera anicteric TMs are clear bilaterally  Tongue is with a dark hairy type appearance.  Oral pharyngeal airway is patent erythema uvula is midline  Throat is clear  External nares are clear.  No cries or discharge from the eyes  No cervical lymphadenopathy present  Lungs: Clear to auscultation bilaterally  Heart: Regular rate and rhythm  Skin: non-diaphoretic patient is      Assessment:     Procedures    The encounter diagnosis was Cough.    Plan:     1. Cough  azithromycin (ZITHROMAX Z-YONG) 250 MG tablet       Risks and benefits of the medication treatment were gone over with the patient and granddaughter.  Shins were answered before discharge.  Follow-up with primary care provider if not getting good resolution or if new symptoms should develop especially if any CHF symptoms or dizziness chest pain chest congestion should present.    Patient Instructions     You were seen today for acute bronchitis. This is likely due to a viral illness.    Symptom management:  -  Get plenty of rest  - Avoid smoking and second hand smoke  - May take tylenol or ibuprofen for fever/discomfort  - Drink plenty of non-caffeinated fluids  - Use nasal steroid spray for sinus congestion  - Albuterol inhaler may be used every 6 hours as needed for chest tightness      Reasons to be seen in the emergency room:  - Develop a fever of 100.4 or higher  - Cough changes, coughing up blood, or become short of breath  - Neck stiffness  - Chest pain  - Severe headache  - Unable to tolerate eating or drinking fluids    Otherwise, if no symptom improvement after 5 days, follow-up with your primary care provider.    As a result of our visit today, here are the action plans for you:    1. Medication(s) to stop: There are no discontinued medications.    2. Medication(s) to start or change:   Medications Ordered   Medications   ? azithromycin (ZITHROMAX Z-YONG) 250 MG tablet     Sig: Take 2 tablets (500 mg) on  Day 1,  followed by 1 tablet (250 mg) once daily on Days 2 through 5.     Dispense:  6 tablet     Refill:  0       3. Other instructions: Yes         Acute Bronchitis  Your healthcare provider has told you that you have acute bronchitis. Bronchitis is infection or inflammation of the bronchial tubes (airways in the lungs). Normally, air moves easily in and out of the airways. Bronchitis narrows the airways, making it harder for air to flow in and out of the lungs. This causes symptoms such as shortness of breath, coughing, and wheezing. Bronchitis can be acute or chronic. Acute means the condition comes on quickly and goes away in a short time. Chronic means a condition lasts a long time and often comes back. Read on to learn more about acute bronchitis.    What causes acute bronchitis?  Acute bronchitis almost always starts as a viral respiratory infection, such as a cold or the flu. Certain factors make it more likely for a cold or flu to turn into bronchitis. These include being very young or very old or  having a heart or lung problem. Cigarette smoking also makes bronchitis more likely.  When bronchitis develops, the airways become swollen. The airways may also become infected with bacteria. This is known as a secondary infection.  Diagnosing acute bronchitis  Your healthcare provider will examine you and ask about your symptoms and health history. You may also have a sputum culture to test the fluid in your lungs. Chest X-rays may be done to look for infection in the lungs.  Treating acute bronchitis  Bronchitis usually clears up as the cold or flu goes away. You can help feel better faster by doing the following:    Take medicine as directed. You may be told to take ibuprofen or other over-the-counter medicines. These help relieve inflammation in your bronchial tubes. Your doctor may prescribe an inhaler to help open up the bronchial tubes. Most of the time, acute bronchitis is caused by a viral infection. Antibiotics are usually not prescribed for viral infections.    Drink plenty of fluids, such as water, juice, or warm soup. Fluids loosen mucus so that you can cough it up. This helps you breathe more easily. Fluids also prevent dehydration.    Make sure you get plenty of rest.    Do not smoke. Do not allow anyone else to smoke in your home.  Recovery and follow-up  Follow up with your doctor as you are told. You will likely feel better in a week or two. But a dry cough can linger beyond that time. Let your doctor know if you still have symptoms (other than a dry cough) after 2 weeks. If youre prone to getting bronchial infections, let your doctor know. And take steps to protect yourself from future infections. These steps include stopping smoking and avoiding tobacco smoke, washing your hands often, and getting a yearly flu shot.  When to call the doctor  Call the doctor if you have any of the following:    Fever of 100.4 F (38.0 C) higher    Symptoms that get worse, or new symptoms    Trouble  breathing    Symptoms that dont start to improve within a week, or within 3 days of taking antibiotics   Date Last Reviewed: 8/4/2014 2000-2016 [employer]. [address] [place] [address], [place] [address]. All rights reserved. This information is not intended as a substitute for professional medical care. Always follow your healthcare professional's instructions.

## 2021-06-26 NOTE — TELEPHONE ENCOUNTER
Refill Approved    Rx renewed per Medication Renewal Policy. Medication was last renewed on 2/15/21, last OV 1/13/21 .    Francine Covarrubias, [facility] Triage/Med Refill 6/21/2021     Requested Prescriptions   Pending Prescriptions Disp Refills     pantoprazole (PROTONIX) 40 MG tablet [Pharmacy Med Name: Pantoprazole Sodium 40 MG Oral Tablet Delayed Release] 30 tablet 0     Sig: Take 1 tablet by mouth once daily       GI Medications Refill Protocol Passed - 6/21/2021 11:59 AM        Passed - PCP or prescribing provider visit in last 12 or next 3 months.     Last office visit with prescriber/PCP: 1/9/2020 Alcides Castillo MD OR same dept: Visit date not found OR same specialty: Visit date not found  Last physical: Visit date not found Last MTM visit: Visit date not found   Next visit within 3 mo: Visit date not found  Next physical within 3 mo: Visit date not found  Prescriber OR PCP: Alcides Castillo MD  Last diagnosis associated with med order: 1. Gastroesophageal reflux disease without esophagitis  - pantoprazole (PROTONIX) 40 MG tablet [Pharmacy Med Name: Pantoprazole Sodium 40 MG Oral Tablet Delayed Release]; Take 1 tablet by mouth once daily  Dispense: 30 tablet; Refill: 0    If protocol passes may refill for 12 months if within 3 months of last provider visit (or a total of 15 months).

## 2021-06-26 NOTE — TELEPHONE ENCOUNTER
INR result is 1.7  INR   Date Value Ref Range Status   06/11/2021 2.10 (!) 0.90 - 1.10 Final       Will the patient be seen, or did they already see, MD or CNP today? No    Most Recent Warfarin dose day/week  Sunday Monday Tuesday Wednesday Thursday Friday Saturday    3.5 3.5 3.5 3.5 3.5 3.5     Sunday Monday Tuesday Wednesday Thursday Friday Saturday   2.5             Has the patient missed any doses of Coumadin, Warfarin, Jantoven in the past 7 days? No    Has the patients medications changed since the last visit? No    Has the patient experienced any bleeding recently? No    Has the patient experienced any injuries or illness recently? No    Has the patient experienced any 'new' shortness of breath, severe headaches, or changes in vision recently? No    Has the patient had any changes in their diet, or alcohol consumption? No    Is the patient here today to prepare for any type of upcoming surgery, procedure, or for a cardioversion procedure? No    What phone number can we reach the patient at today? Neelima

## 2021-06-28 ENCOUNTER — COMMUNICATION - HEALTHEAST (OUTPATIENT)
Dept: FAMILY MEDICINE | Facility: CLINIC | Age: 86
End: 2021-06-28

## 2021-06-28 DIAGNOSIS — N28.0 RENAL INFARCT (H): ICD-10-CM

## 2021-06-28 LAB — INR PPP: 2.2 (ref 0.9–1.1)

## 2021-06-30 NOTE — PROGRESS NOTES
Progress Notes by Radhika Crooks RN at 3/25/2021  3:22 PM     Author: Radhika Crooks RN Service: -- Author Type: Registered Nurse    Filed: 3/25/2021  3:25 PM Encounter Date: 3/25/2021 Status: Attested    : Radhika Crooks RN (Registered Nurse) Cosigner: Alcides Castillo MD at 3/25/2021  3:35 PM    Attestation signed by Alcides Castillo MD at 3/25/2021  3:35 PM    reviewed                Anticoagulation Annual Referral Renewal Review    Benny Carrillo's chart reviewed for annual renewal of referral to anticoagulation monitoring.        Criteria for anticoagulation nurse and/or pharmacist renewal met   Warfarin indication: Atrial Fibrillation and renal infarct Yes, per indication   Current with INR monitoring/compliant Yes Yes   Date of last office visit 1/25/21 Yes, had office visit within last year   Time in Therapeutic Range (TTR) 73 % Yes, TTR > 60%       Benny Carrillo met all criteria for anticoagulation management program initiated renewal.  New INR standing orders and anticoagulation referral renewal placed.      Radhika Crooks RN  3:22 PM

## 2021-07-03 NOTE — ADDENDUM NOTE
Addendum Note by Bassem Dietz MD at 7/10/2018 11:34 AM     Author: Bassem Dietz MD Service: -- Author Type: Physician    Filed: 7/10/2018 11:34 AM Encounter Date: 7/10/2018 Status: Signed    : Bassem Dietz MD (Physician)    Addended by: BASSEM DIETZ on: 7/10/2018 11:34 AM        Modules accepted: Orders

## 2021-07-04 NOTE — TELEPHONE ENCOUNTER
Telephone Encounter by Radhika Crooks RN at 6/21/2021  9:40 AM     Author: Radhika Crooks RN Service: -- Author Type: Registered Nurse    Filed: 6/21/2021  9:41 AM Encounter Date: 6/21/2021 Status: Signed    : Radhika Crooks RN (Registered Nurse)       ANTICOAGULATION  MANAGEMENT- Home Care/Care Facility Result    Assessment     Today's INR result of 1.7 is Subtherapeutic (goal INR of 2.0-3.0)        Warfarin taken as previously instructed    No new diet changes affecting INR    No new medication/supplements affecting INR    Continues to tolerate warfarin with no reported s/s of bleeding or thromboembolism     Previous INR was Therapeutic    Plan:     Spoke with Neelima BARILLAS discussed INR result and instructed:     Warfarin Dosing Instructions: Boost today Mon 6/21 take 4mg then change warfarin dose to 3.5 mg daily   (4.3 % change)    Next INR to be drawn: 1 week    Education provided: target INR goal and significance of current INR result    Neelima verbalizes understanding and agrees to warfarin dosing plan.   ?   Radhika Crooks RN    Subjective/Objective:      Benny Carrillo, a 89 y.o. male is established on warfarin.     Home care/care facility RN's report of Benny INR, recent warfarin dosing, diet changes, medication changes, and symptoms is documented below.    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

## 2021-07-04 NOTE — TELEPHONE ENCOUNTER
Telephone Encounter by Maribel Whitaker RN at 5/28/2021  1:17 PM     Author: Maribel Whitaker RN Service: -- Author Type: Registered Nurse    Filed: 5/28/2021  1:30 PM Encounter Date: 5/28/2021 Status: Signed    : Maribel Whitaker RN (Registered Nurse)       ANTICOAGULATION  MANAGEMENT- Home Care/Care Facility Result    Assessment     Today's INR result of 1.50 is Subtherapeutic (goal INR of 2.0-3.0)        Warfarin taken as previously instructed    No new diet changes affecting INR    No new medication/supplements affecting INR    Continues to tolerate warfarin with no reported s/s of bleeding or thromboembolism     Previous INR was Subtherapeutic at 1.50 on 5/28/21.    Family reported there has been no change in diet or medication regiments.    FAXED telephone orders to [facility] - [phone].    Plan:     Spoke with ERAN Ortez with [facility] discussed INR result and instructed:   - INR continues to be sub-therapeutic.  Adjusted wkly warfarin dose, to ensure INR stability.   - NOTE:  ERAN Ortez usually comes ones a week on Mondays,  However, if INR in the therapeutic range; OK to recheck INR in 2 wks on (Mondays).     Warfarin Dosing Instructions:   (1mg tabs)   - today, advised one time booster with 5mg warfarin dose,   - then change warfarin dose to 2 mg daily on Wed/Sat; and 3 mg daily rest of week.   - (11.8 % change)    Next INR to be drawn:  One wk.  Scheduled on 6/4/21.    Education provided: importance of consistent vitamin K intake, target INR goal and significance of current INR result, monitoring for clotting signs and symptoms and when to seek medical attention/emergency care    ERAN Ortez verbalizes understanding and agrees to warfarin dosing plan.   ?   Maribel Whitaker RN    Subjective/Objective:      Benny Carrillo, a 89 y.o. male is established on warfarin.     Home care/care facility RN's report of Benny INR, recent warfarin dosing, diet  changes, medication changes, and symptoms is documented below.    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

## 2021-07-07 NOTE — TELEPHONE ENCOUNTER
INR result is 2.2  INR   Date Value Ref Range Status   06/21/2021 1.70 (!) 0.90 - 1.10 Final       Will the patient be seen, or did they already see, MD or CNP today? No    Most Recent Warfarin dose day/week  Sunday Monday Tuesday Wednesday Thursday Friday Saturday    4 3.5 3.5 3.5 3.5 3.5     Sunday Monday Tuesday Wednesday Thursday Friday Saturday   3.5             Has the patient missed any doses of Coumadin, Warfarin, Jantoven in the past 7 days? No    Has the patients medications changed since the last visit? No    Has the patient experienced any bleeding recently? No    Has the patient experienced any injuries or illness recently? No    Has the patient experienced any 'new' shortness of breath, severe headaches, or changes in vision recently? No    Has the patient had any changes in their diet, or alcohol consumption? No    Is the patient here today to prepare for any type of upcoming surgery, procedure, or for a cardioversion procedure? No    What phone number can we reach the patient at today? home phone listed in demographics.

## 2021-07-07 NOTE — TELEPHONE ENCOUNTER
ANTICOAGULATION MANAGEMENT     Benny Carrillo 89 y.o., male is on warfarin with Therapeutic INR result (goal range 2.0-3.0)    Recent labs: (last 7 days)     06/28/21   INR 2.20*       ASSESSMENT     Source: Chart Review and Home Care/Facility Nurse      Warfarin dosing taken: Warfarin taken as instructed    Diet: No new diet changes affecting INR    Illness, Injury or hospitalization: No    Medication changes: None    Signs or symptoms of bleeding or clotting: No    Previous INR: subtherapeutic    Additional findings: None     PLAN     Recommended plan for no diet, medication or health factor changes affecting INR:     Dosing instructions: Continue your current warfarin dose 3.5 mg daily (0% change)    Follow up no later than: 7-10 days     Telephone call with home care nurse Shelia who verbalizes understanding and agrees to plan    Orders given to  Homecare nurse/facility to recheck    Education provided: importance of therapeutic range and target INR goal and significance of current INR result    Plan made per [facility] anticoagulation protocol    Sol Coy  [facility]ticoagulation [facility]   [phone]    Anticoagulation Episode Summary     Current INR goal:  2.0-3.0   TTR:  65.4 % (1 y)   Next INR check:  7/8/2021   INR from last check:  2.20 (6/28/2021)   Weekly max warfarin dose:     Target end date:  Indefinite   INR check location:     Preferred lab:     Send INR reminders to:  [facility]    Indications    Renal infarct (H) [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

## 2021-07-08 ENCOUNTER — COMMUNICATION - HEALTHEAST (OUTPATIENT)
Dept: FAMILY MEDICINE | Facility: CLINIC | Age: 86
End: 2021-07-08

## 2021-07-08 DIAGNOSIS — I48.91 ATRIAL FIBRILLATION (H): ICD-10-CM

## 2021-07-08 DIAGNOSIS — N28.0 RENAL INFARCT (H): ICD-10-CM

## 2021-07-08 LAB — INR PPP: 2.9 (ref 0.9–1.1)

## 2021-07-08 NOTE — TELEPHONE ENCOUNTER
Telephone Encounter by Radhika Crooks, RN at 7/8/2021 10:26 AM     Author: Radhika Crooks RN Service: -- Author Type: Registered Nurse    Filed: 7/8/2021 10:31 AM Encounter Date: 7/8/2021 Status: Signed    : Radhika Crooks RN (Registered Nurse)       ANTICOAGULATION MANAGEMENT     Benny Carrillo 89 y.o., male is on warfarin with Therapeutic INR result (goal range 2.0-3.0)    Recent labs: (last 7 days)     07/08/21   INR 2.90*       ASSESSMENT     Source: Home Care/Facility Nurse      Warfarin dosing taken: Warfarin taken as instructed    Diet: No new diet changes affecting INR    Illness, Injury or hospitalization: No    Medication changes: None    Signs or symptoms of bleeding or clotting: No    Previous INR: therapeutic last visit; previously outside of goal range    Additional findings: None     PLAN     Recommended plan for no diet, medication or health factor changes affecting INR:     Dosing instructions: Continue your current warfarin dose 3.5 mg daily     Follow up no later than: 2 weeks     Telephone call with home care nurse Renetta who agrees to plan and repeated back plan correctly    Orders given to  Homecare nurse/facility to recheck    Education provided: target INR goal and significance of current INR result    Plan made per [facility] anticoagulation protocol    Radhika Crooks  Anticoagulation Clinic   [phone]    Anticoagulation Episode Summary     Current INR goal:  2.0-3.0   TTR:  65.7 % (1 y)   Next INR check:  7/22/2021   INR from last check:  2.90 (7/8/2021)   Weekly max warfarin dose:     Target end date:  Indefinite   INR check location:     Preferred lab:     Send INR reminders to:  [facility]    Indications    Renal infarct (H) [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

## 2021-07-08 NOTE — TELEPHONE ENCOUNTER
Telephone Encounter by Mayra Hogan at 7/8/2021 10:20 AM     Author: Mayra Hogan Service: -- Author Type: Patient Access    Filed: 7/8/2021 10:22 AM Encounter Date: 7/8/2021 Status: Signed    : Mayra Hogan (Patient Access)       INR result is 2.9  INR   Date Value Ref Range Status   06/28/2021 2.20 (!) 0.90 - 1.10 Final       Will the patient be seen, or did they already see, MD or CNP today? No    Most Recent Warfarin dose day/week  Sunday Monday Tuesday Wednesday Thursday Friday Saturday       3.5 3.5 3.5     Sunday Monday Tuesday Wednesday Thursday Friday Saturday   3.5 3.5 3.5 3.5          Has the patient missed any doses of Coumadin, Warfarin, Jantoven in the past 7 days? No    Has the patients medications changed since the last visit? No    Has the patient experienced any bleeding recently? No    Has the patient experienced any injuries or illness recently? No    Has the patient experienced any 'new' shortness of breath, severe headaches, or changes in vision recently? No    Has the patient had any changes in their diet, or alcohol consumption? No    Is the patient here today to prepare for any type of upcoming surgery, procedure, or for a cardioversion procedure? No    What phone number can we reach the patient at today? home phone listed in demographics.

## 2021-07-11 ENCOUNTER — HOSPITAL ENCOUNTER (OUTPATIENT)
Facility: CLINIC | Age: 86
Setting detail: OBSERVATION
Discharge: HOME HEALTH | End: 2021-07-12
Attending: EMERGENCY MEDICINE | Admitting: EMERGENCY MEDICINE
Payer: COMMERCIAL

## 2021-07-11 ENCOUNTER — HOSPITAL ENCOUNTER (EMERGENCY)
Facility: CLINIC | Age: 86
Discharge: HOME | End: 2021-07-11
Attending: STUDENT IN AN ORGANIZED HEALTH CARE EDUCATION/TRAINING PROGRAM | Admitting: STUDENT IN AN ORGANIZED HEALTH CARE EDUCATION/TRAINING PROGRAM
Payer: COMMERCIAL

## 2021-07-11 ENCOUNTER — HOSPITAL ENCOUNTER (EMERGENCY)
Dept: CT IMAGING | Facility: CLINIC | Age: 86
End: 2021-07-11
Attending: STUDENT IN AN ORGANIZED HEALTH CARE EDUCATION/TRAINING PROGRAM
Payer: COMMERCIAL

## 2021-07-11 VITALS
SYSTOLIC BLOOD PRESSURE: 122 MMHG | HEIGHT: 64 IN | DIASTOLIC BLOOD PRESSURE: 71 MMHG | RESPIRATION RATE: 16 BRPM | BODY MASS INDEX: 20.49 KG/M2 | OXYGEN SATURATION: 94 % | HEART RATE: 58 BPM | TEMPERATURE: 97.6 F | WEIGHT: 120 LBS

## 2021-07-11 DIAGNOSIS — N39.0 URINARY TRACT INFECTION ASSOCIATED WITH CYSTOSTOMY CATHETER, SUBSEQUENT ENCOUNTER: Primary | ICD-10-CM

## 2021-07-11 DIAGNOSIS — T83.510D URINARY TRACT INFECTION ASSOCIATED WITH CYSTOSTOMY CATHETER, SUBSEQUENT ENCOUNTER: Primary | ICD-10-CM

## 2021-07-11 DIAGNOSIS — N39.0 ACUTE UTI: ICD-10-CM

## 2021-07-11 DIAGNOSIS — T83.090A BLOCKED SUPRAPUBIC CATHETER, INITIAL ENCOUNTER (H): ICD-10-CM

## 2021-07-11 DIAGNOSIS — R33.9 URINARY RETENTION: ICD-10-CM

## 2021-07-11 DIAGNOSIS — J44.9 CHRONIC OBSTRUCTIVE PULMONARY DISEASE, UNSPECIFIED COPD TYPE (H): ICD-10-CM

## 2021-07-11 DIAGNOSIS — R31.9 HEMATURIA, UNSPECIFIED TYPE: ICD-10-CM

## 2021-07-11 DIAGNOSIS — I48.19 PERSISTENT ATRIAL FIBRILLATION (H): ICD-10-CM

## 2021-07-11 DIAGNOSIS — N30.01 ACUTE CYSTITIS WITH HEMATURIA: ICD-10-CM

## 2021-07-11 DIAGNOSIS — R31.0 GROSS HEMATURIA: ICD-10-CM

## 2021-07-11 LAB
ALBUMIN SERPL-MCNC: 3.8 G/DL (ref 3.5–5)
ALP SERPL-CCNC: 80 U/L (ref 45–120)
ALT SERPL W P-5'-P-CCNC: 12 U/L (ref 0–45)
ANION GAP SERPL CALCULATED.3IONS-SCNC: 6 MMOL/L (ref 5–18)
AST SERPL W P-5'-P-CCNC: 23 U/L (ref 0–40)
BASOPHILS # BLD AUTO: 0.1 10E3/UL (ref 0–0.2)
BASOPHILS NFR BLD AUTO: 1 %
BILIRUB SERPL-MCNC: 0.9 MG/DL (ref 0–1)
BUN SERPL-MCNC: 8 MG/DL (ref 8–28)
CALCIUM SERPL-MCNC: 9.1 MG/DL (ref 8.5–10.5)
CHLORIDE BLD-SCNC: 103 MMOL/L (ref 98–107)
CO2 SERPL-SCNC: 30 MMOL/L (ref 22–31)
CREAT SERPL-MCNC: 1.09 MG/DL (ref 0.7–1.3)
EOSINOPHIL # BLD AUTO: 0.3 10E3/UL (ref 0–0.7)
EOSINOPHIL NFR BLD AUTO: 3 %
ERYTHROCYTE [DISTWIDTH] IN BLOOD BY AUTOMATED COUNT: 13.8 % (ref 10–15)
GFR SERPL CREATININE-BSD FRML MDRD: 55 ML/MIN/1.73M2
GLUCOSE BLD-MCNC: 116 MG/DL (ref 70–125)
HCT VFR BLD AUTO: 43.1 % (ref 40–53)
HGB BLD-MCNC: 13.5 G/DL (ref 13.3–17.7)
IMM GRANULOCYTES # BLD: 0 10E3/UL
IMM GRANULOCYTES NFR BLD: 0 %
INR PPP: 3.56 (ref 0.85–1.15)
LYMPHOCYTES # BLD AUTO: 0.8 10E3/UL (ref 0.8–5.3)
LYMPHOCYTES NFR BLD AUTO: 11 %
MCH RBC QN AUTO: 32.4 PG (ref 26.5–33)
MCHC RBC AUTO-ENTMCNC: 31.3 G/DL (ref 31.5–36.5)
MCV RBC AUTO: 103 FL (ref 78–100)
MONOCYTES # BLD AUTO: 0.8 10E3/UL (ref 0–1.3)
MONOCYTES NFR BLD AUTO: 10 %
NEUTROPHILS # BLD AUTO: 5.9 10E3/UL (ref 1.6–8.3)
NEUTROPHILS NFR BLD AUTO: 75 %
NRBC # BLD AUTO: 0 10E3/UL
NRBC BLD AUTO-RTO: 0 /100
PLATELET # BLD AUTO: 167 10E3/UL (ref 150–450)
POTASSIUM BLD-SCNC: 4.8 MMOL/L (ref 3.5–5)
PROT SERPL-MCNC: 8.1 G/DL (ref 6–8)
RBC # BLD AUTO: 4.17 10E6/UL (ref 4.4–5.9)
SODIUM SERPL-SCNC: 139 MMOL/L (ref 136–145)
WBC # BLD AUTO: 7.9 10E3/UL (ref 4–11)

## 2021-07-11 PROCEDURE — 99285 EMERGENCY DEPT VISIT HI MDM: CPT | Mod: 25

## 2021-07-11 PROCEDURE — 81001 URINALYSIS AUTO W/SCOPE: CPT | Performed by: STUDENT IN AN ORGANIZED HEALTH CARE EDUCATION/TRAINING PROGRAM

## 2021-07-11 PROCEDURE — 85610 PROTHROMBIN TIME: CPT | Performed by: STUDENT IN AN ORGANIZED HEALTH CARE EDUCATION/TRAINING PROGRAM

## 2021-07-11 PROCEDURE — 96365 THER/PROPH/DIAG IV INF INIT: CPT

## 2021-07-11 PROCEDURE — 84450 TRANSFERASE (AST) (SGOT): CPT | Performed by: STUDENT IN AN ORGANIZED HEALTH CARE EDUCATION/TRAINING PROGRAM

## 2021-07-11 PROCEDURE — 36592 COLLECT BLOOD FROM PICC: CPT | Performed by: STUDENT IN AN ORGANIZED HEALTH CARE EDUCATION/TRAINING PROGRAM

## 2021-07-11 PROCEDURE — G0378 HOSPITAL OBSERVATION PER HR: HCPCS

## 2021-07-11 PROCEDURE — 250N000011 HC RX IP 250 OP 636: Performed by: STUDENT IN AN ORGANIZED HEALTH CARE EDUCATION/TRAINING PROGRAM

## 2021-07-11 PROCEDURE — 87086 URINE CULTURE/COLONY COUNT: CPT | Performed by: STUDENT IN AN ORGANIZED HEALTH CARE EDUCATION/TRAINING PROGRAM

## 2021-07-11 PROCEDURE — 99220 PR INITIAL OBSERVATION CARE,LEVEL III: CPT | Performed by: INTERNAL MEDICINE

## 2021-07-11 PROCEDURE — 85025 COMPLETE CBC W/AUTO DIFF WBC: CPT | Performed by: STUDENT IN AN ORGANIZED HEALTH CARE EDUCATION/TRAINING PROGRAM

## 2021-07-11 PROCEDURE — 51798 US URINE CAPACITY MEASURE: CPT

## 2021-07-11 PROCEDURE — C9803 HOPD COVID-19 SPEC COLLECT: HCPCS

## 2021-07-11 PROCEDURE — 74177 CT ABD & PELVIS W/CONTRAST: CPT

## 2021-07-11 RX ORDER — CEFTRIAXONE 1 G/1
1 INJECTION, POWDER, FOR SOLUTION INTRAMUSCULAR; INTRAVENOUS ONCE
Status: COMPLETED | OUTPATIENT
Start: 2021-07-11 | End: 2021-07-12

## 2021-07-11 RX ORDER — FENTANYL CITRATE 50 UG/ML
25 INJECTION, SOLUTION INTRAMUSCULAR; INTRAVENOUS ONCE
Status: DISCONTINUED | OUTPATIENT
Start: 2021-07-11 | End: 2021-07-12 | Stop reason: HOSPADM

## 2021-07-11 RX ORDER — CEPHALEXIN 500 MG/1
500 CAPSULE ORAL 3 TIMES DAILY
Qty: 15 CAPSULE | Refills: 0 | Status: ON HOLD | OUTPATIENT
Start: 2021-07-11 | End: 2021-07-12

## 2021-07-11 RX ORDER — IOPAMIDOL 755 MG/ML
100 INJECTION, SOLUTION INTRAVASCULAR ONCE
Status: COMPLETED | OUTPATIENT
Start: 2021-07-11 | End: 2021-07-11

## 2021-07-11 RX ADMIN — IOPAMIDOL 75 ML: 755 INJECTION, SOLUTION INTRAVENOUS at 12:31

## 2021-07-11 ASSESSMENT — MIFFLIN-ST. JEOR
SCORE: 1065.32
SCORE: 1112.63

## 2021-07-11 ASSESSMENT — ENCOUNTER SYMPTOMS
VOMITING: 0
COUGH: 0
DIFFICULTY URINATING: 1
NAUSEA: 0
FEVER: 0
ABDOMINAL PAIN: 1
BACK PAIN: 0
SHORTNESS OF BREATH: 0
DIARRHEA: 0

## 2021-07-11 NOTE — ED NOTES
"Alert and orientated x three.  No questions when leaving the department.  Granddaughter with patient to drive home.    Showed patient how to get his own lab/radiology studies on line.  Go to [employer].  Click on \"Sign Up Now\".  Enter activation code listed on the back of the AVS/Discharge papers.  Pt states understanding of instructions and follow up care, indicates no questions.      Patient ambulated out of the emergency department without incident.    "

## 2021-07-11 NOTE — ED PROVIDER NOTES
"ED SIGNOUT  Date/Time:7/11/2021 2:30 PM    Patient signed out to me by my colleague, Dr. Alexander.  Please see their note for complete history and physical. Plan to follow up on pending CT study.    Reassessment: I did review the laboratory studies, INR was 3.56, labs otherwise showed no acute concerning findings, hemoglobin was within normal limits.  Urine studies suggestive of infection with positive nitrates, leukocytes and blood.  CT imaging reported mild hyperenhancement of a decompressed bladder diverticulum, markedly enlarged prostate.  I did speak with urology who recommends the patient hold warfarin for the next 48 hours, we will start a short course of Keflex and recommend close follow-up with urology for discussion of further outpatient management and need for cystoscopy.  On my exam the patient appears quite well and comfortable and in no distress.  Again catheter is draining well with no signs of obstruction.  Spoke with the patient's granddaughter who is present at the bedside she speaks English well, offered  but patient felt comfortable with granddaughter interpreting.  Discussed need for holding the warfarin for 48 hours, starting the Keflex and following up with [facility] and I did place referral and give contact information.  Discussed all these findings recommendations with patient and family and they felt reassured and comfortable with discharge.  Return precautions provided.    The creation of this record is based on the scribe s observations of the work being performed by Ryan Frank and the provider s statements to them. It was created on their behalf by Ryan Frank a trained medical scribe. This document has been checked and approved by the attending provider.      REMAINING ED WORKUP:    Vitals:  /71   Pulse 58   Temp 97.6  F (36.4  C) (Oral)   Resp 16   Ht 1.626 m (5' 4\")   Wt 54.4 kg (120 lb)   SpO2 94%   BMI 20.60 kg/m        Pertinent " labs results reviewed   Results for orders placed or performed during the hospital encounter of 07/11/21   CT Abdomen Pelvis w Contrast    Impression    IMPRESSION:   1.  There is mild hyperenhancement of a decompressed bladder diverticulum. Consider further evaluation with cystoscopy.    2.  Markedly enlarged prostate.   Comprehensive metabolic panel   Result Value Ref Range    Sodium 139 136 - 145 mmol/L    Potassium 4.8 3.5 - 5.0 mmol/L    Chloride 103 98 - 107 mmol/L    Carbon Dioxide (CO2) 30 22 - 31 mmol/L    Anion Gap 6 5 - 18 mmol/L    Urea Nitrogen 8 8 - 28 mg/dL    Creatinine 1.09 0.70 - 1.30 mg/dL    Calcium 9.1 8.5 - 10.5 mg/dL    Glucose 116 70 - 125 mg/dL    Alkaline Phosphatase 80 45 - 120 U/L    AST 23 0 - 40 U/L    ALT 12 0 - 45 U/L    Protein Total 8.1 (H) 6.0 - 8.0 g/dL    Albumin 3.8 3.5 - 5.0 g/dL    Bilirubin Total 0.9 0.0 - 1.0 mg/dL    GFR Estimate 55 (L) >60 mL/min/1.73m2   INR   Result Value Ref Range    INR 3.56 (H) 0.85 - 1.15   CBC with platelets and differential   Result Value Ref Range    WBC Count 7.9 4.0 - 11.0 10e3/uL    RBC Count 4.17 (L) 4.40 - 5.90 10e6/uL    Hemoglobin 13.5 13.3 - 17.7 g/dL    Hematocrit 43.1 40.0 - 53.0 %     (H) 78 - 100 fL    MCH 32.4 26.5 - 33.0 pg    MCHC 31.3 (L) 31.5 - 36.5 g/dL    RDW 13.8 10.0 - 15.0 %    Platelet Count 167 150 - 450 10e3/uL    % Neutrophils 75 %    % Lymphocytes 11 %    % Monocytes 10 %    % Eosinophils 3 %    % Basophils 1 %    % Immature Granulocytes 0 %    NRBCs per 100 WBC 0 <1 /100    Absolute Neutrophils 5.9 1.6 - 8.3 10e3/uL    Absolute Lymphocytes 0.8 0.8 - 5.3 10e3/uL    Absolute Monocytes 0.8 0.0 - 1.3 10e3/uL    Absolute Eosinophils 0.3 0.0 - 0.7 10e3/uL    Absolute Basophils 0.1 0.0 - 0.2 10e3/uL    Absolute Immature Granulocytes 0.0 <=0.0 10e3/uL    Absolute NRBCs 0.0 10e3/uL   UA reflex to Microscopic   Result Value Ref Range    Color Urine Brown (A) Colorless, Straw, Light Yellow, Yellow    Appearance Urine Turbid  (A) Clear    Glucose Urine Negative Negative mg/dL    Bilirubin Urine Negative Negative    Ketones Urine Negative Negative mg/dL    Specific Gravity Urine 1.029 1.001 - 1.030    Blood Urine >1.0 mg/dL (A) Negative    pH Urine 8.0 (H) 5.0 - 7.0    Protein Albumin Urine 70  (A) Negative mg/dL    Urobilinogen Urine <2.0 <2.0 mg/dL    Nitrite Urine Positive (A) Negative    Leukocyte Esterase Urine 250 Eliu/uL (A) Negative    Bacteria Urine Many (A) None Seen /HPF    RBC Urine >182 (H) <=2 /HPF    WBC Urine 92 (H) <=5 /HPF       Pertinent imaging reviewed   Please see official radiology report.  CT Abdomen Pelvis w Contrast   Final Result   IMPRESSION:    1.  There is mild hyperenhancement of a decompressed bladder diverticulum. Consider further evaluation with cystoscopy.      2.  Markedly enlarged prostate.           Interventions  Medications - No data to display     ED Course/MDM:  2:00 PM Signout accepted from Dr. Alexander.  Prior records were reviewed.  Diagnostics from this visit are reviewed.  2:31 PM I introduced myself to the patient. PPE worn including surgical mask, gloves.  2:49 PM I spoke with Dr. Feliciano from Urology who suggested holding Warfarin for 48 hours, antibiotics. Recommended Urology follow-up. My exam is benign, updated the patient and their family.      ED Course as of Jul 11 1513   Sun Jul 11, 2021   1019 Patient is a 89-year-old male with a history of SP tube since 2010, on Coumadin, here with hematuria that is painless.  Patient states he feels well.  No significant complaints.   was his granddaughter at bedside.  Noticed blood this morning.  No fevers, chills, nausea vomiting.  No chest pain or trouble reading.  SP tube was replaced 4 days ago.  Arrival vitals are stable.  Patient also clinically.  There is carson blood in his leg bag.  The SP tube looks well otherwise.  No active bleeding outside.  INR has been therapeutic.  Plan for basic labs, CT and reevaluate      1357 Labs overall  reassuring.  INR 3.56.  Urinalysis does suggest infection although hemorrhagic cystitis could have similar presentation.              1. Hematuria, unspecified type    2. Acute cystitis with hematuria         Hospital Emergency Department              Tl Muñoz MD  07/11/21 5212

## 2021-07-11 NOTE — ED NOTES
Unable to print lab label for urine.  Called lab and asked to look for urine in tube system and to run on the lab side.  Spoke with Idalia.  jo

## 2021-07-11 NOTE — ED PROVIDER NOTES
Emergency Department Encounter       CHIEF COMPLAINT:    Catheter Problem (blood in urine bag)          Impression and Plan     ED COURSE & MEDICAL DECISION MAKING:  ED Course as of Jul 11 1325   Sun Jul 11, 2021   1019 Patient is a 89-year-old male with a history of SP tube since 2010, on Coumadin, here with hematuria that is painless.  Patient states he feels well.  No significant complaints.   was his granddaughter at bedside.  Noticed blood this morning.  No fevers, chills, nausea vomiting.  No chest pain or trouble reading.  SP tube was replaced 4 days ago.  Arrival vitals are stable.  Patient also clinically.  There is carson blood in his leg bag.  The SP tube looks well otherwise.  No active bleeding outside.  INR has been therapeutic.  Plan for basic labs, CT and reevaluate            10:14 AM I met with the patient, obtained an initial history, performed an examination and discussed the plan. PPE worn throughout all interactions with the patient, including surgical mask, gloves.                                At the conclusion of the encounter I discussed the results of all the tests and the disposition. The questions were answered. The patient or family acknowledged understanding and was agreeable with the care plan.    FINAL IMPRESSION:  ***    *** minutes of critical care time         MEDICATIONS GIVEN IN THE EMERGENCY DEPARTMENT:  Medications - No data to display    NEW PRESCRIPTIONS STARTED AT TODAY'S ED VISIT:  New Prescriptions    No medications on file       HPI     Patient information obtained from: Patient's granddaughter    Use of Interpretor: Jourdan via granddaughter at bedside.     Benny Carrillo is a 89 year old male with a pertinent history of suprapubic catheter, urinary obstruction, BPH, HTN, who presents to this ED by private car with granddaughter for evaluation of blood in his suprapubic catheter.    Patient's granddaughter reports the patient woke up this morning with  blood in his catheter bag. There was 1 small clot in the bag. He has been increasing his fluid intake to help flush out the blood, and his granddaughter thinks the blood may be decreasing. Patient has a home health RN that comes out once per week to check his INR and changes his catheter once a month. His catheter was changed on 7/8 (3 days ago). Patient is anticoagulated on Coumadin. He has had the catheter since 2010.     Denies abdominal pain, chest pain, shortness of breath, or any additional symptoms at this time.     REVIEW OF SYSTEMS:  Review of Systems   Constitutional: Negative for fever, malaise  HEENT: Negative runny nose, sore throat, ear pain, neck pain  Respiratory: Negative for shortness of breath, cough, congestion  Cardiovascular: Negative for chest pain, leg edema  Gastrointestinal: Negative for abdominal distention, abdominal pain, constipation, vomiting, nausea  Genitourinary: Positive for blood in catheter with clot. Negative for dysuria.  Integument: Negative for rash, skin breakdown  Neurological: Negative for paresthesias, weakness, headache.  Musculoskeletal: Negative for joint pain, joint swelling      All other systems reviewed and are negative.          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     Constipation 1/13/2015     Elevated digoxin level 7/24/2015     GERD (gastroesophageal reflux disease)      Hypertension      Lactic acidosis      Lower urinary tract infectious disease      Muscle weakness      SBO (small bowel obstruction) (H)      Small bowel obstruction (H) 7/23/2015     Stroke (H)      Underweight due to inadequate caloric intake 7/28/2015     Urinary obstruction        Past Surgical History:  "  Procedure Laterality Date     CHOLECYSTECTOMY       CYSTOTOMY       EYE SURGERY Bilateral     cataract removal     [facility]  5/28/2019            Social History     Tobacco Use     Smoking status: Never Smoker     Smokeless tobacco: Never Used   Substance Use Topics     Alcohol use: No     Drug use: No       acetaminophen (TYLENOL) 500 MG tablet  albuterol (PROAIR HFA;PROVENTIL HFA;VENTOLIN HFA) 90 mcg/actuation inhaler  amLODIPine (NORVASC) 5 MG tablet  artificial tears, hypromellose, (GONIOVISC) 2.5 % ophthalmic solution  ARTIFICIAL TEARS, POLYVIN ALC, 1.4 % ophthalmic solution  benzonatate (TESSALON) 200 MG capsule  digoxin (LANOXIN) 125 mcg (0.125 mg) tablet  famotidine (PEPCID) 20 MG tablet  guaiFENesin (ROBAFEN) 100 mg/5 mL syrup  hydrocortisone 2.5 % cream  loratadine (ALLERGY RELIEF, LORATADINE,) 10 mg tablet  MEDICATION CANNOT BE REORDERED - PLEASE MANUALLY REORDER AND DISCONTINUE THE OLD ORDER  multivitamin with minerals (THERA-M) 9 mg iron-400 mcg Tab tablet  pantoprazole (PROTONIX) 40 MG tablet  polyvinyl alcohol-povidone (ARTIFICIAL TEARS,PVALCH-POVID,) 0.5-0.6 % Drop  protein (BOOST HIGH PROTEIN) Powd  warfarin ANTICOAGULANT (COUMADIN/JANTOVEN) 1 MG tablet  warfarin ANTICOAGULANT (COUMADIN/JANTOVEN) 2.5 MG tablet            Physical Exam     /73   Pulse 59   Temp 97.9  F (36.6  C) (Temporal)   Resp 16   Ht 1.626 m (5' 4\")   Wt 54.4 kg (120 lb)   SpO2 96%   BMI 20.60 kg/m        PHYSICAL EXAM:     General: Patient appears well, nontoxic, comfortable  HEENT: Moist mucous membranes, no tongue swelling.  No head trauma.  No midline neck pain.  Cardiovascular: Normal rate, normal rhythm, no extremity edema.  No appreciable murmur.  Respiratory: No signs of respiratory distress, lungs are clear to auscultation bilaterally with no wheezes rhonchi or rales.  Abdominal: Soft, nontender, nondistended, no palpable masses, no guarding, no rebound. There is carson blood in his leg bag.  The SP tube looks " well otherwise.  No active bleeding outside.  Musculoskeletal: Full range of motion of joints, no deformities appreciated.  Neurological: Alert and oriented, grossly neurologically intact.  Psychological: Normal affect and mood.  Integument: No rashes appreciated      Results       Labs Ordered and Resulted from Time of ED Arrival Up to the Time of Departure from the ED   COMPREHENSIVE METABOLIC PANEL - Abnormal; Notable for the following components:       Result Value    Protein Total 8.1 (*)     GFR Estimate 55 (*)     All other components within normal limits   INR - Abnormal; Notable for the following components:    INR 3.56 (*)     All other components within normal limits   CBC WITH PLATELETS AND DIFFERENTIAL - Abnormal; Notable for the following components:    RBC Count 4.17 (*)      (*)     MCHC 31.3 (*)     All other components within normal limits   UA MACROSCOPIC WITH REFLEX TO MICROSCOPIC AND CULTURE   URINE MACROSCOPIC WITH REFLEX TO MICRO   PERIPHERAL IV CATHETER   CBC WITH PLATELETS & DIFFERENTIAL    Narrative:     The following orders were created for panel order CBC with Platelets & Differential.  Procedure                               Abnormality         Status                     ---------                               -----------         ------                     CBC with platelets and d...[[id number]]  Abnormal            Final result                 Please view results for these tests on the individual orders.       CT Abdomen Pelvis w Contrast    (Results Pending)                     PROCEDURES:  Procedures:  Procedures       I, Manisha Ko am serving as a scribe to document services personally performed by Rudy Alexander DO, based on myobservations and the provider's statements to me.  I, Rudy Alexander DO, attest that Manisha Ko is acting in a scribe capacity, has observed my performance of the services and has documented them in accordance with my direction.    Rudy Alexander  DO  Emergency Medicine  [facility] EMERGENCY ROOM

## 2021-07-11 NOTE — ED NOTES
Patient returns from radiology.  Aware of approximate wait time for results.  Will continue to monitor.

## 2021-07-11 NOTE — ED TRIAGE NOTES
Patient brought to ED room by WC.  Patient's granddaughter assisting with the translation/history taking.      Patient woke with blood in leg bag.  Has suprapubic catheter.  Is on blood thinners, granddaughter unsure of reason why      S Ohl, DO  at the bedside.      came into room at beiging of triage and assessed patient prior to traige being done.

## 2021-07-11 NOTE — ED NOTES
"Alert and orientated x three.  No questions when leaving the department.  granddaughter with patient to drive home.    Showed patient how to get his  own lab/radiology studies on line.  Go to [email or web address].  Click on \"Sign Up Now\".  Enter activation code listed on the back of the AVS/Discharge papers.  Pt states understanding of instructions and follow up care, indicates no questions.      Patient wheeled out of the emergency department without incident.    Aminah Phillips RN 7/11/2021 3:10 PM       "

## 2021-07-12 VITALS
RESPIRATION RATE: 20 BRPM | OXYGEN SATURATION: 93 % | DIASTOLIC BLOOD PRESSURE: 76 MMHG | SYSTOLIC BLOOD PRESSURE: 130 MMHG | HEART RATE: 58 BPM | BODY MASS INDEX: 18.98 KG/M2 | HEIGHT: 64 IN | TEMPERATURE: 97.4 F | WEIGHT: 111.2 LBS

## 2021-07-12 LAB
ALBUMIN SERPL-MCNC: 3.5 G/DL (ref 3.5–5)
ALP SERPL-CCNC: 77 U/L (ref 45–120)
ALT SERPL W P-5'-P-CCNC: 12 U/L (ref 0–45)
ANION GAP SERPL CALCULATED.3IONS-SCNC: 8 MMOL/L (ref 5–18)
AST SERPL W P-5'-P-CCNC: 19 U/L (ref 0–40)
BILIRUB SERPL-MCNC: 0.7 MG/DL (ref 0–1)
BUN SERPL-MCNC: 7 MG/DL (ref 8–28)
CALCIUM SERPL-MCNC: 8.7 MG/DL (ref 8.5–10.5)
CHLORIDE BLD-SCNC: 102 MMOL/L (ref 98–107)
CO2 SERPL-SCNC: 30 MMOL/L (ref 22–31)
CREAT SERPL-MCNC: 1.03 MG/DL (ref 0.7–1.3)
ERYTHROCYTE [DISTWIDTH] IN BLOOD BY AUTOMATED COUNT: 14 % (ref 10–15)
GFR SERPL CREATININE-BSD FRML MDRD: 59 ML/MIN/1.73M2
GLUCOSE BLD-MCNC: 104 MG/DL (ref 70–125)
HCT VFR BLD AUTO: 40.5 % (ref 40–53)
HGB BLD-MCNC: 12.8 G/DL (ref 13.3–17.7)
INR PPP: 3.43 (ref 0.85–1.15)
LACTATE SERPL-SCNC: 1.1 MMOL/L (ref 0.7–2)
MCH RBC QN AUTO: 32.1 PG (ref 26.5–33)
MCHC RBC AUTO-ENTMCNC: 31.6 G/DL (ref 31.5–36.5)
MCV RBC AUTO: 102 FL (ref 78–100)
PLATELET # BLD AUTO: 168 10E3/UL (ref 150–450)
POTASSIUM BLD-SCNC: 4.7 MMOL/L (ref 3.5–5)
PROT SERPL-MCNC: 7.3 G/DL (ref 6–8)
RBC # BLD AUTO: 3.99 10E6/UL (ref 4.4–5.9)
SARS-COV-2 RNA RESP QL NAA+PROBE: NEGATIVE
SODIUM SERPL-SCNC: 140 MMOL/L (ref 136–145)
WBC # BLD AUTO: 8.7 10E3/UL (ref 4–11)

## 2021-07-12 PROCEDURE — 87635 SARS-COV-2 COVID-19 AMP PRB: CPT | Performed by: EMERGENCY MEDICINE

## 2021-07-12 PROCEDURE — 85041 AUTOMATED RBC COUNT: CPT | Performed by: INTERNAL MEDICINE

## 2021-07-12 PROCEDURE — 250N000011 HC RX IP 250 OP 636: Performed by: EMERGENCY MEDICINE

## 2021-07-12 PROCEDURE — 258N000003 HC RX IP 258 OP 636: Performed by: INTERNAL MEDICINE

## 2021-07-12 PROCEDURE — 96365 THER/PROPH/DIAG IV INF INIT: CPT

## 2021-07-12 PROCEDURE — G0378 HOSPITAL OBSERVATION PER HR: HCPCS

## 2021-07-12 PROCEDURE — 83605 ASSAY OF LACTIC ACID: CPT | Performed by: INTERNAL MEDICINE

## 2021-07-12 PROCEDURE — 85610 PROTHROMBIN TIME: CPT | Performed by: INTERNAL MEDICINE

## 2021-07-12 PROCEDURE — 80053 COMPREHEN METABOLIC PANEL: CPT | Performed by: INTERNAL MEDICINE

## 2021-07-12 PROCEDURE — 36415 COLL VENOUS BLD VENIPUNCTURE: CPT | Performed by: INTERNAL MEDICINE

## 2021-07-12 PROCEDURE — 96361 HYDRATE IV INFUSION ADD-ON: CPT

## 2021-07-12 PROCEDURE — 99217 PR OBSERVATION CARE DISCHARGE: CPT | Mod: GC | Performed by: INTERNAL MEDICINE

## 2021-07-12 RX ORDER — ACETAMINOPHEN 325 MG/1
650 TABLET ORAL EVERY 6 HOURS PRN
Status: DISCONTINUED | OUTPATIENT
Start: 2021-07-12 | End: 2021-07-12 | Stop reason: HOSPADM

## 2021-07-12 RX ORDER — ACETAMINOPHEN 650 MG/1
650 SUPPOSITORY RECTAL EVERY 6 HOURS PRN
Status: DISCONTINUED | OUTPATIENT
Start: 2021-07-12 | End: 2021-07-12 | Stop reason: HOSPADM

## 2021-07-12 RX ORDER — CEPHALEXIN 500 MG/1
500 CAPSULE ORAL 2 TIMES DAILY
Qty: 14 CAPSULE | Refills: 0 | Status: SHIPPED | OUTPATIENT
Start: 2021-07-12 | End: 2021-07-20

## 2021-07-12 RX ORDER — ACETAMINOPHEN 500 MG
500-1000 TABLET ORAL EVERY 6 HOURS PRN
COMMUNITY
End: 2021-07-20

## 2021-07-12 RX ORDER — CEFTRIAXONE 1 G/1
1 INJECTION, POWDER, FOR SOLUTION INTRAMUSCULAR; INTRAVENOUS EVERY 24 HOURS
Status: DISCONTINUED | OUTPATIENT
Start: 2021-07-13 | End: 2021-07-12 | Stop reason: HOSPADM

## 2021-07-12 RX ORDER — CEPHALEXIN 500 MG/1
500 CAPSULE ORAL 3 TIMES DAILY
Qty: 21 CAPSULE | Refills: 0 | Status: SHIPPED | OUTPATIENT
Start: 2021-07-12 | End: 2021-07-12

## 2021-07-12 RX ORDER — SODIUM CHLORIDE 9 MG/ML
INJECTION, SOLUTION INTRAVENOUS CONTINUOUS
Status: DISCONTINUED | OUTPATIENT
Start: 2021-07-12 | End: 2021-07-12 | Stop reason: HOSPADM

## 2021-07-12 RX ADMIN — CEFTRIAXONE 1 G: 1 INJECTION, POWDER, FOR SOLUTION INTRAMUSCULAR; INTRAVENOUS at 00:38

## 2021-07-12 RX ADMIN — SODIUM CHLORIDE: 9 INJECTION, SOLUTION INTRAVENOUS at 01:48

## 2021-07-12 ASSESSMENT — MIFFLIN-ST. JEOR: SCORE: 1025.4

## 2021-07-12 NOTE — CONSULTS
[facility] CONSULTATION    Type of Consult: observation  Place of Service: [facility]   Reason for consult: Gross hematuria  Request for consult by: Dr. Pantoja    History of present illness:   Benny Carrillo is a 89 year old male that was admitted for <principal problem not specified>. Urology is being consulted for gross hematuria. History obtained through patient, patient's family and chart review.     Patient's family present throughout entire encounter. Patient reports development of gross hematuria yesterday morning which brought him into the ER. He was diagnosed with a UTI and started on antibiotics. Urine was draining freely through catheter tubing without blood clots so he was discharged home with instructions to hold warfarin and follow up with [facility] shortly. Gross hematuria worsened throughout the day and drainage from SP tube stopped entirely. Patient and family returned to ER for further evaluation. Multiple blood clots were manually irrigated out of bladder and he was admitted for observation. Gross hematuria resolved overnight without any need for further irrigation.     He and his family report intermittent gross hematuria shortly after SP tube exchanges. He takes Warfarin for atrial fibrillation hx. The gross hematuria isn't present with every change, but when present it is minimal and present for less than 12 hours. SP tube was last exchanged 7/8/21 by his usual home health nurse. He has been followed by our group historically, but has not been seen since 2015 (no issues with SP tube). He was last seen by Dr. De La Vega.     He is currently laying in bed. Urine yellow without blood clots or sediment noted from SP tube. Afebrile. He denies abdominal pain, chills, and N/V.     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       Urinary obstruction        Past Surgical History:  Past Surgical History:   Procedure Laterality Date     CHOLECYSTECTOMY       CYSTOTOMY       EYE SURGERY Bilateral     cataract removal     PICC  5/28/2019            Social History:  Social History     Socioeconomic History     Marital status:      Spouse name: Not on file     Number of children: Not on file     Years of education: Not on file     Highest education level: Not on file   Occupational History     Not on file   Tobacco Use     Smoking status: Never Smoker     Smokeless tobacco: Never Used   Substance and Sexual Activity     Alcohol use: No     Drug use: No     Sexual activity: Not on file   Other Topics Concern     Not on file   Social History Narrative     Not on file     Social Determinants of Health     Financial Resource Strain:      Difficulty of Paying Living Expenses:    Food Insecurity:      Worried About Running Out of Food in the Last Year:      Ran Out of Food in the Last Year:    Transportation Needs:      Lack of Transportation (Medical):      Lack of Transportation (Non-Medical):    Physical Activity:      Days of Exercise per Week:      Minutes of Exercise per Session:    Stress:      Feeling of Stress :    Social Connections:      Frequency of Communication with Friends and Family:      Frequency of Social Gatherings with Friends and  Family:      Attends Worship Services:      Active Member of Clubs or Organizations:      Attends Club or Organization Meetings:      Marital Status:    Intimate Partner Violence:      Fear of Current or Ex-Partner:      Emotionally Abused:      Physically Abused:      Sexually Abused:        Medications:  Current Facility-Administered Medications   Medication     acetaminophen (TYLENOL) tablet 650 mg    Or     acetaminophen (TYLENOL) Suppository 650 mg     [START ON 7/13/2021] cefTRIAXone (ROCEPHIN) 1 g vial to attach to  mL bag for ADULTS or NS 50 mL bag for PEDS     fentaNYL (PF) (SUBLIMAZE) injection 25 mcg     melatonin tablet 1 mg     sodium chloride 0.9% infusion       Allergies:   Allergies   Allergen Reactions     Morphine Itching and Other (See Comments)     Pt received morphine IV on 8/26/18. Pt reported localized itching and discomfort with redness near IV site following administration of morphine.       Review of Systems:   A full 12 point review of systems was taken and is negative aside from what is noted above in the HPI    Physical Exam:  Temp:  [97.3  F (36.3  C)-98.1  F (36.7  C)] 97.4  F (36.3  C)  Pulse:  [] 58  Resp:  [16-22] 20  BP: (118-154)/(64-93) 130/76  SpO2:  [90 %-97 %] 93 %  General: NAD, alert, cooperative  Abdomen: SP tube present. Minimal erythema noted around SP tube. soft, non tender, non distended. No suprapubic fullness/tenderness. No CVA tenderness noted bilaterally.   Geniturinary: Urine translucent yellow without sediment or blood clots.   Psychological: alert and oriented, answers questions appropriately    Labs:   Lab Results   Component Value Date    WBC 8.7 07/12/2021    HGB 12.8 (L) 07/12/2021    HCT 40.5 07/12/2021     07/12/2021    CHOL 117 09/15/2016    TRIG 78 09/15/2016    HDL 27 (L) 09/15/2016    ALT 12 07/12/2021    AST 19 07/12/2021     07/12/2021    BUN 7 (L) 07/12/2021    CO2 30 07/12/2021    TSH 0.49 07/24/2020    PSA 5.3 07/24/2015     INR 3.43 (H) 07/12/2021        Lab Results   Component Value Date    UROBILINOGEN <2.0 E.U./dL 12/02/2020    NITRITE Positive (A) 07/11/2021    BACTERIA Many (A) 07/11/2021        Urine culture: pending    Lab Results: personally reviewed     Imaging:  EXAM: CT ABDOMEN PELVIS W CONTRAST  LOCATION: [facility]  DATE/TIME: 7/11/2021 12:17 PM     INDICATION: Hematuria  COMPARISON: 12/2/2020  TECHNIQUE: CT scan of the abdomen and pelvis was performed following injection of IV contrast. Multiplanar reformats were obtained. Dose reduction techniques were used.  CONTRAST: 75ml Isoview 370     FINDINGS:   LOWER CHEST: Unremarkable     HEPATOBILIARY: Scattered liver cysts and additional small hypodense lesions too small to characterize. No suspicious liver mass. Normal gallbladder.     PANCREAS: Normal.     SPLEEN: Normal.     ADRENAL GLANDS: Normal.     KIDNEYS/BLADDER: The bladder is minimally distended, with a suprapubic catheter in place. There is mild hyperenhancement of a decompressed bladder diverticulum. No hydronephrosis. No ureteral lithiasis.     BOWEL: Normal appendix. No bowel obstruction or inflammatory change.     LYMPH NODES: Normal.     VASCULATURE: Ectasia of the right common iliac artery. No abdominal aortic aneurysm.     PELVIC ORGANS: The prostate is markedly enlarged and protrudes into the bladder.     MUSCULOSKELETAL: Unremarkable                                                                      IMPRESSION:   1.  There is mild hyperenhancement of a decompressed bladder diverticulum. Consider further evaluation with cystoscopy.     2.  Markedly enlarged prostate.    I have personally reviewed the imaging reports above.     Assessment / Plan : Benny XANDER Carrillo is being seen by [facility] for gross hematuria    - Gross hematuria resolved. Urine suspicious for an infection with positive leukocyte and nitrites on UA. Continue antibiotics. Awaiting urine culture results. Discussed  discharge with medicine. I will not exchange SP tube at this time due to concern for return of gross hematuria and having been on antibiotics for less than 24 hours. SP tube can be exchanged as an outpatient.   - Ok to restart warfarin tomorrow or per medicine team based on INR.  - Patient will need further work up as an outpatient such as cystoscopy and urine cytology. Email sent to scheduling team to arrange appointment.     Thank you for consulting [facility] regarding this patient's care. We will continue to follow. Please contact us with questions or concerns.     Robert Manzo PA-C  [facility]   [phone]

## 2021-07-12 NOTE — PLAN OF CARE
PRIMARY DIAGNOSIS: ACUTE PAIN  OUTPATIENT/OBSERVATION GOALS TO BE MET BEFORE DISCHARGE:  1. Pain Status: Improved with use of alternative comfort measures i.e.: essential oils, positioning, and rest.    2. Return to near baseline physical activity: Yes    3. Cleared for discharge by consultants (if involved): No; possible urology consult in am.    Discharge Planner Nurse   Safe discharge environment identified: Yes; home with family support.  Barriers to discharge: Yes; pending intermittent catheter irrigation requirements and urology consult.        Entered by: Dallas Odom 07/12/2021 2:46 AM     Please review provider order for any additional goals.   Nurse to notify provider when observation goals have been met and patient is ready for discharge.

## 2021-07-12 NOTE — ED TRIAGE NOTES
Pt arrives with urinary retention. Urine not coming from suprapubic cath. Pt is distended and painful.

## 2021-07-12 NOTE — H&P
[facility] MEDICINE ADMISSION HISTORY AND PHYSICAL     Assessment & Plan    1.Failed Supra pubic catheter - Appears to be blocked and caused supra pubic pain. Now declogged and has concentrated urine.    2. UTI - and was given IV rocephin will continue. Repeat labs in AM. Did not appear septic  3. On coumadin for history of Afib and renal infarct - INR is supratherapeutic -- holding coumadin     If hgb dropping and/or SP cath not working - refer to Urology    VTE prophylaxis: on coumadin   IV fluids: Per order set   Diet:  Regular   GI prophylaxis: Not indicated at this point, re-assess if needed.   Pain, sleep, and vomiting medications: Per order set  Code Status: Full   COVID test result:  negative   COVID vaccination: completed   Barriers to discharge: admitting clinical condition  Discharge Disposition:  May need transfer to SNF or Home health care. Consider referral to care manager/        Care plan was created based on available information provided, including patient's condition at the time of encounter.   This plan was discussed with patient and/or family members using layman's terms and have agreed to proceed.   At the end of night shift (9PM - 730A), this case will be presented to the AM Hospitalist.    It is recommended to revise care plan if there is change in condition and/or new clinical information that are not available during my encounter.     All or some of home medication/s were not resumed on admission due to safety reasons or contraindications. Dosing and frequency may also have been modified. Please resume/review them during your visit.     70 minutes of total visit duration and greater than 50% was spent in direct evaluation of patient and coordination of care including discussion of diagnostic test results and recommended treatment. .      Morgan Pantoja MD, MPH, FACP, [facility]  Internal Medicine - Hospitalist        Chief Complaint Hypogastric pain        HISTORY     Met him in the ED, along with his grand daughter - who prefer to interpret. Refused formal . They live together. Patient is [facility]  He came in because of supra-pubic pain. He has a supra pubic catheter and it was not draining any urine. Patient has SPC since 2010. No fevers. No rigors. No diarrhea. No chest pain or SOB. Patient's catheter is replaced once a month by an RN. It was just replaced a few days ago.    In the ED, Ct showed --- There is mild hyperenhancement of a decompressed bladder diverticulum. Consider further evaluation with cystoscopy. Markedly enlarged prostate. I think ED have told them about this, and need to see Urology    - His UA showed UTI. He was given IV rocephin. His WBC was normal.   ROS --- No headache. No dizziness. No weakness. No CP or SOB. No palpitations. No urinary symptoms. No bleeding symptoms. No weight loss. Rest of 12 point ROS was reviewed and negative.         Past Medical History       Patient Active Problem List    Diagnosis Date Noted     Chronic obstructive pulmonary disease, unspecified COPD type (H) 07/21/2020     Priority: Medium     Chronic atrial fibrillation      Priority: Medium     Created by Conversion         Chronic retention of urine      Priority: Medium     Moderate malnutrition (H)      Priority: Medium     Benign essential hypertension 09/09/2019     Priority: Medium     Atrial fibrillation (H) 06/17/2019     Priority: Medium     Incontinence of feces, unspecified fecal incontinence type 06/11/2019     Priority: Medium     Aspiration pneumonia of both lower lobes due to vomit (H)      Priority: Medium     Acute respiratory failure with hypoxia (H)      Priority: Medium     History of CVA (cerebrovascular accident)      Priority: Medium     Permanent atrial fibrillation (H)      Priority: Medium     SBO (small bowel obstruction) (H) 05/27/2019     Priority: Medium     Accelerated hypertension      Priority: Medium     Small  bowel obstruction (H) 12/21/2018     Priority: Medium     Renal infarct (H) 07/22/2018     Priority: Medium     Acute pulmonary edema (H)      Priority: Medium     Acute pyelonephritis 02/10/2018     Priority: Medium     Urinary tract infection associated with cystostomy catheter, initial encounter (H) 02/10/2018     Priority: Medium     Sepsis secondary to UTI (H) 02/10/2018     Priority: Medium     History of ischemic cerebrovascular accident (CVA) with residual deficit 11/20/2017     Priority: Medium     right occiput   left hemianopsia         Acute nonintractable headache, unspecified headache type      Priority: Medium     Acute cerebral infarction (H) 11/14/2017     Priority: Medium     Chest pain 09/14/2016     Priority: Medium     Gastroesophageal reflux disease without esophagitis 09/14/2016     Priority: Medium     Suprapubic catheter (H) 09/14/2016     Priority: Medium     Leukocytosis, unspecified type 09/14/2016     Priority: Medium     Heartburn 09/14/2016     Priority: Medium     Underweight due to inadequate caloric intake 07/28/2015     Priority: Medium     Body mass index is 20.25 kg/(m^2).         Muscle Weakness Generalized      Priority: Medium     Created by Conversion            Surgical History     He  has a past surgical history that includes Cystotomy; Eye surgery (Bilateral); Cholecystectomy; and Picc (5/28/2019).     Past Surgical History:   Procedure Laterality Date     CHOLECYSTECTOMY       CYSTOTOMY       EYE SURGERY Bilateral     cataract removal     PICC  5/28/2019          Family History      Reviewed, and family history includes Diabetes in an other family member; No Known Problems in his father and mother.,     Social History      Reviewed, and he  reports that he has never smoked. He has never used smokeless tobacco. He reports that he does not drink alcohol and does not use drugs.  Social History     Tobacco Use     Smoking status: Never Smoker     Smokeless tobacco: Never Used    Substance Use Topics     Alcohol use: No         Allergies     Allergies   Allergen Reactions     Morphine Itching and Other (See Comments)     Pt received morphine IV on 8/26/18. Pt reported localized itching and discomfort with redness near IV site following administration of morphine.       Prior to Admission Medications      (Not in a hospital admission)      Review of Systems     A 12 point comprehensive review of systems was negative except as noted above in HPI.    PHYSICAL EXAMINATION     Vitals      Temp:  [97.6  F (36.4  C)-97.9  F (36.6  C)] 97.6  F (36.4  C)  Pulse:  [] 110  Resp:  [16-22] 22  BP: (122-142)/(64-93) 142/93  SpO2:  [90 %-97 %] 97 %    Examination     General Appearance:  Alert, cooperative, no distress  Head:    Normocephalic, without obvious abnormality, atraumatic  EENT:  PERRL, conjunctiva/corneas clear, EOM's intact.   Neck:   Supple, symmetrical, trachea midline, no adenopathy; no NVE  Back:  Symmetric, no curvature, no CVA tenderness  Chest/Lungs: Clear to auscultation bilaterally, respirations unlabored, No tenderness or deformity. No abdominal breathing or use of accessory muscles.   Heart:    Regular rate and rhythm, S1 and S2 normal, no murmur, rub   or gallop  Abdomen: Soft, non-tender, bowel sounds active all four quadrants, not peritoneal on palpation. Not distended. SP catheter with highly concentrated urine  Extremities:  Extremities normal, atraumatic, no swelling   Skin:  Skin color, texture, turgor normal, no rashes or lesion.   Neurologic:  Awake and alert, No lateralizing or localizing signs     Pertinent Lab     Personally reviewed.  Results for orders placed or performed during the hospital encounter of 07/11/21   CT Abdomen Pelvis w Contrast     Status: None    Narrative    EXAM: CT ABDOMEN PELVIS W CONTRAST  LOCATION: [facility]  DATE/TIME: 7/11/2021 12:17 PM    INDICATION: Hematuria  COMPARISON: 12/2/2020  TECHNIQUE: CT scan of the abdomen and  pelvis was performed following injection of IV contrast. Multiplanar reformats were obtained. Dose reduction techniques were used.  CONTRAST: 75ml Isoview 370    FINDINGS:   LOWER CHEST: Unremarkable    HEPATOBILIARY: Scattered liver cysts and additional small hypodense lesions too small to characterize. No suspicious liver mass. Normal gallbladder.    PANCREAS: Normal.    SPLEEN: Normal.    ADRENAL GLANDS: Normal.    KIDNEYS/BLADDER: The bladder is minimally distended, with a suprapubic catheter in place. There is mild hyperenhancement of a decompressed bladder diverticulum. No hydronephrosis. No ureteral lithiasis.    BOWEL: Normal appendix. No bowel obstruction or inflammatory change.    LYMPH NODES: Normal.    VASCULATURE: Ectasia of the right common iliac artery. No abdominal aortic aneurysm.    PELVIC ORGANS: The prostate is markedly enlarged and protrudes into the bladder.    MUSCULOSKELETAL: Unremarkable      Impression    IMPRESSION:   1.  There is mild hyperenhancement of a decompressed bladder diverticulum. Consider further evaluation with cystoscopy.    2.  Markedly enlarged prostate.   Comprehensive metabolic panel     Status: Abnormal   Result Value Ref Range    Sodium 139 136 - 145 mmol/L    Potassium 4.8 3.5 - 5.0 mmol/L    Chloride 103 98 - 107 mmol/L    Carbon Dioxide (CO2) 30 22 - 31 mmol/L    Anion Gap 6 5 - 18 mmol/L    Urea Nitrogen 8 8 - 28 mg/dL    Creatinine 1.09 0.70 - 1.30 mg/dL    Calcium 9.1 8.5 - 10.5 mg/dL    Glucose 116 70 - 125 mg/dL    Alkaline Phosphatase 80 45 - 120 U/L    AST 23 0 - 40 U/L    ALT 12 0 - 45 U/L    Protein Total 8.1 (H) 6.0 - 8.0 g/dL    Albumin 3.8 3.5 - 5.0 g/dL    Bilirubin Total 0.9 0.0 - 1.0 mg/dL    GFR Estimate 55 (L) >60 mL/min/1.73m2   INR     Status: Abnormal   Result Value Ref Range    INR 3.56 (H) 0.85 - 1.15   CBC with platelets and differential     Status: Abnormal   Result Value Ref Range    WBC Count 7.9 4.0 - 11.0 10e3/uL    RBC Count 4.17 (L) 4.40  - 5.90 10e6/uL    Hemoglobin 13.5 13.3 - 17.7 g/dL    Hematocrit 43.1 40.0 - 53.0 %     (H) 78 - 100 fL    MCH 32.4 26.5 - 33.0 pg    MCHC 31.3 (L) 31.5 - 36.5 g/dL    RDW 13.8 10.0 - 15.0 %    Platelet Count 167 150 - 450 10e3/uL    % Neutrophils 75 %    % Lymphocytes 11 %    % Monocytes 10 %    % Eosinophils 3 %    % Basophils 1 %    % Immature Granulocytes 0 %    NRBCs per 100 WBC 0 <1 /100    Absolute Neutrophils 5.9 1.6 - 8.3 10e3/uL    Absolute Lymphocytes 0.8 0.8 - 5.3 10e3/uL    Absolute Monocytes 0.8 0.0 - 1.3 10e3/uL    Absolute Eosinophils 0.3 0.0 - 0.7 10e3/uL    Absolute Basophils 0.1 0.0 - 0.2 10e3/uL    Absolute Immature Granulocytes 0.0 <=0.0 10e3/uL    Absolute NRBCs 0.0 10e3/uL   UA reflex to Microscopic     Status: Abnormal   Result Value Ref Range    Color Urine Brown (A) Colorless, Straw, Light Yellow, Yellow    Appearance Urine Turbid (A) Clear    Glucose Urine Negative Negative mg/dL    Bilirubin Urine Negative Negative    Ketones Urine Negative Negative mg/dL    Specific Gravity Urine 1.029 1.001 - 1.030    Blood Urine >1.0 mg/dL (A) Negative    pH Urine 8.0 (H) 5.0 - 7.0    Protein Albumin Urine 70  (A) Negative mg/dL    Urobilinogen Urine <2.0 <2.0 mg/dL    Nitrite Urine Positive (A) Negative    Leukocyte Esterase Urine 250 Eliu/uL (A) Negative    Bacteria Urine Many (A) None Seen /HPF    RBC Urine >182 (H) <=2 /HPF    WBC Urine 92 (H) <=5 /HPF   CBC with Platelets & Differential     Status: Abnormal    Narrative    The following orders were created for panel order CBC with Platelets & Differential.  Procedure                               Abnormality         Status                     ---------                               -----------         ------                     CBC with platelets and d...[[id number]]  Abnormal            Final result                 Please view results for these tests on the individual orders.         Pertinent Radiology     Radiology Results: CT Abdomen  Pelvis w Contrast    Result Date: 7/11/2021  EXAM: CT ABDOMEN PELVIS W CONTRAST LOCATION: [facility] DATE/TIME: 7/11/2021 12:17 PM INDICATION: Hematuria COMPARISON: 12/2/2020 TECHNIQUE: CT scan of the abdomen and pelvis was performed following injection of IV contrast. Multiplanar reformats were obtained. Dose reduction techniques were used. CONTRAST: 75ml Isoview 370 FINDINGS: LOWER CHEST: Unremarkable HEPATOBILIARY: Scattered liver cysts and additional small hypodense lesions too small to characterize. No suspicious liver mass. Normal gallbladder. PANCREAS: Normal. SPLEEN: Normal. ADRENAL GLANDS: Normal. KIDNEYS/BLADDER: The bladder is minimally distended, with a suprapubic catheter in place. There is mild hyperenhancement of a decompressed bladder diverticulum. No hydronephrosis. No ureteral lithiasis. BOWEL: Normal appendix. No bowel obstruction or inflammatory change. LYMPH NODES: Normal. VASCULATURE: Ectasia of the right common iliac artery. No abdominal aortic aneurysm. PELVIC ORGANS: The prostate is markedly enlarged and protrudes into the bladder. MUSCULOSKELETAL: Unremarkable     IMPRESSION: 1.  There is mild hyperenhancement of a decompressed bladder diverticulum. Consider further evaluation with cystoscopy. 2.  Markedly enlarged prostate.

## 2021-07-12 NOTE — PROGRESS NOTES
PRIMARY DIAGNOSIS: ACUTE PAIN  OUTPATIENT/OBSERVATION GOALS TO BE MET BEFORE DISCHARGE:  1. Pain Status: Improved with use of alternative comfort measures i.e.: essential oils, positioning and rest.    2. Return to near baseline physical activity: Yes    3. Cleared for discharge by consultants (if involved): No.    Discharge Planner Nurse   Safe discharge environment identified: Yes  Barriers to discharge: Yes       Entered by: Dallas Odom 07/12/2021 5:01 AM     Please review provider order for any additional goals.   Nurse to notify provider when observation goals have been met and patient is ready for discharge.

## 2021-07-12 NOTE — PHARMACY-ADMISSION MEDICATION HISTORY
Pharmacy Note - Admission Medication History    Pertinent Provider Information: warfarin dose is currently 3.5 mg daily    Pt started keflex 7/11 PM     ______________________________________________________________________    Prior To Admission (PTA) med list completed and updated in EMR.       Prior to Admission Medications   Prescriptions Last Dose Informant Patient Reported? Taking?   ARTIFICIAL TEARS, POLYVIN ALC, 1.4 % ophthalmic solution PRN  No Yes   Sig: [ARTIFICIAL TEARS, POLYVIN ALC, 1.4 % OPHTHALMIC SOLUTION] INSTILL 2 DROPS INTO EACH EYE TWICE DAILY AS NEEDED DRY EYES   MEDICATION CANNOT BE REORDERED - PLEASE MANUALLY REORDER AND DISCONTINUE THE OLD ORDER   No No   Sig: [CATHETER 14 FR MISC] Suprapubic catheter. 14 French.   acetaminophen (TYLENOL) 500 MG tablet Past Week at Unknown time  Yes Yes   Sig: Take 500-1,000 mg by mouth every 6 hours as needed for mild pain   albuterol (PROAIR HFA;PROVENTIL HFA;VENTOLIN HFA) 90 mcg/actuation inhaler PRN  No Yes   Sig: [ALBUTEROL (PROAIR HFA;PROVENTIL HFA;VENTOLIN HFA) 90 MCG/ACTUATION INHALER] INHALE 2 PUFFS BY MOUTH EVERY 6 HOURS AS NEEDED FOR WHEEZING   amLODIPine (NORVASC) 5 MG tablet 7/11/2021 at Unknown time  No Yes   Sig: [AMLODIPINE (NORVASC) 5 MG TABLET] Take 1 tablet (5 mg total) by mouth daily.   cephALEXin (KEFLEX) 500 MG capsule 7/11/2021 at Unknown time  No Yes   Sig: Take 1 capsule (500 mg) by mouth 3 times daily for 5 days   digoxin (LANOXIN) 125 mcg (0.125 mg) tablet 7/11/2021 at Unknown time  No Yes   Sig: [DIGOXIN (LANOXIN) 125 MCG (0.125 MG) TABLET] Take 1 tablet (125 mcg total) by mouth daily.   famotidine (PEPCID) 20 MG tablet 7/11/2021 at Unknown time  No Yes   Sig: [FAMOTIDINE (PEPCID) 20 MG TABLET] Take 1 tablet by mouth once daily   guaiFENesin (ROBAFEN) 100 mg/5 mL syrup PRN  No Yes   Sig: [GUAIFENESIN (ROBAFEN) 100 MG/5 ML SYRUP] TAKE 10 ML BY MOUTH  THREE TIMES DAILY AS NEEDED FOR COUGH   hydrocortisone 2.5 % cream PRN  No Yes    Sig: [HYDROCORTISONE 2.5 % CREAM] APPLY  CREAM TO AFFECTED AREA ONCE TO TWICE DAILY AS NEEDED   loratadine (ALLERGY RELIEF, LORATADINE,) 10 mg tablet 7/11/2021 at Unknown time  No Yes   Sig: [LORATADINE (ALLERGY RELIEF, LORATADINE,) 10 MG TABLET] Take 1 tablet (10 mg total) by mouth daily.   multivitamin with minerals (THERA-M) 9 mg iron-400 mcg Tab tablet 7/11/2021 at Unknown time  No Yes   Sig: [MULTIVITAMIN WITH MINERALS (THERA-M) 9 MG IRON-400 MCG TAB TABLET] Take 1 tablet by mouth daily.   pantoprazole (PROTONIX) 40 MG tablet 7/11/2021 at Unknown time  No Yes   Sig: [PANTOPRAZOLE (PROTONIX) 40 MG TABLET] Take 1 tablet (40 mg total) by mouth daily.   polyvinyl alcohol-povidone (ARTIFICIAL TEARS,PVALCH-POVID,) 0.5-0.6 % Drop prn  No Yes   Sig: [POLYVINYL ALCOHOL-POVIDONE (ARTIFICIAL TEARS,PVALCH-POVID,) 0.5-0.6 % DROP] 1-2 gtts two times a day to each eye prn   protein (BOOST HIGH PROTEIN) Powd   No No   Sig: [PROTEIN (BOOST HIGH PROTEIN) POWD] 1 can po daily   warfarin ANTICOAGULANT (COUMADIN/JANTOVEN) 1 MG tablet 7/11/2021 at Unknown time  No Yes   Sig: [WARFARIN ANTICOAGULANT (COUMADIN/JANTOVEN) 1 MG TABLET] Take 2 to 3mg (2 or 3 tabs) by mouth daily as directed.  Adjust dose based on INR.   Patient taking differently: Take 1 mg by mouth daily    warfarin ANTICOAGULANT (COUMADIN/JANTOVEN) 2.5 MG tablet 7/11/2021 at Unknown time  No Yes   Sig: [WARFARIN ANTICOAGULANT (COUMADIN/JANTOVEN) 2.5 MG TABLET] Take 1 tablet (2.5 mg total) by mouth See Admin Instructions. Take 2.5 mg daily or per inr results   Patient taking differently: Take 2.5 mg by mouth daily       Facility-Administered Medications: None       Information source(s): Family member and CareEverywhere/SureScripts  Method of interview communication: phone    Summary of Changes to PTA Med List  New: none  Discontinued: duplicates, tessalon  Changed: updated warfarin dosing    Patient was asked about OTC/herbal products specifically.  PTA med list  reflects this.    In the past week, patient estimated taking medication this percent of the time:  greater than 90%.    Allergies were reviewed, assessed, and updated with the patient.      Patient does not anticipate needing any multi-use medications during admission.    The information provided in this note is only as accurate as the sources available at the time of the update(s).    Thank you for the opportunity to participate in the care of this patient.    Carolin Martinez [facility]  7/12/2021 9:28 AM

## 2021-07-12 NOTE — PLAN OF CARE
Problem: UTI (Urinary Tract Infection)  Goal: Improved Infection Symptoms  Outcome: Improving     Problem: Urinary Retention  Goal: Effective Urinary Elimination  Outcome: Improving   Patients vitals stable. Patient denies pain. Patient is having light sriram urine collect in bag. No blood clots. Patient ate blueberry muffin for breakfast and had some orange juice. RN encouraged oral fluid intake.

## 2021-07-12 NOTE — PLAN OF CARE
Problem: Adult Inpatient Plan of Care  Goal: Plan of Care Review  Outcome: Completed  Goal: Patient-Specific Goal (Individualized)  Outcome: Completed  Goal: Absence of Hospital-Acquired Illness or Injury  Outcome: Completed  Intervention: Identify and Manage Fall Risk  Recent Flowsheet Documentation  Taken 7/12/2021 1145 by Beverly Ellis RN  Safety Promotion/Fall Prevention:   activity supervised   safety round/check completed   nonskid shoes/slippers when out of bed  Taken 7/12/2021 0830 by Beverly Ellis RN  Safety Promotion/Fall Prevention:   activity supervised   safety round/check completed   nonskid shoes/slippers when out of bed  Intervention: Prevent Skin Injury  Recent Flowsheet Documentation  Taken 7/12/2021 1145 by Beverly Ellis RN  Body Position: position changed independently  Taken 7/12/2021 0830 by Beverly Ellis RN  Body Position: position changed independently  Goal: Optimal Comfort and Wellbeing  Outcome: Completed  Goal: Readiness for Transition of Care  Outcome: Completed     Problem: COPD Comorbidity  Goal: Maintenance of COPD Symptom Control  Outcome: Completed     Problem: Hypertension Comorbidity  Goal: Blood Pressure in Desired Range  Outcome: Completed     Problem: UTI (Urinary Tract Infection)  Goal: Improved Infection Symptoms  7/12/2021 1255 by Beverly Ellis RN  Outcome: Completed  7/12/2021 1006 by Beverly Ellis RN  Outcome: Improving     Problem: Fall Injury Risk  Goal: Absence of Fall and Fall-Related Injury  Outcome: Completed  Intervention: Promote Injury-Free Environment  Recent Flowsheet Documentation  Taken 7/12/2021 1145 by Beverly Ellis RN  Safety Promotion/Fall Prevention:   activity supervised   safety round/check completed   nonskid shoes/slippers when out of bed  Taken 7/12/2021 0830 by Beverly Ellis RN  Safety Promotion/Fall Prevention:   activity supervised   safety round/check completed   nonskid shoes/slippers  when out of bed     Problem: Pain Acute  Goal: Acceptable Pain Control and Functional Ability  Outcome: Completed     Problem: Urinary Retention  Goal: Effective Urinary Elimination  7/12/2021 1255 by Beverly Ellis RN  Outcome: Completed  7/12/2021 1006 by Beverly Ellis RN  Outcome: Improving     Problem: Adult Inpatient Plan of Care  Goal: Patient-Specific Goal (Individualized)  Outcome: Completed     Problem: Adult Inpatient Plan of Care  Goal: Absence of Hospital-Acquired Illness or Injury  Outcome: Completed  Intervention: Identify and Manage Fall Risk  Recent Flowsheet Documentation  Taken 7/12/2021 1145 by Beverly Ellis RN  Safety Promotion/Fall Prevention:   activity supervised   safety round/check completed   nonskid shoes/slippers when out of bed  Taken 7/12/2021 0830 by Beverly Ellis RN  Safety Promotion/Fall Prevention:   activity supervised   safety round/check completed   nonskid shoes/slippers when out of bed  Intervention: Prevent Skin Injury  Recent Flowsheet Documentation  Taken 7/12/2021 1145 by Beverly Ellis RN  Body Position: position changed independently  Taken 7/12/2021 0830 by Beverly Ellis RN  Body Position: position changed independently     Problem: Adult Inpatient Plan of Care  Goal: Optimal Comfort and Wellbeing  Outcome: Completed     Problem: Adult Inpatient Plan of Care  Goal: Readiness for Transition of Care  Outcome: Completed     Problem: COPD Comorbidity  Goal: Maintenance of COPD Symptom Control  Outcome: Completed     Problem: Hypertension Comorbidity  Goal: Blood Pressure in Desired Range  Outcome: Completed     Problem: UTI (Urinary Tract Infection)  Goal: Improved Infection Symptoms  7/12/2021 1255 by Beverly Ellis RN  Outcome: Completed  7/12/2021 1006 by Beverly Ellis RN  Outcome: Improving     Problem: Fall Injury Risk  Goal: Absence of Fall and Fall-Related Injury  Outcome: Completed  Intervention: Promote  Injury-Free Environment  Recent Flowsheet Documentation  Taken 7/12/2021 1145 by Beverly Ellis, RN  Safety Promotion/Fall Prevention:   activity supervised   safety round/check completed   nonskid shoes/slippers when out of bed  Taken 7/12/2021 0830 by Beverly Ellis, RN  Safety Promotion/Fall Prevention:   activity supervised   safety round/check completed   nonskid shoes/slippers when out of bed     Problem: Pain Acute  Goal: Acceptable Pain Control and Functional Ability  Outcome: Completed   Patient vitals stable. Patient denied pain. Patient education went over with family and patient. All questions answered.

## 2021-07-12 NOTE — ED PROVIDER NOTES
EMERGENCY DEPARTMENT ENCOUNTER      NAME: Benny Carrillo  AGE: 89 year old male  YOB: 1921  MRN: [id number]  EVALUATION DATE & TIME: 2021 10:05 PM    PCP: Alcides Castillo    ED PROVIDER: Lilli Briceno M.D.        Chief Complaint   Patient presents with     Urinary Retention         FINAL IMPRESSION:    1. Blocked suprapubic catheter, initial encounter (H)    2. Gross hematuria    3. Urinary retention    4. Acute UTI            MEDICAL DECISION MAKIN year old male history of chronic urinary retention and enlarged prostate with suprapubic catheter in place and presents emergency department with inability to empty his bladder.  He is on Coumadin for A. fib and today noted having blood in his Taylor bag.  He presented to the emergency department earlier today and was diagnosed with UTI and started on antibiotics.  Catheter was draining at that time and was discharged home.  He presents back to the emergency department now as catheter has stopped draining and having increasing abdominal pain and fullness.  Suprapubic catheter was flushed and drained by nursing in triage with significant improvement in symptoms.  He has a small amount of blood-tinged urine in the Taylor catheter.  Given the presence of the blood, UTI, and blockage of the catheter the plan is admission to the hospital for intermittent bladder irrigation by nursing and urology to see him tomorrow.  Ceftriaxone has been ordered per hospitalist request.  Patient otherwise hemodynamically stable.  Case was discussed with Dr. Feliciano of urology.      ED COURSE:  10:25 PM   I met with the patient for initial exam.    10:37 PM   Per nurse, they drained about 250 ml of urine.    10:54 PM  I spoke to Dr. Feliciano with [facility] about the patient.  We discussed management for this and the plan at this time is admission to the hospital for intermittent suprapubic catheter flushing by nursing staff.  If this becomes completely clotted and  does not improve with that then he may need Taylor catheter placement, CT scan, etc. markedly enlarged prostate and this may be a difficult procedure.  Urology will see him in the hospital tomorrow.    11:04 PM  I updated the patient on the plan for admission.  She did family present at bedside agree with the plan for admission.  Nursing did reBladderScan him and his bladder appears to be empty at this time.  He has having a little bit of suprapubic tenderness which would suggest a bladder spasm and therefore some fentanyl has been ordered.    11:18 PM  I spoke with hospitalist Dr. Pantoja about the patient. They accept the patient for admission.  Hospitalist requested that we give 1 g IV ceftriaxone.    I do not think that this represents ACS, PE, ruptured AAA, aortic dissection, bowel obstruction, bowel ischemia, cholecystitis, pancreatitis, appendicitis, diverticulitis, kidney stone, pyelonephritis, incarcerated or strangulated hernia, testicular torsion, viscus perforation, perforated GI ulcer, or other such etiologies at this time.    COVID-19 PPE worn during patient evaluation:  Mask: n95 and homemade masks   Eye Protection: none  Gown: none  Hair cover: yes  Face shield: none  Patient wearing a mask: yes    At the conclusion of the encounter I discussed the results of all of the tests and the disposition. Their questions were answered. The patient (and any family present) acknowledged understanding and were agreeable with the care plan.      CONSULTANTS:  [facility] - Dr. Feliciano      MEDICATIONS GIVEN IN THE EMERGENCY:  Medications   fentaNYL (PF) (SUBLIMAZE) injection 25 mcg (has no administration in time range)   cefTRIAXone (ROCEPHIN) 1 g vial to attach to  mL bag for ADULTS or NS 50 mL bag for PEDS (has no administration in time range)         NEW PRESCRIPTIONS STARTED AT TODAY'S ER VISIT  none        CONDITION:  Stable, improved        DISPOSITION:  Med surg obs as accepted by Dr. Pantoja,  hospitalist         =================================================================  =================================================================    HPI    Patient information was obtained from: patient     Use of Intrepreter: Yes (Granddaughter) - Jourdan Carrlilo is a 89 year old male with history of a.fib, suprapubic catheter in place, UTI secondary to catheter, small bowel obstruction, hypertension, CVA, COPD who presents to the ER with complaints of urinary retention.    Per chart review, patient was seen at [facility] ED earlier today for evaluation of blood in catheter. Patient's granddaughter reported the patient woke up with blood in his catheter bag. There was 1 small clot in the bag. He had been increasing his fluid intake to help flush out the blood, and his granddaughter thought the blood may be decreasing.  His catheter was changed on 7/8 (3 days ago) by a visiting nurse. Urine studies suggested infection with positive nitrates, leukocytes and blood.  CT imaging reported mild hyperenhancement of a decompressed bladder diverticulum, markedly enlarged prostate. Spoke with urology who recommended the patient hold warfarin for the next 48 hours. Patient discharged and started on Keflex.    Patient feels that his catheter is not draining. His abdomen feels full and has some abdominal discomfort. During examination, the patient reports his catheter was flushed in triage, 250cc urine removed, and he is feeling better. Denies any other complaints.        REVIEW OF SYSTEMS  Review of Systems   Constitutional: Negative for fever.   Respiratory: Negative for cough and shortness of breath.    Cardiovascular: Negative for chest pain.   Gastrointestinal: Positive for abdominal pain. Negative for diarrhea, nausea and vomiting.   Genitourinary: Positive for difficulty urinating.   Musculoskeletal: Negative for back pain.   Allergic/Immunologic: Negative for immunocompromised state.   All other systems  reviewed and are negative.          PAST MEDICAL HISTORY:  Past Medical History:   Diagnosis Date     Asthma      Atrial fibrillation (H)      Benign prostatic hyperplasia      Bradycardia 7/25/2015     Cataract      Chronic obstructive pulmonary disease, unspecified COPD type (H) 7/21/2020     Citrobacter infection 7/25/2015    UTI      Citrobacter infection 7/25/2015    UTI      Constipation 1/13/2015     Elevated digoxin level 7/24/2015     GERD (gastroesophageal reflux disease)      Hypertension      Lactic acidosis      Lower urinary tract infectious disease      Replacement Utility updated for latest IMO load     Muscle weakness      SBO (small bowel obstruction) (H)      Small bowel obstruction (H) 7/23/2015     Stroke (H)      Underweight due to inadequate caloric intake 7/28/2015    Body mass index is 20.25 kg/(m^2).       Urinary obstruction          PAST SURGICAL HISTORY:  Past Surgical History:   Procedure Laterality Date     CHOLECYSTECTOMY       CYSTOTOMY       EYE SURGERY Bilateral     cataract removal     PIC  5/28/2019              CURRENT MEDICATIONS:    Prior to Admission medications    Medication Sig Start Date End Date Taking? Authorizing Provider   acetaminophen (TYLENOL) 500 MG tablet [ACETAMINOPHEN (TYLENOL) 500 MG TABLET] 2 po three times a day prn pain 5/27/21   Alcides Castillo MD   albuterol (PROAIR HFA;PROVENTIL HFA;VENTOLIN HFA) 90 mcg/actuation inhaler [ALBUTEROL (PROAIR HFA;PROVENTIL HFA;VENTOLIN HFA) 90 MCG/ACTUATION INHALER] INHALE 2 PUFFS BY MOUTH EVERY 6 HOURS AS NEEDED FOR WHEEZING 1/13/21   Alcides Castillo MD   amLODIPine (NORVASC) 5 MG tablet [AMLODIPINE (NORVASC) 5 MG TABLET] Take 1 tablet (5 mg total) by mouth daily. 6/14/21   Alcides Castillo MD   artificial tears, hypromellose, (GONIOVISC) 2.5 % ophthalmic solution [ARTIFICIAL TEARS, HYPROMELLOSE, (GONIOVISC) 2.5 % OPHTHALMIC SOLUTION] 2 gtts to each eye two times a day prn dry eyes 9/5/18   Alcides Castillo MD   ARTIFICIAL  TEARS, POLYVIN ALC, 1.4 % ophthalmic solution [ARTIFICIAL TEARS, POLYVIN ALC, 1.4 % OPHTHALMIC SOLUTION] INSTILL 2 DROPS INTO EACH EYE TWICE DAILY AS NEEDED DRY EYES 10/14/19   Alcides Castillo MD   benzonatate (TESSALON) 200 MG capsule [BENZONATATE (TESSALON) 200 MG CAPSULE] Take 1 capsule (200 mg total) by mouth 3 (three) times a day as needed for cough. 12/13/19   Stephanie Lester MD   cephALEXin (KEFLEX) 500 MG capsule Take 1 capsule (500 mg) by mouth 3 times daily for 5 days 7/11/21 7/16/21  Tl Muñoz MD   digoxin (LANOXIN) 125 mcg (0.125 mg) tablet [DIGOXIN (LANOXIN) 125 MCG (0.125 MG) TABLET] Take 1 tablet (125 mcg total) by mouth daily. 2/15/21   Alcides Castillo MD   famotidine (PEPCID) 20 MG tablet [FAMOTIDINE (PEPCID) 20 MG TABLET] Take 1 tablet by mouth once daily 4/23/21   Alcides Castillo MD   guaiFENesin (ROBAFEN) 100 mg/5 mL syrup [GUAIFENESIN (ROBAFEN) 100 MG/5 ML SYRUP] TAKE 10 ML BY MOUTH  THREE TIMES DAILY AS NEEDED FOR COUGH 6/1/21   Alcides Castillo MD   hydrocortisone 2.5 % cream [HYDROCORTISONE 2.5 % CREAM] APPLY  CREAM TO AFFECTED AREA ONCE TO TWICE DAILY AS NEEDED 4/7/20   Alcides Castillo MD   loratadine (ALLERGY RELIEF, LORATADINE,) 10 mg tablet [LORATADINE (ALLERGY RELIEF, LORATADINE,) 10 MG TABLET] Take 1 tablet (10 mg total) by mouth daily. 5/3/21   Alcides Castillo MD   MEDICATION CANNOT BE REORDERED - PLEASE MANUALLY REORDER AND DISCONTINUE THE OLD ORDER [CATHETER 14 FR MISC] Suprapubic catheter. 14 French. 11/8/19   Jaye Christianson MD   multivitamin with minerals (THERA-M) 9 mg iron-400 mcg Tab tablet [MULTIVITAMIN WITH MINERALS (THERA-M) 9 MG IRON-400 MCG TAB TABLET] Take 1 tablet by mouth daily. 2/15/21   Alcides Castillo MD   pantoprazole (PROTONIX) 40 MG tablet [PANTOPRAZOLE (PROTONIX) 40 MG TABLET] Take 1 tablet (40 mg total) by mouth daily. 2/15/21   Alcides Castillo MD   polyvinyl alcohol-povidone (ARTIFICIAL TEARS,PVALCH-POVID,) 0.5-0.6 % Drop [POLYVINYL  ALCOHOL-POVIDONE (ARTIFICIAL TEARS,PVALCH-POVID,) 0.5-0.6 % DROP] 1-2 gtts two times a day to each eye prn 9/1/20   Alcides Castillo MD   protein (BOOST HIGH PROTEIN) Powd [PROTEIN (BOOST HIGH PROTEIN) POWD] 1 can po daily 1/25/21   Alcides Castillo MD   warfarin ANTICOAGULANT (COUMADIN/JANTOVEN) 1 MG tablet [WARFARIN ANTICOAGULANT (COUMADIN/JANTOVEN) 1 MG TABLET] Take 2 to 3mg (2 or 3 tabs) by mouth daily as directed.  Adjust dose based on INR. 2/15/21   Alcides Castillo MD   warfarin ANTICOAGULANT (COUMADIN/JANTOVEN) 2.5 MG tablet [WARFARIN ANTICOAGULANT (COUMADIN/JANTOVEN) 2.5 MG TABLET] Take 1 tablet (2.5 mg total) by mouth See Admin Instructions. Take 2.5 mg daily or per inr results 1/13/21   Alcides Castillo MD         ALLERGIES:  Allergies   Allergen Reactions     Morphine Itching and Other (See Comments)     Pt received morphine IV on 8/26/18. Pt reported localized itching and discomfort with redness near IV site following administration of morphine.         FAMILY HISTORY:  Family History   Problem Relation Age of Onset     No Known Problems Mother      No Known Problems Father      Diabetes Other         spouse         SOCIAL HISTORY:  Social History     Socioeconomic History     Marital status:      Spouse name: Not on file     Number of children: Not on file     Years of education: Not on file     Highest education level: Not on file   Occupational History     Not on file   Tobacco Use     Smoking status: Never Smoker     Smokeless tobacco: Never Used   Substance and Sexual Activity     Alcohol use: No     Drug use: No     Sexual activity: Not on file   Other Topics Concern     Not on file   Social History Narrative     Not on file     Social Determinants of Health     Financial Resource Strain:      Difficulty of Paying Living Expenses:    Food Insecurity:      Worried About Running Out of Food in the Last Year:      Ran Out of Food in the Last Year:    Transportation Needs:      Lack of  "Transportation (Medical):      Lack of Transportation (Non-Medical):    Physical Activity:      Days of Exercise per Week:      Minutes of Exercise per Session:    Stress:      Feeling of Stress :    Social Connections:      Frequency of Communication with Friends and Family:      Frequency of Social Gatherings with Friends and Family:      Attends Alevism Services:      Active Member of Clubs or Organizations:      Attends Club or Organization Meetings:      Marital Status:    Intimate Partner Violence:      Fear of Current or Ex-Partner:      Emotionally Abused:      Physically Abused:      Sexually Abused:          VITALS:  Patient Vitals for the past 24 hrs:   BP Temp Temp src Pulse Resp SpO2 Height Weight   07/11/21 2146 -- 97.6  F (36.4  C) Oral 110 22 97 % 1.702 m (5' 7\") 54.4 kg (119 lb 14.9 oz)   07/11/21 2144 (!) 142/93 -- -- -- -- -- -- --       Wt Readings from Last 3 Encounters:   07/11/21 54.4 kg (119 lb 14.9 oz)   07/11/21 54.4 kg (120 lb)   01/09/20 53.5 kg (118 lb)         PHYSICAL EXAM    Constitutional:  Well developed, Well nourished, NAD, GCS 15  HENT:  Normocephalic, Atraumatic, Bilateral external ears normal, Nose normal. Neck- Normal range of motion, No tenderness, Supple, No stridor.   Eyes:  PERRL, EOMI, Conjunctiva normal, No discharge.  Respiratory:  Normal breath sounds, No respiratory distress, No wheezing,   Cardiovascular:  Normal heart rate  GI:  No excessive obesity.  Bowel sounds normal, Soft, No tenderness, No masses, No flank tenderness. No rebound or guarding.   : suprapubic cath in place and draining blood tinged blood, no clots appreciated. ~50cc urine in ca bag.  Musculoskeletal:  No cyanosis, No clubbing. Good range of motion in all major joints. No major deformities noted.   Integument:  Warm, Dry, No erythema, No rash.  No petechiae.  Neurologic:  Alert & interactive, No focal deficits noted.   Psychiatric:  Affect normal, Cooperative          LAB:  All pertinent " labs reviewed and interpreted.  Recent Results (from the past 24 hour(s))   Comprehensive metabolic panel    Collection Time: 07/11/21 11:04 AM   Result Value Ref Range    Sodium 139 136 - 145 mmol/L    Potassium 4.8 3.5 - 5.0 mmol/L    Chloride 103 98 - 107 mmol/L    Carbon Dioxide (CO2) 30 22 - 31 mmol/L    Anion Gap 6 5 - 18 mmol/L    Urea Nitrogen 8 8 - 28 mg/dL    Creatinine 1.09 0.70 - 1.30 mg/dL    Calcium 9.1 8.5 - 10.5 mg/dL    Glucose 116 70 - 125 mg/dL    Alkaline Phosphatase 80 45 - 120 U/L    AST 23 0 - 40 U/L    ALT 12 0 - 45 U/L    Protein Total 8.1 (H) 6.0 - 8.0 g/dL    Albumin 3.8 3.5 - 5.0 g/dL    Bilirubin Total 0.9 0.0 - 1.0 mg/dL    GFR Estimate 55 (L) >60 mL/min/1.73m2   INR    Collection Time: 07/11/21 11:04 AM   Result Value Ref Range    INR 3.56 (H) 0.85 - 1.15   CBC with platelets and differential    Collection Time: 07/11/21 11:04 AM   Result Value Ref Range    WBC Count 7.9 4.0 - 11.0 10e3/uL    RBC Count 4.17 (L) 4.40 - 5.90 10e6/uL    Hemoglobin 13.5 13.3 - 17.7 g/dL    Hematocrit 43.1 40.0 - 53.0 %     (H) 78 - 100 fL    MCH 32.4 26.5 - 33.0 pg    MCHC 31.3 (L) 31.5 - 36.5 g/dL    RDW 13.8 10.0 - 15.0 %    Platelet Count 167 150 - 450 10e3/uL    % Neutrophils 75 %    % Lymphocytes 11 %    % Monocytes 10 %    % Eosinophils 3 %    % Basophils 1 %    % Immature Granulocytes 0 %    NRBCs per 100 WBC 0 <1 /100    Absolute Neutrophils 5.9 1.6 - 8.3 10e3/uL    Absolute Lymphocytes 0.8 0.8 - 5.3 10e3/uL    Absolute Monocytes 0.8 0.0 - 1.3 10e3/uL    Absolute Eosinophils 0.3 0.0 - 0.7 10e3/uL    Absolute Basophils 0.1 0.0 - 0.2 10e3/uL    Absolute Immature Granulocytes 0.0 <=0.0 10e3/uL    Absolute NRBCs 0.0 10e3/uL   UA reflex to Microscopic    Collection Time: 07/11/21  1:00 PM   Result Value Ref Range    Color Urine Brown (A) Colorless, Straw, Light Yellow, Yellow    Appearance Urine Turbid (A) Clear    Glucose Urine Negative Negative mg/dL    Bilirubin Urine Negative Negative     Ketones Urine Negative Negative mg/dL    Specific Gravity Urine 1.029 1.001 - 1.030    Blood Urine >1.0 mg/dL (A) Negative    pH Urine 8.0 (H) 5.0 - 7.0    Protein Albumin Urine 70  (A) Negative mg/dL    Urobilinogen Urine <2.0 <2.0 mg/dL    Nitrite Urine Positive (A) Negative    Leukocyte Esterase Urine 250 Eliu/uL (A) Negative    Bacteria Urine Many (A) None Seen /HPF    RBC Urine >182 (H) <=2 /HPF    WBC Urine 92 (H) <=5 /HPF       Lab Results   Component Value Date    ABORH B POS 09/14/2020           RADIOLOGY:  Reviewed all pertinent imaging. Please see official radiology report.    No orders to display         EKG:    none      PROCEDURES:  Suprapubic cath flushed and drained by RN      I, Park Hyatt, am serving as a scribe to document services personally performed by Dr. Lilli Briceno based on my observation and the provider's statements to me. I, Dr. Lilli Briceno MD attest that Park Hyatt is acting in a scribe capacity, has observed my performance of the services and has documented them in accordance with my direction.        Lilli Briceno M.D. [facility]  Emergency Medicine and Medical Toxicology  Formerly [facility] EMERGENCY ROOM  [address] [id number] [phone]  Dept: [phone]     Lilli Briceno MD  07/11/21 [id number]

## 2021-07-12 NOTE — DISCHARGE SUMMARY
[facility] MEDICINE  DISCHARGE SUMMARY     Primary Care Physician: Alcides Castillo  Admission Date: 7/11/2021   Discharge Provider: Amrit Barrios MD, MD Discharge Date: 7/12/2021   Diet:   Active Diet and Nourishment Order   Procedures     Regular Diet Adult     Diet       Code Status: Full Code   Activity: DCACTIVITY: Activity as tolerated        Condition at Discharge: Satisfactory     REASON FOR PRESENTATION(See Admission Note for Details)   Feels suprapubic catheter/urinary retention    PRINCIPAL & ACTIVE DISCHARGE DIAGNOSES     Urinary retention secondary to clogged suprapubic catheter  Hematuria, resolved  UTI, catheter associated  Chronic A. 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  The following labs/tests are recommended: INR.  On   7/16/2021  Follow up with Dr. Mccollum urology, a they will call you to set up an   appointment                 DISPOSITION     Home with home care    SUMMARY OF HOSPITAL COURSE:      89-year-old gentleman with a history of hypertension, chronic A. fib, history of renal infarct, urinary retention status post suprapubic catheter placement presented with a clogged catheter, catheter was unclogged, hematuria noted that has now resolved, INR was supratherapeutic, Coumadin held, advised to hold for another 2 days, repeat INR check on 7/16/2021.  Patient was treated with IV ceftriaxone for UTI, culture is pending, continue cephalexin twice  daily for 7 more days, follow-up with urology for catheter exchange.    Discharge Medications with Med changes:     Current Discharge Medication List      CONTINUE these medications which have CHANGED    Details   cephALEXin (KEFLEX) 500 MG capsule Take 1 capsule (500 mg) by mouth 2 times daily for 7 days  Qty: 14 capsule, Refills: 0    Comments: Change in instructions from previous prescription, please call if questions  Associated Diagnoses: Urinary tract infection associated with cystostomy catheter, subsequent encounter         CONTINUE these medications which have NOT CHANGED    Details   acetaminophen (TYLENOL) 500 MG tablet Take 500-1,000 mg by mouth every 6 hours as needed for mild pain      albuterol (PROAIR HFA;PROVENTIL HFA;VENTOLIN HFA) 90 mcg/actuation inhaler [ALBUTEROL (PROAIR HFA;PROVENTIL HFA;VENTOLIN HFA) 90 MCG/ACTUATION INHALER] INHALE 2 PUFFS BY MOUTH EVERY 6 HOURS AS NEEDED FOR WHEEZING  Qty: 1 each, Refills: 3    Comments: May substitute the equivalent medication per insurance preference.  Associated Diagnoses: Environmental allergies      amLODIPine (NORVASC) 5 MG tablet [AMLODIPINE (NORVASC) 5 MG TABLET] Take 1 tablet (5 mg total) by mouth daily.  Qty: 90 tablet, Refills: 1    Associated Diagnoses: Benign essential hypertension      ARTIFICIAL TEARS, POLYVIN ALC, 1.4 % ophthalmic solution [ARTIFICIAL TEARS, POLYVIN ALC, 1.4 % OPHTHALMIC SOLUTION] INSTILL 2 DROPS INTO EACH EYE TWICE DAILY AS NEEDED DRY EYES  Qty: 15 mL, Refills: 6    Comments: Please consider 90 day supplies to promote better adherence  Associated Diagnoses: Environmental allergies      digoxin (LANOXIN) 125 mcg (0.125 mg) tablet [DIGOXIN (LANOXIN) 125 MCG (0.125 MG) TABLET] Take 1 tablet (125 mcg total) by mouth daily.  Qty: 30 tablet, Refills: 3    Comments: Please consider 90 day supplies to promote better adherence  Associated Diagnoses: Chronic atrial fibrillation (H)      famotidine (PEPCID) 20 MG tablet [FAMOTIDINE  (PEPCID) 20 MG TABLET] Take 1 tablet by mouth once daily  Qty: 90 tablet, Refills: 3    Associated Diagnoses: Gastroesophageal reflux disease without esophagitis      guaiFENesin (ROBAFEN) 100 mg/5 mL syrup [GUAIFENESIN (ROBAFEN) 100 MG/5 ML SYRUP] TAKE 10 ML BY MOUTH  THREE TIMES DAILY AS NEEDED FOR COUGH  Qty: 240 mL, Refills: 0    Associated Diagnoses: Cough      hydrocortisone 2.5 % cream [HYDROCORTISONE 2.5 % CREAM] APPLY  CREAM TO AFFECTED AREA ONCE TO TWICE DAILY AS NEEDED  Qty: 28 g, Refills: 2    Associated Diagnoses: Hemorrhoid      loratadine (ALLERGY RELIEF, LORATADINE,) 10 mg tablet [LORATADINE (ALLERGY RELIEF, LORATADINE,) 10 MG TABLET] Take 1 tablet (10 mg total) by mouth daily.  Qty: 90 tablet, Refills: 1    Comments: Please consider 90 day supplies to promote better adherence  Associated Diagnoses: Environmental allergies      multivitamin with minerals (THERA-M) 9 mg iron-400 mcg Tab tablet [MULTIVITAMIN WITH MINERALS (THERA-M) 9 MG IRON-400 MCG TAB TABLET] Take 1 tablet by mouth daily.  Qty: 30 tablet, Refills: 3    Associated Diagnoses: Poor nutrition      pantoprazole (PROTONIX) 40 MG tablet [PANTOPRAZOLE (PROTONIX) 40 MG TABLET] Take 1 tablet (40 mg total) by mouth daily.  Qty: 90 tablet, Refills: 1    Associated Diagnoses: Gastroesophageal reflux disease without esophagitis      polyvinyl alcohol-povidone (ARTIFICIAL TEARS,PVALCH-POVID,) 0.5-0.6 % Drop [POLYVINYL ALCOHOL-POVIDONE (ARTIFICIAL TEARS,PVALCH-POVID,) 0.5-0.6 % DROP] 1-2 gtts two times a day to each eye prn  Qty: 15 mL, Refills: 3    Associated Diagnoses: Dry eyes      !! warfarin ANTICOAGULANT (COUMADIN/JANTOVEN) 1 MG tablet [WARFARIN ANTICOAGULANT (COUMADIN/JANTOVEN) 1 MG TABLET] Take 2 to 3mg (2 or 3 tabs) by mouth daily as directed.  Adjust dose based on INR.  Qty: 150 tablet, Refills: 1    Comments: Current dose is 3mg on Mon and Thurs,  2mg ROW. Also using a 2.5mg tablet  Associated Diagnoses: Chronic atrial fibrillation (H)       !! warfarin ANTICOAGULANT (COUMADIN/JANTOVEN) 2.5 MG tablet [WARFARIN ANTICOAGULANT (COUMADIN/JANTOVEN) 2.5 MG TABLET] Take 1 tablet (2.5 mg total) by mouth See Admin Instructions. Take 2.5 mg daily or per inr results  Qty: 90 tablet, Refills: 1    Associated Diagnoses: Chronic atrial fibrillation (H)      MEDICATION CANNOT BE REORDERED - PLEASE MANUALLY REORDER AND DISCONTINUE THE OLD ORDER [CATHETER 14 FR MISC] Suprapubic catheter. 14 Italian.  Qty: 1 each, Refills: 0    Associated Diagnoses: Suprapubic catheter (H)      protein (BOOST HIGH PROTEIN) Powd [PROTEIN (BOOST HIGH PROTEIN) POWD] 1 can po daily  Qty: 30 Can, Refills: 6    Associated Diagnoses: Moderate protein-calorie malnutrition (H)       !! - Potential duplicate medications found. Please discuss with provider.                Rationale for medication changes:      Cephalexin for UTI  INR check on 716, Coumadin on hold for 2 days        Consults   Urology     None    Immunizations given this encounter     Most Recent Immunizations   Administered Date(s) Administered     Flu, Unspecified 09/19/2013     HepB-Adult 09/29/2009     Influenza (H1N1) 12/06/2009     Influenza (High Dose) 3 valent vaccine 09/09/2019     Influenza Vaccine IM > 6 months Valent IIV4 09/25/2014     Pneumo Conj 13-V (2010&after) 07/22/2015     Pneumococcal 23 valent 08/26/2009     TD (ADULT, 7+) 09/09/2019     Td (Adult), Adsorbed 08/26/2009     Varicella 08/26/2009           Anticoagulation Information      Recent INR results:   Recent Labs   Lab 07/12/21  0856 07/11/21  1104   INR 3.43* 3.56*     Warfarin doses (if applicable) or name of other anticoagulant: Warfarin 2.5 to 3.5 mg daily, advised to take 2.5 mg daily after holding for the next 2 days      SIGNIFICANT IMAGING FINDINGS     No results found for this visit on 07/11/21.    SIGNIFICANT LABORATORY FINDINGS     Most Recent 3 CBC's:Recent Labs   Lab Test 07/12/21  0856 07/11/21  1104 12/02/20  1509   WBC 8.7 7.9 9.2    HGB 12.8* 13.5 13.5*   * 103* 102*    167 229     Most Recent 3 BMP's:Recent Labs   Lab Test 07/12/21  0856 07/11/21  1104 12/02/20  1509    139 138   POTASSIUM 4.7 4.8 4.9   CHLORIDE 102 103 102   CO2 30 30 29   BUN 7* 8 9   CR 1.03 1.09 1.10   ANIONGAP 8 6 7   VADIM 8.7 9.1 8.5    116 92     Most Recent INR's and Anticoagulation Dosing History:  Anticoagulation Dose History    There is no flowsheet data to display.             Discharge Orders        HOME CARE NURSING REFERRAL      Reason for your hospital stay    Blood in the urine, urinary catheter obstruction, urinary tract infection     Follow-up and recommended labs and tests     Follow up with primary care provider, Alcides Castillo, within 7 days for hospital follow- up.  The following labs/tests are recommended: INR.  On 7/16/2021  Follow up with Dr. Mccollum urology, a they will call you to set up an appointment     Activity    Your activity upon discharge: activity as tolerated     Discharge Instructions    Do not take warfarin on 7/12/2021 and 7/13/2021 then started 2.5 mg daily with INR check on 7/16/2021, further instructions on warfarin dosages by your primary care physician     Diet    Follow this diet upon discharge: Orders Placed This Encounter      Regular Diet Adult       Examination   Physical Exam   Temp:  [97.3  F (36.3  C)-98.1  F (36.7  C)] 97.4  F (36.3  C)  Pulse:  [] 58  Resp:  [16-22] 20  BP: (118-154)/(66-93) 130/76  SpO2:  [90 %-97 %] 93 %  Wt Readings from Last 1 Encounters:   07/12/21 50.4 kg (111 lb 3.2 oz)       General: Awake alert and comfortable  Lungs: CTA without rales or rhonchi  Heart: S1, S2 without murmurs  Abdomen: Soft nontender, bowel sounds positive, suprapubic catheter  CNS: Nonfocal  Extremities: No edema        Please see EMR for more detailed significant labs, imaging, consultant notes etc.        Amrit Barrios MD, MD    [facility]    CC:Alcides Castillo

## 2021-07-12 NOTE — DISCHARGE INSTRUCTIONS
Bladder Infection, Male (Adult)    You have a bladder infection.  Urine is normally free of bacteria. But bacteria can get into the urinary tract from the skin around the rectum. Or it may travel in the blood from other parts of the body.   This is called a urinary tract infection (UTI). An infection can occur anywhere in the urinary tract. It could be in a kidney (pyelonephritis) or in the bladder (cystitis) and urethra (urethritis). The urethra is the tube that drains urine from the bladder through the tip of the penis.   The most common place for a UTI is in the bladder. This is called a bladder infection. Most bladder infections are easily treated. They are not serious unless the infection spreads up to the kidney.   The terms bladder infection, UTI, and cystitis are often used to describe the same thing. But they aren t always the same. Cystitis is an inflammation of the bladder. The most common cause of cystitis is an infection.    Keep in mind:    Infections in the urine are called UTIs.    Cystitis is often caused by a UTI.    Not all UTIs and cases of cystitis are bladder infections.    Bladder infections are the most common type of cystitis.  Symptoms of a bladder infection  The infection causes inflammation in the urethra and bladder. This inflammation causes many of the symptoms. The most common symptoms of a bladder infection are:     Pain or burning when urinating    Having to go more often than normal    Feeling like you need to go right away    Only a small amount of urine comes out    Blood in urine    Discomfort in your belly (abdomen), often in the lower belly, above the pubic bone    Cloudy, strong, or bad-smelling urine    Unable to urinate (retention)    Urinary incontinence    Fever    Loss of appetite  Older adults may also feel confused.   Causes of a bladder infection  Bladder infections are not contagious. You can't get one from someone else, from a toilet seat, or from sharing a bath.    The most common cause of bladder infections is bacteria from the bowels. The bacteria get onto the skin around the opening of the urethra. From there they can get into the urine and travel up to the bladder. This causes inflammation and an infection. This often happens because of:     An enlarged prostate    Poor cleaning of the genitals    Procedures that put a tube in your bladder, such as a Taylor catheter    Bowel incontinence    Older age    Not emptying your bladder (the urine stays there, giving the bacteria a chance to grow)    Dehydration (this lets urine to stay in the bladder longer)    Constipation (this can cause the bowels to push on the bladder or urethra and keep the bladder from emptying)  Treatment  Bladder infections are treated with antibiotics. They often clear up quickly without complications. Treatment helps prevent a more serious kidney infection.   Medicines  Medicines can help in treating a bladder infection:     You may have been given phenazopyridine to ease burning when you urinate. It will cause your urine to be bright orange. It can stain clothing.    You may have been prescribed antibiotics. Take this medicine until you have finished it, even if you feel better. Taking all of the medicine will make sure the infection has cleared.  You can use acetaminophen or ibuprofen for pain, fever, or discomfort, unless another medicine was prescribed. You can also alternate them, or use both together. They work differently and are a different class of medicines, so taking them together is not an overdose. If you have chronic liver or kidney disease, talk with your healthcare provider before using these medicines. Also talk with your provider if you ve had a stomach ulcer or GI (gastrointestinal) bleeding or are taking blood thinner medicines.   Home care  Here are some guidelines to help you care for yourself at home:     Drink plenty of fluids, unless your healthcare provider told you not to.  Fluids will prevent dehydration and flush out your bladder.    Use good personal hygiene. Wipe from front to back after using the toilet, and clean your penis regularly. If you aren t circumcised, retract the foreskin when cleaning.    Urinate more often, and don t try to hold it in for long periods of time, if possible.    Wear loose-fitting clothes and cotton underwear. Don't wear tight-fitting pants. This helps keep you clean and dry.    Change your diet to prevent constipation. This means eating more fresh foods and more fiber, and less junk and fatty foods.    Don't have sex until your symptoms are gone.    Don't have caffeine, alcohol, and spicy foods. These can irritate the bladder.  Follow-up care  Follow up with your healthcare provider, or as advised, if all symptoms have not cleared up in 5 days. It's important to keep your follow-up appointment. You can talk with your provider to see if you need more tests of the urinary tract. This is especially important if you have infections that keep coming back.   If a culture was done, you will be told if your treatment needs to be changed. If directed, you can call to find out the results.   If X-rays were taken, you will be told of any findings that may affect your care.   Call 911  Call 911 if any of these occur:     Trouble breathing    Trouble waking up    Feeling confused    Fainting or loss of consciousness    Fast heart rate  When to get medical advice  Call your healthcare provider right away if any of these occur:     Fever of 100.4 F (38 C) or higher, or as directed by your provider    Your symptoms don t improve after 2 days of treatment    Back or belly pain that gets worse    Repeated vomiting, or you aren t able to keep medicine down    Weakness or dizziness  Nae last reviewed this educational content on 10/1/2019    0466-1350 [employer]. All rights reserved. This information is not intended as a substitute for professional  medical care. Always follow your healthcare professional's instructions.        Discharge Instructions: Bladder Training with a Suprapubic Catheter   You are going home with a tube that drains urine from your bladder. This is a suprapubic catheter. Your healthcare provider put the catheter right into your bladder through a tiny cut (incision) in your belly. You'll need to train your bladder to work as it did before. It takes time after illness or injury for you to feel the urge to urinate. And your bladder may not empty completely. So you'll need to check the amount of urine in your bladder.   By clamping and unclamping the catheter, you'll learn to urinate the way you did before you had the catheter. The amount of urine that you pass through the urethra will increase. And the amount of urine draining from your catheter will decrease. When you reach less than 50 to 75 ml from your catheter for a few days, your provider may want to remove it.   Home care    Use the bathroom at least every 3 hours.    Try to pass your urine normally into a container. The container should be a measuring container. Then you can figure out how much urine you passed. Don't be discouraged if this takes some time for you to learn.    Measure the amount of urine in the container.    Unclamp your catheter.    Drain the urine from your catheter.    Measure the amount of urine.    Clamp your catheter.    Write down the amount of urine that you passed normally. And write down the amount from your catheter.    Add the 2 amounts together and record the total. Also record the date and time.    Try to increase the amount of urine you pass normally. Try to decrease the amount left in your catheter.    Tell your healthcare provider when you are draining less than 50 to 75 ml from your catheter.    Follow-up care  Make a follow-up appointment, or as directed.   When to call your healthcare provider  Call your healthcare provider right away if you have  any of the following:     Urine that is cloudy, bloody, or smells bad    Fever of 100.4 F (38 C) or higher, or as directed by your provider    Shaking chills    Rash, itching, redness, swelling, or drainage at the catheter site    Full feeling in your bladder or bladder pain    Catheter that is clogged or feels clogged    No urine drainage    Urine leaking around catheter    Catheter or stitches that fall out  [employer] last reviewed this educational content on 12/1/2019 2000-2021 [employer]. All rights reserved. This information is not intended as a substitute for professional medical care. Always follow your healthcare professional's instructions.

## 2021-07-12 NOTE — ED NOTES
In triage tried to irrigate catheter and was able to flush catheter with ease and pull back what was mostly blood and clots. Bladder scan prior to this read 277 ml. Removed about 220 ml from bladder to get pt comfortable. Once catheter is hooked back to the leg bag there is no drainage. MD notified and pt brought to room.

## 2021-07-12 NOTE — PROGRESS NOTES
Care Management Discharge Note    Discharge Date: 07/12/2021       Discharge Disposition:  Home with home care services    Discharge Services:  RN/HHA with [facility]    Discharge DME:      Discharge Transportation:  Family    Private pay costs discussed: Not applicable    PAS Confirmation Code:    Patient/family educated on Medicare website which has current facility and service quality ratings:      Education Provided on the Discharge Plan:    Persons Notified of Discharge Plans: MD, bedside RN, Pt and family  Patient/Family in Agreement with the Plan:  YEs    Handoff Referral Completed: Yes    Additional Information:  SW met with pt in pt room, pt granddaughter in room, pt declined  services for granddaughter to be . SW introduced self AIDET completed. Pt uses [facility] home care. Pt lives with family and uses walker or cane. Pt wishes to resume with [facility], writer spoke to [facility] and they will resume services RN and adding HHA as pt granddaughter stated pt needs assistance at home.  MD updated with home care plan.      Linh HUBBARD

## 2021-07-13 ENCOUNTER — PATIENT OUTREACH (OUTPATIENT)
Dept: GERIATRIC MEDICINE | Facility: CLINIC | Age: 86
End: 2021-07-13

## 2021-07-13 LAB
ALBUMIN UR-MCNC: 70 MG/DL
APPEARANCE UR: ABNORMAL
BACTERIA #/AREA URNS HPF: ABNORMAL /HPF
BILIRUB UR QL STRIP: NEGATIVE
COLOR UR AUTO: ABNORMAL
GLUCOSE UR STRIP-MCNC: NEGATIVE MG/DL
HGB UR QL STRIP: ABNORMAL
KETONES UR STRIP-MCNC: NEGATIVE MG/DL
LEUKOCYTE ESTERASE UR QL STRIP: ABNORMAL
NITRATE UR QL: POSITIVE
PH UR STRIP: 8 [PH] (ref 5–7)
RBC URINE: >182 /HPF
SP GR UR STRIP: 1.03 (ref 1–1.03)
UROBILINOGEN UR STRIP-MCNC: <2 MG/DL
WBC URINE: 92 /HPF

## 2021-07-13 NOTE — PROGRESS NOTES
[facility] Care Coordination Contact    CC contacted family Brittanie and reviewed discharge summary.  Member has a follow-up appointment with PCP in 7 days: No: Offered Assistance with setting up a follow up appointment. Family declines and will try to set up within the next couple of weeks.   Member has had a change in condition: No  Home visit needed: No  Care plan reviewed and updated.  The following home based services PCA RN were resumed.  New referrals placed: No  Transition log completed.   PCP notified of transition back to home via EMR.      [facility] Six-Month Telephone Assessment    6 month telephone assessment completed on 7/13/2021.    ER visits: Yes -  [facility]  Hospitalizations: Yes -  [facility]  TCU stays: No  Significant health status changes: No significant changes noted. Recent hospitalization related to urinary retention and complications with catheter. Family reports issues were addressed during hospitalization. Current services will continue as before.   Falls/Injuries: No  ADL/IADL changes: No  Changes in services: No- Will continue with 11.5 hours of PCA per day and Weekly nursing visits.     Caregiver Assessment follow up:  N/A    Goals: See POC in chart for goal progress documentation.      Will see member in 6 months for an annual health risk assessment.   Encouraged member to call CC with any questions or concerns in the meantime.     KESHA Sun  [facility]  [phone]                  KESHA Sun  [facility]  [phone]

## 2021-07-15 LAB
BACTERIA UR CULT: ABNORMAL
BACTERIA UR CULT: ABNORMAL

## 2021-07-15 RX ORDER — LEVOFLOXACIN 500 MG/1
500 TABLET, FILM COATED ORAL DAILY
Qty: 10 TABLET | Refills: 0 | Status: SHIPPED | OUTPATIENT
Start: 2021-07-15 | End: 2021-07-25

## 2021-07-16 ENCOUNTER — TELEPHONE (OUTPATIENT)
Dept: FAMILY MEDICINE | Facility: CLINIC | Age: 86
End: 2021-07-16

## 2021-07-16 ENCOUNTER — ANTICOAGULATION THERAPY VISIT (OUTPATIENT)
Dept: ANTICOAGULATION | Facility: CLINIC | Age: 86
End: 2021-07-16

## 2021-07-16 DIAGNOSIS — N28.0 RENAL INFARCT (H): ICD-10-CM

## 2021-07-16 DIAGNOSIS — I48.91 ATRIAL FIBRILLATION (H): Primary | ICD-10-CM

## 2021-07-16 LAB — INR PPP: 1.2

## 2021-07-16 NOTE — TELEPHONE ENCOUNTER
Reason for Call:  INR    Who is calling?  Home Care: Neelima with [facility]    Phone number:  [phone]    Fax number:  Verbal orders requested    Name of caller: Neelima with [facility]    INR Value:  1.2    Are there any other concerns:  Yes: Patient has infection and was started on Cephalexin and warfarin was held on 7/12 and 7/13.    Can we leave a detailed message on this number? YES    Phone number patient can be reached at: Other phone number:  [phone]      Call taken on 7/16/2021 at 10:58 AM by Suzanna Dumont

## 2021-07-16 NOTE — PROGRESS NOTES
ANTICOAGULATION MANAGEMENT     Benny Crarillo 89 year old male is on warfarin with subtherapeutic INR result. (Goal INR 2.0-3.0)    Recent labs: (last 7 days)     07/16/21  0000   INR 1.2       ASSESSMENT     Source(s): Chart Review and Home Care/Facility Nurse       Warfarin doses taken: Warfarin taken as instructed at hospital discharge 7/12 held x 2, then 2.5 mg x 2    Diet: No new diet changes identified    New illness, injury, or hospitalization: yes 7/11 for uti     Medication/supplement changes: had IV ceftriaxone in hosp, was sent home on keflex for 7 days but was given levaquin from pharm, home care is looking into this now    Signs or symptoms of bleeding or clotting: No    Previous INR: Supratherapeutic    Additional findings: None     PLAN     Recommended plan for temporary change(s) affecting INR     Dosing Instructions: resume 3.5 mg daily with next INR in 3 days       Summary  As of 7/16/2021    Full warfarin instructions:  3.5 mg every day   Next INR check:  7/19/2021             Telephone call with home care nurse Neelima who verbalizes understanding and agrees to plan    Orders given to  Homecare nurse/facility to recheck mon7/19    Education provided: None required    Plan made per [facility] anticoagulation protocol    Perlita Brown RN  Anticoagulation Clinic  7/16/2021    _______________________________________________________________________     Anticoagulation Episode Summary     Current INR goal:  2.0-3.0   TTR:  64.3 % (1 y)   Target end date:     Send INR reminders to:  [facility]    Indications    Atrial fibrillation (H) [I48.91]  Renal infarct (H) [N28.0]           Comments:           Anticoagulation Care Providers     Provider Role Specialty Phone number    Alcides Castillo MD Referring Family Medicine [phone]

## 2021-07-19 ENCOUNTER — TELEPHONE (OUTPATIENT)
Dept: FAMILY MEDICINE | Facility: CLINIC | Age: 86
End: 2021-07-19

## 2021-07-19 ENCOUNTER — MEDICAL CORRESPONDENCE (OUTPATIENT)
Dept: HEALTH INFORMATION MANAGEMENT | Facility: CLINIC | Age: 86
End: 2021-07-19

## 2021-07-19 DIAGNOSIS — N28.0 RENAL INFARCT (H): ICD-10-CM

## 2021-07-19 DIAGNOSIS — I48.91 ATRIAL FIBRILLATION (H): Primary | ICD-10-CM

## 2021-07-19 LAB — INR PPP: 1.8

## 2021-07-19 NOTE — TELEPHONE ENCOUNTER
Reason for Call:  INR    Who is calling?  Home Care:   [facility]    Phone number:  [phone]    Fax number:  n/a    Name of caller: Neelima    INR Value:  1.8    Are there any other concerns:  No    Can we leave a detailed message on this number? YES    Phone number patient can be reached at: Home number on file [phone] (home)      Call taken on 7/19/2021 at 9:25 AM by Lisa Cook

## 2021-07-19 NOTE — TELEPHONE ENCOUNTER
ANTICOAGULATION MANAGEMENT     Benny Carrillo 89 year old male is on warfarin with subtherapeutic INR result. (Goal INR )    Recent labs: (last 7 days)     07/16/21  0000   INR 1.2       ASSESSMENT     Source(s): Home Care/Facility Nurse       Warfarin doses taken: Warfarin recently held for while in hospital which may be affecting INR    Diet: No new diet changes identified    New illness, injury, or hospitalization: No    Medication/supplement changes: None noted    Signs or symptoms of bleeding or clotting: No    Previous INR: Subtherapeutic    Additional findings: None     PLAN     Recommended plan for no diet, medication or health factor changes affecting INR     Dosing Instructions: Continue your current warfarin dose with next INR in 1 week       Summary  As of 7/19/2021    Full warfarin instructions:  3.5 mg every day   Next INR check:  7/26/2021             Telephone call with home care nurse Neelima who agrees to plan and repeated back plan correctly    Orders given to  Homecare nurse/facility to recheck    Education provided: Target INR goal and significance of current INR result    Plan made per [facility] anticoagulation protocol    Raquel Porras RN  Anticoagulation Clinic  7/19/2021    _______________________________________________________________________     Anticoagulation Episode Summary     Current INR goal:  2.0-3.0   TTR:  64.3 % (1 y)   Target end date:  Indefinite   Send INR reminders to:  [facility]    Indications    Atrial fibrillation (H) [I48.91]  Renal infarct (H) [N28.0]           Comments:           Anticoagulation Care Providers     Provider Role Specialty Phone number    Alcides Castillo MD Referring Family Medicine [phone]

## 2021-07-20 ENCOUNTER — OFFICE VISIT (OUTPATIENT)
Dept: FAMILY MEDICINE | Facility: CLINIC | Age: 86
End: 2021-07-20
Payer: COMMERCIAL

## 2021-07-20 VITALS
DIASTOLIC BLOOD PRESSURE: 69 MMHG | SYSTOLIC BLOOD PRESSURE: 106 MMHG | HEART RATE: 67 BPM | OXYGEN SATURATION: 95 % | WEIGHT: 114.75 LBS | BODY MASS INDEX: 19.7 KG/M2 | TEMPERATURE: 97.9 F

## 2021-07-20 DIAGNOSIS — R33.9 URINARY RETENTION: ICD-10-CM

## 2021-07-20 DIAGNOSIS — I10 BENIGN ESSENTIAL HYPERTENSION: ICD-10-CM

## 2021-07-20 DIAGNOSIS — N39.0 URINARY TRACT INFECTION ASSOCIATED WITH CATHETERIZATION OF URINARY TRACT, UNSPECIFIED INDWELLING URINARY CATHETER TYPE, SEQUELA: ICD-10-CM

## 2021-07-20 DIAGNOSIS — Z09 HOSPITAL DISCHARGE FOLLOW-UP: Primary | ICD-10-CM

## 2021-07-20 DIAGNOSIS — I48.20 CHRONIC ATRIAL FIBRILLATION (H): ICD-10-CM

## 2021-07-20 DIAGNOSIS — K21.9 GASTROESOPHAGEAL REFLUX DISEASE WITHOUT ESOPHAGITIS: ICD-10-CM

## 2021-07-20 DIAGNOSIS — N28.0 RENAL INFARCT (H): ICD-10-CM

## 2021-07-20 DIAGNOSIS — T83.511S URINARY TRACT INFECTION ASSOCIATED WITH CATHETERIZATION OF URINARY TRACT, UNSPECIFIED INDWELLING URINARY CATHETER TYPE, SEQUELA: ICD-10-CM

## 2021-07-20 DIAGNOSIS — Z91.09 ENVIRONMENTAL ALLERGIES: ICD-10-CM

## 2021-07-20 DIAGNOSIS — E63.9 POOR NUTRITION: ICD-10-CM

## 2021-07-20 DIAGNOSIS — I48.91 ATRIAL FIBRILLATION (H): Primary | ICD-10-CM

## 2021-07-20 DIAGNOSIS — R52 PAIN: ICD-10-CM

## 2021-07-20 DIAGNOSIS — I48.11 LONGSTANDING PERSISTENT ATRIAL FIBRILLATION (H): ICD-10-CM

## 2021-07-20 PROCEDURE — 99495 TRANSJ CARE MGMT MOD F2F 14D: CPT | Performed by: FAMILY MEDICINE

## 2021-07-20 RX ORDER — FAMOTIDINE 20 MG/1
TABLET, FILM COATED ORAL
Qty: 90 TABLET | Refills: 1 | Status: SHIPPED | OUTPATIENT
Start: 2021-07-20 | End: 2021-12-27

## 2021-07-20 RX ORDER — WARFARIN SODIUM 1 MG/1
TABLET ORAL
Qty: 150 TABLET | Refills: 1 | Status: SHIPPED | OUTPATIENT
Start: 2021-07-20 | End: 2021-12-08

## 2021-07-20 RX ORDER — MULTIVIT,CALC,MINS/IRON/FOLIC 9MG-400MCG
1 TABLET ORAL DAILY
Qty: 90 TABLET | Refills: 1 | Status: SHIPPED | OUTPATIENT
Start: 2021-07-20 | End: 2021-12-08

## 2021-07-20 RX ORDER — WARFARIN SODIUM 2.5 MG/1
2.5 TABLET ORAL DAILY
Qty: 90 TABLET | Refills: 1 | Status: SHIPPED | OUTPATIENT
Start: 2021-07-20 | End: 2022-04-18

## 2021-07-20 RX ORDER — DIGOXIN 125 MCG
125 TABLET ORAL DAILY
Qty: 90 TABLET | Refills: 1 | Status: SHIPPED | OUTPATIENT
Start: 2021-07-20 | End: 2021-12-08

## 2021-07-20 RX ORDER — LORATADINE 10 MG/1
10 TABLET ORAL DAILY
Qty: 90 TABLET | Refills: 1 | Status: SHIPPED | OUTPATIENT
Start: 2021-07-20 | End: 2022-04-24

## 2021-07-20 RX ORDER — AMLODIPINE BESYLATE 5 MG/1
5 TABLET ORAL DAILY
Qty: 90 TABLET | Refills: 1 | Status: SHIPPED | OUTPATIENT
Start: 2021-07-20 | End: 2021-12-08

## 2021-07-20 RX ORDER — ACETAMINOPHEN 500 MG
500-1000 TABLET ORAL EVERY 6 HOURS PRN
Qty: 100 TABLET | Refills: 1 | Status: SHIPPED | OUTPATIENT
Start: 2021-07-20 | End: 2021-12-08

## 2021-07-20 NOTE — PROGRESS NOTES
Assessment & Plan      1. Hospital discharge follow-up  Hospital discharge follow-up for his urinary retention and UTI, presently stable    2. Urinary retention  Suprapubic catheter nonfunctioning    3. Urinary tract infection associated with catheterization of urinary tract, unspecified indwelling urinary catheter type, sequela  Had both Klebsiella and Pseudomonas.  He took a week's worth of Keflex he can stop that.  He should go on the levofloxacin for 10 days    4. Benign essential hypertension  Hypertension controlled on amlodipine refilled today  - amLODIPine (NORVASC) 5 MG tablet; Take 1 tablet (5 mg) by mouth daily  Dispense: 90 tablet; Refill: 1    5. Longstanding persistent atrial fibrillation (H)  Rate controlled on digoxin, INR was 1.8 yesterday.  With starting the levofloxacin think were okay there.    6. 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 Poor nutrition  Refill on multivitamins  - Multiple Vitamins-Minerals (GNP THERAPEUTIC-M) TABS; Take 1 tablet by mouth daily  Dispense: 90 tablet; Refill: 1    8. Chronic atrial fibrillation (H)  Please see above regarding warfarin I did refill that and the digoxin 0.125 mg daily  - warfarin ANTICOAGULANT (COUMADIN) 1 MG tablet; Take 1 to 3 tabs by mouth daily as directed Adjust dose based on INR.  Dispense: 150 tablet; Refill: 1  - warfarin ANTICOAGULANT (COUMADIN) 2.5 MG tablet; Take 1 tablet (2.5 mg) by mouth daily Adjust based on inr  Dispense: 90 tablet; Refill: 1  - digoxin (LANOXIN) 125 MCG tablet; Take 1 tablet (125 mcg) by mouth daily  Dispense: 90 tablet; Refill: 1    9. Environmental allergies  Allergies controlled on loratadine, refill medicine  - loratadine (CLARITIN) 10 MG tablet; Take 1 tablet (10 mg) by mouth daily  Dispense: 90 tablet; Refill: 1    10. Pain  As needed use of acetaminophen for fever and pain  - acetaminophen (TYLENOL) 500 MG tablet; Take 1-2 tablets  (500-1,000 mg) by mouth every 6 hours as needed for mild pain  Dispense: 100 tablet; Refill: 1    11. Gastroesophageal reflux disease without esophagitis  GERD, continue famotidine  - famotidine (PEPCID) 20 MG tablet; Take 1 tablet by mouth once daily  Dispense: 90 tablet; Refill: 1    Follow-up in 3 months for an annual wellness visit.        Subjective:    This 89 year old male was seen today for follow-up from hospital.    Patient was admitted to the [facility] July 11 through July 12 for urine retention he had a clogged suprapubic catheter.  Also had UTI    He grew out Klebsiella and Pseudomonas.    The originally treated him with Keflex, and then added Levaquin on July 15 but they never started on that they do have the prescription for 10 days    I do think he should take that.    He is having no abdominal pain now no fever the catheter is working well    He needed refill on all of his medications I reviewed meds today for his blood pressure atrial fibrillation allergies and reflux.    Please see med refills above.    Patient's labs from the hospital showed hemoglobin 12.8 white count normal CMP showed GFR 59 rest of the renal function and other electrolytes and liver function normal.    Patient is due for an annual wellness visit he will schedule at in 3 months we will check dig level as well as go through the health risk assessment    Review of Systems    10 point review of systems positive as outlined above otherwise negative    Current Outpatient Medications   Medication     acetaminophen (TYLENOL) 500 MG tablet     amLODIPine (NORVASC) 5 MG tablet     digoxin (LANOXIN) 125 MCG tablet     famotidine (PEPCID) 20 MG tablet     loratadine (CLARITIN) 10 MG tablet     Multiple Vitamins-Minerals (GNP THERAPEUTIC-M) TABS     warfarin ANTICOAGULANT (COUMADIN) 1 MG tablet     warfarin ANTICOAGULANT (COUMADIN) 2.5 MG tablet     albuterol (PROAIR HFA;PROVENTIL HFA;VENTOLIN HFA) 90 mcg/actuation inhaler      ARTIFICIAL TEARS, POLYVIN ALC, 1.4 % ophthalmic solution     guaiFENesin (ROBAFEN) 100 mg/5 mL syrup     hydrocortisone 2.5 % cream     levofloxacin (LEVAQUIN) 500 MG tablet     MEDICATION CANNOT BE REORDERED - PLEASE MANUALLY REORDER AND DISCONTINUE THE OLD ORDER     pantoprazole (PROTONIX) 40 MG tablet     polyvinyl alcohol-povidone (ARTIFICIAL TEARS,PVALCH-POVID,) 0.5-0.6 % Drop     protein (BOOST HIGH PROTEIN) Powd     No current facility-administered medications for this visit.       Objective    Vitals: /69   Pulse 67   Temp 97.9  F (36.6  C)   Wt 52.1 kg (114 lb 12 oz)   SpO2 95%   BMI 19.70 kg/m    BMI= Body mass index is 19.7 kg/m .     Physical Exam    General appearance no acute distress    HEENT neck nontender known nasal drainage no sore throat    Lungs clear throughout no rales or rhonchi heart was irregularly irregular rate in the 70 range O2 sat look good    He is afebrile    Abdomen is soft nontender no suprapubic pain    Extremities without edema    Skin without breakdown.    INR yesterday 1.8        Alcides Castillo MD

## 2021-07-26 ENCOUNTER — TELEPHONE (OUTPATIENT)
Dept: FAMILY MEDICINE | Facility: CLINIC | Age: 86
End: 2021-07-26

## 2021-07-26 ENCOUNTER — ANTICOAGULATION THERAPY VISIT (OUTPATIENT)
Dept: FAMILY MEDICINE | Facility: CLINIC | Age: 86
End: 2021-07-26

## 2021-07-26 DIAGNOSIS — N28.0 RENAL INFARCT (H): ICD-10-CM

## 2021-07-26 DIAGNOSIS — I48.91 ATRIAL FIBRILLATION (H): Primary | ICD-10-CM

## 2021-07-26 DIAGNOSIS — G47.00 INSOMNIA, UNSPECIFIED TYPE: Primary | ICD-10-CM

## 2021-07-26 LAB — INR (EXTERNAL): 1.5 (ref 0.9–1.1)

## 2021-07-26 NOTE — PROGRESS NOTES
ANTICOAGULATION MANAGEMENT     Benny Carrillo 89 year old male is on warfarin with subtherapeutic INR result. (Goal INR 2.0-3.0)    Recent labs: (last 7 days)     07/26/21  0928   INR 1.5*       ASSESSMENT     Source(s): Home Care/Facility Nurse       Warfarin doses taken: Warfarin taken as instructed    Diet: No new diet changes identified    New illness, injury, or hospitalization: Yes: Pt being treated for UTI.    Medication/supplement changes: 10 days of levaquin 07/20-07/30. Levaquin may increase INR and risk of bleeding.    Signs or symptoms of bleeding or clotting: No    Previous INR: Subtherapeutic    Additional findings: None     PLAN     Recommended plan for no diet, medication or health factor changes causing subtherapeutic INR     Dosing Instructions: Booster dose then Increase your warfarin dose (12.2% change) with next INR in 3 days       Summary  As of 7/26/2021    Full warfarin instructions:  7/26: 7 mg; Otherwise 4.5 mg every Mon, Wed, Fri; 3.5 mg all other days   Next INR check:  7/29/2021             Telephone call with home care nurse Neelima who verbalizes understanding and agrees to plan    Orders given to  Homecare nurse/facility to recheck    Education provided: Target INR goal and significance of current INR result    Plan made per [facility] anticoagulation protocol    Lance Hawthorne RN  Anticoagulation Clinic  7/26/2021    _______________________________________________________________________     Anticoagulation Episode Summary     Current INR goal:  2.0-3.0   TTR:  63.9 % (1 y)   Target end date:  Indefinite   Send INR reminders to:  [facility]    Indications    Atrial fibrillation (H) [I48.91]  Renal infarct (H) [N28.0]           Comments:           Anticoagulation Care Providers     Provider Role Specialty Phone number    Alcides Castillo MD Referring Family Medicine [phone]

## 2021-07-26 NOTE — TELEPHONE ENCOUNTER
Reason for Call:  Other     Detailed comments: neelima from [facility] calling to say the family said the patient has been having trouble sleeping, they are asking for something to help him sleep please    Phone Number Patient can be reached at: Other phone number:  Neelima [phone]*    Best Time:     Can we leave a detailed message on this number? YES    Call taken on 7/26/2021 at 2:52 PM by Vicky Hardin

## 2021-07-27 NOTE — TELEPHONE ENCOUNTER
I sent a prescription, to his pharmacy, for melatonin 5 mg tablets, take 1 tablet at bedtime as needed for sleep

## 2021-07-29 ENCOUNTER — ANTICOAGULATION THERAPY VISIT (OUTPATIENT)
Dept: FAMILY MEDICINE | Facility: CLINIC | Age: 86
End: 2021-07-29

## 2021-07-29 ENCOUNTER — TELEPHONE (OUTPATIENT)
Dept: FAMILY MEDICINE | Facility: CLINIC | Age: 86
End: 2021-07-29

## 2021-07-29 DIAGNOSIS — I48.91 ATRIAL FIBRILLATION (H): Primary | ICD-10-CM

## 2021-07-29 DIAGNOSIS — N28.0 RENAL INFARCT (H): ICD-10-CM

## 2021-07-29 LAB — INR (EXTERNAL): 1.7 (ref 0.9–1.1)

## 2021-07-29 NOTE — TELEPHONE ENCOUNTER
Reason for Call:  Home Health Care    dayana with [facility] called regarding (reason for call): verbal orders   Orders are needed for this patient. skilled nursing    PT: no     OT: no  Skilled Nursing:  recert today   1 a week x 9 weeks   5  PRN    Pt Provider: Dr. rashid    Phone Number Homecare Nurse can be reached at: [phone]  Can we leave a detailed message on this number? yes  Phone number patient can be reached at: Home number on file [phone] (home)    Best Time: anytime  Call taken on 7/29/2021 at 2:33 PM by Ruthann Tovar

## 2021-07-29 NOTE — PROGRESS NOTES
ANTICOAGULATION MANAGEMENT     Benny Carrillo 89 year old male is on warfarin with subtherapeutic INR result. (Goal INR 2.0-3.0)    Recent labs: (last 7 days)     07/29/21  0824   INR 1.7*       ASSESSMENT     Source(s): Home Care/Facility Nurse       Warfarin doses taken: Warfarin taken as instructed    Diet: No new diet changes identified    New illness, injury, or hospitalization: UTI sx resolved.    Medication/supplement changes: Family states Benny took Levaquin 07/20-07/25. Levaquin can raise INR and risk of bleeding but does not appear to be doing so.    Signs or symptoms of bleeding or clotting: No    Previous INR: Subtherapeutic    Additional findings: None     PLAN     Recommended plan for no diet, medication or health factor changes affecting INR     Dosing Instructions:  Increase your warfarin dose (7% change) with next INR in 4 days       Summary  As of 7/29/2021    Full warfarin instructions:  7/29: 7 mg; Otherwise 3.5 mg every Sun, Wed; 4.5 mg all other days   Next INR check:  8/2/2021             Telephone call with home care nurse Shelia who verbalizes understanding and agrees to plan    Orders given to  Homecare nurse/facility to recheck    Education provided: Target INR goal and significance of current INR result    Plan made per [facility] anticoagulation protocol    Lance aHwthorne RN  Anticoagulation Clinic  7/29/2021    _______________________________________________________________________     Anticoagulation Episode Summary     Current INR goal:  2.0-3.0   TTR:  63.1 % (1 y)   Target end date:  Indefinite   Send INR reminders to:  [facility]    Indications    Atrial fibrillation (H) [I48.91]  Renal infarct (H) [N28.0]           Comments:           Anticoagulation Care Providers     Provider Role Specialty Phone number    Alcides Castillo MD Referring Family Medicine [phone]

## 2021-08-02 ENCOUNTER — ANTICOAGULATION THERAPY VISIT (OUTPATIENT)
Dept: FAMILY MEDICINE | Facility: CLINIC | Age: 86
End: 2021-08-02

## 2021-08-02 DIAGNOSIS — I48.91 ATRIAL FIBRILLATION (H): Primary | ICD-10-CM

## 2021-08-02 DIAGNOSIS — N28.0 RENAL INFARCT (H): ICD-10-CM

## 2021-08-02 LAB — INR (EXTERNAL): 3.9 (ref 0.9–1.1)

## 2021-08-02 NOTE — PROGRESS NOTES
ANTICOAGULATION MANAGEMENT     Benny Carrillo 89 year old male is on warfarin with supratherapeutic INR result. (Goal INR 2.0-3.0)    Recent labs: (last 7 days)     08/02/21  0954   INR 3.9*       ASSESSMENT     Source(s): Chart Review and Home Care/Facility Nurse       Warfarin doses taken: Warfarin taken as instructed    Diet: No new diet changes identified    New illness, injury, or hospitalization: No    Medication/supplement changes: Leavquin 07/20-07/30 which can increase INR and risk of bleeding.     Signs or symptoms of bleeding or clotting: No    Previous INR: Subtherapeutic    Additional findings: None     PLAN     Recommended plan for no diet, medication or health factor changes affecting INR     Dosing Instructions: Hold dose then continue your current warfarin dose with next INR in 1 week       Summary  As of 8/2/2021    Full warfarin instructions:  8/2: Hold; Otherwise 3.5 mg every Sun, Wed; 4.5 mg all other days   Next INR check:  8/9/2021             Telephone call with home care nurse Neelima who verbalizes understanding and agrees to plan    Orders given to  Homecare nurse/facility to recheck    Education provided: Target INR goal and significance of current INR result    Plan made per [facility]agulation protocol    Lance Hawthorne RN  Anticoagulation Clinic  8/2/2021    _______________________________________________________________________     Anticoagulation Episode Summary     Current INR goal:  2.0-3.0   TTR:  62.5 % (1 y)   Target end date:  Indefinite   Send INR reminders to:  [facility]    Indications    Atrial fibrillation (H) [I48.91]  Renal infarct (H) [N28.0]           Comments:           Anticoagulation Care Providers     Provider Role Specialty Phone number    Alcides Castillo MD Referring Family Medicine [phone]

## 2021-08-09 ENCOUNTER — ANTICOAGULATION THERAPY VISIT (OUTPATIENT)
Dept: FAMILY MEDICINE | Facility: CLINIC | Age: 86
End: 2021-08-09

## 2021-08-09 DIAGNOSIS — N28.0 RENAL INFARCT (H): ICD-10-CM

## 2021-08-09 DIAGNOSIS — I48.91 ATRIAL FIBRILLATION (H): Primary | ICD-10-CM

## 2021-08-09 LAB — INR (EXTERNAL): 4.5 (ref 0.9–1.1)

## 2021-08-09 NOTE — CONFIDENTIAL NOTE
ANTICOAGULATION MANAGEMENT     Benny Carrillo 89 year old male is on warfarin with supratherapeutic INR result. (Goal INR 2.0-3.0)    Recent labs: (last 7 days)     08/09/21  1004   INR 4.5*       ASSESSMENT     Source(s): Home Care/Facility Nurse       Warfarin doses taken: Warfarin taken as instructed    Diet: No new diet changes identified    New illness, injury, or hospitalization: No    Medication/supplement changes: Was on levaquin 07/20-07/30 for UTI.    Signs or symptoms of bleeding or clotting: No    Previous INR: Supratherapeutic    Additional findings: None. No overt factors to explain elevated INR.     PLAN     Recommended plan for no diet, medication or health factor changes affecting INR     Dosing Instructions: Hold 1.5 doses then Decrease your warfarin dose (14% change) with next INR in 4 days       Summary  As of 8/9/2021    Full warfarin instructions:  8/9: Hold; 8/10: 2 mg; Otherwise 4.5 mg every Mon; 3.5 mg all other days   Next INR check:  8/13/2021             Telephone call with home care nurse Neelima who verbalizes understanding and agrees to plan    Orders given to  Homecare nurse/facility to recheck    Education provided: Target INR goal and significance of current INR result    Plan made per [facility]agulation protocol    Lance Hawthorne RN  Anticoagulation Clinic  8/9/2021    _______________________________________________________________________     Anticoagulation Episode Summary     Current INR goal:  2.0-3.0   TTR:  60.5 % (1 y)   Target end date:  Indefinite   Send INR reminders to:  [facility]    Indications    Atrial fibrillation (H) [I48.91]  Renal infarct (H) [N28.0]           Comments:           Anticoagulation Care Providers     Provider Role Specialty Phone number    Alcides Castillo MD Referring Family Medicine [phone]

## 2021-08-13 ENCOUNTER — ANTICOAGULATION THERAPY VISIT (OUTPATIENT)
Dept: FAMILY MEDICINE | Facility: CLINIC | Age: 86
End: 2021-08-13

## 2021-08-13 DIAGNOSIS — N28.0 RENAL INFARCT (H): ICD-10-CM

## 2021-08-13 DIAGNOSIS — I48.91 ATRIAL FIBRILLATION (H): Primary | ICD-10-CM

## 2021-08-13 LAB — INR (EXTERNAL): 2.6 (ref 0.9–1.1)

## 2021-08-13 NOTE — CONFIDENTIAL NOTE
ANTICOAGULATION MANAGEMENT     Benny Carrillo 89 year old male is on warfarin with therapeutic INR result. (Goal INR 2.0-3.0)    Recent labs: (last 7 days)     08/13/21  0954   INR 2.6*       ASSESSMENT     Source(s): Home Care/Facility Nurse       Warfarin doses taken: Warfarin taken as instructed    Diet: No new diet changes identified    New illness, injury, or hospitalization: No    Medication/supplement changes: None noted    Signs or symptoms of bleeding or clotting: No    Previous INR: Supratherapeutic    Additional findings: None     PLAN     Recommended plan for no diet, medication or health factor changes affecting INR     Dosing Instructions: Continue your current warfarin dose with next INR in 5 days       Summary  As of 8/13/2021    Full warfarin instructions:  4.5 mg every Mon; 3.5 mg all other days   Next INR check:  8/18/2021             Telephone call with home care nurse Neelima who verbalizes understanding and agrees to plan    Orders given to  Homecare nurse/facility to recheck    Education provided: Target INR goal and significance of current INR result    Plan made per [facility]agulation protocol    Lance Hawthorne RN  Anticoagulation Clinic  8/13/2021    _______________________________________________________________________     Anticoagulation Episode Summary     Current INR goal:  2.0-3.0   TTR:  59.7 % (1 y)   Target end date:  Indefinite   Send INR reminders to:  [facility]    Indications    Atrial fibrillation (H) [I48.91]  Renal infarct (H) [N28.0]           Comments:           Anticoagulation Care Providers     Provider Role Specialty Phone number    Alcides Castillo MD Referring Family Medicine [phone]

## 2021-08-18 ENCOUNTER — ANTICOAGULATION THERAPY VISIT (OUTPATIENT)
Dept: FAMILY MEDICINE | Facility: CLINIC | Age: 86
End: 2021-08-18

## 2021-08-18 DIAGNOSIS — N28.0 RENAL INFARCT (H): ICD-10-CM

## 2021-08-18 DIAGNOSIS — I48.91 ATRIAL FIBRILLATION (H): Primary | ICD-10-CM

## 2021-08-18 LAB — INR (EXTERNAL): 2.7 (ref 0.9–1.1)

## 2021-08-18 NOTE — CONFIDENTIAL NOTE
ANTICOAGULATION MANAGEMENT     Benny Carrillo 89 year old male is on warfarin with therapeutic INR result. (Goal INR 2.0-3.0)    Recent labs: (last 7 days)     08/18/21  1032   INR 2.7*       ASSESSMENT     Source(s): Home Care/Facility Nurse       Warfarin doses taken: Warfarin taken as instructed    Diet: No new diet changes identified    New illness, injury, or hospitalization: No    Medication/supplement changes: None noted    Signs or symptoms of bleeding or clotting: No    Previous INR: Therapeutic last visit; previously outside of goal range    Additional findings: None     PLAN     Recommended plan for no diet, medication or health factor changes affecting INR     Dosing Instructions: Continue your current warfarin dose with next INR in 12 days       Summary  As of 8/18/2021    Full warfarin instructions:  4.5 mg every Mon; 3.5 mg all other days   Next INR check:  8/30/2021             Telephone call with home care nurse Neelima who verbalizes understanding and agrees to plan    Orders given to  Homecare nurse/facility to recheck    Education provided: Target INR goal and significance of current INR result    Plan made per [facility]agulation protocol    Lance Hawthorne RN  Anticoagulation Clinic  8/18/2021    _______________________________________________________________________     Anticoagulation Episode Summary     Current INR goal:  2.0-3.0   TTR:  59.7 % (1 y)   Target end date:  Indefinite   Send INR reminders to:  [facility]    Indications    Atrial fibrillation (H) [I48.91]  Renal infarct (H) [N28.0]           Comments:           Anticoagulation Care Providers     Provider Role Specialty Phone number    Alcides Castillo MD Referring Family Medicine [phone]

## 2021-08-23 DIAGNOSIS — Z53.9 DIAGNOSIS NOT YET DEFINED: Primary | ICD-10-CM

## 2021-08-23 PROCEDURE — G0179 MD RECERTIFICATION HHA PT: HCPCS | Performed by: FAMILY MEDICINE

## 2021-08-30 ENCOUNTER — ANTICOAGULATION THERAPY VISIT (OUTPATIENT)
Dept: FAMILY MEDICINE | Facility: CLINIC | Age: 86
End: 2021-08-30

## 2021-08-30 DIAGNOSIS — N28.0 RENAL INFARCT (H): ICD-10-CM

## 2021-08-30 DIAGNOSIS — I48.91 ATRIAL FIBRILLATION (H): Primary | ICD-10-CM

## 2021-08-30 LAB — INR (EXTERNAL): 2.3 (ref 0.9–1.1)

## 2021-08-30 NOTE — PROGRESS NOTES
ANTICOAGULATION MANAGEMENT     Benny Carrillo 89 year old male is on warfarin with therapeutic INR result. (Goal INR 2.0-3.0)    Recent labs: (last 7 days)     08/30/21  0959   INR 2.3*       ASSESSMENT     Source(s): Home Care/Facility Nurse       Warfarin doses taken: Warfarin taken as instructed    Diet: No new diet changes identified    New illness, injury, or hospitalization: No    Medication/supplement changes: None noted    Signs or symptoms of bleeding or clotting: No    Previous INR: Therapeutic     Additional findings: None     PLAN     Recommended plan for no diet, medication or health factor changes affecting INR     Dosing Instructions: Continue your current warfarin dose with next INR in 3 weeks       Summary  As of 8/30/2021    Full warfarin instructions:  4.5 mg every Mon; 3.5 mg all other days   Next INR check:  9/20/2021             Telephone call with home care nurse Neelima who verbalizes understanding and agrees to plan    Orders given to  Homecare nurse/facility to recheck    Education provided: Goal range and significance of current result    Plan made per [facility] anticoagulation protocol    Lance Hawthorne RN  Anticoagulation Clinic  8/30/2021    _______________________________________________________________________     Anticoagulation Episode Summary     Current INR goal:  2.0-3.0   TTR:  59.7 % (1 y)   Target end date:  Indefinite   Send INR reminders to:  [facility]    Indications    Atrial fibrillation (H) [I48.91]  Renal infarct (H) [N28.0]           Comments:           Anticoagulation Care Providers     Provider Role Specialty Phone number    Alcides Castillo MD Referring Family Medicine [phone]

## 2021-09-10 ENCOUNTER — HOSPITAL ENCOUNTER (EMERGENCY)
Facility: HOSPITAL | Age: 86
Discharge: HOME | End: 2021-09-10
Attending: EMERGENCY MEDICINE | Admitting: EMERGENCY MEDICINE
Payer: COMMERCIAL

## 2021-09-10 ENCOUNTER — APPOINTMENT (OUTPATIENT)
Dept: CT IMAGING | Facility: HOSPITAL | Age: 86
End: 2021-09-10
Attending: EMERGENCY MEDICINE
Payer: COMMERCIAL

## 2021-09-10 VITALS
WEIGHT: 116 LBS | BODY MASS INDEX: 18.64 KG/M2 | OXYGEN SATURATION: 92 % | DIASTOLIC BLOOD PRESSURE: 72 MMHG | SYSTOLIC BLOOD PRESSURE: 140 MMHG | RESPIRATION RATE: 16 BRPM | HEART RATE: 61 BPM | TEMPERATURE: 97 F | HEIGHT: 66 IN

## 2021-09-10 DIAGNOSIS — R79.1 ELEVATED INR: ICD-10-CM

## 2021-09-10 DIAGNOSIS — S30.1XXA HEMATOMA OF LEFT FLANK, INITIAL ENCOUNTER: ICD-10-CM

## 2021-09-10 LAB
ALBUMIN SERPL-MCNC: 3.7 G/DL (ref 3.5–5)
ALP SERPL-CCNC: 84 U/L (ref 45–120)
ALT SERPL W P-5'-P-CCNC: 10 U/L (ref 0–45)
ANION GAP SERPL CALCULATED.3IONS-SCNC: 10 MMOL/L (ref 5–18)
AST SERPL W P-5'-P-CCNC: 20 U/L (ref 0–40)
BASOPHILS # BLD AUTO: 0.1 10E3/UL (ref 0–0.2)
BASOPHILS NFR BLD AUTO: 1 %
BILIRUB SERPL-MCNC: 1.5 MG/DL (ref 0–1)
BUN SERPL-MCNC: 12 MG/DL (ref 8–28)
CALCIUM SERPL-MCNC: 9.1 MG/DL (ref 8.5–10.5)
CHLORIDE BLD-SCNC: 100 MMOL/L (ref 98–107)
CO2 SERPL-SCNC: 28 MMOL/L (ref 22–31)
CREAT SERPL-MCNC: 1.15 MG/DL (ref 0.7–1.3)
EOSINOPHIL # BLD AUTO: 0.4 10E3/UL (ref 0–0.7)
EOSINOPHIL NFR BLD AUTO: 4 %
ERYTHROCYTE [DISTWIDTH] IN BLOOD BY AUTOMATED COUNT: 13.8 % (ref 10–15)
GFR SERPL CREATININE-BSD FRML MDRD: 52 ML/MIN/1.73M2
GLUCOSE BLD-MCNC: 129 MG/DL (ref 70–125)
HCT VFR BLD AUTO: 39.7 % (ref 40–53)
HGB BLD-MCNC: 12.3 G/DL (ref 13.3–17.7)
IMM GRANULOCYTES # BLD: 0.1 10E3/UL
IMM GRANULOCYTES NFR BLD: 1 %
INR PPP: 4.3 (ref 0.9–1.15)
LYMPHOCYTES # BLD AUTO: 1.8 10E3/UL (ref 0.8–5.3)
LYMPHOCYTES NFR BLD AUTO: 18 %
MCH RBC QN AUTO: 32.7 PG (ref 26.5–33)
MCHC RBC AUTO-ENTMCNC: 31 G/DL (ref 31.5–36.5)
MCV RBC AUTO: 106 FL (ref 78–100)
MONOCYTES # BLD AUTO: 1.3 10E3/UL (ref 0–1.3)
MONOCYTES NFR BLD AUTO: 12 %
NEUTROPHILS # BLD AUTO: 6.7 10E3/UL (ref 1.6–8.3)
NEUTROPHILS NFR BLD AUTO: 64 %
NRBC # BLD AUTO: 0 10E3/UL
NRBC BLD AUTO-RTO: 0 /100
PLATELET # BLD AUTO: 240 10E3/UL (ref 150–450)
POTASSIUM BLD-SCNC: 5.5 MMOL/L (ref 3.5–5)
PROT SERPL-MCNC: 8.1 G/DL (ref 6–8)
RBC # BLD AUTO: 3.76 10E6/UL (ref 4.4–5.9)
SODIUM SERPL-SCNC: 138 MMOL/L (ref 136–145)
WBC # BLD AUTO: 10.2 10E3/UL (ref 4–11)

## 2021-09-10 PROCEDURE — 82040 ASSAY OF SERUM ALBUMIN: CPT | Performed by: EMERGENCY MEDICINE

## 2021-09-10 PROCEDURE — 85610 PROTHROMBIN TIME: CPT | Performed by: EMERGENCY MEDICINE

## 2021-09-10 PROCEDURE — 74177 CT ABD & PELVIS W/CONTRAST: CPT

## 2021-09-10 PROCEDURE — 82374 ASSAY BLOOD CARBON DIOXIDE: CPT | Performed by: EMERGENCY MEDICINE

## 2021-09-10 PROCEDURE — 36415 COLL VENOUS BLD VENIPUNCTURE: CPT | Performed by: EMERGENCY MEDICINE

## 2021-09-10 PROCEDURE — 99285 EMERGENCY DEPT VISIT HI MDM: CPT | Mod: 25

## 2021-09-10 PROCEDURE — 250N000011 HC RX IP 250 OP 636: Performed by: EMERGENCY MEDICINE

## 2021-09-10 PROCEDURE — 85025 COMPLETE CBC W/AUTO DIFF WBC: CPT | Performed by: EMERGENCY MEDICINE

## 2021-09-10 RX ORDER — IOPAMIDOL 755 MG/ML
75 INJECTION, SOLUTION INTRAVASCULAR ONCE
Status: COMPLETED | OUTPATIENT
Start: 2021-09-10 | End: 2021-09-10

## 2021-09-10 RX ADMIN — IOPAMIDOL 75 ML: 755 INJECTION, SOLUTION INTRAVENOUS at 18:39

## 2021-09-10 ASSESSMENT — MIFFLIN-ST. JEOR: SCORE: 1078.92

## 2021-09-10 NOTE — ED TRIAGE NOTES
W/c to triage with granddaughter and son.  Pt having bruising and pain noted to L side of body.  Started yesterday.  Denies trauma or fall.  Pt takes blood thinner.

## 2021-09-10 NOTE — ED PROVIDER NOTES
"EMERGENCY DEPARTMENT NOTE     Name: Benny Carrillo    Age/Sex: 89 year old male   MRN: [id number]   Evaluation Date & Time:  9/10/2021  5:06 PM    PCP:    Alcides Castillo   ED Provider: Elijah Johnson D.O.       CHIEF COMPLAINT    Bleeding/Bruising       DIAGNOSIS & DISPOSITION     1. Hematoma of left flank, initial encounter    2. Elevated INR      DISPOSITION: Home    At the conclusion of the encounter I discussed the results of all of the tests and the disposition. The questions were answered. The patient or family acknowledged understanding and was agreeable with the care plan.    TOTAL CRITICAL CARE TIME (EXCLUDING PROCEDURES): Not applicable    PROCEDURES:   None    EMERGENCY DEPARTMENT COURSE/MEDICAL DECISION MAKING   5:25 PM I met with the patient to gather history and to perform my initial exam.  We discussed treatment options and the plan for care while in the Emergency Department.      Benny Carrillo is a 89 year old male with relevant past history of ischemic cerebrovascular accident, hypertension, stroke, small bowel obstruction, and urinary obstruction s/p suprapubic catheter who presents to the emergency department for evaluation of left side pain and bruising.   Triage note reviewed:W/c to triage with granddaughter and son.  Pt having bruising and pain noted to L side of body.  Started yesterday.  Denies trauma or fall.  Pt takes blood thinner.     Vital signs:BP (!) 140/72   Pulse 61   Temp 97  F (36.1  C) (Temporal)   Resp 16   Ht 1.676 m (5' 6\")   Wt 52.6 kg (116 lb)   SpO2 92%   BMI 18.72 kg/m    Pertinent physical exam findings:  Cardiac: Irregularly irregular rhythm with normal rate  Abdomen: Left posterior flank ecchymosis approximately 8 cm in diameter.  Tenderness over this area.  No other abdominal tenderness particularly no right lower quadrant tenderness.  Suprapubic catheter in place, no evidence of abdominal wall cellulitis.  Small amount of hematuria noted in urine collection " bag which is chronic for the patient's family.  Diagnostic studies:  Imaging:  CT of the abdomen and pelvis: No evidence of retroperitoneal hematoma or intra-abdominal bleeding.  Wall thickening and dilatation of the distal aspect of the appendix measuring 1 cm.  No.  Periappendiceal inflammation  Lab: Hemoglobin 12.3, INR 4.3  Interventions:None  Medical decision making: CT shows no evidence of intra-abdominal retroperitoneal bleeding.  Ecchymotic area left flank appears superficial based on CT.  Patient had incidental finding of distal enlarged appendix.  Abdominal exam and history do not suggest acute appendicitis.  Discussed case with Dr. Beaver for surgery given the patient's advanced age she does not feel that any further intervention is necessary.  Noted to have elevated INR.  Patient is on 3.5 mg of Coumadin daily and will hold for the next 2 days with follow-up INR on Monday.  Patient will follow up with primary care physician on Monday for repeat INR and for reevaluation.  In the interval time if the patient develops increasing ecchymosis of his flank area, has any significant abdominal pain, grossly bloody urine changed over baseline will return to the emergency department.    ED INTERVENTIONS     Medications   iopamidol (ISOVUE-370) solution 75 mL (75 mLs Intravenous Given 9/10/21 1839)       DISCHARGE MEDICATIONS        Review of your medicines      UNREVIEWED medicines. Ask your doctor about these medicines      Dose / Directions   acetaminophen 500 MG tablet  Commonly known as: TYLENOL  Used for: Pain      Dose: 500-1,000 mg  Take 1-2 tablets (500-1,000 mg) by mouth every 6 hours as needed for mild pain  Quantity: 100 tablet  Refills: 1     Akwa Tears 1.4 % ophthalmic solution  Used for: Environmental allergies  Generic drug: polyvinyl alcohol      [ARTIFICIAL TEARS, POLYVIN ALC, 1.4 % OPHTHALMIC SOLUTION] INSTILL 2 DROPS INTO EACH EYE TWICE DAILY AS NEEDED DRY EYES  Quantity: 15 mL  Refills: 6      albuterol 108 (90 Base) MCG/ACT inhaler  Commonly known as: PROAIR HFA/PROVENTIL HFA/VENTOLIN HFA  Used for: Environmental allergies      [ALBUTEROL (PROAIR HFA;PROVENTIL HFA;VENTOLIN HFA) 90 MCG/ACTUATION INHALER] INHALE 2 PUFFS BY MOUTH EVERY 6 HOURS AS NEEDED FOR WHEEZING  Quantity: 1 each  Refills: 3     amLODIPine 5 MG tablet  Commonly known as: NORVASC  Used for: Benign essential hypertension      Dose: 5 mg  Take 1 tablet (5 mg) by mouth daily  Quantity: 90 tablet  Refills: 1     digoxin 125 MCG tablet  Commonly known as: LANOXIN  Used for: Chronic atrial fibrillation (H)      Dose: 125 mcg  Take 1 tablet (125 mcg) by mouth daily  Quantity: 90 tablet  Refills: 1     famotidine 20 MG tablet  Commonly known as: PEPCID  Used for: Gastroesophageal reflux disease without esophagitis      Take 1 tablet by mouth once daily  Quantity: 90 tablet  Refills: 1     GNP Artificial Tears 5-6 MG/ML Soln  Used for: Dry eyes  Generic drug: Polyvinyl Alcohol-Povidone      [POLYVINYL ALCOHOL-POVIDONE (ARTIFICIAL TEARS,PVALCH-POVID,) 0.5-0.6 % DROP] 1-2 gtts two times a day to each eye prn  Quantity: 15 mL  Refills: 3     GNP Therapeutic-M Tabs  Used for: Poor nutrition      Dose: 1 tablet  Take 1 tablet by mouth daily  Quantity: 90 tablet  Refills: 1     guaiFENesin 100 MG/5ML Syrp  Commonly known as: ROBITUSSIN  Used for: Cough      [GUAIFENESIN (ROBAFEN) 100 MG/5 ML SYRUP] TAKE 10 ML BY MOUTH  THREE TIMES DAILY AS NEEDED FOR COUGH  Quantity: 240 mL  Refills: 0     hydrocortisone 2.5 % cream  Used for: Hemorrhoid      [HYDROCORTISONE 2.5 % CREAM] APPLY  CREAM TO AFFECTED AREA ONCE TO TWICE DAILY AS NEEDED  Quantity: 28 g  Refills: 2     loratadine 10 MG tablet  Commonly known as: CLARITIN  Used for: Environmental allergies      Dose: 10 mg  Take 1 tablet (10 mg) by mouth daily  Quantity: 90 tablet  Refills: 1     MEDICATION CANNOT BE REORDERED - PLEASE MANUALLY REORDER AND DISCONTINUE THE OLD ORDER  Used for: Suprapubic  catheter (H)      [CATHETER 14 FR MISC] Suprapubic catheter. 14 French.  Quantity: 1 each  Refills: 0     pantoprazole 40 MG EC tablet  Commonly known as: PROTONIX  Used for: Gastroesophageal reflux disease without esophagitis      Dose: 40 mg  [PANTOPRAZOLE (PROTONIX) 40 MG TABLET] Take 1 tablet (40 mg total) by mouth daily.  Quantity: 90 tablet  Refills: 1     Protein Powd  Used for: Moderate protein-calorie malnutrition (H)      [PROTEIN (BOOST HIGH PROTEIN) POWD] 1 can po daily  Quantity: 30 Can  Refills: 6     * warfarin ANTICOAGULANT 1 MG tablet  Commonly known as: COUMADIN  Used for: Chronic atrial fibrillation (H)      Take as directed. If you are unsure how to take this medication, talk to your nurse or doctor.  Original instructions: Take 1 to 3 tabs by mouth daily as directed Adjust dose based on INR.  Quantity: 150 tablet  Refills: 1     * warfarin ANTICOAGULANT 2.5 MG tablet  Commonly known as: COUMADIN  Used for: Chronic atrial fibrillation (H)      Dose: 2.5 mg  Take as directed. If you are unsure how to take this medication, talk to your nurse or doctor.  Original instructions: Take 1 tablet (2.5 mg) by mouth daily Adjust based on inr  Quantity: 90 tablet  Refills: 1         * This list has 2 medication(s) that are the same as other medications prescribed for you. Read the directions carefully, and ask your doctor or other care provider to review them with you.            CONTINUE these medicines which have NOT CHANGED      Dose / Directions   melatonin 5 MG tablet  Used for: Insomnia, unspecified type      Dose: 5 mg  Take 1 tablet (5 mg) by mouth nightly as needed for sleep  Quantity: 30 tablet  Refills: 3              INFORMATION SOURCE AND LIMITATIONS    History/Exam limitations: none  Patient information was obtained from: granddaughter  Use of : Yes (granddaughter) - Language [facility]    HISTORY OF PRESENT ILLNESS   Benny Carrillo male with a relevant past history of  ischemic  cerebrovascular accident, hypertension, stroke, small bowel obstruction, and urinary obstruction, who presents to this ED by walk in for evaluation of left side pain and bruising.    Yesterday (9/9/21), patient started to have left side pain and bruising but had not sustained a fall or injury to that side.     Patient has as suprapubic catheter that has been there for 7 years and has been draining normally. There is blood tinged urine in the leg bag currently. Around a month ago, his granddaughter noticed there has been some blood in his leg bag. They went to [facility] and were told that the blood was there because the patient is on blood thinners which would make the patient bleed slightly due to irritation from the catheter.     Patient denies any other symptoms at this time.     REVIEW OF SYSTEMS:   Constitutional: Negative for  fever.   HENT: Negative for URI symptoms or sore throat.    Eyes: Negative for visual disturbance.   Cardiac: Negative for  chest pain,palpitations, near syncope or syncope  Respiratory: Negative for cough and shortness of breath.    Gastrointestinal: Negative for abdominal pain, nausea, vomiting, constipation, diarrhea, rectal bleeding or melena.  Genitourinary: Positive for hematuria. Negative for dysuria and flank pain.   Musculoskeletal: Positive for left side pain and bruising.  Negative for back pain.   Skin: Negative for  rash  Neurological: Negative for dizziness, headache, syncope, speech difficulty, unilateral weakness or imbalance with walking.   Hematological: Negative for adenopathy. Does not bruise/bleed easily.   Psychiatric/Behavioral: Negative for confusion.       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     Constipation 1/13/2015     Elevated digoxin level 7/24/2015     GERD (gastroesophageal reflux disease)      Hypertension      Lactic acidosis      Lower urinary tract infectious disease      Muscle weakness      SBO (small bowel obstruction) (H)      Small bowel obstruction (H) 7/23/2015     Stroke (H)      Underweight due to inadequate caloric intake 7/28/2015     Urinary obstruction      Patient Active Problem List   Diagnosis     Chronic atrial fibrillation     Muscle Weakness Generalized     Underweight due to inadequate caloric intake     Chest pain     Gastroesophageal reflux disease without esophagitis     Suprapubic catheter (H)     Leukocytosis, unspecified type     Heartburn     Acute cerebral infarction (H)     Accelerated hypertension     Chronic retention of urine     Acute nonintractable headache, unspecified headache type     History of ischemic cerebrovascular accident (CVA) with residual deficit     Acute pyelonephritis     Urinary tract infection associated with cystostomy catheter, initial encounter (H)     Moderate malnutrition (H)     Sepsis secondary to UTI (H)     Acute pulmonary edema (H)     Renal infarct (H)     Small bowel obstruction (H)     SBO (small bowel obstruction) (H)     Aspiration pneumonia of both lower lobes due to vomit (H)     Acute respiratory failure with hypoxia (H)     History of CVA (cerebrovascular accident)     Permanent atrial fibrillation (H)     Incontinence of feces, unspecified fecal incontinence type     Atrial fibrillation (H)     Benign essential hypertension     Chronic obstructive pulmonary disease, unspecified COPD type (H)     Urinary retention     Past Surgical History:   Procedure Laterality Date     CHOLECYSTECTOMY       CYSTOTOMY       EYE SURGERY Bilateral     cataract removal     PICC  5/28/2019          Social Histrory  Smoking:  Alcohol Use:  Allergies   Allergen Reactions     Morphine Itching and Other (See Comments)     Pt  "received morphine IV on 8/26/18. Pt reported localized itching and discomfort with redness near IV site following administration of morphine.         OUTPATIENT MEDICATIONS     Discharge Medication List as of 9/10/2021  9:29 PM         Vitals:    09/10/21 1547 09/10/21 1557 09/10/21 2017   BP: 128/72  (!) 140/72   Pulse: 56  61   Resp: 18  16   Temp: 97  F (36.1  C)     TempSrc: Temporal     SpO2:  91% 92%   Weight: 52.6 kg (116 lb)     Height: 1.676 m (5' 6\")         Physical Exam   Constitutional: Oriented to person, place, and time. Appears well-developed and well-nourished.   HEENT:    Head: Atraumatic.    Nose: Nose normal.    Mouth/Throat: Oropharynx is clear and moist.    Eyes: EOM are normal. Pupils are equal, round, and reactive to light.   Neck: Normal range of motion. Neck supple.   Cardiovascular: Normal rate, irregularly irregular rhythm and normal heart sounds.    Pulmonary/Chest: Normal effort  and breath sounds normal.   Abdominal: Soft. Bowel sounds are normal. Suprapubic catheter.   Musculoskeletal: Normal range of motion. Ecchymotic area on left flank 8 cm in diameter  Neurological: Alert and oriented to person, place, and time. Normal strength.No sensory deficit. No cranial nerve deficit . Coordination serial fingers, finger-to-nose normal and gait normal.   Skin: Skin is warm and dry.   Psychiatric: Normal mood and affect. Behavior is normal. Thought content normal.       DIAGNOSTICS    LABORATORY FINDINGS (REVIEWED AND INTERPRETED):  Labs Ordered and Resulted from Time of ED Arrival Up to the Time of Departure from the ED   INR - Abnormal; Notable for the following components:       Result Value    INR 4.30 (*)     All other components within normal limits   COMPREHENSIVE METABOLIC PANEL - Abnormal; Notable for the following components:    Potassium 5.5 (*)     Glucose 129 (*)     Protein Total 8.1 (*)     Bilirubin Total 1.5 (*)     GFR Estimate 52 (*)     All other components within normal " limits   CBC WITH PLATELETS AND DIFFERENTIAL - Abnormal; Notable for the following components:    RBC Count 3.76 (*)     Hemoglobin 12.3 (*)     Hematocrit 39.7 (*)      (*)     MCHC 31.0 (*)     Absolute Immature Granulocytes 0.1 (*)     All other components within normal limits   CBC WITH PLATELETS & DIFFERENTIAL    Narrative:     The following orders were created for panel order CBC with platelets differential.  Procedure                               Abnormality         Status                     ---------                               -----------         ------                     CBC with platelets and d...[[id number]]  Abnormal            Final result                 Please view results for these tests on the individual orders.         IMAGING (REVIEWED AND INTERPRETED):  CT Abdomen Pelvis w Contrast   Final Result   IMPRESSION:    1.  No evidence for retroperitoneal hematoma or psoas muscular hematoma. No evidence for significant free fluid in the abdomen or pelvis.      2.  Interval development of mild wall thickening and dilatation of the distal aspect of the appendix measuring upwards of 1 cm. Findings may represent early acute appendicitis. Another consideration would be wall thickening related to a developing lesion    such as mucocele. Recommend correlation with patient's clinical history and continued follow-up evaluation.      3.  Marked prostate enlargement.      4.  Colonic diverticulosis without diverticulitis.              ECG (REVIEWED AND INTERPRETED):   None    I, Giovanna Larose,  am serving as a scribe to document services personally performed by Elijah Johnson D.O., based on my observation and the provider s statements to me.    Elijah BRONSON D.O., attest that Giovanna Larose is acting in a scribe capacity, has observed my performance of the services and has documented them in accordance with my direction.    Elijah Johnson D.O.  EMERGENCY MEDICINE   09/10/21  [facility]  [facility] EMERGENCY DEPARTMENT  [address] [place] [id number]  [phone]  Dept: [phone]      Elijah Johnson DO  09/11/21 0302

## 2021-09-11 NOTE — ED NOTES
Pt brought by family for evaluation of large area of ecchymosis to left flank noted since yesterday. Pt also has small area of ecchymosis just above umbilical region. Pt/family deny trauma/falls and otherwise denies abd pain. Pt is generally weak and non-ambulatory at home, requires family assist to transfer and with cares at baseline.

## 2021-09-11 NOTE — ED NOTES
L side of abd and mid abd bruising noted. Suprapubic catheter in placed. Granddaughter at bedside. VSS. Pt resting comfortably in bed.

## 2021-09-11 NOTE — DISCHARGE INSTRUCTIONS
Discontinue warfarin for 2 days and have your INR rechecked on Monday and discussed with Dr. Castillo reinitiation of warfarin.  On your CT scan there was some enlargement of your appendix which appears to be an incidental finding but follow-up with Dr. Castillo next week to review this.  If there are progressive symptoms including development of abdominal pain, increased bruising, difficulty breathing return to the emergency department.

## 2021-09-13 ENCOUNTER — ANTICOAGULATION THERAPY VISIT (OUTPATIENT)
Dept: FAMILY MEDICINE | Facility: CLINIC | Age: 86
End: 2021-09-13

## 2021-09-13 ENCOUNTER — PATIENT OUTREACH (OUTPATIENT)
Dept: GERIATRIC MEDICINE | Facility: CLINIC | Age: 86
End: 2021-09-13

## 2021-09-13 DIAGNOSIS — N28.0 RENAL INFARCT (H): ICD-10-CM

## 2021-09-13 DIAGNOSIS — I48.91 ATRIAL FIBRILLATION (H): Primary | ICD-10-CM

## 2021-09-13 LAB — INR (EXTERNAL): 2.9 (ref 0.9–1.1)

## 2021-09-13 NOTE — PROGRESS NOTES
ANTICOAGULATION MANAGEMENT     Benny Carrillo 89 year old male is on warfarin with therapeutic INR result. (Goal INR 2.0-3.0)    Recent labs: (last 7 days)     09/13/21  1017   INR 2.9*       ASSESSMENT     Source(s): Home Care/Facility Nurse       Warfarin doses taken: Warfarin taken as instructed    Diet: No new diet changes identified    New illness, injury, or hospitalization: Yes. ED 09/10 for L flank hematoma. Pt was discharged home with instructions to hold warfarin and watch for additional bruising.    Medication/supplement changes: None noted    Signs or symptoms of bleeding or clotting: Yes: hematoma as noted above.Family/HC RN states the hematoma has not gotten bigger since the ED.    Previous INR: Supratherapeutic    Additional findings: None     PLAN     Recommended plan for no diet, medication or health factor changes affecting INR     Dosing Instructions:  Decrease your warfarin dose (12% change) with next INR in 4 days       Decrease of 12% based on INR of 4.3 on 09/10.    Summary  As of 9/13/2021    Full warfarin instructions:  2.5 mg every Mon, Fri; 3.5 mg all other days   Next INR check:  9/17/2021             Telephone call with home care nurse Nichole who verbalizes understanding and agrees to plan    Orders given to  Homecare nurse/facility to recheck    Education provided: Goal range and significance of current result, Monitoring for bleeding signs and symptoms and When to seek medical attention/emergency care    Plan made per [facility] anticoagulation protocol    Lance Hawthorne RN  Anticoagulation Clinic  9/13/2021    _______________________________________________________________________     Anticoagulation Episode Summary     Current INR goal:  2.0-3.0   TTR:  57.0 % (1 y)   Target end date:  Indefinite   Send INR reminders to:  CARLOS CERVANTES    Indications    Atrial fibrillation (H) [I48.91]  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

## 2021-09-13 NOTE — PROGRESS NOTES
[facility] Care Coordination Contact  CC received notification of Emergency Room visit.  ER visit occurred on 9/10/21 at [facility] with Dx of hematoma of left flank.    CC contacted family granddaughter and reviewed discharge summary.  Member has a follow-up appointment with PCP: Yes: scheduled on 9/15  Member has had a change in condition: No  New referrals placed: No  Home Visit Needed: No  Care plan reviewed and updated.  PCP notified of ED visit via EMR.    KESHA Sun  [facility]  [phone]

## 2021-09-15 ENCOUNTER — OFFICE VISIT (OUTPATIENT)
Dept: FAMILY MEDICINE | Facility: CLINIC | Age: 86
End: 2021-09-15
Payer: COMMERCIAL

## 2021-09-15 VITALS — RESPIRATION RATE: 18 BRPM | SYSTOLIC BLOOD PRESSURE: 118 MMHG | DIASTOLIC BLOOD PRESSURE: 80 MMHG | HEART RATE: 60 BPM

## 2021-09-15 DIAGNOSIS — I48.21 PERMANENT ATRIAL FIBRILLATION (H): ICD-10-CM

## 2021-09-15 DIAGNOSIS — E44.0 MODERATE MALNUTRITION (H): ICD-10-CM

## 2021-09-15 DIAGNOSIS — T14.8XXA BRUISE: Primary | ICD-10-CM

## 2021-09-15 DIAGNOSIS — E87.5 HYPERPOTASSEMIA: ICD-10-CM

## 2021-09-15 DIAGNOSIS — Z93.59 SUPRAPUBIC CATHETER (H): ICD-10-CM

## 2021-09-15 LAB
ANION GAP SERPL CALCULATED.3IONS-SCNC: 10 MMOL/L (ref 5–18)
BUN SERPL-MCNC: 10 MG/DL (ref 8–28)
CALCIUM SERPL-MCNC: 8.8 MG/DL (ref 8.5–10.5)
CHLORIDE BLD-SCNC: 103 MMOL/L (ref 98–107)
CO2 SERPL-SCNC: 26 MMOL/L (ref 22–31)
CREAT SERPL-MCNC: 0.97 MG/DL (ref 0.7–1.3)
DIGOXIN SERPL-MCNC: 0.8 UG/L
ERYTHROCYTE [DISTWIDTH] IN BLOOD BY AUTOMATED COUNT: 14.4 % (ref 10–15)
GFR SERPL CREATININE-BSD FRML MDRD: 64 ML/MIN/1.73M2
GLUCOSE BLD-MCNC: 112 MG/DL (ref 70–125)
HCT VFR BLD AUTO: 38.8 % (ref 40–53)
HGB BLD-MCNC: 11.9 G/DL (ref 13.3–17.7)
MCH RBC QN AUTO: 32.5 PG (ref 26.5–33)
MCHC RBC AUTO-ENTMCNC: 30.7 G/DL (ref 31.5–36.5)
MCV RBC AUTO: 106 FL (ref 78–100)
PLATELET # BLD AUTO: 257 10E3/UL (ref 150–450)
POTASSIUM BLD-SCNC: 4.7 MMOL/L (ref 3.5–5)
RBC # BLD AUTO: 3.66 10E6/UL (ref 4.4–5.9)
SODIUM SERPL-SCNC: 139 MMOL/L (ref 136–145)
WBC # BLD AUTO: 8.9 10E3/UL (ref 4–11)

## 2021-09-15 PROCEDURE — 80048 BASIC METABOLIC PNL TOTAL CA: CPT | Performed by: FAMILY MEDICINE

## 2021-09-15 PROCEDURE — 36415 COLL VENOUS BLD VENIPUNCTURE: CPT | Performed by: FAMILY MEDICINE

## 2021-09-15 PROCEDURE — 80162 ASSAY OF DIGOXIN TOTAL: CPT | Performed by: FAMILY MEDICINE

## 2021-09-15 PROCEDURE — 99214 OFFICE O/P EST MOD 30 MIN: CPT | Performed by: FAMILY MEDICINE

## 2021-09-15 PROCEDURE — 85027 COMPLETE CBC AUTOMATED: CPT | Performed by: FAMILY MEDICINE

## 2021-09-15 PROCEDURE — 85610 PROTHROMBIN TIME: CPT | Performed by: FAMILY MEDICINE

## 2021-09-15 NOTE — PROGRESS NOTES
OFFICE VISIT - FAMILY MEDICINE     ASSESSMENT AND PLAN       ICD-10-CM    1. Bruise  T14.8XXA CBC with platelets     CBC with platelets   2. 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 Suprapubic catheter (H)  Z93.59    4. Moderate malnutrition (H)  E44.0    5. Permanent atrial fibrillation (H)  I48.21 Basic metabolic panel  (Ca, Cl, CO2, Creat, Gluc, K, Na, BUN)     INR     Basic metabolic panel  (Ca, Cl, CO2, Creat, Gluc, K, Na, BUN)     INR   Left flank bruits, seems to be slowly improving, INR is therapeutic today, will have anticoagulation clinic continue to manage and adjust accordingly.  Electrolyte abnormality will recheck today and continue to monitor, we will also get a digoxin level.  Last with chronic indwelling catheter, he did have some blood in the urine initially but urine appears clear today with no sign of infection.  Atrial fibrillation continue with current management, seems to be tolerating digoxin well we will check a level.  CT abdomen and pelvis  done in the ER did mention possible appendicitis but again patient denies any right flank lower abdominal pain, no fever no chills.  Symptoms of appendicitis discussed with family member and to seek prompt medical attention if he develop any.  CHIEF COMPLAINT   Hospital F/U       [facility]   Benny Carrillo is a 89 year old male.  No Patient Care Coordination Note on file.    Patient is here today with wife and granddaughter, granddaughter is giving most of this history, apparently there was a change on the Coumadin nurse, his INR went up to 4 and above, possibly from lack of coordination, he did develop a bruise on the left side of abdomen area, he also has some blood in the urine, he was brought to the ER, ER note, treatment and imaging were reviewed, there was a question of possible appendicitis, patient did  have a normal white cell count, and today denies any right lower quadrant pain.  No fever no chills.  Overall seems to be doing fine for his age, few days ago he did have blood in the catheter but seems to be better.  INR appears therapeutic today.  His urine bag appears clear today.  ER labs show some mild electrolyte abnormalities but again patient denies any chest pain, muscle aches or fever.  He is on digoxin for A. fib.    Review of Systems As per HPI, otherwise negative.    OBJECTIVE   /80 (BP Location: Left arm, Patient Position: Sitting, Cuff Size: Adult Regular)   Pulse 60   Resp 18   Physical Exam  Constitutional:       Appearance: Normal appearance.   HENT:      Head: Normocephalic and atraumatic.   Cardiovascular:      Rate and Rhythm: Normal rate and regular rhythm.   Pulmonary:      Effort: Pulmonary effort is normal.      Breath sounds: Normal breath sounds.   Abdominal:      General: There is no distension.      Palpations: There is no mass.      Tenderness: There is no abdominal tenderness.   Musculoskeletal:      Cervical back: Normal range of motion and neck supple.   Neurological:      General: No focal deficit present.      Mental Status: He is alert and oriented to person, place, and time.   Psychiatric:         Behavior: Behavior normal.         Thought Content: Thought content normal.         Judgment: Judgment normal.         PFSH     Family History   Problem Relation Age of Onset     No Known Problems Mother      No Known Problems Father      Diabetes Other         spouse     Social History     Socioeconomic History     Marital status:      Spouse name: Not on file     Number of children: Not on file     Years of education: Not on file     Highest education level: Not on file   Occupational History     Not on file   Tobacco Use     Smoking status: Never Smoker     Smokeless tobacco: Never Used   Substance and Sexual Activity     Alcohol use: No     Drug use: No     Sexual  activity: Not on file   Other Topics Concern     Not on file   Social History Narrative     Not on file     Social Determinants of Health     Financial Resource Strain:      Difficulty of Paying Living Expenses:    Food Insecurity:      Worried About Running Out of Food in the Last Year:      Ran Out of Food in the Last Year:    Transportation Needs:      Lack of Transportation (Medical):      Lack of Transportation (Non-Medical):    Physical Activity:      Days of Exercise per Week:      Minutes of Exercise per Session:    Stress:      Feeling of Stress :    Social Connections:      Frequency of Communication with Friends and Family:      Frequency of Social Gatherings with Friends and Family:      Attends Islam Services:      Active Member of Clubs or Organizations:      Attends Club or Organization Meetings:      Marital Status:    Intimate Partner Violence:      Fear of Current or Ex-Partner:      Emotionally Abused:      Physically Abused:      Sexually Abused:        PMS   [unfilled]  Past Surgical History:   Procedure Laterality Date     CHOLECYSTECTOMY       CYSTOTOMY       EYE SURGERY Bilateral     cataract removal     [facility]  5/28/2019            RESULTS/CONSULTS (Lab/Rad)     Recent Results (from the past 168 hour(s))   INR   Result Value Ref Range    INR 4.30 (H) 0.90 - 1.15   Comprehensive metabolic panel   Result Value Ref Range    Sodium 138 136 - 145 mmol/L    Potassium 5.5 (H) 3.5 - 5.0 mmol/L    Chloride 100 98 - 107 mmol/L    Carbon Dioxide (CO2) 28 22 - 31 mmol/L    Anion Gap 10 5 - 18 mmol/L    Urea Nitrogen 12 8 - 28 mg/dL    Creatinine 1.15 0.70 - 1.30 mg/dL    Calcium 9.1 8.5 - 10.5 mg/dL    Glucose 129 (H) 70 - 125 mg/dL    Alkaline Phosphatase 84 45 - 120 U/L    AST 20 0 - 40 U/L    ALT 10 0 - 45 U/L    Protein Total 8.1 (H) 6.0 - 8.0 g/dL    Albumin 3.7 3.5 - 5.0 g/dL    Bilirubin Total 1.5 (H) 0.0 - 1.0 mg/dL    GFR Estimate 52 (L) >60 mL/min/1.73m2   CBC with platelets and  differential   Result Value Ref Range    WBC Count 10.2 4.0 - 11.0 10e3/uL    RBC Count 3.76 (L) 4.40 - 5.90 10e6/uL    Hemoglobin 12.3 (L) 13.3 - 17.7 g/dL    Hematocrit 39.7 (L) 40.0 - 53.0 %     (H) 78 - 100 fL    MCH 32.7 26.5 - 33.0 pg    MCHC 31.0 (L) 31.5 - 36.5 g/dL    RDW 13.8 10.0 - 15.0 %    Platelet Count 240 150 - 450 10e3/uL    % Neutrophils 64 %    % Lymphocytes 18 %    % Monocytes 12 %    % Eosinophils 4 %    % Basophils 1 %    % Immature Granulocytes 1 %    NRBCs per 100 WBC 0 <1 /100    Absolute Neutrophils 6.7 1.6 - 8.3 10e3/uL    Absolute Lymphocytes 1.8 0.8 - 5.3 10e3/uL    Absolute Monocytes 1.3 0.0 - 1.3 10e3/uL    Absolute Eosinophils 0.4 0.0 - 0.7 10e3/uL    Absolute Basophils 0.1 0.0 - 0.2 10e3/uL    Absolute Immature Granulocytes 0.1 (H) <=0.0 10e3/uL    Absolute NRBCs 0.0 10e3/uL   INR (External Result)   Result Value Ref Range    INR (External) 2.9 (A) 0.9 - 1.1   Basic metabolic panel  (Ca, Cl, CO2, Creat, Gluc, K, Na, BUN)   Result Value Ref Range    Sodium 139 136 - 145 mmol/L    Potassium 4.7 3.5 - 5.0 mmol/L    Chloride 103 98 - 107 mmol/L    Carbon Dioxide (CO2) 26 22 - 31 mmol/L    Anion Gap 10 5 - 18 mmol/L    Urea Nitrogen 10 8 - 28 mg/dL    Creatinine 0.97 0.70 - 1.30 mg/dL    Calcium 8.8 8.5 - 10.5 mg/dL    Glucose 112 70 - 125 mg/dL    GFR Estimate 64 >60 mL/min/1.73m2   CBC with platelets   Result Value Ref Range    WBC Count 8.9 4.0 - 11.0 10e3/uL    RBC Count 3.66 (L) 4.40 - 5.90 10e6/uL    Hemoglobin 11.9 (L) 13.3 - 17.7 g/dL    Hematocrit 38.8 (L) 40.0 - 53.0 %     (H) 78 - 100 fL    MCH 32.5 26.5 - 33.0 pg    MCHC 30.7 (L) 31.5 - 36.5 g/dL    RDW 14.4 10.0 - 15.0 %    Platelet Count 257 150 - 450 10e3/uL   Digoxin level   Result Value Ref Range    Digoxin 0.8   ug/L   INR   Result Value Ref Range    INR 2.14 (H) 0.85 - 1.15     CT Abdomen Pelvis w Contrast  Narrative: EXAM: CT ABDOMEN PELVIS W CONTRAST  LOCATION: [facility]  HOSPITAL  DATE/TIME: 9/10/2021 6:38 PM    INDICATION: Left flank pain with hematoma. Evaluate for retroperitoneal hematoma.  COMPARISON: 07/11/2021, 12/02/2020, 12/08/2019.  TECHNIQUE: CT scan of the abdomen and pelvis was performed following injection of IV contrast. Multiplanar reformats were obtained. Dose reduction techniques were used.  CONTRAST: Isovue 370, 75 mL.    FINDINGS:   LOWER CHEST: Unchanged 4 mm nodule lateral left lower lobe, no further follow-up. Minimal linear atelectasis. Cardiac enlargement.    HEPATOBILIARY: Unchanged hepatic cyst, no further follow-up. No calcified gallstones or biliary dilatation.    PANCREAS: Normal.    SPLEEN: Normal size spleen. No splenic laceration or perisplenic hematoma.    ADRENAL GLANDS: Adrenal glands negative. No adrenal hemorrhage.    KIDNEYS/BLADDER: Symmetric contrast enhancement to both kidneys. No renal laceration or perinephric hematoma. Bilateral renal cysts, no further follow-up. No urinary collecting system dilatation or calculi. Suprapubic catheter completely decompressing   the bladder.    BOWEL: Colonic diverticulosis. Interval development of mild dilatation of the distal aspect of the appendix at 1 cm (series 3, image 90). There is a suggestion of some mild wall thickening allowing for the significant motion artifact at this location. No   evidence for overt surrounding inflammatory change. This may be due to very early acute appendicitis, although given the focal wall thickening, an underlying lesion cannot be completely excluded. Proximal appendix normal in caliber. No appendicoliths.   No small bowel dilatation or inflammatory change.    LYMPH NODES: No lymphadenopathy.    VASCULATURE: Normal caliber aorta. Mild ectasia of the distal right common iliac artery. Atherosclerotic vascular calcification.    PELVIC ORGANS: Marked prostate enlargement. No free fluid.    MUSCULOSKELETAL: No evidence for retroperitoneal hematoma. No evidence for psoas  muscular hematoma or adjacent fluid in the abdomen or pelvis. Degenerative changes in the spine. No evidence for overt compression fracture.  Impression: IMPRESSION:   1.  No evidence for retroperitoneal hematoma or psoas muscular hematoma. No evidence for significant free fluid in the abdomen or pelvis.    2.  Interval development of mild wall thickening and dilatation of the distal aspect of the appendix measuring upwards of 1 cm. Findings may represent early acute appendicitis. Another consideration would be wall thickening related to a developing lesion   such as mucocele. Recommend correlation with patient's clinical history and continued follow-up evaluation.    3.  Marked prostate enlargement.    4.  Colonic diverticulosis without diverticulitis.     [unfilled]    HEALTH MAINTENANCE / SCREENING   [unfilled], [unfilled],[unfilled]  Immunization History   Administered Date(s) Administered     Flu, Unspecified 10/10/2010, 09/15/2011, 09/17/2012, 09/19/2013     HepB-Adult 08/26/2009, 09/29/2009     Influenza (H1N1) 12/06/2009     Influenza (High Dose) 3 valent vaccine 09/29/2015, 09/22/2016, 10/02/2017, 09/09/2019     Influenza Vaccine IM > 6 months Valent IIV4 (Alfuria,Fluzone) 09/25/2014     Pneumo Conj 13-V (2010&after) 07/22/2015     Pneumococcal 23 valent 08/26/2009     TD (ADULT, 7+) 09/09/2019     Td (Adult), Adsorbed 04/03/2009, 08/26/2009     Varicella 08/26/2009     Health Maintenance   Topic     SPIROMETRY      ANNUAL REVIEW OF  ORDERS      ADVANCE CARE PLANNING      COPD ACTION PLAN      COVID-19 Vaccine (1)     ZOSTER IMMUNIZATION (1 of 2)     MEDICARE ANNUAL WELLNESS VISIT      INFLUENZA VACCINE (1)     FALL RISK ASSESSMENT      DTAP/TDAP/TD IMMUNIZATION (2 - Td or Tdap)     PHQ-2      Pneumococcal Vaccine: 65+ Years      IPV IMMUNIZATION      MENINGITIS IMMUNIZATION      HEPATITIS B IMMUNIZATION      Review of prior external note(s) from - Outside records from ER  Review of  external notes as documented elsewhere in note  27 minutes spent on the date of the encounter doing chart review, review of outside records, review of test results, interpretation of tests, patient visit, documentation and discussion with family wife,Airam         Arlette Yang MD  [facility]   This transcription uses voice recognition software, which may contain typographical errors.

## 2021-09-16 ENCOUNTER — ANTICOAGULATION THERAPY VISIT (OUTPATIENT)
Dept: FAMILY MEDICINE | Facility: CLINIC | Age: 86
End: 2021-09-16

## 2021-09-16 DIAGNOSIS — N28.0 RENAL INFARCT (H): ICD-10-CM

## 2021-09-16 DIAGNOSIS — I48.91 ATRIAL FIBRILLATION (H): Primary | ICD-10-CM

## 2021-09-16 LAB — INR PPP: 2.14 (ref 0.85–1.15)

## 2021-09-16 NOTE — PROGRESS NOTES
INR was 2.14 in clinic yesterday. INR was 2.9 on 09/13 so it is trending down somewhat quickly. Called home care RN Neelima who confirmed she will be checking INR tomorrow. Will give new dosing based on tomorrow's INR.

## 2021-09-17 ENCOUNTER — ANTICOAGULATION THERAPY VISIT (OUTPATIENT)
Dept: FAMILY MEDICINE | Facility: CLINIC | Age: 86
End: 2021-09-17

## 2021-09-17 DIAGNOSIS — N28.0 RENAL INFARCT (H): ICD-10-CM

## 2021-09-17 DIAGNOSIS — I48.91 ATRIAL FIBRILLATION (H): Primary | ICD-10-CM

## 2021-09-17 LAB — INR (EXTERNAL): 2.9 (ref 0.9–1.1)

## 2021-09-17 NOTE — PROGRESS NOTES
ANTICOAGULATION MANAGEMENT     Benny Carrillo 89 year old male is on warfarin with therapeutic INR result. (Goal INR 2.0-3.0)    Recent labs: (last 7 days)     09/17/21  1033   INR 2.9*       ASSESSMENT     Source(s): Home Care/Facility Nurse       Warfarin doses taken: Warfarin taken as instructed    Diet: No new diet changes identified    New illness, injury, or hospitalization: No    Medication/supplement changes: None noted    Signs or symptoms of bleeding or clotting: Yes: Bruise on flanks is slowly healing. Has not expanded.    Previous INR: Therapeutic last 2(+) visits    Additional findings: None     PLAN     Recommended plan for no diet, medication or health factor changes affecting INR     Dosing Instructions: Continue your current warfarin dose with next INR in 1 week       Summary  As of 9/17/2021    Full warfarin instructions:  2.5 mg every Mon, Fri; 3.5 mg all other days   Next INR check:  9/24/2021             Telephone call with home care nurse Nichole who verbalizes understanding and agrees to plan    Orders given to  Homecare nurse/facility to recheck    Education provided: Goal range and significance of current result    Plan made per [facility] anticoagulation protocol    Lance Hawthorne RN  Anticoagulation Clinic  9/17/2021    _______________________________________________________________________     Anticoagulation Episode Summary     Current INR goal:  2.0-3.0   TTR:  57.0 % (1 y)   Target end date:  Indefinite   Send INR reminders to:  [facility]    Indications    Atrial fibrillation (H) [I48.91]  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

## 2021-09-19 ENCOUNTER — TELEPHONE (OUTPATIENT)
Dept: FAMILY MEDICINE | Facility: CLINIC | Age: 86
End: 2021-09-19

## 2021-09-19 NOTE — TELEPHONE ENCOUNTER
Provider response below relayed to patient. Message understood. Pt has future appointment with Dr. Castillo already    ----- Message from Arlette Yang MD sent at 9/17/2021 11:03 AM CDT -----  Recent lab result did show normalization of potassium level, mild anemia but stable compared to previous result, digoxin level was normal.Follow-up with Dr. Castillo.

## 2021-09-24 ENCOUNTER — ANTICOAGULATION THERAPY VISIT (OUTPATIENT)
Dept: FAMILY MEDICINE | Facility: CLINIC | Age: 86
End: 2021-09-24

## 2021-09-24 DIAGNOSIS — N28.0 RENAL INFARCT (H): Primary | ICD-10-CM

## 2021-09-24 LAB — INR (EXTERNAL): 3.5 (ref 2–3)

## 2021-09-24 NOTE — PROGRESS NOTES
ANTICOAGULATION MANAGEMENT     Benny Carrillo 89 year old male is on warfarin with supratherapeutic INR result. (Goal INR 2.0-3.0)    Recent labs: (last 7 days)     09/24/21  1007   INR 3.5*       ASSESSMENT     Source(s): Chart Review and Home Care/Facility Nurse       Warfarin doses taken: Warfarin taken as instructed    Diet: No new diet changes identified    New illness, injury, or hospitalization: No    Medication/supplement changes: None noted    Signs or symptoms of bleeding or clotting: No    Previous INR: Therapeutic last 2(+) visits    Additional findings: None     PLAN     Recommended plan for no diet, medication or health factor changes affecting INR     Dosing Instructions: Hold dose then Decrease your warfarin dose (8.9% change) with next INR in 10 days       Summary  As of 9/24/2021    Full warfarin instructions:  9/24: Hold; Otherwise 3.5 mg every Sun, Tue, Thu; 2.5 mg all other days   Next INR check:               Telephone call with home care nurse Neelima [phone] who verbalizes understanding and agrees to plan and who agrees to plan and repeated back plan correctly    Orders given to  Homecare nurse/facility to recheck HC nurse requests INR recheck on Mondays    Education provided: Please call back if any changes to your diet, medications or how you've been taking warfarin    Plan made per [facility] anticoagulation protocol    Mónica Sanchez, RN  Anticoagulation Clinic  9/24/2021    _______________________________________________________________________     Anticoagulation Episode Summary     Current INR goal:  2.0-3.0   TTR:  55.4 % (1 y)   Target end date:  Indefinite   Send INR reminders to:  [facility]    Indications    Atrial fibrillation (H) [I48.91]  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

## 2021-09-27 ENCOUNTER — TELEPHONE (OUTPATIENT)
Dept: FAMILY MEDICINE | Facility: CLINIC | Age: 86
End: 2021-09-27

## 2021-09-27 DIAGNOSIS — R05.9 COUGH: ICD-10-CM

## 2021-09-27 NOTE — TELEPHONE ENCOUNTER
Reason for Call:  Other     Detailed comments: [facility] from [facility];ing to get verbal orders for skilled nursing 1 time a week x 9 and 5 prn's    Phone Number Patient can be reached at: Other phone number:  [phone]    Best Time: any    Can we leave a detailed message on this number? YES    Call taken on 9/27/2021 at 9:27 AM by Vicky Hardin

## 2021-09-28 NOTE — TELEPHONE ENCOUNTER
Guaifenesin/ ROBITUSSIN    Routing refill request to provider for review/approval because:  Drug not on the [facility] refill protocol     Last Written Prescription Date:  6/01/2021  Last Fill Quantity: 240 mL,  # refills: 0   Last office visit provider: JUSTIN with Dr. Yang on 9/15/2021     Requested Prescriptions   Pending Prescriptions Disp Refills     guaiFENesin (ROBITUSSIN) 100 MG/5ML SYRP 240 mL 0     Sig: Take 10 mLs by mouth 3 times daily as needed for cough       There is no refill protocol information for this order          Karthik Mayen RN 09/28/21 8:03 AM

## 2021-10-04 ENCOUNTER — ANTICOAGULATION THERAPY VISIT (OUTPATIENT)
Dept: FAMILY MEDICINE | Facility: CLINIC | Age: 86
End: 2021-10-04

## 2021-10-04 DIAGNOSIS — I48.91 ATRIAL FIBRILLATION (H): Primary | ICD-10-CM

## 2021-10-04 DIAGNOSIS — N28.0 RENAL INFARCT (H): ICD-10-CM

## 2021-10-04 LAB — INR (EXTERNAL): 2.4 (ref 0.9–1.1)

## 2021-10-04 NOTE — PROGRESS NOTES
ANTICOAGULATION MANAGEMENT     Benny Carrillo 89 year old male is on warfarin with therapeutic INR result. (Goal INR 2.0-3.0)    Recent labs: (last 7 days)     10/04/21  0955   INR 2.4*       ASSESSMENT     Source(s): Home Care/Facility Nurse       Warfarin doses taken: Warfarin taken as instructed    Diet: No new diet changes identified    New illness, injury, or hospitalization: No    Medication/supplement changes: None noted    Signs or symptoms of bleeding or clotting: No    Previous INR: Supratherapeutic    Additional findings: None     PLAN     Recommended plan for no diet, medication or health factor changes affecting INR     Dosing Instructions: Continue your current warfarin dose with next INR in 2 weeks       Summary  As of 10/4/2021    Full warfarin instructions:  3.5 mg every Sun, Tue, Thu; 2.5 mg all other days   Next INR check:  10/18/2021             Telephone call with home care nurse Neelima who verbalizes understanding and agrees to plan    Orders given to  Homecare nurse/facility to recheck    Education provided: Goal range and significance of current result    Plan made per [facility] anticoagulation protocol    Lance Hawthorne RN  Anticoagulation Clinic  10/4/2021    _______________________________________________________________________     Anticoagulation Episode Summary     Current INR goal:  2.0-3.0   TTR:  56.4 % (1 y)   Target end date:  Indefinite   Send INR reminders to:  [facility] CERVANT[facility]    Indications    Atrial fibrillation (H) [I48.91]  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

## 2021-10-07 LAB
ANION GAP SERPL CALCULATED.3IONS-SCNC: 11 MMOL/L (ref 5–18)
BUN SERPL-MCNC: 9 MG/DL (ref 8–28)
CALCIUM SERPL-MCNC: 9.4 MG/DL (ref 8.5–10.5)
CHLORIDE BLD-SCNC: 101 MMOL/L (ref 98–107)
CO2 SERPL-SCNC: 26 MMOL/L (ref 22–31)
CREAT SERPL-MCNC: 1.17 MG/DL (ref 0.7–1.3)
ERYTHROCYTE [DISTWIDTH] IN BLOOD BY AUTOMATED COUNT: 14.1 % (ref 10–15)
GFR SERPL CREATININE-BSD FRML MDRD: 51 ML/MIN/1.73M2
GLUCOSE BLD-MCNC: 126 MG/DL (ref 70–125)
HCT VFR BLD AUTO: 42.7 % (ref 40–53)
HGB BLD-MCNC: 13.3 G/DL (ref 13.3–17.7)
INR PPP: 2.16 (ref 0.85–1.15)
MCH RBC QN AUTO: 33.1 PG (ref 26.5–33)
MCHC RBC AUTO-ENTMCNC: 31.1 G/DL (ref 31.5–36.5)
MCV RBC AUTO: 106 FL (ref 78–100)
PLATELET # BLD AUTO: 198 10E3/UL (ref 150–450)
POTASSIUM BLD-SCNC: 4.5 MMOL/L (ref 3.5–5)
RBC # BLD AUTO: 4.02 10E6/UL (ref 4.4–5.9)
SODIUM SERPL-SCNC: 138 MMOL/L (ref 136–145)
WBC # BLD AUTO: 10.7 10E3/UL (ref 4–11)

## 2021-10-07 PROCEDURE — 85027 COMPLETE CBC AUTOMATED: CPT | Performed by: EMERGENCY MEDICINE

## 2021-10-07 PROCEDURE — 99283 EMERGENCY DEPT VISIT LOW MDM: CPT

## 2021-10-07 PROCEDURE — 85610 PROTHROMBIN TIME: CPT | Performed by: EMERGENCY MEDICINE

## 2021-10-07 PROCEDURE — 36415 COLL VENOUS BLD VENIPUNCTURE: CPT | Performed by: EMERGENCY MEDICINE

## 2021-10-07 PROCEDURE — 80048 BASIC METABOLIC PNL TOTAL CA: CPT | Performed by: EMERGENCY MEDICINE

## 2021-10-07 ASSESSMENT — MIFFLIN-ST. JEOR: SCORE: 1078.92

## 2021-10-08 ENCOUNTER — DOCUMENTATION ONLY (OUTPATIENT)
Dept: FAMILY MEDICINE | Facility: CLINIC | Age: 86
End: 2021-10-08

## 2021-10-08 ENCOUNTER — HOSPITAL ENCOUNTER (EMERGENCY)
Facility: CLINIC | Age: 86
Discharge: HOME | End: 2021-10-08
Attending: EMERGENCY MEDICINE | Admitting: EMERGENCY MEDICINE
Payer: COMMERCIAL

## 2021-10-08 VITALS
HEIGHT: 66 IN | BODY MASS INDEX: 18.64 KG/M2 | WEIGHT: 116 LBS | RESPIRATION RATE: 20 BRPM | OXYGEN SATURATION: 97 % | SYSTOLIC BLOOD PRESSURE: 137 MMHG | TEMPERATURE: 97.5 F | HEART RATE: 67 BPM | DIASTOLIC BLOOD PRESSURE: 80 MMHG

## 2021-10-08 DIAGNOSIS — R05.9 COUGH: ICD-10-CM

## 2021-10-08 DIAGNOSIS — N48.1 BALANITIS: ICD-10-CM

## 2021-10-08 DIAGNOSIS — T83.510A URINARY TRACT INFECTION ASSOCIATED WITH CYSTOSTOMY CATHETER, INITIAL ENCOUNTER (H): ICD-10-CM

## 2021-10-08 DIAGNOSIS — N39.0 URINARY TRACT INFECTION ASSOCIATED WITH CYSTOSTOMY CATHETER, INITIAL ENCOUNTER (H): ICD-10-CM

## 2021-10-08 LAB
ALBUMIN UR-MCNC: 30 MG/DL
APPEARANCE UR: ABNORMAL
BACTERIA #/AREA URNS HPF: ABNORMAL /HPF
BILIRUB UR QL STRIP: NEGATIVE
COLOR UR AUTO: YELLOW
GLUCOSE UR STRIP-MCNC: NEGATIVE MG/DL
HGB UR QL STRIP: ABNORMAL
KETONES UR STRIP-MCNC: NEGATIVE MG/DL
LEUKOCYTE ESTERASE UR QL STRIP: ABNORMAL
MUCOUS THREADS #/AREA URNS LPF: PRESENT /LPF
NITRATE UR QL: POSITIVE
PH UR STRIP: 7 [PH] (ref 5–7)
RBC URINE: 12 /HPF
SP GR UR STRIP: 1.01 (ref 1–1.03)
SQUAMOUS EPITHELIAL: 1 /HPF
UROBILINOGEN UR STRIP-MCNC: <2 MG/DL
WBC URINE: 4 /HPF

## 2021-10-08 PROCEDURE — 250N000013 HC RX MED GY IP 250 OP 250 PS 637: Performed by: EMERGENCY MEDICINE

## 2021-10-08 PROCEDURE — 81001 URINALYSIS AUTO W/SCOPE: CPT | Performed by: EMERGENCY MEDICINE

## 2021-10-08 PROCEDURE — 87086 URINE CULTURE/COLONY COUNT: CPT | Performed by: EMERGENCY MEDICINE

## 2021-10-08 RX ORDER — CEPHALEXIN 500 MG/1
500 CAPSULE ORAL 3 TIMES DAILY
Qty: 28 CAPSULE | Refills: 0 | Status: SHIPPED | OUTPATIENT
Start: 2021-10-08 | End: 2021-10-15

## 2021-10-08 RX ORDER — CLOTRIMAZOLE 1 %
CREAM (GRAM) TOPICAL 2 TIMES DAILY
Qty: 45 G | Refills: 0 | Status: SHIPPED | OUTPATIENT
Start: 2021-10-08 | End: 2021-10-23

## 2021-10-08 RX ORDER — CLOTRIMAZOLE 1 %
CREAM (GRAM) TOPICAL ONCE
Status: COMPLETED | OUTPATIENT
Start: 2021-10-08 | End: 2021-10-08

## 2021-10-08 RX ORDER — METRONIDAZOLE 7.5 MG/G
LOTION TOPICAL
Qty: 59 ML | Refills: 0 | Status: SHIPPED | OUTPATIENT
Start: 2021-10-08 | End: 2021-12-08

## 2021-10-08 RX ADMIN — CLOTRIMAZOLE: 1 CREAM TOPICAL at 00:37

## 2021-10-08 NOTE — PROGRESS NOTES
ANTICOAGULATION  MANAGEMENT: Discharge Review    Benny Carrillo chart reviewed for anticoagulation continuity of care    Emergency room visit on 10/8/21 for Balanitis and UTi.    Discharge disposition: Home with Home Care    Results:    Recent labs: (last 7 days)     10/04/21  0955 10/07/21  2321   INR 2.4* 2.16*     Anticoagulation inpatient management:     not applicable     Anticoagulation discharge instructions:     Warfarin dosing: home regimen continued   Bridging: No   INR goal change: No      Medication changes affecting anticoagulation: Yes: Cephalexin for 7 days (10/8-10/15) for UTI    Additional factors affecting anticoagulation: Yes: infection (UTI)    Plan     Recommend to check INR on 10/11/21    Gave new recheck date to alissa Ortez, and she reports they will add to the schedule.    Anticoagulation Calendar updated    Montse Riley RN

## 2021-10-08 NOTE — ED PROVIDER NOTES
EMERGENCY DEPARTMENT ENCOUNTER      NAME: Benny Carrillo  AGE: 89 year old male  YOB: 1921  MRN: [id number]  EVALUATION DATE & TIME: 10/8/2021 12:03 AM    PCP: Alcides Castillo    ED PROVIDER: Sera Farley MD    Chief Complaint   Patient presents with     Abdominal Pain         FINAL IMPRESSION:  1. Balanitis    2. Urinary tract infection associated with cystostomy catheter, initial encounter (H)          ED COURSE & MEDICAL DECISION MAKING:    Pertinent Labs & Imaging studies reviewed. (See chart for details)  89 year old male with history of previous CVA, A. fib on Coumadin, HTN, COPD with BPH and chronic indwelling suprapubic catheter who presents to the Emergency Department for evaluation of pain at the tip of the penis.  No associated urinary symptoms, leakage of urine from the penis.  Catheter is draining appropriately and patient does not have any pain in the abdomen.  Abdomen is benign.  On exam patient has fairly classic balanitis with erythema swelling of the glans of the penis with smegma behind the foreskin when retracted.  There is nothing that looks necrotizing.  I did obtain labs despite his benign abdomen exam because of the language barrier.  CBC, BMP unremarkable.  INR therapeutic at 2.16.  Urine was sent for culture.  Anticipate urinalysis to look infected given the suprapubic catheter but without any urinary symptoms we will hold on antibiotic treatment at this time.    Patient given topical clotrimazole cream, first application here as well as prescription for home.  Also given prescription for topical metronidazole lotion to apply to the penis.  Patient and family counseled about proper hygiene, retracting his foreskin when bathing, etc.  UA + nitrite.  Based on previous culture will treat with keflex.  Discharged home with return precautions.      ED Course as of Oct 08 0158   Fri Oct 08, 2021   0007 INR therapeutic   INR(!): 2.16     12:09 PM I evaluated the patient to gather  "history and perform initial exam. ED course and treatment plan was discussed. I also discussed treatment plan with antibiotics and patient was discharged with extensive anticipatory guidance and given return precautions. Patient was agreeable with the plan.  PPE worn: eye protection, surgical mask, gloves.      At the conclusion of the encounter I discussed the results of all of the tests and the disposition. The questions were answered. The patient or family acknowledged understanding and was agreeable with the care plan.    MEDICATIONS GIVEN IN THE EMERGENCY:  Medications   clotrimazole (LOTRIMIN) 1 % cream ( Topical Given 10/8/21 0037)       NEW PRESCRIPTIONS STARTED AT TODAY'S ER VISIT  Discharge Medication List as of 10/8/2021 12:41 AM      START taking these medications    Details   clotrimazole (LOTRIMIN) 1 % external cream Apply topically 2 times daily for 15 days Apply to the glans penisDisp-45 g, R-0Local Print      metroNIDAZOLE (METROLOTION) 0.75 % external lotion Apply to the glans penis twice daily for seven daysDisp-59 mL, R-0Local Print                =================================================================    HPI    Patient information was obtained from: patient     Use of Intrepreter: Granddaughter is acting as the .    Benny Carrillo is a 89 year old male with pertinent medical history of atrial fibrillation, hypertension, COPD, GERD, asthma, with a catheter in place who presents external genitalia pain.    Patient reports developing pain to the tip of the penis, swelling, and redness. He says that there is only pain to the tip of his penis. He is not circumcised. He does have a suprapubic catheter in place \"for many years since 2012\".     He does have assistance with bathing.  He denies dysuria, abdominal pain or any other associated symptoms at this time.       REVIEW OF SYSTEMS  Constitutional:  Denies fever, chills, weight loss or weakness  Respiratory: No SOB, wheeze or " cough  Cardiovascular:  No CP, palpitations  GI:  Denies abdominal pain, nausea, vomiting, diarrhea  : Denies dysuria, denies hematuria.  Positive pain to the tip of the penis  Skin:  Denies rash, pallor  All other systems negative unless noted in HPI.      PAST MEDICAL HISTORY:  Past Medical History:   Diagnosis Date     Asthma      Atrial fibrillation (H)      Benign prostatic hyperplasia      Bradycardia 7/25/2015     Cataract      Chronic obstructive pulmonary disease, unspecified COPD type (H) 7/21/2020     Citrobacter infection 7/25/2015    UTI      Citrobacter infection 7/25/2015    UTI      Constipation 1/13/2015     Elevated digoxin level 7/24/2015     GERD (gastroesophageal reflux disease)      Hypertension      Lactic acidosis      Lower urinary tract infectious disease      Replacement Utility updated for latest IMO load     Muscle weakness      SBO (small bowel obstruction) (H)      Small bowel obstruction (H) 7/23/2015     Stroke (H)      Underweight due to inadequate caloric intake 7/28/2015    Body mass index is 20.25 kg/(m^2).       Urinary obstruction        PAST SURGICAL HISTORY:  Past Surgical History:   Procedure Laterality Date     CHOLECYSTECTOMY       CYSTOTOMY       EYE SURGERY Bilateral     cataract removal     PICC  5/28/2019            CURRENT MEDICATIONS:    Prior to Admission Medications   Prescriptions Last Dose Informant Patient Reported? Taking?   ARTIFICIAL TEARS, POLYVIN ALC, 1.4 % ophthalmic solution   No No   Sig: [ARTIFICIAL TEARS, POLYVIN ALC, 1.4 % OPHTHALMIC SOLUTION] INSTILL 2 DROPS INTO EACH EYE TWICE DAILY AS NEEDED DRY EYES   MEDICATION CANNOT BE REORDERED - PLEASE MANUALLY REORDER AND DISCONTINUE THE OLD ORDER   No No   Sig: [CATHETER 14 FR MISC] Suprapubic catheter. 14 French.   Multiple Vitamins-Minerals (GNP THERAPEUTIC-M) TABS   No No   Sig: Take 1 tablet by mouth daily   acetaminophen (TYLENOL) 500 MG tablet   No No   Sig: Take 1-2 tablets (500-1,000 mg) by mouth  every 6 hours as needed for mild pain   albuterol (PROAIR HFA;PROVENTIL HFA;VENTOLIN HFA) 90 mcg/actuation inhaler   No No   Sig: [ALBUTEROL (PROAIR HFA;PROVENTIL HFA;VENTOLIN HFA) 90 MCG/ACTUATION INHALER] INHALE 2 PUFFS BY MOUTH EVERY 6 HOURS AS NEEDED FOR WHEEZING   amLODIPine (NORVASC) 5 MG tablet   No No   Sig: Take 1 tablet (5 mg) by mouth daily   digoxin (LANOXIN) 125 MCG tablet   No No   Sig: Take 1 tablet (125 mcg) by mouth daily   famotidine (PEPCID) 20 MG tablet   No No   Sig: Take 1 tablet by mouth once daily   guaiFENesin (ROBITUSSIN) 100 MG/5ML SYRP   No No   Sig: Take 10 mLs by mouth 3 times daily as needed for cough   hydrocortisone 2.5 % cream   No No   Sig: [HYDROCORTISONE 2.5 % CREAM] APPLY  CREAM TO AFFECTED AREA ONCE TO TWICE DAILY AS NEEDED   loratadine (CLARITIN) 10 MG tablet   No No   Sig: Take 1 tablet (10 mg) by mouth daily   melatonin 5 MG tablet   No No   Sig: Take 1 tablet (5 mg) by mouth nightly as needed for sleep   pantoprazole (PROTONIX) 40 MG tablet   No No   Sig: [PANTOPRAZOLE (PROTONIX) 40 MG TABLET] Take 1 tablet (40 mg total) by mouth daily.   polyvinyl alcohol-povidone (ARTIFICIAL TEARS,PVALCH-POVID,) 0.5-0.6 % Drop   No No   Sig: [POLYVINYL ALCOHOL-POVIDONE (ARTIFICIAL TEARS,PVALCH-POVID,) 0.5-0.6 % DROP] 1-2 gtts two times a day to each eye prn   protein (BOOST HIGH PROTEIN) Powd   No No   Sig: [PROTEIN (BOOST HIGH PROTEIN) POWD] 1 can po daily   warfarin ANTICOAGULANT (COUMADIN) 1 MG tablet   No No   Sig: Take 1 to 3 tabs by mouth daily as directed Adjust dose based on INR.   warfarin ANTICOAGULANT (COUMADIN) 2.5 MG tablet   No No   Sig: Take 1 tablet (2.5 mg) by mouth daily Adjust based on inr      Facility-Administered Medications: None       ALLERGIES:  Allergies   Allergen Reactions     Morphine Itching and Other (See Comments)     Pt received morphine IV on 8/26/18. Pt reported localized itching and discomfort with redness near IV site following administration of  "morphine.       FAMILY HISTORY:  Family History   Problem Relation Age of Onset     No Known Problems Mother      No Known Problems Father      Diabetes Other         spouse       SOCIAL HISTORY:  Social History     Tobacco Use     Smoking status: Never Smoker     Smokeless tobacco: Never Used   Substance Use Topics     Alcohol use: No     Drug use: No        VITALS:  Patient Vitals for the past 24 hrs:   BP Temp Temp src Pulse Resp SpO2 Height Weight   10/08/21 0024 137/80 97.5  F (36.4  C) Oral 67 20 97 % -- --   10/07/21 2054 -- -- -- -- -- -- 1.676 m (5' 6\") 52.6 kg (116 lb)   10/07/21 2049 (!) 157/96 97.1  F (36.2  C) Temporal 86 20 96 % -- --       PHYSICAL EXAM    General Appearance:  elderly, cachetic, muscle wasting.    Head:  Normocephalic  Eyes:   conjunctiva/corneas clear  ENT:   membranes are moist without pallor  Neck:  Supple  Cardio:  Regular rate and rhythm  Pulm:  No respiratory distress  Back: No CVA tenderness, normal ROM  Abdomen:  Soft, non-tender, non distended,no rebound or guarding. Suprapubic catheter placed on lower abdomen.  : Mild erythema and swelling to the gland of the penis. When foreskin is retracted, there is discomfort and a significant amount of smegma.   Extremities: Extremities with muscle wasting.  No trauma  Skin:  Skin warm, dry, no rashes  Neuro:  Alert and oriented ×3, moving all extremities, no gross sensory defects     RADIOLOGY/LABS:  Reviewed all pertinent imaging. Please see official radiology report. All pertinent labs reviewed and interpreted.    Results for orders placed or performed during the hospital encounter of 10/08/21   CBC (+ platelets, no diff)   Result Value Ref Range    WBC Count 10.7 4.0 - 11.0 10e3/uL    RBC Count 4.02 (L) 4.40 - 5.90 10e6/uL    Hemoglobin 13.3 13.3 - 17.7 g/dL    Hematocrit 42.7 40.0 - 53.0 %     (H) 78 - 100 fL    MCH 33.1 (H) 26.5 - 33.0 pg    MCHC 31.1 (L) 31.5 - 36.5 g/dL    RDW 14.1 10.0 - 15.0 %    Platelet Count 198 " 150 - 450 10e3/uL   Basic metabolic panel   Result Value Ref Range    Sodium 138 136 - 145 mmol/L    Potassium 4.5 3.5 - 5.0 mmol/L    Chloride 101 98 - 107 mmol/L    Carbon Dioxide (CO2) 26 22 - 31 mmol/L    Anion Gap 11 5 - 18 mmol/L    Urea Nitrogen 9 8 - 28 mg/dL    Creatinine 1.17 0.70 - 1.30 mg/dL    Calcium 9.4 8.5 - 10.5 mg/dL    Glucose 126 (H) 70 - 125 mg/dL    GFR Estimate 51 (L) >60 mL/min/1.73m2   Result Value Ref Range    INR 2.16 (H) 0.85 - 1.15   UA with Microscopic reflex to Culture    Specimen: Urine, Catheter   Result Value Ref Range    Color Urine Yellow Colorless, Straw, Light Yellow, Yellow    Appearance Urine Cloudy (A) Clear    Glucose Urine Negative Negative mg/dL    Bilirubin Urine Negative Negative    Ketones Urine Negative Negative mg/dL    Specific Gravity Urine 1.012 1.001 - 1.030    Blood Urine 1.0 mg/dL (A) Negative    pH Urine 7.0 5.0 - 7.0    Protein Albumin Urine 30  (A) Negative mg/dL    Urobilinogen Urine <2.0 <2.0 mg/dL    Nitrite Urine Positive (A) Negative    Leukocyte Esterase Urine 250 Eliu/uL (A) Negative    Bacteria Urine Few (A) None Seen /HPF    Mucus Urine Present (A) None Seen /LPF    RBC Urine 12 (H) <=2 /HPF    WBC Urine 4 <=5 /HPF    Squamous Epithelials Urine 1 <=1 /HPF       The creation of this record is based on the scribe s observations of the work being performed by Sera Farley MD and the provider s statements to them. It was created on his behalf by Allison Medeiros, a trained medical scribe. This document has been checked and approved by the attending provider.    Sera Farley MD  Emergency Medicine  [facility] EMERGENCY ROOM  [address] [address] [phone]  Dept: [phone]     Sera Farley MD  10/08/21 0044       Sera Farley MD  10/08/21 0158

## 2021-10-08 NOTE — ED TRIAGE NOTES
Irish speaking patient.  Granddaughter is interpreting.  Has supra pubic catheter.  Abdomin is more distended and penis is painful.

## 2021-10-08 NOTE — ED TRIAGE NOTES
Does have pain pills for any kind of pain which the family has given him.  20:00 approximately was last does.

## 2021-10-10 LAB — BACTERIA UR CULT: NORMAL

## 2021-10-11 ENCOUNTER — ANTICOAGULATION THERAPY VISIT (OUTPATIENT)
Dept: ANTICOAGULATION | Facility: CLINIC | Age: 86
End: 2021-10-11

## 2021-10-11 ENCOUNTER — PATIENT OUTREACH (OUTPATIENT)
Dept: GERIATRIC MEDICINE | Facility: CLINIC | Age: 86
End: 2021-10-11

## 2021-10-11 DIAGNOSIS — I48.91 ATRIAL FIBRILLATION (H): Primary | ICD-10-CM

## 2021-10-11 DIAGNOSIS — N28.0 RENAL INFARCT (H): ICD-10-CM

## 2021-10-11 LAB — INR (EXTERNAL): 2.6 (ref 0.9–1.1)

## 2021-10-11 NOTE — PROGRESS NOTES
[facility] Care Coordination Contact  CC received notification of Emergency Room visit.  ER visit occurred on 10/8/2021 at [facility] with Dx of balanitis.    CC contacted family granddaughter and reviewed discharge summary.  Member has a follow-up appointment with PCP: Yes: scheduled on 10/26/2021  Member has had a change in condition: No  New referrals placed: No  Home Visit Needed: No  Care plan reviewed and updated.  PCP notified of ED visit via EMR.    KESHA Sun  [facility]  [phone]

## 2021-10-11 NOTE — PROGRESS NOTES
ANTICOAGULATION MANAGEMENT     Benny Carrillo 89 year old male is on warfarin with therapeutic INR result. (Goal INR 2.0-3.0)    Recent labs: (last 7 days)     10/11/21  0957   INR 2.6*       ASSESSMENT     Source(s): Chart Review and Home Care/Facility Nurse       Warfarin doses taken: Warfarin taken as instructed    Diet: No new diet changes identified    New illness, injury, or hospitalization: Yes: recent ER visit for UTI and balanitis (tip of penis pain)    Medication/supplement changes: keflex for 7 days and lotrimin ointment    Signs or symptoms of bleeding or clotting: No    Previous INR: Therapeutic last 2(+) visits    Additional findings: None     PLAN     Recommended plan for temporary change(s) affecting INR     Dosing Instructions: Continue your current warfarin dose with next INR in 1 week       Summary  As of 10/11/2021    Full warfarin instructions:  3.5 mg every Sun, Tue, Thu; 2.5 mg all other days   Next INR check:  10/18/2021             Telephone call with home care nurse Neelima who verbalizes understanding and agrees to plan    Orders given to  Homecare nurse/facility to recheck    Education provided: Please call back if any changes to your diet, medications or how you've been taking warfarin and Contact [phone]  with any changes, questions or concerns.     Plan made per [facility] anticoagulation protocol    Candida Mckineny RN  Anticoagulation Clinic  10/11/2021    _______________________________________________________________________     Anticoagulation Episode Summary     Current INR goal:  2.0-3.0   TTR:  58.3 % (1 y)   Target end date:  Indefinite   Send INR reminders to:  [facility]    Indications    Atrial fibrillation (H) [I48.91]  Renal infarct (H) [N28.0]           Comments:           Anticoagulation Care Providers     Provider Role Specialty Phone number    Alcides Castillo MD Referring Family Medicine [phone]

## 2021-10-18 ENCOUNTER — ANTICOAGULATION THERAPY VISIT (OUTPATIENT)
Dept: FAMILY MEDICINE | Facility: CLINIC | Age: 86
End: 2021-10-18

## 2021-10-18 DIAGNOSIS — N28.0 RENAL INFARCT (H): ICD-10-CM

## 2021-10-18 DIAGNOSIS — I48.91 ATRIAL FIBRILLATION (H): Primary | ICD-10-CM

## 2021-10-18 LAB — INR (EXTERNAL): 2.7 (ref 0.9–1.1)

## 2021-10-18 NOTE — PROGRESS NOTES
ANTICOAGULATION MANAGEMENT     Benny Carrillo 89 year old male is on warfarin with therapeutic INR result. (Goal INR 2.0-3.0)    Recent labs: (last 7 days)     10/18/21  0955   INR 2.7*       ASSESSMENT     Source(s): Chart Review and Home Care/Facility Nurse       Warfarin doses taken: Warfarin taken as instructed    Diet: No new diet changes identified    New illness, injury, or hospitalization: No    Medication/supplement changes: Has 2 more days of Keflex    Signs or symptoms of bleeding or clotting: No    Previous INR: Therapeutic last 2(+) visits    Additional findings: None     PLAN     Recommended plan for no diet, medication or health factor changes affecting INR     Dosing Instructions: Continue your current warfarin dose with next INR in 1 week       Summary  As of 10/18/2021    Full warfarin instructions:  3.5 mg every Sun, Tue, Thu; 2.5 mg all other days   Next INR check:  10/25/2021             Telephone call with home care nurse Neelima who verbalizes understanding and agrees to plan    Orders given to  Homecare nurse/facility to recheck    Education provided: Please call back if any changes to your diet, medications or how you've been taking warfarin    Plan made per [facility] anticoagulation protocol    Lilli Red, RN  Anticoagulation Clinic  10/18/2021    _______________________________________________________________________     Anticoagulation Episode Summary     Current INR goal:  2.0-3.0   TTR:  60.2 % (1 y)   Target end date:  Indefinite   Send INR reminders to:  [facility]    Indications    Atrial fibrillation (H) [I48.91]  Renal infarct (H) [N28.0]           Comments:           Anticoagulation Care Providers     Provider Role Specialty Phone number    Alcides Castillo MD Referring Family Medicine [phone]

## 2021-10-25 ENCOUNTER — ANTICOAGULATION THERAPY VISIT (OUTPATIENT)
Dept: ANTICOAGULATION | Facility: CLINIC | Age: 86
End: 2021-10-25

## 2021-10-25 DIAGNOSIS — I48.91 ATRIAL FIBRILLATION (H): Primary | ICD-10-CM

## 2021-10-25 DIAGNOSIS — N28.0 RENAL INFARCT (H): ICD-10-CM

## 2021-10-25 LAB — INR (EXTERNAL): 2.7 (ref 0.9–1.1)

## 2021-10-25 NOTE — PROGRESS NOTES
ANTICOAGULATION MANAGEMENT     Benny Carrillo 89 year old male is on warfarin with therapeutic INR result. (Goal INR 2.0-3.0)    Recent labs: (last 7 days)     10/25/21  1109   INR 2.7*       ASSESSMENT     Source(s): Chart Review and Home Care/Facility Nurse       Warfarin doses taken: Warfarin taken as instructed    Diet: No new diet changes identified    New illness, injury, or hospitalization: No    Medication/supplement changes: None noted    Signs or symptoms of bleeding or clotting: No    Previous INR: Therapeutic last 2(+) visits    Additional findings: None     PLAN     Recommended plan for no diet, medication or health factor changes affecting INR     Dosing Instructions: Continue your current warfarin dose with next INR in 2 weeks       Summary  As of 10/25/2021    Full warfarin instructions:  3.5 mg every Sun, Tue, Thu; 2.5 mg all other days   Next INR check:  11/8/2021             Telephone call with home care nurse Lise who verbalizes understanding and agrees to plan    Lab visit scheduled    Education provided: Goal range and significance of current result and Importance of therapeutic range    Plan made per [facility] anticoagulation protocol    Edilson Morris RN  Anticoagulation Clinic  10/25/2021    _______________________________________________________________________     Anticoagulation Episode Summary     Current INR goal:  2.0-3.0   TTR:  61.0 % (1 y)   Target end date:  Indefinite   Send INR reminders to:  [facility]    Indications    Atrial fibrillation (H) [I48.91]  Renal infarct (H) [N28.0]           Comments:           Anticoagulation Care Providers     Provider Role Specialty Phone number    Alcides Castillo MD Referring Family Medicine [phone]

## 2021-11-08 ENCOUNTER — ANTICOAGULATION THERAPY VISIT (OUTPATIENT)
Dept: FAMILY MEDICINE | Facility: CLINIC | Age: 86
End: 2021-11-08
Payer: COMMERCIAL

## 2021-11-08 DIAGNOSIS — I48.91 ATRIAL FIBRILLATION (H): Primary | ICD-10-CM

## 2021-11-08 DIAGNOSIS — N28.0 RENAL INFARCT (H): ICD-10-CM

## 2021-11-08 LAB — INR (EXTERNAL): 2.6 (ref 0.9–1.1)

## 2021-11-08 NOTE — PROGRESS NOTES
ANTICOAGULATION MANAGEMENT     Benny Carrillo 89 year old male is on warfarin with therapeutic INR result. (Goal INR 2.0-3.0)    Recent labs: (last 7 days)     11/08/21  1006   INR 2.6*       ASSESSMENT     Source(s): Chart Review and Patient/Caregiver Call       Warfarin doses taken: Warfarin taken as instructed    Diet: No new diet changes identified    New illness, injury, or hospitalization: No    Medication/supplement changes: None noted    Signs or symptoms of bleeding or clotting: No    Previous INR: Therapeutic last 2(+) visits    Additional findings: None     PLAN     Recommended plan for no diet, medication or health factor changes affecting INR     Dosing Instructions: Continue your current warfarin dose with next INR in 2 weeks       Summary  As of 11/8/2021    Full warfarin instructions:  3.5 mg every Sun, Tue, Thu; 2.5 mg all other days   Next INR check:               Telephone call with home care nurse Neelima- [phone] who verbalizes understanding and agrees to plan    Orders given to  Homecare nurse/facility to recheck    Education provided: None required    Plan made per [facility] anticoagulation protocol    Shelia Gimenez RN  Anticoagulation Clinic  11/8/2021    _______________________________________________________________________     Anticoagulation Episode Summary     Current INR goal:  2.0-3.0   TTR:  63.4 % (1 y)   Target end date:  Indefinite   Send INR reminders to:  [facility]    Indications    Atrial fibrillation (H) [I48.91]  Renal infarct (H) [N28.0]           Comments:           Anticoagulation Care Providers     Provider Role Specialty Phone number    Alcides Castillo MD Referring Family Medicine [phone]

## 2021-11-21 ENCOUNTER — APPOINTMENT (OUTPATIENT)
Dept: CT IMAGING | Facility: CLINIC | Age: 86
End: 2021-11-21
Attending: STUDENT IN AN ORGANIZED HEALTH CARE EDUCATION/TRAINING PROGRAM
Payer: COMMERCIAL

## 2021-11-21 ENCOUNTER — HOSPITAL ENCOUNTER (EMERGENCY)
Facility: CLINIC | Age: 86
Discharge: HOME | End: 2021-11-21
Attending: STUDENT IN AN ORGANIZED HEALTH CARE EDUCATION/TRAINING PROGRAM | Admitting: STUDENT IN AN ORGANIZED HEALTH CARE EDUCATION/TRAINING PROGRAM
Payer: COMMERCIAL

## 2021-11-21 VITALS
RESPIRATION RATE: 40 BRPM | HEART RATE: 65 BPM | SYSTOLIC BLOOD PRESSURE: 142 MMHG | OXYGEN SATURATION: 93 % | DIASTOLIC BLOOD PRESSURE: 74 MMHG

## 2021-11-21 DIAGNOSIS — R07.89 CHEST WALL PAIN: ICD-10-CM

## 2021-11-21 DIAGNOSIS — B02.9 HERPES ZOSTER WITHOUT COMPLICATION: ICD-10-CM

## 2021-11-21 LAB
ALBUMIN SERPL-MCNC: 3.5 G/DL (ref 3.5–5)
ALP SERPL-CCNC: 67 U/L (ref 45–120)
ALT SERPL W P-5'-P-CCNC: <9 U/L (ref 0–45)
ANION GAP SERPL CALCULATED.3IONS-SCNC: 7 MMOL/L (ref 5–18)
APTT PPP: 42 SECONDS (ref 22–38)
AST SERPL W P-5'-P-CCNC: 20 U/L (ref 0–40)
ATRIAL RATE - MUSE: 36 BPM
BASOPHILS # BLD AUTO: 0 10E3/UL (ref 0–0.2)
BASOPHILS NFR BLD AUTO: 1 %
BILIRUB SERPL-MCNC: 0.8 MG/DL (ref 0–1)
BUN SERPL-MCNC: 10 MG/DL (ref 8–28)
CALCIUM SERPL-MCNC: 8.7 MG/DL (ref 8.5–10.5)
CHLORIDE BLD-SCNC: 104 MMOL/L (ref 98–107)
CO2 SERPL-SCNC: 30 MMOL/L (ref 22–31)
CREAT SERPL-MCNC: 1.28 MG/DL (ref 0.7–1.3)
DIASTOLIC BLOOD PRESSURE - MUSE: NORMAL MMHG
EOSINOPHIL # BLD AUTO: 0.5 10E3/UL (ref 0–0.7)
EOSINOPHIL NFR BLD AUTO: 6 %
ERYTHROCYTE [DISTWIDTH] IN BLOOD BY AUTOMATED COUNT: 13.2 % (ref 10–15)
GFR SERPL CREATININE-BSD FRML MDRD: 46 ML/MIN/1.73M2
GLUCOSE BLD-MCNC: 111 MG/DL (ref 70–125)
HCT VFR BLD AUTO: 41.3 % (ref 40–53)
HGB BLD-MCNC: 13.4 G/DL (ref 13.3–17.7)
IMM GRANULOCYTES # BLD: 0 10E3/UL
IMM GRANULOCYTES NFR BLD: 0 %
INR PPP: 2.69 (ref 0.85–1.15)
INTERPRETATION ECG - MUSE: NORMAL
LYMPHOCYTES # BLD AUTO: 1.8 10E3/UL (ref 0.8–5.3)
LYMPHOCYTES NFR BLD AUTO: 21 %
MCH RBC QN AUTO: 32.6 PG (ref 26.5–33)
MCHC RBC AUTO-ENTMCNC: 32.4 G/DL (ref 31.5–36.5)
MCV RBC AUTO: 101 FL (ref 78–100)
MONOCYTES # BLD AUTO: 1.2 10E3/UL (ref 0–1.3)
MONOCYTES NFR BLD AUTO: 14 %
NEUTROPHILS # BLD AUTO: 5.2 10E3/UL (ref 1.6–8.3)
NEUTROPHILS NFR BLD AUTO: 58 %
NRBC # BLD AUTO: 0 10E3/UL
NRBC BLD AUTO-RTO: 0 /100
P AXIS - MUSE: NORMAL DEGREES
PLATELET # BLD AUTO: 196 10E3/UL (ref 150–450)
POTASSIUM BLD-SCNC: 4.9 MMOL/L (ref 3.5–5)
PR INTERVAL - MUSE: NORMAL MS
PROT SERPL-MCNC: 7.3 G/DL (ref 6–8)
QRS DURATION - MUSE: 120 MS
QT - MUSE: 378 MS
QTC - MUSE: 389 MS
R AXIS - MUSE: -47 DEGREES
RBC # BLD AUTO: 4.11 10E6/UL (ref 4.4–5.9)
SODIUM SERPL-SCNC: 141 MMOL/L (ref 136–145)
SYSTOLIC BLOOD PRESSURE - MUSE: NORMAL MMHG
T AXIS - MUSE: 36 DEGREES
TROPONIN I SERPL-MCNC: 0.04 NG/ML (ref 0–0.29)
TROPONIN I SERPL-MCNC: 0.04 NG/ML (ref 0–0.29)
VENTRICULAR RATE- MUSE: 64 BPM
WBC # BLD AUTO: 8.7 10E3/UL (ref 4–11)

## 2021-11-21 PROCEDURE — 85025 COMPLETE CBC W/AUTO DIFF WBC: CPT | Performed by: STUDENT IN AN ORGANIZED HEALTH CARE EDUCATION/TRAINING PROGRAM

## 2021-11-21 PROCEDURE — 71275 CT ANGIOGRAPHY CHEST: CPT

## 2021-11-21 PROCEDURE — 99285 EMERGENCY DEPT VISIT HI MDM: CPT | Mod: 25

## 2021-11-21 PROCEDURE — 85610 PROTHROMBIN TIME: CPT | Performed by: STUDENT IN AN ORGANIZED HEALTH CARE EDUCATION/TRAINING PROGRAM

## 2021-11-21 PROCEDURE — 82040 ASSAY OF SERUM ALBUMIN: CPT | Performed by: STUDENT IN AN ORGANIZED HEALTH CARE EDUCATION/TRAINING PROGRAM

## 2021-11-21 PROCEDURE — 250N000011 HC RX IP 250 OP 636: Performed by: STUDENT IN AN ORGANIZED HEALTH CARE EDUCATION/TRAINING PROGRAM

## 2021-11-21 PROCEDURE — 93005 ELECTROCARDIOGRAM TRACING: CPT | Performed by: STUDENT IN AN ORGANIZED HEALTH CARE EDUCATION/TRAINING PROGRAM

## 2021-11-21 PROCEDURE — 85730 THROMBOPLASTIN TIME PARTIAL: CPT | Performed by: STUDENT IN AN ORGANIZED HEALTH CARE EDUCATION/TRAINING PROGRAM

## 2021-11-21 PROCEDURE — 36415 COLL VENOUS BLD VENIPUNCTURE: CPT | Performed by: STUDENT IN AN ORGANIZED HEALTH CARE EDUCATION/TRAINING PROGRAM

## 2021-11-21 PROCEDURE — 84484 ASSAY OF TROPONIN QUANT: CPT | Performed by: STUDENT IN AN ORGANIZED HEALTH CARE EDUCATION/TRAINING PROGRAM

## 2021-11-21 RX ORDER — IOPAMIDOL 755 MG/ML
100 INJECTION, SOLUTION INTRAVASCULAR ONCE
Status: COMPLETED | OUTPATIENT
Start: 2021-11-21 | End: 2021-11-21

## 2021-11-21 RX ORDER — VALACYCLOVIR HYDROCHLORIDE 1 G/1
1000 TABLET, FILM COATED ORAL 3 TIMES DAILY
Qty: 21 TABLET | Refills: 0 | Status: SHIPPED | OUTPATIENT
Start: 2021-11-21 | End: 2021-12-08

## 2021-11-21 RX ADMIN — IOPAMIDOL 75 ML: 755 INJECTION, SOLUTION INTRAVENOUS at 17:35

## 2021-11-21 NOTE — ED PROVIDER NOTES
Emergency Department Encounter         FINAL IMPRESSION:  Sidewall pain, zoster        ED COURSE AND MEDICAL DECISION MAKING       ED Course as of 11/21/21 1612   Sun Nov 21, 2021   1526 EKG is A. fib with a rate of 64, no STEMI, no inversions no depressions morphology similar to September 2020.   1608 Patient is a 89-year-old male history hypertension hyperlipidemia here with left-sided chest pain.  Patient states it feels like prickling sensation.  Patient's granddaughter at bedside interpreting.  No fevers, chills, nausea vomiting.  No anterior chest pain.  No shortness of breath or cough or hemoptysis.  No weight loss.  Abdominal pain.  Pain does not radiate into his back.  Arrival he looks well.  A. fib on EKG.  Granddaughter states patient has been using a hot water bottle on the left side of his chest.  On evaluation patient with normal heart sounds and lung sounds.  He does have some redness on the  left side of his chest.  Difficult determine whether this is early shingles or just redness from the hot water bottle.  Nonetheless, we will do a cardiopulmonary evaluation including CT of the chest and reevaluate     -Patient's redness on his chest is concerning for possible early herpes zoster however because of his language barrier and age we will work him up cardiopulmonary wise.  Plan for 2 troponins and CT PE.  If both negative, we will start him on valacyclovir and discharge home.   granddaughter also concern about sleep.  Plan for melatonin.  -Patient signed out to oncoming physician with final disposition pending including troponin and repeat evaluation.        3:59 PM I met the patient and performed my initial interview and exam.        EKG  A. fib with a rate of 64, no STEMI, no inversions no depressions morphology similar to September 2020.    At the conclusion of the encounter I discussed the results of all the tests and the disposition. The questions were answered. The patient or family  acknowledged understanding and was agreeable with the care plan.        MEDICATIONS GIVEN IN THE EMERGENCY DEPARTMENT:  Medications - No data to display    NEW PRESCRIPTIONS STARTED AT TODAY'S ED VISIT:  New Prescriptions    No medications on file       HPI     Patient information obtained from: Patient    Use of Interpretor: N/A     Benny Carrillo is a 89 year old male with a pertinent history of atrial fibrillation, CVA, and hypertension who presents to this ED by walk in for evaluation of chest pain.     Patient is here with left-sided chest pain.  Patient states it feels like prickling sensation.  Patient's granddaughter at bedside interpreting. No anterior chest pain.  Pain does not radiate into his back.  Granddaughter states patient has been using a hot water bottle on the left side of his chest. Patient denies any fevers, chills, nausea, vomiting, shortness of breath, cough, hemoptysis, weight loss, abdominal pain, or any other complaints at this time.        REVIEW OF SYSTEMS:  Review of Systems   Constitutional: Negative for fever, malaise  HEENT: Negative runny nose, sore throat, ear pain, neck pain  Respiratory: Negative for shortness of breath, cough, congestion  Cardiovascular: Positive for left sided chest pain. Negative for leg edema  Gastrointestinal: Negative for abdominal distention, abdominal pain, constipation, vomiting, nausea, diarrhea  Genitourinary: Negative for dysuria and hematuria.   Integument: Negative for rash, skin breakdown  Neurological: Negative for paresthesias, weakness, headache.  Musculoskeletal: Negative for joint pain, joint swelling      All other systems reviewed and are negative.          MEDICAL HISTORY     Past Medical History:   Diagnosis Date     Asthma      Atrial fibrillation (H)      Benign prostatic hyperplasia      Bradycardia 7/25/2015     Cataract      Chronic obstructive pulmonary disease, unspecified COPD type (H) 7/21/2020     Citrobacter infection 7/25/2015      Citrobacter infection 7/25/2015     Constipation 1/13/2015     Elevated digoxin level 7/24/2015     GERD (gastroesophageal reflux disease)      Hypertension      Lactic acidosis      Lower urinary tract infectious disease      Muscle weakness      SBO (small bowel obstruction) (H)      Small bowel obstruction (H) 7/23/2015     Stroke (H)      Underweight due to inadequate caloric intake 7/28/2015     Urinary obstruction        Past Surgical History:   Procedure Laterality Date     CHOLECYSTECTOMY       CYSTOTOMY       EYE SURGERY Bilateral     cataract removal     PICC  5/28/2019            Social History     Tobacco Use     Smoking status: Never Smoker     Smokeless tobacco: Never Used   Substance Use Topics     Alcohol use: No     Drug use: No       acetaminophen (TYLENOL) 500 MG tablet  albuterol (PROAIR HFA;PROVENTIL HFA;VENTOLIN HFA) 90 mcg/actuation inhaler  amLODIPine (NORVASC) 5 MG tablet  ARTIFICIAL TEARS, POLYVIN ALC, 1.4 % ophthalmic solution  digoxin (LANOXIN) 125 MCG tablet  famotidine (PEPCID) 20 MG tablet  guaiFENesin (ROBITUSSIN) 100 MG/5ML SYRP  hydrocortisone 2.5 % cream  loratadine (CLARITIN) 10 MG tablet  MEDICATION CANNOT BE REORDERED - PLEASE MANUALLY REORDER AND DISCONTINUE THE OLD ORDER  melatonin 5 MG tablet  metroNIDAZOLE (METROLOTION) 0.75 % external lotion  Multiple Vitamins-Minerals (GNP THERAPEUTIC-M) TABS  pantoprazole (PROTONIX) 40 MG tablet  polyvinyl alcohol-povidone (ARTIFICIAL TEARS,PVALCH-POVID,) 0.5-0.6 % Drop  protein (BOOST HIGH PROTEIN) Powd  warfarin ANTICOAGULANT (COUMADIN) 1 MG tablet  warfarin ANTICOAGULANT (COUMADIN) 2.5 MG tablet            PHYSICAL EXAM     /70   Pulse (!) 46   Resp 18   SpO2 96%       PHYSICAL EXAM:     General: Patient appears well, nontoxic, comfortable  HEENT: Moist mucous membranes, no tongue swelling.  No head trauma.  No midline neck pain.  Cardiovascular: Normal rate, normal rhythm, no extremity edema.  No appreciable murmur.    Respiratory: No signs of respiratory distress, lungs are clear to auscultation bilaterally with no wheezes rhonchi or rales.  Abdominal: Soft, nontender, nondistended, no palpable masses, no guarding, no rebound  Musculoskeletal: Full range of motion of joints, no deformities appreciated.  Neurological: Alert and oriented, grossly neurologically intact.  Psychological: Normal affect and mood.  Integument: He does have some redness on the left side of his chest.  Difficult determine whether this is early shingles or just redness from the hot water bottle, no carson vesicular lesions over there is a couple satellite lesions below his left nipple.  This all remains along the same dermatome and does not cross midline.        RESULTS       Labs Ordered and Resulted from Time of ED Arrival to Time of ED Departure   COMPREHENSIVE METABOLIC PANEL - Abnormal       Result Value    Sodium 141      Potassium 4.9      Chloride 104      Carbon Dioxide (CO2) 30      Anion Gap 7      Urea Nitrogen 10      Creatinine 1.28      Calcium 8.7      Glucose 111      Alkaline Phosphatase 67      AST 20      ALT <9      Protein Total 7.3      Albumin 3.5      Bilirubin Total 0.8      GFR Estimate 46 (*)    INR - Abnormal    INR 2.69 (*)    PARTIAL THROMBOPLASTIN TIME - Abnormal    aPTT 42 (*)    CBC WITH PLATELETS AND DIFFERENTIAL - Abnormal    WBC Count 8.7      RBC Count 4.11 (*)     Hemoglobin 13.4      Hematocrit 41.3       (*)     MCH 32.6      MCHC 32.4      RDW 13.2      Platelet Count 196      % Neutrophils 58      % Lymphocytes 21      % Monocytes 14      % Eosinophils 6      % Basophils 1      % Immature Granulocytes 0      NRBCs per 100 WBC 0      Absolute Neutrophils 5.2      Absolute Lymphocytes 1.8      Absolute Monocytes 1.2      Absolute Eosinophils 0.5      Absolute Basophils 0.0      Absolute Immature Granulocytes 0.0      Absolute NRBCs 0.0     TROPONIN I - Normal    Troponin I 0.04     TROPONIN I - Normal     Troponin I 0.04         CT Chest Pulmonary Embolism w Contrast   Final Result   IMPRESSION:   1.  Negative for pulmonary emboli.      2.  Mild emphysema.      3.  Mild mucous plugging left lower lobe.      4.  No infiltrates.              PROCEDURES:  Procedures:  Procedures       I, Thai Bernard am serving as a scribe to document services personally performed by Rudy Alexander DO, based on my observations and the provider's statements to me.  I, Rudy Alexander DO, attest that Thai Bernard is acting in a scribe capacity, has observed my performance of the services and has documented them in accordance with my direction.    Rudy Alexander DO  Emergency Medicine  [facility] EMERGENCY ROOM     Rudy Alexander DO  11/22/21 2053

## 2021-11-22 ENCOUNTER — ANTICOAGULATION THERAPY VISIT (OUTPATIENT)
Dept: ANTICOAGULATION | Facility: CLINIC | Age: 86
End: 2021-11-22
Payer: COMMERCIAL

## 2021-11-22 ENCOUNTER — DOCUMENTATION ONLY (OUTPATIENT)
Dept: ANTICOAGULATION | Facility: CLINIC | Age: 86
End: 2021-11-22
Payer: COMMERCIAL

## 2021-11-22 ENCOUNTER — PATIENT OUTREACH (OUTPATIENT)
Dept: GERIATRIC MEDICINE | Facility: CLINIC | Age: 86
End: 2021-11-22
Payer: COMMERCIAL

## 2021-11-22 DIAGNOSIS — N28.0 RENAL INFARCT (H): ICD-10-CM

## 2021-11-22 DIAGNOSIS — I48.91 ATRIAL FIBRILLATION (H): Primary | ICD-10-CM

## 2021-11-22 LAB — INR (EXTERNAL): 3.2 (ref 0.9–1.1)

## 2021-11-22 NOTE — ED PROVIDER NOTES
EMERGENCY DEPARTMENT SIGN OUT NOTE        ED COURSE AND MEDICAL DECISION MAKING  Patient was signed out to me by Dr. Rudy Alexander at 6:05 PM    7:32 PM I rechecked and updated the patient with results and plan for discharge. Patient is agreeable.     In brief, Benny Carrillo is a 89 year old male who initially presented with left sided non-radiating chest pain. Patient has been using a hot water bottle on the left side of his chest and has some overlying redness concerning for early shingles versus just redness from the hot water bottle.     At time of sign out, disposition was pending repeat troponin and CT scan.     FINAL IMPRESSION    1. Herpes zoster without complication    2. Chest wall pain        ED MEDS  Medications   iopamidol (ISOVUE-370) solution 100 mL (75 mLs Intravenous Given 11/21/21 2104)       LAB  Labs Ordered and Resulted from Time of ED Arrival to Time of ED Departure   COMPREHENSIVE METABOLIC PANEL - Abnormal       Result Value    Sodium 141      Potassium 4.9      Chloride 104      Carbon Dioxide (CO2) 30      Anion Gap 7      Urea Nitrogen 10      Creatinine 1.28      Calcium 8.7      Glucose 111      Alkaline Phosphatase 67      AST 20      ALT <9      Protein Total 7.3      Albumin 3.5      Bilirubin Total 0.8      GFR Estimate 46 (*)    INR - Abnormal    INR 2.69 (*)    PARTIAL THROMBOPLASTIN TIME - Abnormal    aPTT 42 (*)    CBC WITH PLATELETS AND DIFFERENTIAL - Abnormal    WBC Count 8.7      RBC Count 4.11 (*)     Hemoglobin 13.4      Hematocrit 41.3       (*)     MCH 32.6      MCHC 32.4      RDW 13.2      Platelet Count 196      % Neutrophils 58      % Lymphocytes 21      % Monocytes 14      % Eosinophils 6      % Basophils 1      % Immature Granulocytes 0      NRBCs per 100 WBC 0      Absolute Neutrophils 5.2      Absolute Lymphocytes 1.8      Absolute Monocytes 1.2      Absolute Eosinophils 0.5      Absolute Basophils 0.0      Absolute Immature Granulocytes 0.0      Absolute NRBCs  0.0     TROPONIN I - Normal    Troponin I 0.04     TROPONIN I - Normal    Troponin I 0.04         EKG  Performed at: 16:22    Impression: AFIB, left axis deviation, and RBBB    Rate: 64 bpm  Rhythm: AFIB  QRS interval: 120 ms  QTc interval: 389 ms  ST changes: No acute ST elevations or depressions  Comparison (9/14/20): No significant change    RADIOLOGY    CT Chest Pulmonary Embolism w Contrast   Final Result   IMPRESSION:   1.  Negative for pulmonary emboli.      2.  Mild emphysema.      3.  Mild mucous plugging left lower lobe.      4.  No infiltrates.          DISCHARGE MEDS  New Prescriptions    VALACYCLOVIR (VALTREX) 1000 MG TABLET    Take 1 tablet (1,000 mg) by mouth 3 times daily for 7 days

## 2021-11-22 NOTE — PROGRESS NOTES
ANTICOAGULATION  MANAGEMENT: Discharge Review    Benny Carrillo chart reviewed for anticoagulation continuity of care    Emergency room visit on 11/21/2021 for Herpes Zoster without complication/chest wall pain.    Discharge disposition: Home with Home Care    Results:    Recent labs: (last 7 days)     11/21/21  1536   INR 2.69*     Anticoagulation inpatient management:     not applicable     Anticoagulation discharge instructions:     Warfarin dosing: home regimen continued   Bridging: No   INR goal change: No      Medication changes affecting anticoagulation: No    Additional factors affecting anticoagulation: No    Plan     No adjustment to anticoagulation plan needed    Recommended follow up is scheduled for 11/22/2021 with home care    No adjustment to Anticoagulation Calendar was required    Lance Hawthorne RN

## 2021-11-22 NOTE — PROGRESS NOTES
ANTICOAGULATION MANAGEMENT     Benny Carrillo 89 year old male is on warfarin with supratherapeutic INR result. (Goal INR 2.0-3.0)    Recent labs: (last 7 days)     11/22/21  1000   INR 3.2*       ASSESSMENT     Source(s): Chart Review and Home Care/Facility Nurse       Warfarin doses taken: Warfarin taken as instructed    Diet: No new diet changes identified    New illness, injury, or hospitalization: Yes: ER visit yesterday, dx with shingles.     Medication/supplement changes: Valtrex 7 day course (dates: 11/21-11/28) No interaction anticipated    Signs or symptoms of bleeding or clotting: No    Previous INR: Therapeutic last 2(+) visits    Additional findings: increased inflammation/pain from shingles. Will recheck INR in one week with next home care visit      PLAN     Recommended plan for temporary change(s) affecting INR     Dosing Instructions: Hold dose then continue your current warfarin dose with next INR in 1 week       Summary  As of 11/22/2021    Full warfarin instructions:  11/22: Hold; Otherwise 3.5 mg every Sun, Tue, Thu; 2.5 mg all other days   Next INR check:  11/29/2021             Telephone call with home care nurse Neelima  who verbalizes understanding and agrees to plan    Orders given to  Homecare nurse/facility to recheck    Education provided: Goal range and significance of current result and Monitoring for bleeding signs and symptoms    Plan made per [facility] anticoagulation protocol    Rachele Wright, RN  Anticoagulation Clinic  11/22/2021    _______________________________________________________________________     Anticoagulation Episode Summary     Current INR goal:  2.0-3.0   TTR:  63.3 % (1 y)   Target end date:  Indefinite   Send INR reminders to:  CARLOS CERVANTES    Indications    Atrial fibrillation (H) [I48.91]  Renal infarct (H) [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

## 2021-11-23 NOTE — PROGRESS NOTES
[facility] Care Coordination Contact  CC received notification of Emergency Room visit.  ER visit occurred on 11/21/2021 at [facility] with Dx of herpes zoster.    CC contacted family Brittanie and reviewed discharge summary.  Member has a follow-up appointment with PCP: No: Offered Assistance with setting up a follow up appointment  Member has had a change in condition: No  New referrals placed: No  Home Visit Needed: No  Care plan reviewed and updated.  PCP notified of ED visit via EMR.    KESHA Sun  [facility]  [phone]

## 2021-11-29 ENCOUNTER — ANTICOAGULATION THERAPY VISIT (OUTPATIENT)
Dept: ANTICOAGULATION | Facility: CLINIC | Age: 86
End: 2021-11-29
Payer: COMMERCIAL

## 2021-11-29 DIAGNOSIS — I48.91 ATRIAL FIBRILLATION (H): Primary | ICD-10-CM

## 2021-11-29 DIAGNOSIS — N28.0 RENAL INFARCT (H): ICD-10-CM

## 2021-11-29 LAB — INR (EXTERNAL): 2.4 (ref 0.9–1.1)

## 2021-11-29 NOTE — PROGRESS NOTES
ANTICOAGULATION MANAGEMENT     Benny Carrillo 89 year old male is on warfarin with therapeutic INR result. (Goal INR 2.0-3.0)    Recent labs: (last 7 days)     11/29/21  0922   INR 2.4*       ASSESSMENT     Source(s): Home Care/Facility Nurse       Warfarin doses taken: Warfarin taken as instructed    Diet: No new diet changes identified    New illness, injury, or hospitalization: Yes: Pt recently diagnosed with shingles.    Medication/supplement changes: Finished Valtrex yesterday. Should not effect INR.     Pt is going to start taking tylenol q6 for shingles pain. Tylenol can increase INR if taken at high doses.    Signs or symptoms of bleeding or clotting: No    Previous INR: Supratherapeutic    Additional findings: None     PLAN     Recommended plan for no diet, medication or health factor changes affecting INR     Dosing Instructions: Continue your current warfarin dose with next INR in 1 week       Summary  As of 11/29/2021    Full warfarin instructions:  3.5 mg every Sun, Tue, Thu; 2.5 mg all other days   Next INR check:  12/6/2021             Telephone call with home care nurse Shelia who verbalizes understanding and agrees to plan    Orders given to  Homecare nurse/facility to recheck    Education provided: Goal range and significance of current result    Plan made per [facility] anticoagulation protocol    Lance Hawthorne RN  Anticoagulation Clinic  11/29/2021    _______________________________________________________________________     Anticoagulation Episode Summary     Current INR goal:  2.0-3.0   TTR:  62.8 % (1 y)   Target end date:  Indefinite   Send INR reminders to:  CARLOS CERVANTES    Indications    Atrial fibrillation (H) [I48.91]  Renal infarct (H) [N28.0]           Comments:           Anticoagulation Care Providers     Provider Role Specialty Phone number    Alcides Castillo MD Referring Family Medicine [phone]

## 2021-12-01 ENCOUNTER — HOSPITAL ENCOUNTER (EMERGENCY)
Facility: CLINIC | Age: 86
Discharge: HOME | End: 2021-12-01
Attending: EMERGENCY MEDICINE | Admitting: EMERGENCY MEDICINE
Payer: COMMERCIAL

## 2021-12-01 ENCOUNTER — DOCUMENTATION ONLY (OUTPATIENT)
Dept: FAMILY MEDICINE | Facility: CLINIC | Age: 86
End: 2021-12-01
Payer: COMMERCIAL

## 2021-12-01 VITALS
DIASTOLIC BLOOD PRESSURE: 71 MMHG | OXYGEN SATURATION: 95 % | TEMPERATURE: 97.8 F | BODY MASS INDEX: 18.64 KG/M2 | HEART RATE: 49 BPM | WEIGHT: 116 LBS | SYSTOLIC BLOOD PRESSURE: 131 MMHG | HEIGHT: 66 IN | RESPIRATION RATE: 22 BRPM

## 2021-12-01 DIAGNOSIS — B02.9 HERPES ZOSTER WITHOUT COMPLICATION: ICD-10-CM

## 2021-12-01 DIAGNOSIS — R07.89 CHEST WALL PAIN: ICD-10-CM

## 2021-12-01 DIAGNOSIS — B02.29 POST HERPETIC NEURALGIA: ICD-10-CM

## 2021-12-01 PROCEDURE — 93005 ELECTROCARDIOGRAM TRACING: CPT | Performed by: EMERGENCY MEDICINE

## 2021-12-01 PROCEDURE — 250N000013 HC RX MED GY IP 250 OP 250 PS 637: Performed by: EMERGENCY MEDICINE

## 2021-12-01 PROCEDURE — 99283 EMERGENCY DEPT VISIT LOW MDM: CPT

## 2021-12-01 RX ORDER — OXYCODONE HYDROCHLORIDE 5 MG/1
2.5 TABLET ORAL 3 TIMES DAILY PRN
Qty: 6 TABLET | Refills: 0 | Status: SHIPPED | OUTPATIENT
Start: 2021-12-01 | End: 2021-12-08

## 2021-12-01 RX ADMIN — OXYCODONE HYDROCHLORIDE 2.5 MG: 5 TABLET ORAL at 12:32

## 2021-12-01 ASSESSMENT — ENCOUNTER SYMPTOMS
NAUSEA: 0
SORE THROAT: 0
ABDOMINAL PAIN: 0
VOMITING: 0
SHORTNESS OF BREATH: 0
DIARRHEA: 0
JOINT SWELLING: 0
CONFUSION: 0
FEVER: 0
DIZZINESS: 0
HEMATURIA: 0
DYSURIA: 0
CHILLS: 0

## 2021-12-01 ASSESSMENT — MIFFLIN-ST. JEOR: SCORE: 1078.92

## 2021-12-01 NOTE — PROGRESS NOTES
ANTICOAGULATION  MANAGEMENT: Discharge Review    Benny Carrillo chart reviewed for anticoagulation continuity of care    Emergency room visit on 12/1 for Shingles.    Discharge disposition: Home    Results:    Recent labs: (last 7 days)     11/29/21  0922   INR 2.4*     Anticoagulation inpatient management:     home regimen continued    Anticoagulation discharge instructions:     Warfarin dosing: home regimen continued   Bridging: No   INR goal change: No      Medication changes affecting anticoagulation: Yes: Started on oxycodone for increased pain    Additional factors affecting anticoagulation: No    Plan     No adjustment to anticoagulation plan needed    Recommended follow up is scheduled on 12/6    No adjustment to Anticoagulation Calendar was required    Montse Riley RN

## 2021-12-01 NOTE — ED PROVIDER NOTES
Emergency Department Encounter     Evaluation Date & Time:   12/1/2021 12:06 PM    CHIEF COMPLAINT:  Abdominal Pain and Chest Pain      Triage Note:Seen here several days ago and was diagnosed with shingles. This am began complaining of left mid axillary pain radiating into left lower chest. Wanted daughter to bring to ED.         ED COURSE & MEDICAL DECISION MAKING:        Pt here with ongoing chest wall pain since he was diagnosed with Shingles on 11/21/21.  Pt with scabbed lesions to back, and primarily tender to very light palpation along T2/T3 dermatomal regions along axillary/lateral chest wall.  Pt with extensive workup on last ED visit including serial trops and CTA chest, all negative.  Pt finished course of valacyclovir.  Will give short rx for oxycodone to take 2.5 mg TID PRN.  Pt has close outpatient primary follow up and will defer to them for ongoing pain management.  Pt and family agreeable.      12:20 PM Met with patient for initial interview and exam. Plan for care in the ED was discussed. PPE: N95, surgical mask, gloves.       At the conclusion of the encounter I discussed the results of all the tests and the disposition. The questions were answered. The patient or family acknowledged understanding and was agreeable with the care plan.      MEDICATIONS GIVEN IN THE EMERGENCY DEPARTMENT:  Medications   oxyCODONE IR (ROXICODONE) half-tab 2.5 mg (2.5 mg Oral Given 12/1/21 1232)       NEW PRESCRIPTIONS STARTED AT TODAY'S ED VISIT:  Discharge Medication List as of 12/1/2021  1:30 PM      START taking these medications    Details   oxyCODONE (ROXICODONE) 5 MG tablet Take 0.5 tablets (2.5 mg) by mouth 3 times daily as needed for pain, Disp-6 tablet, R-0, Local Print             HPI   HPI     Benny Carrillo is a 89 year old male with a pertinent history of atrial fibrillation, stroke, HTN, bradycardia, CVA, and COPD who presents to this ED via walk in for evaluation of axillary pain and chest wall pain.      Per chart review, patient was seen here at the [facility] ED on 11/21/21 for evaluation of left sided chest pain. Patient had been using a hot water bottle on the left side of the chest and has some overlying redness to the area. Patient diagnosed with Herpes zoster without complication and was discharged with a prescription of valtrex.     Patient was seen 10 days ago and put on valtrex for shingles. He presents with pain in the left axillary and left chest wall region. Pain was sharp when he was last seen, but now he describes it as more constant. Patient finished the course of Valtrex and has been taking tylenol for the pain, last taken at 5:00 AM. The rash that was present on his upper back has been improving. Patient denies any vomiting or additional complaints at this time.     REVIEW OF SYSTEMS:  Review of Systems   Constitutional: Negative for chills and fever.   HENT: Negative for sore throat.    Eyes: Negative for visual disturbance.   Respiratory: Negative for shortness of breath.    Cardiovascular: Negative for chest pain.   Gastrointestinal: Negative for abdominal pain, diarrhea, nausea and vomiting.   Endocrine: Negative for polyuria.   Genitourinary: Negative for dysuria and hematuria.        - urinary changes     Musculoskeletal: Negative for joint swelling.        Endorses pain in left axillary and left side of chest wall   Skin: Positive for rash (upper back, improving since last visit on 11/21/21).   Neurological: Negative for dizziness.   Psychiatric/Behavioral: Negative for confusion.   All other systems reviewed and are negative.        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     Constipation 1/13/2015      Elevated digoxin level 7/24/2015     GERD (gastroesophageal reflux disease)      Hypertension      Lactic acidosis      Lower urinary tract infectious disease      Muscle weakness      SBO (small bowel obstruction) (H)      Small bowel obstruction (H) 7/23/2015     Stroke (H)      Underweight due to inadequate caloric intake 7/28/2015     Urinary obstruction        Past Surgical History:   Procedure Laterality Date     CHOLECYSTECTOMY       CYSTOTOMY       EYE SURGERY Bilateral     cataract removal     PICC  5/28/2019            Family History   Problem Relation Age of Onset     No Known Problems Mother      No Known Problems Father      Diabetes Other         spouse       Social History     Tobacco Use     Smoking status: Never Smoker     Smokeless tobacco: Never Used   Substance Use Topics     Alcohol use: No     Drug use: No       oxyCODONE (ROXICODONE) 5 MG tablet  acetaminophen (TYLENOL) 500 MG tablet  albuterol (PROAIR HFA;PROVENTIL HFA;VENTOLIN HFA) 90 mcg/actuation inhaler  amLODIPine (NORVASC) 5 MG tablet  ARTIFICIAL TEARS, POLYVIN ALC, 1.4 % ophthalmic solution  digoxin (LANOXIN) 125 MCG tablet  famotidine (PEPCID) 20 MG tablet  guaiFENesin (ROBITUSSIN) 100 MG/5ML SYRP  hydrocortisone 2.5 % cream  loratadine (CLARITIN) 10 MG tablet  MEDICATION CANNOT BE REORDERED - PLEASE MANUALLY REORDER AND DISCONTINUE THE OLD ORDER  melatonin 5 MG tablet  melatonin 5 MG tablet  metroNIDAZOLE (METROLOTION) 0.75 % external lotion  Multiple Vitamins-Minerals (GNP THERAPEUTIC-M) TABS  pantoprazole (PROTONIX) 40 MG tablet  polyvinyl alcohol-povidone (ARTIFICIAL TEARS,PVALCH-POVID,) 0.5-0.6 % Drop  protein (BOOST HIGH PROTEIN) Powd  valACYclovir (VALTREX) 1000 mg tablet  warfarin ANTICOAGULANT (COUMADIN) 1 MG tablet  warfarin ANTICOAGULANT (COUMADIN) 2.5 MG tablet        Physical Exam     Triage Vitals:  ED Triage Vitals [12/01/21 1136]   Enc Vitals Group      BP (!) 147/90      Pulse 78      Resp 22      Temp 97.8  F (36.6  " C)      Temp src Temporal      SpO2 93 %      Weight 52.6 kg (116 lb)      Height 1.676 m (5' 6\")      Head Circumference       Peak Flow       Pain Score       Pain Loc       Pain Edu?       Excl. in GC?         Vitals:  /71   Pulse (!) 49   Temp 97.8  F (36.6  C) (Temporal)   Resp 22   Ht 1.676 m (5' 6\")   Wt 52.6 kg (116 lb)   SpO2 95%   BMI 18.72 kg/m      PHYSICAL EXAM:   Physical Exam  Vitals and nursing note reviewed.   Constitutional:       General: He is not in acute distress.     Comments: Elderly, frail appearing   HENT:      Head: Normocephalic and atraumatic.      Nose: Nose normal.      Mouth/Throat:      Mouth: Mucous membranes are moist.   Eyes:      Pupils: Pupils are equal, round, and reactive to light.   Cardiovascular:      Rate and Rhythm: Normal rate and regular rhythm.      Pulses: Normal pulses.           Radial pulses are 2+ on the right side and 2+ on the left side.        Dorsalis pedis pulses are 2+ on the right side and 2+ on the left side.   Pulmonary:      Effort: Pulmonary effort is normal. No respiratory distress.      Breath sounds: Normal breath sounds.   Chest:      Chest wall: No crepitus.      Comments: Tenderness to very light palpation left axillary and anterior chest wall, no rash present to these areas.   Abdominal:      Palpations: Abdomen is soft.      Tenderness: There is no abdominal tenderness.   Genitourinary:     Comments: Suprapubic catheter present. Light yellow urine present in bag.   Musculoskeletal:      Cervical back: Full passive range of motion without pain and neck supple.      Comments: Upper back just left of midline, scabbed lesions present. No calf tenderness or swelling b/l   Skin:     General: Skin is warm.      Findings: No rash.   Neurological:      General: No focal deficit present.      Mental Status: He is alert. Mental status is at baseline.      Comments: Fluent speech, no acute lateralizing deficits   Psychiatric:         Mood and " Affect: Mood normal.         Behavior: Behavior normal.         Results     LAB:  All pertinent labs reviewed and interpreted  Labs Ordered and Resulted from Time of ED Arrival to Time of ED Departure - No data to display    RADIOLOGY:  No orders to display                ECG:  Afib, rate 60, no acute ischemia, similar morphology to prior EKG from 11/21/21    I have independently reviewed and interpreted the EKG(s) documented above     PROCEDURES:  Procedures:  none      FINAL IMPRESSION:    ICD-10-CM    1. Herpes zoster without complication  B02.9    2. Chest wall pain  R07.89    3. Post herpetic neuralgia  B02.29        0 minutes of critical care time      I, Ivette Mesa, am serving as a scribe to document services personally performed by Dr. Sajan Majano, based on my observations and the provider's statements to me. I, Sajan Majano, DO attest that Ivette Mesa is acting in a scribe capacity, has observed my performance of the services and has documented them in accordance with my direction.      Sajan Majano DO  Emergency Medicine  [facility] EMERGENCY ROOM  12/1/2021  12:26 PM          Sajan Majano MD  12/01/21 1500

## 2021-12-01 NOTE — DISCHARGE INSTRUCTIONS
Follow up with primary clinic next week as scheduled for ongoing evaluation and care. Take oxycodone for breakthrough pain as directed. Otherwise, take Tylenol 500-1000mg up to 4 times a day.

## 2021-12-01 NOTE — ED TRIAGE NOTES
Seen here several days ago and was diagnosed with shingles. This am began complaining of left mid axillary pain radiating into left lower chest. Wanted daughter to bring to ED.

## 2021-12-01 NOTE — ED NOTES
Pt presents with family with C/O left sided pain from shingles.  Taking Tylenol at home without relief.

## 2021-12-02 ENCOUNTER — PATIENT OUTREACH (OUTPATIENT)
Dept: GERIATRIC MEDICINE | Facility: CLINIC | Age: 86
End: 2021-12-02
Payer: COMMERCIAL

## 2021-12-02 DIAGNOSIS — Z53.9 DIAGNOSIS NOT YET DEFINED: Primary | ICD-10-CM

## 2021-12-02 LAB
ATRIAL RATE - MUSE: 62 BPM
DIASTOLIC BLOOD PRESSURE - MUSE: NORMAL MMHG
INTERPRETATION ECG - MUSE: NORMAL
P AXIS - MUSE: NORMAL DEGREES
PR INTERVAL - MUSE: NORMAL MS
QRS DURATION - MUSE: 120 MS
QT - MUSE: 382 MS
QTC - MUSE: 382 MS
R AXIS - MUSE: -2 DEGREES
SYSTOLIC BLOOD PRESSURE - MUSE: NORMAL MMHG
T AXIS - MUSE: 78 DEGREES
VENTRICULAR RATE- MUSE: 60 BPM

## 2021-12-06 ENCOUNTER — ANTICOAGULATION THERAPY VISIT (OUTPATIENT)
Dept: ANTICOAGULATION | Facility: CLINIC | Age: 86
End: 2021-12-06
Payer: COMMERCIAL

## 2021-12-06 ENCOUNTER — MEDICAL CORRESPONDENCE (OUTPATIENT)
Dept: HEALTH INFORMATION MANAGEMENT | Facility: CLINIC | Age: 86
End: 2021-12-06
Payer: COMMERCIAL

## 2021-12-06 DIAGNOSIS — I48.91 ATRIAL FIBRILLATION (H): Primary | ICD-10-CM

## 2021-12-06 DIAGNOSIS — N28.0 RENAL INFARCT (H): ICD-10-CM

## 2021-12-06 LAB — INR (EXTERNAL): 2.8 (ref 0.9–1.1)

## 2021-12-06 PROCEDURE — G0179 MD RECERTIFICATION HHA PT: HCPCS | Performed by: FAMILY MEDICINE

## 2021-12-06 NOTE — PROGRESS NOTES
ANTICOAGULATION MANAGEMENT     Benny Carrillo 89 year old male is on warfarin with therapeutic INR result. (Goal INR 2.0-3.0)    Recent labs: (last 7 days)     12/06/21  0955   INR 2.8*       ASSESSMENT     Source(s): Chart Review, Patient/Caregiver Call and Home Care/Facility Nurse       Warfarin doses taken: Warfarin taken as instructed    Diet: No new diet changes identified    New illness, injury, or hospitalization: Residual pain from shing    Medication/supplement changes: Finished Valtrex for shingles 11/28/2021    Signs or symptoms of bleeding or clotting: No    Previous INR: Therapeutic last visit; previously outside of goal range    Additional findings: None     PLAN     Recommended plan for no diet, medication or health factor changes affecting INR     Dosing Instructions: Continue your current warfarin dose with next INR in 2 weeks       Summary  As of 12/6/2021    Full warfarin instructions:  3.5 mg every Sun, Tue, Thu; 2.5 mg all other days   Next INR check:  12/20/2021             Telephone call with home care nurse Neelima who verbalizes understanding and agrees to plan    Orders given to  Homecare nurse/facility to recheck    Education provided: Goal range and significance of current result    Plan made per [facility] anticoagulation protocol    Lance Hawthorne RN  Anticoagulation Clinic  12/6/2021    _______________________________________________________________________     Anticoagulation Episode Summary     Current INR goal:  2.0-3.0   TTR:  62.8 % (1 y)   Target end date:  Indefinite   Send INR reminders to:  CARLOS CERVANTES    Indications    Atrial fibrillation (H) [I48.91]  Renal infarct (H) [N28.0]           Comments:           Anticoagulation Care Providers     Provider Role Specialty Phone number    Alcides Castillo MD Referring Family Medicine [phone]

## 2021-12-08 ENCOUNTER — OFFICE VISIT (OUTPATIENT)
Dept: FAMILY MEDICINE | Facility: CLINIC | Age: 86
End: 2021-12-08
Payer: COMMERCIAL

## 2021-12-08 VITALS
OXYGEN SATURATION: 93 % | DIASTOLIC BLOOD PRESSURE: 78 MMHG | SYSTOLIC BLOOD PRESSURE: 118 MMHG | HEART RATE: 52 BPM | RESPIRATION RATE: 20 BRPM

## 2021-12-08 DIAGNOSIS — K21.9 GASTROESOPHAGEAL REFLUX DISEASE WITHOUT ESOPHAGITIS: ICD-10-CM

## 2021-12-08 DIAGNOSIS — I48.20 CHRONIC ATRIAL FIBRILLATION (H): ICD-10-CM

## 2021-12-08 DIAGNOSIS — Z71.85 IMMUNIZATION COUNSELING: ICD-10-CM

## 2021-12-08 DIAGNOSIS — I10 BENIGN ESSENTIAL HYPERTENSION: ICD-10-CM

## 2021-12-08 DIAGNOSIS — K64.4 EXTERNAL HEMORRHOIDS: ICD-10-CM

## 2021-12-08 DIAGNOSIS — B02.9 HERPES ZOSTER WITHOUT COMPLICATION: Primary | ICD-10-CM

## 2021-12-08 DIAGNOSIS — E63.9 POOR NUTRITION: ICD-10-CM

## 2021-12-08 DIAGNOSIS — R52 PAIN: ICD-10-CM

## 2021-12-08 PROCEDURE — 99214 OFFICE O/P EST MOD 30 MIN: CPT | Mod: 25 | Performed by: FAMILY MEDICINE

## 2021-12-08 PROCEDURE — G0008 ADMIN INFLUENZA VIRUS VAC: HCPCS | Performed by: FAMILY MEDICINE

## 2021-12-08 PROCEDURE — 90662 IIV NO PRSV INCREASED AG IM: CPT | Performed by: FAMILY MEDICINE

## 2021-12-08 RX ORDER — GABAPENTIN 100 MG/1
CAPSULE ORAL
Qty: 30 CAPSULE | Refills: 1 | Status: SHIPPED | OUTPATIENT
Start: 2021-12-08 | End: 2022-01-24

## 2021-12-08 RX ORDER — MULTIVIT,CALC,MINS/IRON/FOLIC 9MG-400MCG
1 TABLET ORAL DAILY
Qty: 90 TABLET | Refills: 1 | Status: SHIPPED | OUTPATIENT
Start: 2021-12-08 | End: 2022-07-10

## 2021-12-08 RX ORDER — ACETAMINOPHEN 500 MG
500-1000 TABLET ORAL EVERY 6 HOURS PRN
Qty: 100 TABLET | Refills: 1 | Status: SHIPPED | OUTPATIENT
Start: 2021-12-08 | End: 2022-06-07

## 2021-12-08 RX ORDER — PANTOPRAZOLE SODIUM 40 MG/1
40 TABLET, DELAYED RELEASE ORAL DAILY
Qty: 90 TABLET | Refills: 1 | Status: SHIPPED | OUTPATIENT
Start: 2021-12-08 | End: 2022-06-24

## 2021-12-08 RX ORDER — HYDROCORTISONE 2.5 %
CREAM (GRAM) TOPICAL
Qty: 28 G | Refills: 2 | Status: SHIPPED | OUTPATIENT
Start: 2021-12-08

## 2021-12-08 RX ORDER — DIGOXIN 125 MCG
125 TABLET ORAL DAILY
Qty: 90 TABLET | Refills: 1 | Status: SHIPPED | OUTPATIENT
Start: 2021-12-08 | End: 2022-07-18

## 2021-12-08 RX ORDER — AMLODIPINE BESYLATE 5 MG/1
5 TABLET ORAL DAILY
Qty: 90 TABLET | Refills: 1 | Status: SHIPPED | OUTPATIENT
Start: 2021-12-08 | End: 2022-06-07

## 2021-12-08 RX ORDER — WARFARIN SODIUM 1 MG/1
TABLET ORAL
Qty: 150 TABLET | Refills: 1 | Status: SHIPPED | OUTPATIENT
Start: 2021-12-08 | End: 2022-10-21

## 2021-12-08 NOTE — PROGRESS NOTES
Assessment & Plan        ICD-10-CM    1. Herpes zoster without complication  B02.9 gabapentin (NEURONTIN) 100 MG capsule   2. Benign essential hypertension  I10 amLODIPine (NORVASC) 5 MG tablet   3. Hemorrhoid  K64.9    4. Poor nutrition  E63.9 Multiple Vitamins-Minerals (GNP THERAPEUTIC-M) TABS   5. Pain  R52 acetaminophen (TYLENOL) 500 MG tablet   6. Chronic atrial fibrillation (H)  I48.20 warfarin ANTICOAGULANT (COUMADIN) 1 MG tablet     digoxin (LANOXIN) 125 MCG tablet   7. Gastroesophageal reflux disease without esophagitis  K21.9 pantoprazole (PROTONIX) 40 MG EC tablet   8. External hemorrhoids  K64.4 hydrocortisone 2.5 % cream   9. Immunization counseling  Z71.85           Left chest herpes zoster some ongoing nerve pain, will treat with gabapentin 100 mg take 1 every 8-12 hours.  As needed for pain/burning    Hypertension continue on amlodipine refill    Atrial fibrillation INR therapeutic rate controlled.    GERD continue on pantoprazole.    Use hydrocortisone for the hemorrhoids    Flu shot was given today    Please see discussion below regarding the Covid vaccinations.        Return in about 3 months (around 3/8/2022) for Follow up. for recheck.        Subjective:    This 89 year old male was seen today for follow-up from hospital.    202 times for chest pain    Turned out he had herpes zoster.  Left mid chest.    Rash started in the back in wraparound slightly.    Main symptoms are in through the mid axillary line in the mid chest    Has a burning sensation    He was treated initially with valacyclovir 1000 mg 3 times daily for 7 days.  When he went back on 12/1/2021 he was treated with some as needed oxycodone.    They have been using Tylenol.  They feel the oxycodone is too strong.    Patient needed refill on other medications for his hypertension chronic atrial fibrillation reflux and some hemorrhoid cream.    We discussed immunizations he has not had Covid vaccinations I strongly encouraged him to  "get that I talked to the family.  They will talk to other family members were \"more in charge \".  All the family members does have their Covid shots and boosters.  They will let me know if they want the patient to get this.    Patient did receive a flu shot today.    Review of Systems    10 point review of systems positive as outlined above otherwise negative    Current Outpatient Medications   Medication     acetaminophen (TYLENOL) 500 MG tablet     albuterol (PROAIR HFA;PROVENTIL HFA;VENTOLIN HFA) 90 mcg/actuation inhaler     amLODIPine (NORVASC) 5 MG tablet     ARTIFICIAL TEARS, POLYVIN ALC, 1.4 % ophthalmic solution     digoxin (LANOXIN) 125 MCG tablet     famotidine (PEPCID) 20 MG tablet     gabapentin (NEURONTIN) 100 MG capsule     guaiFENesin (ROBITUSSIN) 100 MG/5ML SYRP     hydrocortisone 2.5 % cream     loratadine (CLARITIN) 10 MG tablet     melatonin 5 MG tablet     Multiple Vitamins-Minerals (GNP THERAPEUTIC-M) TABS     pantoprazole (PROTONIX) 40 MG EC tablet     polyvinyl alcohol-povidone (ARTIFICIAL TEARS,PVALCH-POVID,) 0.5-0.6 % Drop     warfarin ANTICOAGULANT (COUMADIN) 1 MG tablet     warfarin ANTICOAGULANT (COUMADIN) 2.5 MG tablet     MEDICATION CANNOT BE REORDERED - PLEASE MANUALLY REORDER AND DISCONTINUE THE OLD ORDER     No current facility-administered medications for this visit.       Objective    Vitals: /78 (BP Location: Right arm, Patient Position: Sitting, Cuff Size: Adult Regular)   Pulse 52   Resp 20   SpO2 93%   BMI= There is no height or weight on file to calculate BMI.     Physical Exam    General appearance no acute distress    Vital signs as outlined heart rate was around 60 irregularly irregular.  Patient has chronic atrial fibrillation    No shortness of breath    Lungs were clear throughout no rales or rhonchi    Patient had a CT of the chest negative for PE there was mild emphysema.    Skin showed some grouped scabbed lesions in the mid posterior chest, consistent with " healing herpes zoster    Heart irregularly irregular as outlined.    Abdomen nontender, no masses    Extremities without edema.    Labs from the hospital on 11/21/2021 showed a GFR of 46 we will recheck that down the line we will plan to recheck in about 3 months.    INR on 12/6/2021 was 2.8.        Alcides Castillo MD

## 2021-12-15 NOTE — PROGRESS NOTES
[facility] Care Coordination Contact  CC received notification of Emergency Room visit.  ER visit occurred on 12/01/2021 at [facility] with Dx of herpes.    CC contacted adult son Steven and reviewed discharge summary.  Member has a follow-up appointment with PCP: Yes: scheduled on 12/08  Member has had a change in condition: No  New referrals placed: No  Home Visit Needed: No  Care plan reviewed and updated.  PCP notified of ED visit via EMR.    KESHA Sun  [facility]  [phone]

## 2021-12-16 ENCOUNTER — PATIENT OUTREACH (OUTPATIENT)
Dept: GERIATRIC MEDICINE | Facility: CLINIC | Age: 86
End: 2021-12-16
Payer: COMMERCIAL

## 2021-12-16 DIAGNOSIS — Z86.73 HISTORY OF CVA (CEREBROVASCULAR ACCIDENT): ICD-10-CM

## 2021-12-16 DIAGNOSIS — M62.81 MUSCLE WEAKNESS (GENERALIZED): Primary | ICD-10-CM

## 2021-12-16 SDOH — ECONOMIC STABILITY: FOOD INSECURITY: WITHIN THE PAST 12 MONTHS, THE FOOD YOU BOUGHT JUST DIDN'T LAST AND YOU DIDN'T HAVE MONEY TO GET MORE.: NEVER TRUE

## 2021-12-16 SDOH — ECONOMIC STABILITY: TRANSPORTATION INSECURITY
IN THE PAST 12 MONTHS, HAS LACK OF TRANSPORTATION KEPT YOU FROM MEETINGS, WORK, OR FROM GETTING THINGS NEEDED FOR DAILY LIVING?: NO

## 2021-12-16 SDOH — ECONOMIC STABILITY: INCOME INSECURITY: IN THE LAST 12 MONTHS, WAS THERE A TIME WHEN YOU WERE NOT ABLE TO PAY THE MORTGAGE OR RENT ON TIME?: NO

## 2021-12-16 SDOH — HEALTH STABILITY: PHYSICAL HEALTH: ON AVERAGE, HOW MANY DAYS PER WEEK DO YOU ENGAGE IN MODERATE TO STRENUOUS EXERCISE (LIKE A BRISK WALK)?: 0 DAYS

## 2021-12-16 SDOH — ECONOMIC STABILITY: TRANSPORTATION INSECURITY
IN THE PAST 12 MONTHS, HAS THE LACK OF TRANSPORTATION KEPT YOU FROM MEDICAL APPOINTMENTS OR FROM GETTING MEDICATIONS?: NO

## 2021-12-16 SDOH — HEALTH STABILITY: PHYSICAL HEALTH: ON AVERAGE, HOW MANY MINUTES DO YOU ENGAGE IN EXERCISE AT THIS LEVEL?: 0 MIN

## 2021-12-16 SDOH — ECONOMIC STABILITY: FOOD INSECURITY: WITHIN THE PAST 12 MONTHS, YOU WORRIED THAT YOUR FOOD WOULD RUN OUT BEFORE YOU GOT MONEY TO BUY MORE.: NEVER TRUE

## 2021-12-16 ASSESSMENT — SOCIAL DETERMINANTS OF HEALTH (SDOH)
HOW OFTEN DO YOU ATTEND CHURCH OR RELIGIOUS SERVICES?: NEVER
WITHIN THE LAST YEAR, HAVE YOU BEEN AFRAID OF YOUR PARTNER OR EX-PARTNER?: NO
WITHIN THE LAST YEAR, HAVE YOU BEEN KICKED, HIT, SLAPPED, OR OTHERWISE PHYSICALLY HURT BY YOUR PARTNER OR EX-PARTNER?: NO
HOW OFTEN DO YOU ATTENT MEETINGS OF THE CLUB OR ORGANIZATION YOU BELONG TO?: NEVER
HOW OFTEN DO YOU GET TOGETHER WITH FRIENDS OR RELATIVES?: MORE THAN THREE TIMES A WEEK
WITHIN THE LAST YEAR, HAVE YOU BEEN HUMILIATED OR EMOTIONALLY ABUSED IN OTHER WAYS BY YOUR PARTNER OR EX-PARTNER?: NO
DO YOU BELONG TO ANY CLUBS OR ORGANIZATIONS SUCH AS CHURCH GROUPS UNIONS, FRATERNAL OR ATHLETIC GROUPS, OR SCHOOL GROUPS?: NO
HOW HARD IS IT FOR YOU TO PAY FOR THE VERY BASICS LIKE FOOD, HOUSING, MEDICAL CARE, AND HEATING?: NOT HARD AT ALL
WITHIN THE LAST YEAR, HAVE TO BEEN RAPED OR FORCED TO HAVE ANY KIND OF SEXUAL ACTIVITY BY YOUR PARTNER OR EX-PARTNER?: NO
IN A TYPICAL WEEK, HOW MANY TIMES DO YOU TALK ON THE PHONE WITH FAMILY, FRIENDS, OR NEIGHBORS?: NEVER

## 2021-12-16 ASSESSMENT — LIFESTYLE VARIABLES
HOW OFTEN DO YOU HAVE A DRINK CONTAINING ALCOHOL: NEVER
HOW OFTEN DO YOU HAVE SIX OR MORE DRINKS ON ONE OCCASION: NEVER

## 2021-12-16 NOTE — PROGRESS NOTES
[facility] Care Coordination Contact    [facility] Home Visit Assessment     Home visit for Health Risk Assessment with Benny Carrillo completed on December 16, 2021    Type of residence: Private home - no stairs  Current living arrangement: I live in a private home with family     Assessment completed with: Patient    Current Care Plan  Member currently receiving the following home care services: Skilled Nursing   Member currently receiving the following community resources: PCA    Medication Review  Medication reconciliation completed in Epic: No-challenging to complete telephonically. SN visits in place.   Medication set-up & administration: Family/informal caregiver sets up weekly. SN checks INR and sets up coumadin.  Family caregiver administers medications.  Medication Risk Assessment Medication (1 or more, place referral to MTM): N/A: No risk factors identified  MTM Referral Placed: No: No risk factors idenified    Mental/Behavioral Health   Depression Screening: No concerns.  PHQ-2 Total Score (Adult) - Positive if 3 or more points; Administer PHQ-9 if positive: 0  Mental health DX: No        Falls Assessment:   Fallen 2 or more times in the past year?: No   Any fall with injury in the past year?: No    ADL/IADL Dependencies:   Dependent ADLs: Bathing,Dressing,Eating,Grooming,Incontinence,Positioning,Transfers,Wheelchair-with assist,Toileting  Dependent IADLs: Cleaning,Cooking,Laundry,Shopping,Meal Preparation,Medication Management,Money Management,Transportation,Incontinence    [facility] sponsored benefits: Shared information re: Silver Sneakers/gym memberships, ASA, Calcium +D.    PCA Assessment completed at visit: Yes Annual PCA assessment indicated 48 units per day of PCA. This is an increase from the previous assessment. 12 hours per day    Elderly Waiver Eligibility: No-does not meet criteria    Care Plan & Recommendations: Benny Carrillo is a 89 y.o. old male with past medical  history of recurrent bowel obstruction, atrial fibrillation  managed with coumadin, cholecystectomy, hypertension, acute cerebral infarction, and a chronic supra-pubic catheter.     Benny resides in a home with his son Steven, daughter in law, Owen, and grand daughter, Brittanie. Brittanie is the PCA representative. Grandsons are available as well to assist with transportation, appointments, and translating.     Benny receives PCA services to keep him supported in the home as well as SN visits to assist with managing suprabupic catheter. No additional services are being requested at this time. Slight increase in PCA hours related to behavioral changes.     Requesting a shower chair and to change the size of depends from M to S.     See [facility] for detailed assessment information.    Follow-Up Plan: Member informed of future contact, plan to f/u with member with a 6 month telephone assessment.  Contact information shared with member and family, encouraged member to call with any questions or concerns at any time.    KESHA Sun  [facility]  [phone]

## 2021-12-20 ENCOUNTER — ANTICOAGULATION THERAPY VISIT (OUTPATIENT)
Dept: ANTICOAGULATION | Facility: CLINIC | Age: 86
End: 2021-12-20
Payer: COMMERCIAL

## 2021-12-20 DIAGNOSIS — N28.0 RENAL INFARCT (H): ICD-10-CM

## 2021-12-20 DIAGNOSIS — I48.91 ATRIAL FIBRILLATION (H): Primary | ICD-10-CM

## 2021-12-20 LAB — INR (EXTERNAL): 4 (ref 0.9–1.1)

## 2021-12-20 NOTE — PROGRESS NOTES
ANTICOAGULATION MANAGEMENT     Benny Carrillo 89 year old male is on warfarin with supratherapeutic INR result. (Goal INR 2.0-3.0)    Recent labs: (last 7 days)     12/20/21  1004   INR 4.0*       ASSESSMENT     Source(s): Chart Review and Home Care/Facility Nurse       Warfarin doses taken: Warfarin taken as instructed    Diet: No new diet changes identified    New illness, injury, or hospitalization: No    Medication/supplement changes: None noted    Signs or symptoms of bleeding or clotting: No    Previous INR: Therapeutic last 2(+) visits    Additional findings: None     PLAN     Recommended plan for no diet, medication or health factor changes affecting INR     Dosing Instructions: Hold dose then Decrease your warfarin dose (10% change) with next INR in 1 week       Summary  As of 12/20/2021    Full warfarin instructions:  12/20: Hold; Otherwise 3.5 mg every Thu; 2.5 mg all other days   Next INR check:  12/27/2021             Telephone call with home care nurse Neelima who verbalizes understanding and agrees to plan    Orders given to  Homecare nurse/facility to recheck    Education provided: Goal range and significance of current result    Plan made per [facility] anticoagulation protocol    Lance Hawthorne RN  Anticoagulation Clinic  12/20/2021    _______________________________________________________________________     Anticoagulation Episode Summary     Current INR goal:  2.0-3.0   TTR:  62.7 % (1 y)   Target end date:  Indefinite   Send INR reminders to:  CARLOS CERVANTES    Indications    Atrial fibrillation (H) [I48.91]  Renal infarct (H) [N28.0]           Comments:           Anticoagulation Care Providers     Provider Role Specialty Phone number    Alcides Castillo MD Referring Family Medicine [phone]

## 2021-12-22 ENCOUNTER — MEDICAL CORRESPONDENCE (OUTPATIENT)
Dept: HEALTH INFORMATION MANAGEMENT | Facility: CLINIC | Age: 86
End: 2021-12-22
Payer: COMMERCIAL

## 2021-12-23 ENCOUNTER — PATIENT OUTREACH (OUTPATIENT)
Dept: GERIATRIC MEDICINE | Facility: CLINIC | Age: 86
End: 2021-12-23
Payer: COMMERCIAL

## 2021-12-23 NOTE — PROGRESS NOTES
[facility] Care Coordination Contact    Received after visit chart from care coordinator.  Completed following tasks: Mailed copy of care plan to client    [facility]:  Emailed completed PCA assessment to [facility].  Faxed copy of PCA assessment to PCA Agency and mailed copy to member.  Faxed MD Communication to PCP.     Mailed POC signature page and  PCA signature page to member to sign and return asap.    Callum Green  [facility]  Case Management Specialist  [phone]

## 2021-12-23 NOTE — LETTER
December 23, 2021      EDUARDO STATON  [address] [address]      Dear Eduardo:    At [facility], we are dedicated to improving your health and well-being. Enclosed is the Comprehensive Care Plan that we developed with you on 12/16/21. Please review the Care Plan carefully.    As a reminder, some of the things we discussed at your visit include:    Your physical and mental health    Ways to reduce falls    Health care needs you may have    Don t forget to contact your care coordinator if you:    Have been hospitalized or plan to be hospitalized     Have had a fall     Have experienced a change in physical health    Are experiencing emotional problems     If you do not agree with your Care Plan, have questions about it, or have experienced a change in your needs, please call me at [phone]. If you are hearing impaired, please call the [place] Relay at 493 or [phone] (raarbh-ti-vtxaav relay service).    Sincerely,    KESHA Sun  [phone]  [email or web address] ([facility]) is a health plan that contracts with both Medicare and the [place] Medical Assistance (Medicaid) program to provide benefits of both programs to enrollees. Enrollment in [facility] depends on contract renewal.    [id number]     [id number] (11/18)

## 2021-12-24 DIAGNOSIS — K21.9 GASTROESOPHAGEAL REFLUX DISEASE WITHOUT ESOPHAGITIS: ICD-10-CM

## 2021-12-24 NOTE — TELEPHONE ENCOUNTER
[facility]  In Patient Cardiology Consult             Date:   2021  Patient name: Marva Mendez  Date of admission:  2021  4:24 PM  MRN:   [id number]  YOB: 1940    Reason for Admission:  COVID    CHIEF COMPLAINT:  COVID     History Obtained From:  Pt and chart    HISTORY OF PRESENT ILLNESS:    This is an 80year old male who presents due to fall. Wife found him down in bathroom. EMS was called. Found him confused and hypoxic. He was dx with covid 7 days ago. C- collar in place. We were consulted for elevated troponins. Per nursing no CP. Sinus tachy on monitor- . Just recently taken off O2- now on room air. Was on heparin drip, which is on hold, due to a bloody bowel movement. Past Medical History:    Past Medical History:   Diagnosis Date    Arthritis     Asthma     Hyperlipemia     Hypertension          Past Surgical History:    Past Surgical History:   Procedure Laterality Date    TOTAL KNEE ARTHROPLASTY           Home Medications:    No outpatient medications have been marked as taking for the 21 encounter [facility] Encounter). Allergies:  Patient has no known allergies. Social History:    Social History     Socioeconomic History    Marital status:      Spouse name: None    Number of children: None    Years of education: None    Highest education level: None   Occupational History    None   Tobacco Use    Smoking status: Former Smoker     Packs/day: 1.50     Years: 20.00     Pack years: 30.00     Quit date:      Years since quittin.0    Smokeless tobacco: Never Used   Substance and Sexual Activity    Alcohol use:  Yes     Alcohol/week: 1.0 standard drink     Types: 1 Glasses of wine per week     Comment: COUPLE TIMES A WEEK WITH DINNER    Drug use: Never    Sexual activity: None   Other Topics Concern    None   Social History Narrative    None     Social Determinants of Health     Financial Telephone Encounter by Perlita Brown RN at 6/4/2021 12:59 PM     Author: Perlita Brown RN Service: -- Author Type: Registered Nurse    Filed: 6/4/2021  1:12 PM Encounter Date: 6/4/2021 Status: Signed    : Perlita Brown RN (Registered Nurse)       ANTICOAGULATION  MANAGEMENT- Home Care/Care Facility Result    Assessment     Today's INR result of 1.3 is Subtherapeutic (goal INR of 2.0-3.0)        Warfarin taken as previously instructed denies missed doses or other changes    No new diet changes affecting INR    No new medication/supplements affecting INR    Continues to tolerate warfarin with no reported s/s of bleeding or thromboembolism     Previous INR was Subtherapeutic    Plan:     Spoke with home care Neelima discussed INR result and instructed:     Warfarin Dosing Instructions: 4.5 mg tonight to boost then change warfarin dose to 2.5 mg daily on sun/tue; and 3.5 mg daily rest of week  (18.4 % change)    Next INR to be drawn: one week        Neelima verbalizes understanding and agrees to warfarin dosing plan.   ?   Perlita Brown RN    Subjective/Objective:      Benny Carrillo, a 89 y.o. male is established on warfarin.     Home care/care facility RN's report of Benny INR, recent warfarin dosing, diet changes, medication changes, and symptoms is documented below.    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]  Atrial fibrillation (H) [I48.91]           Comments:           Anticoagulation Care Providers     Provider Role Specialty Phone number    Alcides Castillo MD Referring Family Medicine [phone]                              39 degrees    Narrative    Sinus tachycardia  Low voltage QRS  Cannot rule out Anterior infarct , age undetermined  Abnormal ECG  No previous ECGs available     Labs:     CBC:   Recent Labs     12/23/21 1705 12/24/21  0700   WBC 4.4  --    HGB 16.0 16.6   HCT 48.7 50.0     --      BMP:   Recent Labs     12/23/21 1705 12/23/21 2055   * 135   K 7.2* 4.8   CO2 17* 19*   BUN 65* 61*   CREATININE 2.08* 1.72*   LABGLOM 31* 38*   GLUCOSE 213* 88     BNP: No results for input(s): BNP in the last 72 hours. PT/INR:   Recent Labs     12/23/21 1705   PROTIME 13.4   INR 1.0     APTT:  Recent Labs     12/23/21 1705   APTT 31.3     CARDIAC ENZYMES:  Recent Labs     12/23/21 1705 12/23/21 2055   TROPHS 50* 115*     FASTING LIPID PANEL:  Lab Results   Component Value Date    HDL 65 10/12/2021    TRIG 128 10/12/2021     LIVER PROFILE:  Recent Labs     12/23/21 1705   AST 73*   ALT 47*   LABALBU 4.3         IMPRESSION:    Patient Active Problem List   Diagnosis    Essential hypertension    Osteoarthritis of lumbosacral spine    Osteoarthritis of cervical spine with myelopathy    DANIELLE (acute kidney injury) ([facility] [address]     - Elevated troponin- in setting of covid, DANIELLE- possible type 2  - COVID 19  - DANIELLE  - Syncope due to dehydration   - Fall at home  - Bloody bowel movement    RECOMMENDATIONS:  - repeat trop  - on IVF  - heparin on hold due to bloody BM  - check 2d echo  - if 2d echo is low risk, no further inpatient workup is planned, can follow up in 2-4 weeks for consideration of further testing. Discussed with nursing. Thank you for allowing me to participate in the care of this patient, please do not hesitate to call if you have any questions. Radha Miller DO, [facility] [place] [facility], [address], [address], [place] [address] [facility]  [email or web address]  [phone]

## 2021-12-27 ENCOUNTER — ANTICOAGULATION THERAPY VISIT (OUTPATIENT)
Dept: ANTICOAGULATION | Facility: CLINIC | Age: 86
End: 2021-12-27
Payer: COMMERCIAL

## 2021-12-27 DIAGNOSIS — N28.0 RENAL INFARCT (H): ICD-10-CM

## 2021-12-27 DIAGNOSIS — I48.91 ATRIAL FIBRILLATION (H): Primary | ICD-10-CM

## 2021-12-27 LAB — INR (EXTERNAL): 3.3 (ref 0.9–1.1)

## 2021-12-27 RX ORDER — FAMOTIDINE 20 MG/1
TABLET, FILM COATED ORAL
Qty: 90 TABLET | Refills: 1 | Status: SHIPPED | OUTPATIENT
Start: 2021-12-27 | End: 2022-06-23

## 2021-12-27 NOTE — PROGRESS NOTES
ANTICOAGULATION MANAGEMENT     Benny Carrillo 89 year old male is on warfarin with supratherapeutic INR result. (Goal INR 2.0-3.0)    Recent labs: (last 7 days)     12/27/21  0954   INR 3.3*       ASSESSMENT     Source(s): Chart Review and Home Care/Facility Nurse       Warfarin doses taken: Warfarin taken as instructed    Diet: No new diet changes identified    New illness, injury, or hospitalization: No    Medication/supplement changes: None noted    Signs or symptoms of bleeding or clotting: No    Previous INR: Supratherapeutic    Additional findings: None     PLAN     Recommended plan for no diet, medication or health factor changes affecting INR     Dosing Instructions:  Decrease your warfarin dose (5.4% change) with next INR in 1 week       Summary  As of 12/27/2021    Full warfarin instructions:  2.5 mg every day   Next INR check:  1/3/2022             Telephone call with home care nurse Neelima who agrees to plan and repeated back plan correctly    Orders given to  Homecare nurse/facility to recheck    Education provided: Please call back if any changes to your diet, medications or how you've been taking warfarin    Plan made per [facility] anticoagulation protocol    Lilli Red, RN  Anticoagulation Clinic  12/27/2021    _______________________________________________________________________     Anticoagulation Episode Summary     Current INR goal:  2.0-3.0   TTR:  61.9 % (1 y)   Target end date:  Indefinite   Send INR reminders to:  [facility]    Indications    Atrial fibrillation (H) [I48.91]  Renal infarct (H) [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

## 2021-12-28 ENCOUNTER — PATIENT OUTREACH (OUTPATIENT)
Dept: GERIATRIC MEDICINE | Facility: CLINIC | Age: 86
End: 2021-12-28
Payer: COMMERCIAL

## 2021-12-28 NOTE — PROGRESS NOTES
[facility] Care Coordination    Situation: Patient's family calling to inquire about getting the COVID-19 vaccination administered at home.     Assessment: Contacted MD hotline at [phone] and provided patient's information/ placed a referral. MD will contact family directly to discuss eligibility.    Plan/Recommendations: CC updated family. Will remain available if needed.    KESHA Sun  [facility]  [phone]

## 2021-12-28 NOTE — TELEPHONE ENCOUNTER
"Last Written Prescription Date:  07/20/2021  Last Fill Quantity: 90,  # refills: 1   Last office visit provider:   12/08/2021 with Dr Castillo.    Requested Prescriptions   Pending Prescriptions Disp Refills     famotidine (PEPCID) 20 MG tablet 90 tablet 1     Sig: Take 1 tablet by mouth once daily       H2 Blockers Protocol Passed - 12/24/2021  8:13 AM        Passed - Patient is age 12 or older        Passed - Recent (12 mo) or future (30 days) visit within the authorizing provider's specialty     Patient has had an office visit with the authorizing provider or a provider within the authorizing providers department within the previous 12 mos or has a future within next 30 days. See \"Patient Info\" tab in inbasket, or \"Choose Columns\" in Meds & Orders section of the refill encounter.              Passed - Medication is active on med list             Malgorzata Humphreys 12/27/21 9:33 PM  "

## 2022-01-03 ENCOUNTER — ANTICOAGULATION THERAPY VISIT (OUTPATIENT)
Dept: FAMILY MEDICINE | Facility: CLINIC | Age: 87
End: 2022-01-03
Payer: COMMERCIAL

## 2022-01-03 DIAGNOSIS — N28.0 RENAL INFARCT (H): ICD-10-CM

## 2022-01-03 DIAGNOSIS — I48.91 ATRIAL FIBRILLATION (H): Primary | ICD-10-CM

## 2022-01-03 LAB — INR (EXTERNAL): 2.9 (ref 0.9–1.1)

## 2022-01-03 NOTE — PROGRESS NOTES
ANTICOAGULATION MANAGEMENT     Benny Carrillo 89 year old male is on warfarin with therapeutic INR result. (Goal INR 2.0-3.0)    Recent labs: (last 7 days)     01/03/22  0959   INR 2.9*       ASSESSMENT     Source(s): Chart Review and Home Care/Facility Nurse       Warfarin doses taken: Warfarin taken as instructed    Diet: No new diet changes identified    New illness, injury, or hospitalization: No    Medication/supplement changes: None noted    Signs or symptoms of bleeding or clotting: No    Previous INR: Supratherapeutic    Additional findings: None     PLAN     Recommended plan for no diet, medication or health factor changes affecting INR     Dosing Instructions: Continue your current warfarin dose with next INR in 1 week       Summary  As of 1/3/2022    Full warfarin instructions:  2.5 mg every day   Next INR check:               Telephone call with home care nurse Neelima who agrees to plan and repeated back plan correctly    Orders given to  Homecare nurse/facility to recheck    Education provided: Please call back if any changes to your diet, medications or how you've been taking warfarin    Plan made per [facility] anticoagulation protocol    Montse Riley RN  Anticoagulation Clinic  1/3/2022    _______________________________________________________________________     Anticoagulation Episode Summary     Current INR goal:  2.0-3.0   TTR:  60.4 % (1 y)   Target end date:  Indefinite   Send INR reminders to:  [facility]    Indications    Atrial fibrillation (H) [I48.91]  Renal infarct (H) [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

## 2022-01-06 ENCOUNTER — IMMUNIZATION (OUTPATIENT)
Dept: NURSING | Facility: CLINIC | Age: 87
End: 2022-01-06
Payer: COMMERCIAL

## 2022-01-06 PROCEDURE — 91301 PR COVID VAC MODERNA 100 MCG/0.5 ML IM: CPT

## 2022-01-06 PROCEDURE — 0011A PR COVID VAC MODERNA 100 MCG/0.5 ML IM: CPT

## 2022-01-10 ENCOUNTER — ANTICOAGULATION THERAPY VISIT (OUTPATIENT)
Dept: ANTICOAGULATION | Facility: CLINIC | Age: 87
End: 2022-01-10
Payer: COMMERCIAL

## 2022-01-10 DIAGNOSIS — N28.0 RENAL INFARCT (H): ICD-10-CM

## 2022-01-10 DIAGNOSIS — I48.91 ATRIAL FIBRILLATION (H): Primary | ICD-10-CM

## 2022-01-10 LAB — INR (EXTERNAL): 2.2 (ref 0.9–1.1)

## 2022-01-12 ENCOUNTER — MEDICAL CORRESPONDENCE (OUTPATIENT)
Dept: HEALTH INFORMATION MANAGEMENT | Facility: CLINIC | Age: 87
End: 2022-01-12
Payer: COMMERCIAL

## 2022-01-19 DIAGNOSIS — B02.9 HERPES ZOSTER WITHOUT COMPLICATION: ICD-10-CM

## 2022-01-21 NOTE — TELEPHONE ENCOUNTER
Routing refill request to provider for review/approval because:  Drug not on the [facility] refill protocol     Last Written Prescription Date:  21  Last Fill Quantity: 30,  # refills: 1   Last office visit provider:  21     Requested Prescriptions   Pending Prescriptions Disp Refills     gabapentin (NEURONTIN) 100 MG capsule 30 capsule 1     Si capsule every 8-12 hours as needed for nerve pain       There is no refill protocol information for this order          Juan Jose Barker RN 22 3:17 PM

## 2022-01-24 ENCOUNTER — ANTICOAGULATION THERAPY VISIT (OUTPATIENT)
Dept: ANTICOAGULATION | Facility: CLINIC | Age: 87
End: 2022-01-24
Payer: COMMERCIAL

## 2022-01-24 DIAGNOSIS — N28.0 RENAL INFARCT (H): ICD-10-CM

## 2022-01-24 DIAGNOSIS — I48.91 ATRIAL FIBRILLATION (H): Primary | ICD-10-CM

## 2022-01-24 LAB — INR (EXTERNAL): 2 (ref 0.9–1.1)

## 2022-01-24 RX ORDER — GABAPENTIN 100 MG/1
CAPSULE ORAL
Qty: 30 CAPSULE | Refills: 1 | Status: SHIPPED | OUTPATIENT
Start: 2022-01-24 | End: 2022-01-31

## 2022-01-24 NOTE — PROGRESS NOTES
ANTICOAGULATION MANAGEMENT     Benny Carrillo 89 year old male is on warfarin with therapeutic INR result. (Goal INR 2.0-3.0)    Recent labs: (last 7 days)     01/24/22  1000   INR 2.0*       ASSESSMENT     Source(s): Chart Review and Home Care/Facility Nurse       Warfarin doses taken: Warfarin taken as instructed    Diet: No new diet changes identified    New illness, injury, or hospitalization: No    Medication/supplement changes: None noted    Signs or symptoms of bleeding or clotting: No    Previous INR: Therapeutic last 2(+) visits    Additional findings: None     PLAN     Recommended plan for no diet, medication or health factor changes affecting INR     Dosing Instructions: Continue your current warfarin dose with next INR in 2 weeks       Summary  As of 1/24/2022    Full warfarin instructions:  2.5 mg every day   Next INR check:               Telephone call with Benny who verbalizes understanding and agrees to plan    Orders given to  Homecare nurse/facility to recheck    Education provided: Goal range and significance of current result    Plan made per [facility] anticoagulation protocol    Radhika Crooks RN  Anticoagulation Clinic  1/24/2022    _______________________________________________________________________     Anticoagulation Episode Summary     Current INR goal:  2.0-3.0   TTR:  60.4 % (1 y)   Target end date:  Indefinite   Send INR reminders to:  [facility]    Indications    Atrial fibrillation (H) [I48.91]  Renal infarct (H) [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

## 2022-01-25 ENCOUNTER — TELEPHONE (OUTPATIENT)
Dept: FAMILY MEDICINE | Facility: CLINIC | Age: 87
End: 2022-01-25
Payer: COMMERCIAL

## 2022-01-25 NOTE — TELEPHONE ENCOUNTER
Reason for Call:  Home Health Care    Shelia with [facility] called regarding (reason for call): Verbal Orders    Orders are needed for this patient. Skilled Nursing    PT: n/a    OT: n/a    Skilled Nursin x a week for 9 weeks, 3 PRNs    Pt Provider: Dr. Castillo    Phone Number Homecare Nurse can be reached at: [phone]    Can we leave a detailed message on this number? YES    Phone number patient can be reached at: Home number on file [phone] (home)    Best Time: any    Call taken on 2022 at 5:00 PM by Germaine Franklin

## 2022-01-27 DIAGNOSIS — B02.9 HERPES ZOSTER WITHOUT COMPLICATION: ICD-10-CM

## 2022-01-30 NOTE — TELEPHONE ENCOUNTER
Routing refill request to provider for review/approval because:  Drug not on the [facility] Refill Protocol    Last Written Prescription Date:  22  Last Fill Quantity: 30,  # refills: 1   Last office visit provider:  21    Requested Prescriptions   Pending Prescriptions Disp Refills     gabapentin (NEURONTIN) 100 MG capsule 30 capsule 1     Si capsule every 8-12 hours as needed for nerve pain       There is no refill protocol information for this order          Annie Corona RN 22 12:55 AM

## 2022-01-31 RX ORDER — GABAPENTIN 100 MG/1
CAPSULE ORAL
Qty: 30 CAPSULE | Refills: 3 | Status: SHIPPED | OUTPATIENT
Start: 2022-01-31 | End: 2022-06-19

## 2022-02-03 ENCOUNTER — IMMUNIZATION (OUTPATIENT)
Dept: NURSING | Facility: CLINIC | Age: 87
End: 2022-02-03
Attending: PEDIATRICS
Payer: COMMERCIAL

## 2022-02-03 PROCEDURE — 0012A PR COVID VAC MODERNA 100 MCG/0.5 ML IM: CPT

## 2022-02-03 PROCEDURE — 91301 PR COVID VAC MODERNA 100 MCG/0.5 ML IM: CPT

## 2022-02-07 ENCOUNTER — ANTICOAGULATION THERAPY VISIT (OUTPATIENT)
Dept: ANTICOAGULATION | Facility: CLINIC | Age: 87
End: 2022-02-07
Payer: COMMERCIAL

## 2022-02-07 DIAGNOSIS — N28.0 RENAL INFARCT (H): ICD-10-CM

## 2022-02-07 DIAGNOSIS — I48.91 ATRIAL FIBRILLATION (H): Primary | ICD-10-CM

## 2022-02-07 LAB — INR (EXTERNAL): 2.8 (ref 0.9–1.1)

## 2022-02-07 NOTE — PROGRESS NOTES
ANTICOAGULATION MANAGEMENT     Benny Carrillo 89 year old male is on warfarin with therapeutic INR result. (Goal INR 2.0-3.0)    Recent labs: (last 7 days)     02/07/22  1004   INR 2.8*       ASSESSMENT     Source(s): Chart Review and Home Care/Facility Nurse       Warfarin doses taken: Warfarin taken as instructed    Diet: No new diet changes identified    New illness, injury, or hospitalization: No    Medication/supplement changes: None noted    Signs or symptoms of bleeding or clotting: No    Previous INR: Therapeutic last 2(+) visits    Additional findings: None     PLAN     Recommended plan for no diet, medication or health factor changes affecting INR     Dosing Instructions: Continue your current warfarin dose with next INR in 3 weeks       Summary  As of 2/7/2022    Full warfarin instructions:  2.5 mg every day   Next INR check:  2/28/2022             Telephone call with home care nurse Neelima who verbalizes understanding and agrees to plan    Orders given to  Homecare nurse/facility to recheck    Education provided: Please call back if any changes to your diet, medications or how you've been taking warfarin    Plan made per [facility] anticoagulation protocol    Lilli Red, RN  Anticoagulation Clinic  2/7/2022    _______________________________________________________________________     Anticoagulation Episode Summary     Current INR goal:  2.0-3.0   TTR:  60.4 % (1 y)   Target end date:  Indefinite   Send INR reminders to:  [facility]    Indications    Atrial fibrillation (H) [I48.91]  Renal infarct (H) [N28.0]           Comments:           Anticoagulation Care Providers     Provider Role Specialty Phone number    Alcides Castillo MD Referring Family Medicine [phone]

## 2022-02-28 ENCOUNTER — ANTICOAGULATION THERAPY VISIT (OUTPATIENT)
Dept: ANTICOAGULATION | Facility: CLINIC | Age: 87
End: 2022-02-28
Payer: COMMERCIAL

## 2022-02-28 DIAGNOSIS — N28.0 RENAL INFARCT (H): ICD-10-CM

## 2022-02-28 DIAGNOSIS — I48.91 ATRIAL FIBRILLATION (H): Primary | ICD-10-CM

## 2022-02-28 LAB — INR (EXTERNAL): 1.9 (ref 0.9–1.1)

## 2022-02-28 NOTE — PROGRESS NOTES
ANTICOAGULATION MANAGEMENT     Benny Carrillo 89 year old male is on warfarin with subtherapeutic INR result. (Goal INR 2.0-3.0)    Recent labs: (last 7 days)     02/28/22  0945   INR 1.9*       ASSESSMENT     Source(s): Chart Review and Home Care/Facility Nurse       Warfarin doses taken: Warfarin taken as instructed    Diet: No new diet changes identified    New illness, injury, or hospitalization: No    Medication/supplement changes: None noted    Signs or symptoms of bleeding or clotting: No    Previous INR: Therapeutic last 2(+) visits    Additional findings: None     PLAN     Recommended plan for no diet, medication or health factor changes affecting INR     Dosing Instructions: Continue your current warfarin dose with next INR in 2 weeks       Summary  As of 2/28/2022    Full warfarin instructions:  2.5 mg every day   Next INR check:  3/14/2022             Telephone call with home care nurse Neelima who verbalizes understanding and agrees to plan    Orders given to  Homecare nurse/facility to recheck    Education provided: None required    Plan made per [facility] anticoagulation protocol    Candida Melendez, RN  Anticoagulation Clinic  2/28/2022    _______________________________________________________________________     Anticoagulation Episode Summary     Current INR goal:  2.0-3.0   TTR:  59.8 % (1 y)   Target end date:  Indefinite   Send INR reminders to:  [facility]    Indications    Atrial fibrillation (H) [I48.91]  Renal infarct (H) [N28.0]           Comments:           Anticoagulation Care Providers     Provider Role Specialty Phone number    Alcides Castillo MD Referring Family Medicine [phone]

## 2022-03-08 ENCOUNTER — MEDICAL CORRESPONDENCE (OUTPATIENT)
Dept: HEALTH INFORMATION MANAGEMENT | Facility: CLINIC | Age: 87
End: 2022-03-08
Payer: COMMERCIAL

## 2022-03-14 ENCOUNTER — ANTICOAGULATION THERAPY VISIT (OUTPATIENT)
Dept: ANTICOAGULATION | Facility: CLINIC | Age: 87
End: 2022-03-14
Payer: COMMERCIAL

## 2022-03-14 DIAGNOSIS — N28.0 RENAL INFARCT (H): ICD-10-CM

## 2022-03-14 DIAGNOSIS — I48.91 ATRIAL FIBRILLATION (H): Primary | ICD-10-CM

## 2022-03-14 LAB — INR (EXTERNAL): 2.1 (ref 0.9–1.1)

## 2022-03-14 NOTE — PROGRESS NOTES
ANTICOAGULATION MANAGEMENT     Benny Carrillo 89 year old male is on warfarin with therapeutic INR result. (Goal INR 2.0-3.0)    Recent labs: (last 7 days)     03/14/22  1005   INR 2.1*       ASSESSMENT       Source(s): Chart Review and Home Care/Facility Nurse       Warfarin doses taken: Warfarin taken as instructed    Diet: No new diet changes identified    New illness, injury, or hospitalization: No    Medication/supplement changes: None noted    Signs or symptoms of bleeding or clotting: No    Previous INR: Subtherapeutic    Additional findings: None       PLAN     Recommended plan for no diet, medication or health factor changes affecting INR     Dosing Instructions: Continue your current warfarin dose with next INR in 2 weeks       Summary  As of 3/14/2022    Full warfarin instructions:  2.5 mg every day   Next INR check:  3/28/2022             Telephone call with home care nurse Neelima who verbalizes understanding and agrees to plan    Orders given to  Homecare nurse/facility to recheck    Education provided: Please call back if any changes to your diet, medications or how you've been taking warfarin    Plan made per [facility] anticoagulation protocol    Lilli Red, RN  Anticoagulation Clinic  3/14/2022    _______________________________________________________________________     Anticoagulation Episode Summary     Current INR goal:  2.0-3.0   TTR:  58.2 % (1 y)   Target end date:  Indefinite   Send INR reminders to:  [facility]    Indications    Atrial fibrillation (H) [I48.91]  Renal infarct (H) [N28.0]           Comments:           Anticoagulation Care Providers     Provider Role Specialty Phone number    Alcides Castillo MD Referring Family Medicine [phone]

## 2022-03-28 ENCOUNTER — ANTICOAGULATION THERAPY VISIT (OUTPATIENT)
Dept: ANTICOAGULATION | Facility: CLINIC | Age: 87
End: 2022-03-28
Payer: COMMERCIAL

## 2022-03-28 ENCOUNTER — DOCUMENTATION ONLY (OUTPATIENT)
Dept: ANTICOAGULATION | Facility: CLINIC | Age: 87
End: 2022-03-28
Payer: COMMERCIAL

## 2022-03-28 ENCOUNTER — TELEPHONE (OUTPATIENT)
Dept: FAMILY MEDICINE | Facility: CLINIC | Age: 87
End: 2022-03-28
Payer: COMMERCIAL

## 2022-03-28 DIAGNOSIS — N28.0 RENAL INFARCT (H): ICD-10-CM

## 2022-03-28 DIAGNOSIS — I48.20 CHRONIC ATRIAL FIBRILLATION (H): Primary | ICD-10-CM

## 2022-03-28 DIAGNOSIS — Z86.73 HISTORY OF CVA (CEREBROVASCULAR ACCIDENT): ICD-10-CM

## 2022-03-28 DIAGNOSIS — I48.91 ATRIAL FIBRILLATION (H): Primary | ICD-10-CM

## 2022-03-28 LAB — INR (EXTERNAL): 2.1 (ref 0.9–1.1)

## 2022-03-28 NOTE — PROGRESS NOTES
ANTICOAGULATION MANAGEMENT      Benny Carrillo due for annual renewal of referral to anticoagulation monitoring. Order pended for your review and signature.      ANTICOAGULATION SUMMARY      Warfarin indication(s)     Atrial fibrillation    Heart valve present?  NO       Current goal range   INR: 2.0-3.0     Goal appropriate for indication? Yes, INR 2-3 appropriate for hx of DVT, PE, hypercoagulable state, Afib, LVAD, or bileaflet AVR without risk factors     Current duration of therapy Indefinite/long term therapy   Time in Therapeutic Range (TTR)  (Goal > 60%) 62.1%       Office visit with referring provider's group within last year yes on 12/8/21       Montse Riley RN

## 2022-03-28 NOTE — TELEPHONE ENCOUNTER
Reason for Call:  Home Health Care    Shelia with [facility] called regarding (reason for call): verbal order    Orders are needed for this patient. Skilled nursing    PT: no  OT: no    Skilled Nursing: yes   1 every week x 9 weeks     2 prn  Pt Provider: Dr Castillo    Phone Number Homecare Nurse can be reached at:   [phone]  Can we leave a detailed message on this number? YES    Phone number patient can be reached at: Home number on file [phone] (home)    Best Time: anytime     Call taken on 3/28/2022 at 11:36 AM by Ruthann Tovar  ----

## 2022-03-28 NOTE — PROGRESS NOTES
ANTICOAGULATION MANAGEMENT     Benny Carrillo 89 year old male is on warfarin with therapeutic INR result. (Goal INR 2.0-3.0)    Recent labs: (last 7 days)     03/28/22  0931   INR 2.1*       ASSESSMENT       Source(s): Chart Review and Home Care/Facility Nurse       Warfarin doses taken: Warfarin taken as instructed    Diet: No new diet changes identified    New illness, injury, or hospitalization: No    Medication/supplement changes: None noted    Signs or symptoms of bleeding or clotting: No    Previous INR: Therapeutic last visit; previously outside of goal range    Additional findings: None       PLAN     Recommended plan for no diet, medication or health factor changes affecting INR     Dosing Instructions: Continue your current warfarin dose with next INR in 3 weeks       Summary  As of 3/28/2022    Full warfarin instructions:  2.5 mg every day   Next INR check:  4/18/2022             Telephone call with home care nurse Shelia who verbalizes understanding and agrees to plan    Orders given to  Homecare nurse/facility to recheck    Education provided: Please call back if any changes to your diet, medications or how you've been taking warfarin    Plan made per [facility] anticoagulation protocol    Lilli Red, RN  Anticoagulation Clinic  3/28/2022    _______________________________________________________________________     Anticoagulation Episode Summary     Current INR goal:  2.0-3.0   TTR:  62.1 % (1 y)   Target end date:  Indefinite   Send INR reminders to:  [facility]    Indications    Atrial fibrillation (H) [I48.91]  Renal infarct (H) [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

## 2022-03-31 DIAGNOSIS — Z53.9 DIAGNOSIS NOT YET DEFINED: Primary | ICD-10-CM

## 2022-03-31 PROCEDURE — G0179 MD RECERTIFICATION HHA PT: HCPCS | Performed by: FAMILY MEDICINE

## 2022-04-07 ENCOUNTER — MEDICAL CORRESPONDENCE (OUTPATIENT)
Dept: HEALTH INFORMATION MANAGEMENT | Facility: CLINIC | Age: 87
End: 2022-04-07
Payer: COMMERCIAL

## 2022-04-11 ENCOUNTER — MEDICAL CORRESPONDENCE (OUTPATIENT)
Dept: HEALTH INFORMATION MANAGEMENT | Facility: CLINIC | Age: 87
End: 2022-04-11
Payer: COMMERCIAL

## 2022-04-18 ENCOUNTER — ANTICOAGULATION THERAPY VISIT (OUTPATIENT)
Dept: ANTICOAGULATION | Facility: CLINIC | Age: 87
End: 2022-04-18
Payer: COMMERCIAL

## 2022-04-18 DIAGNOSIS — I48.91 ATRIAL FIBRILLATION (H): Primary | ICD-10-CM

## 2022-04-18 DIAGNOSIS — N28.0 RENAL INFARCT (H): ICD-10-CM

## 2022-04-18 DIAGNOSIS — Z86.73 HISTORY OF CVA (CEREBROVASCULAR ACCIDENT): ICD-10-CM

## 2022-04-18 DIAGNOSIS — I48.20 CHRONIC ATRIAL FIBRILLATION (H): ICD-10-CM

## 2022-04-18 LAB — INR (EXTERNAL): 2.9 (ref 0.9–1.1)

## 2022-04-18 RX ORDER — WARFARIN SODIUM 2.5 MG/1
2.5 TABLET ORAL DAILY
Qty: 100 TABLET | Refills: 1 | Status: SHIPPED | OUTPATIENT
Start: 2022-04-18 | End: 2022-10-17

## 2022-04-18 NOTE — PROGRESS NOTES
ANTICOAGULATION MANAGEMENT     Benny Carrillo 89 year old male is on warfarin with therapeutic INR result. (Goal INR 2.0-3.0)    Recent labs: (last 7 days)     04/18/22  0954   INR 2.9*       ASSESSMENT       Source(s): Chart Review and Home Care/Facility Nurse       Warfarin doses taken: Warfarin taken as instructed    Diet: No new diet changes identified    New illness, injury, or hospitalization: No    Medication/supplement changes: None noted    Signs or symptoms of bleeding or clotting: No    Previous INR: Therapeutic last 2(+) visits    Additional findings: Refill needed today. Benny meets all criteria for refill (current [facility] referral, office visit with referring provider/group in last year, lab monitoring up to date or not exceeding 2 weeks overdue). Rx instructions and quantity supplied updated to match patient's current dosing plan. Warfarin 90 day supply with 1 refill granted per [facility] protocol        PLAN     Recommended plan for no diet, medication or health factor changes affecting INR     Dosing Instructions: continue your current warfarin dose with next INR in 4 weeks       Summary  As of 4/18/2022    Full warfarin instructions:  2.5 mg every day   Next INR check:  5/16/2022             Telephone call with [facility] home care/facility nurse who agrees to plan and repeated back plan correctly    Orders given to  Homecare nurse/facility to recheck    Education provided: Please call back if any changes to your diet, medications or how you've been taking warfarin    Plan made per [facility] anticoagulation protocol    Lilli Rde, RN  Anticoagulation Clinic  4/18/2022    _______________________________________________________________________     Anticoagulation Episode Summary     Current INR goal:  2.0-3.0   TTR:  63.3 % (1 y)   Target end date:  Indefinite   Send INR reminders to:  CARLOS CERVANTES    Indications    Atrial fibrillation (H) [I48.91]  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

## 2022-04-20 NOTE — PROGRESS NOTES
[facility] Care Coordination Contact    Faxed signed pca sig page to [employer] and provider.     Callum Green  Care Management Specialist  [facility]  [phone]

## 2022-04-21 DIAGNOSIS — Z91.09 ENVIRONMENTAL ALLERGIES: ICD-10-CM

## 2022-04-22 DIAGNOSIS — Z91.09 ENVIRONMENTAL ALLERGIES: Primary | ICD-10-CM

## 2022-04-22 RX ORDER — GUAIFENESIN 200 MG/10ML
SOLUTION ORAL
COMMUNITY
Start: 2021-12-27 | End: 2022-04-22

## 2022-04-24 NOTE — TELEPHONE ENCOUNTER
"Routing refill request to provider for review/approval because:  Age of patient    Last Written Prescription Date:  7/20/21  Last Fill Quantity: 90,  # refills: 1   Last office visit provider:  7/20/21     Requested Prescriptions   Pending Prescriptions Disp Refills     EQ LORATADINE 10 MG tablet [Pharmacy Med Name: EQ Loratadine 10 MG Oral Tablet] 90 tablet 0     Sig: Take 1 tablet by mouth once daily       Antihistamines Protocol Failed - 4/21/2022 10:32 AM        Failed - Patient is 3-64 years of age     Apply weight-based dosing for peds patients age 3 - 12 years of age.    Forward request to provider for patients under the age of 3 or over the age of 64.          Passed - Recent (12 mo) or future (30 days) visit within the authorizing provider's specialty     Patient has had an office visit with the authorizing provider or a provider within the authorizing providers department within the previous 12 mos or has a future within next 30 days. See \"Patient Info\" tab in inbasket, or \"Choose Columns\" in Meds & Orders section of the refill encounter.              Passed - Medication is active on med list             Annie Corona RN 04/24/22 12:33 PM  "

## 2022-04-26 RX ORDER — GUAIFENESIN 200 MG/10ML
SOLUTION ORAL
Qty: 240 ML | Refills: 0 | Status: SHIPPED | OUTPATIENT
Start: 2022-04-26 | End: 2023-05-12

## 2022-04-26 NOTE — TELEPHONE ENCOUNTER
Routing refill request to provider for review/approval because:  Drug not on the [facility] refill protocol   Pt reported    Last Written Prescription Date:  4/22/22  Last Fill Quantity: ?,  # refills: ?   Last office visit provider:  12/8/21     Requested Prescriptions   Pending Prescriptions Disp Refills     SM TUSSIN MUCUS+CHEST CONGEST 100 MG/5ML liquid         There is no refill protocol information for this order      Signed Prescriptions Disp Refills    SM TUSSIN MUCUS+CHEST CONGEST 100 MG/5ML liquid       Sig: TAKE 10 ML BY MOUTH THREE TIMES DAILY AS NEEDED FOR COUGH       There is no refill protocol information for this order          Francine Covarrubias 04/25/22 7:54 PM

## 2022-05-16 ENCOUNTER — ANTICOAGULATION THERAPY VISIT (OUTPATIENT)
Dept: ANTICOAGULATION | Facility: CLINIC | Age: 87
End: 2022-05-16
Payer: COMMERCIAL

## 2022-05-16 DIAGNOSIS — N28.0 RENAL INFARCT (H): ICD-10-CM

## 2022-05-16 DIAGNOSIS — Z86.73 HISTORY OF CVA (CEREBROVASCULAR ACCIDENT): ICD-10-CM

## 2022-05-16 DIAGNOSIS — I48.91 ATRIAL FIBRILLATION (H): Primary | ICD-10-CM

## 2022-05-16 DIAGNOSIS — I48.20 CHRONIC ATRIAL FIBRILLATION (H): ICD-10-CM

## 2022-05-16 LAB — INR (EXTERNAL): 2.5 (ref 0.9–1.1)

## 2022-05-16 NOTE — PROGRESS NOTES
ANTICOAGULATION MANAGEMENT     Benny Carrillo 89 year old male is on warfarin with therapeutic INR result. (Goal INR 2.0-3.0)    Recent labs: (last 7 days)     05/16/22  1000   INR 2.5*       ASSESSMENT       Source(s): Chart Review and Home Care/Facility Nurse       Warfarin doses taken: Warfarin taken as instructed    Diet: No new diet changes identified    New illness, injury, or hospitalization: No    Medication/supplement changes: None noted    Signs or symptoms of bleeding or clotting: No    Previous INR: Therapeutic last 2(+) visits    Additional findings: None       PLAN     Recommended plan for no diet, medication or health factor changes affecting INR     Dosing Instructions: continue your current warfarin dose with next INR in 5 weeks       Summary  As of 5/16/2022    Full warfarin instructions:  2.5 mg every day   Next INR check:  6/20/2022             Telephone call with [facility] home care/facility nurse who verbalizes understanding and agrees to plan    Orders given to  Homecare nurse/facility to recheck    Education provided: Please call back if any changes to your diet, medications or how you've been taking warfarin    Plan made per [facility] anticoagulation protocol    Lilli Red, RN  Anticoagulation Clinic  5/16/2022    _______________________________________________________________________     Anticoagulation Episode Summary     Current INR goal:  2.0-3.0   TTR:  66.1 % (1 y)   Target end date:  Indefinite   Send INR reminders to:  [facility]    Indications    Atrial fibrillation (H) [I48.91]  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

## 2022-05-26 ENCOUNTER — TELEPHONE (OUTPATIENT)
Dept: FAMILY MEDICINE | Facility: CLINIC | Age: 87
End: 2022-05-26
Payer: COMMERCIAL

## 2022-05-26 NOTE — TELEPHONE ENCOUNTER
Reason for Call:  Home Health Care    Shelia with [facility] called regarding (reason for call): Verbal Orders    Orders are needed for this patient. Skilled Nursing    PT: n/a    OT: n/a    Skilled Nursinx a week for 9 weeks, 3 PRNs    Pt Provider: Dr. Castillo    Phone Number Homecare Nurse can be reached at: [phone]    Can we leave a detailed message on this number? YES    Phone number patient can be reached at: Home number on file [phone] (home)    Best Time: any    Call taken on 2022 at 9:43 AM by Germaine Franklin

## 2022-06-06 DIAGNOSIS — R52 PAIN: ICD-10-CM

## 2022-06-06 DIAGNOSIS — I10 BENIGN ESSENTIAL HYPERTENSION: ICD-10-CM

## 2022-06-07 RX ORDER — ACETAMINOPHEN 500 MG/1
TABLET ORAL
Qty: 360 TABLET | Refills: 2 | Status: SHIPPED | OUTPATIENT
Start: 2022-06-07

## 2022-06-07 RX ORDER — AMLODIPINE BESYLATE 5 MG/1
TABLET ORAL
Qty: 90 TABLET | Refills: 1 | Status: SHIPPED | OUTPATIENT
Start: 2022-06-07 | End: 2022-10-21

## 2022-06-07 NOTE — TELEPHONE ENCOUNTER
"Last Written Prescription Date:  12/8/21  Last Fill Quantity: 90/100/,  # refills: 1   Last office visit provider:  12/8/21     Requested Prescriptions   Pending Prescriptions Disp Refills     EQ ACETAMINOPHEN 500 MG tablet [Pharmacy Med Name: EQ Acetaminophen 500 MG Oral Tablet] 100 tablet 0     Sig: TAKE 1 TO 2 TABLETS BY MOUTH EVERY 6 HOURS AS NEEDED FOR MILD PAIN       Analgesics (Non-Narcotic Tylenol and ASA Only) Passed - 6/7/2022  7:39 AM        Passed - Recent (12 mo) or future (30 days) visit within the authorizing provider's specialty     Patient has had an office visit with the authorizing provider or a provider within the authorizing providers department within the previous 12 mos or has a future within next 30 days. See \"Patient Info\" tab in inbasket, or \"Choose Columns\" in Meds & Orders section of the refill encounter.              Passed - Patient is 7 months old or older     If patient is a peds patient of the age 7 mos -12 years, ok to refill using weight-based dosing.     If >3g daily and/or sig is not \"prn\", check for liver enzymes. If normal in the last year, ok to refill.  If not, refer to the provider.          Passed - Medication is active on med list           amLODIPine (NORVASC) 5 MG tablet [Pharmacy Med Name: amLODIPine Besylate 5 MG Oral Tablet] 90 tablet 0     Sig: Take 1 tablet by mouth once daily       Calcium Channel Blockers Protocol  Passed - 6/6/2022 10:48 AM        Passed - Blood pressure under 140/90 in past 12 months     BP Readings from Last 3 Encounters:   12/08/21 118/78   12/01/21 131/71   11/21/21 (!) 142/74                 Passed - Recent (12 mo) or future (30 days) visit within the authorizing provider's specialty     Patient has had an office visit with the authorizing provider or a provider within the authorizing providers department within the previous 12 mos or has a future within next 30 days. See \"Patient Info\" tab in inbasket, or \"Choose Columns\" in Meds & Orders " section of the refill encounter.              Passed - Medication is active on med list        Passed - Patient is age 18 or older        Passed - Normal serum creatinine on file in past 12 months     Recent Labs   Lab Test 11/21/21  1536   CR 1.28       Ok to refill medication if creatinine is low               Juan Jose Barker RN 06/07/22 7:39 AM

## 2022-06-09 ENCOUNTER — HOSPITAL ENCOUNTER (EMERGENCY)
Facility: CLINIC | Age: 87
Discharge: HOME | End: 2022-06-09
Attending: EMERGENCY MEDICINE | Admitting: EMERGENCY MEDICINE
Payer: COMMERCIAL

## 2022-06-09 ENCOUNTER — MEDICAL CORRESPONDENCE (OUTPATIENT)
Dept: HEALTH INFORMATION MANAGEMENT | Facility: CLINIC | Age: 87
End: 2022-06-09
Payer: COMMERCIAL

## 2022-06-09 ENCOUNTER — DOCUMENTATION ONLY (OUTPATIENT)
Dept: ANTICOAGULATION | Facility: CLINIC | Age: 87
End: 2022-06-09
Payer: COMMERCIAL

## 2022-06-09 VITALS
DIASTOLIC BLOOD PRESSURE: 74 MMHG | BODY MASS INDEX: 17.84 KG/M2 | RESPIRATION RATE: 16 BRPM | WEIGHT: 111 LBS | OXYGEN SATURATION: 95 % | TEMPERATURE: 97.4 F | HEART RATE: 64 BPM | SYSTOLIC BLOOD PRESSURE: 130 MMHG | HEIGHT: 66 IN

## 2022-06-09 DIAGNOSIS — N48.89 PENILE PAIN: ICD-10-CM

## 2022-06-09 PROCEDURE — 99282 EMERGENCY DEPT VISIT SF MDM: CPT

## 2022-06-09 RX ORDER — CLOTRIMAZOLE 1 %
CREAM (GRAM) TOPICAL 2 TIMES DAILY
Qty: 45 G | Refills: 0 | Status: SHIPPED | OUTPATIENT
Start: 2022-06-09 | End: 2022-06-24

## 2022-06-09 ASSESSMENT — ENCOUNTER SYMPTOMS
BACK PAIN: 0
DIFFICULTY URINATING: 0
FREQUENCY: 0
BRUISES/BLEEDS EASILY: 1
HEMATURIA: 0
FLANK PAIN: 0
CONFUSION: 0
DYSURIA: 0
FEVER: 0
VOMITING: 0
ABDOMINAL DISTENTION: 0

## 2022-06-09 NOTE — DISCHARGE INSTRUCTIONS
Recommend clotrimazole twice a day.  I do recommend follow-up with urology.  Keep area clean and dry.  Return to the ER for fever, vomiting, difficulty with catheter drainage or other concerns

## 2022-06-09 NOTE — PROGRESS NOTES
ANTICOAGULATION  MANAGEMENT: Discharge Review    Benny Carrillo chart reviewed for anticoagulation continuity of care    Emergency room visit on 6/9 for Penile Pain/supra pubic cath    Discharge disposition:  Assisted Living    Results:    No results for input(s): INR, [id number], F2, ALMWH, AAUFH in the last 168 hours.  Anticoagulation inpatient management:     not applicable     Anticoagulation discharge instructions:     Warfarin dosing: home regimen continued   Bridging: No   INR goal change: No      Medication changes affecting anticoagulation: No    Additional factors affecting anticoagulation: No     PLAN     No adjustment to anticoagulation plan needed    Patient not contacted    No adjustment to Anticoagulation Calendar was required    Megan Jones, RN

## 2022-06-09 NOTE — ED PROVIDER NOTES
"EMERGENCY DEPARTMENT ENCOUNTER      NAME: Benny Carrillo  AGE: 89 year old male  YOB: 1921  MRN: [id number]  EVALUATION DATE & TIME: 6/9/2022  1:03 PM    PCP: Alcides Castillo    ED PROVIDER: Edmar Foote MD        Chief Complaint   Patient presents with     Urinary Problem     Has supra-pubic cath but pain is in penis.           FINAL IMPRESSION:  No diagnosis found.      ED COURSE & MEDICAL DECISION MAKING:    Pertinent Labs & Imaging studies reviewed. (See chart for details)  89 year old male presents to the Emergency Department for evaluation of resolved penile pain to tip of penis.     Well appearing on exam.  No current symptoms    Exam no current evidence of balanitis, paraphimosis, phimosis, or urethral discharge.  No rash.     History of balanitis which patient states feels similar.  Previously received clotrimazole cream.  Will represcribe cream and refer to urology.  Discussed hygiene.     Suprapubic catheter is functioning appropriately.  UTI unlikely the cause of his resolved penile pain.  Does not have any fever or abdominal pains or problems with his suprapubic catheter.     At the conclusion of the encounter I discussed the results of all of the tests and the disposition. The questions were answered. The patient or family acknowledged understanding and was agreeable with the care plan.                MEDICATIONS GIVEN IN THE EMERGENCY:  Medications - No data to display    NEW PRESCRIPTIONS STARTED AT TODAY'S ER VISIT  New Prescriptions    No medications on file          =================================================================    HPI    Triage note  \"  Penis pain, has suprapubic cath but pain is in penis. States may have gas or constipation. Grand Daughter interpreting.      Triage Assessment     Row Name 06/09/22 1116       Triage Assessment (Adult)    Airway WDL WDL       Respiratory WDL    Respiratory WDL WDL       Skin Circulation/Temperature WDL    Skin " "Circulation/Temperature WDL WDL       Cardiac WDL    Cardiac WDL WDL       Peripheral/Neurovascular WDL    Peripheral Neurovascular WDL WDL       Cognitive/Neuro/Behavioral WDL    Cognitive/Neuro/Behavioral WDL WDL              \"      Patient information was obtained from: patient and grand daughter    Use of : Marily Ma language line - Language Jourdan        Benny Carrillo is a 89 year old male with a pertinent history of chronic suprapubic catheter recently changed June 1 who presents to this ED for evaluation of resolved penile pain.  Has suprapubic catheter.  Denies any urine from penis or any penile drainage.  No fever.  Had pain at penile tip this morning which resolved after hydrocortisone.  No rash to penis.  Feels similar to ED presentation last fall where he was prescribed cream.  This time it is better but wants to avoid worsening.  Catheter is draining appropriately. No current pain.       REVIEW OF SYSTEMS   Review of Systems   Constitutional: Negative for fever.   Gastrointestinal: Negative for abdominal distention and vomiting.   Genitourinary: Positive for penile pain. Negative for decreased urine volume, difficulty urinating, dysuria, flank pain, frequency, genital sores, hematuria, penile discharge, penile swelling, scrotal swelling, testicular pain and urgency.   Musculoskeletal: Negative for back pain.   Skin: Negative for rash.   Allergic/Immunologic: Negative for immunocompromised state.   Hematological: Bruises/bleeds easily.   Psychiatric/Behavioral: Negative for confusion.     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       Urinary obstruction        PAST SURGICAL HISTORY:  Past Surgical History:   Procedure Laterality Date     CHOLECYSTECTOMY       CYSTOTOMY       EYE SURGERY Bilateral     cataract removal     PIC  5/28/2019                CURRENT MEDICATIONS:    EQ LORATADINE 10 MG tablet  albuterol (PROAIR HFA;PROVENTIL HFA;VENTOLIN HFA) 90 mcg/actuation inhaler  amLODIPine (NORVASC) 5 MG tablet  ARTIFICIAL TEARS, POLYVIN ALC, 1.4 % ophthalmic solution  digoxin (LANOXIN) 125 MCG tablet  EQ ACETAMINOPHEN 500 MG tablet  famotidine (PEPCID) 20 MG tablet  gabapentin (NEURONTIN) 100 MG capsule  guaiFENesin (ROBITUSSIN) 100 MG/5ML SYRP  hydrocortisone 2.5 % cream  MEDICATION CANNOT BE REORDERED - PLEASE MANUALLY REORDER AND DISCONTINUE THE OLD ORDER  melatonin 5 MG tablet  Multiple Vitamins-Minerals (GNP THERAPEUTIC-M) TABS  pantoprazole (PROTONIX) 40 MG EC tablet  polyvinyl alcohol-povidone (ARTIFICIAL TEARS,PVALCH-POVID,) 0.5-0.6 % Drop  SM TUSSIN MUCUS+CHEST CONGEST 100 MG/5ML liquid  warfarin ANTICOAGULANT (COUMADIN) 1 MG tablet  warfarin ANTICOAGULANT (COUMADIN) 2.5 MG tablet        ALLERGIES:  Allergies   Allergen Reactions     Morphine Itching and Other (See Comments)     Pt received morphine IV on 8/26/18. Pt reported localized itching and discomfort with redness near IV site following administration of morphine.       FAMILY HISTORY:  Family History   Problem Relation Age of Onset     No Known Problems Mother      No Known Problems Father      Diabetes Other         spouse       SOCIAL HISTORY:   Social History     Socioeconomic History  "    Marital status:    Tobacco Use     Smoking status: Never Smoker     Smokeless tobacco: Never Used   Substance and Sexual Activity     Alcohol use: No     Drug use: No     Social Determinants of Health     Financial Resource Strain: Low Risk      Difficulty of Paying Living Expenses: Not hard at all   Food Insecurity: No Food Insecurity     Worried About Running Out of Food in the Last Year: Never true     Ran Out of Food in the Last Year: Never true   Transportation Needs: No Transportation Needs     Lack of Transportation (Medical): No     Lack of Transportation (Non-Medical): No   Physical Activity: Inactive     Days of Exercise per Week: 0 days     Minutes of Exercise per Session: 0 min   Stress: No Stress Concern Present     Feeling of Stress : Not at all   Social Connections: Socially Isolated     Frequency of Communication with Friends and Family: Never     Frequency of Social Gatherings with Friends and Family: More than three times a week     Attends Church Services: Never     Active Member of Clubs or Organizations: No     Attends Club or Organization Meetings: Never     Marital Status:    Intimate Partner Violence: Not At Risk     Fear of Current or Ex-Partner: No     Emotionally Abused: No     Physically Abused: No     Sexually Abused: No   Housing Stability: Unknown     Unable to Pay for Housing in the Last Year: No     Unstable Housing in the Last Year: No       VITALS:  /80   Pulse 60   Resp 16   Ht 1.676 m (5' 6\")   Wt 50.3 kg (111 lb)   SpO2 93%   BMI 17.92 kg/m      PHYSICAL EXAM      Vitals: /80   Pulse 60   Resp 16   Ht 1.676 m (5' 6\")   Wt 50.3 kg (111 lb)   SpO2 93%   BMI 17.92 kg/m    General: Appears in no acute distress, awake, alert, interactive.  Eyes: Conjunctivae non-injected. Sclera anicteric.  HENT: Atraumatic.  Neck: Supple.  Respiratory/Chest: Respiration unlabored.  Abdomen: non distended, suprapubic catheter in place with yellow-colored " urine, abdomen soft nontender  : Circumcised, no urethral discharge, no rash.  No tenderness.  Points to tip of penis as source of pain  Musculoskeletal: Normal extremities. No edema or erythema.  Skin: Normal color. No rash or diaphoresis.  Neurologic: Face symmetric, moves all extremities spontaneously. Speech clear.  Psychiatric: Oriented to person, place, and time. Affect appropriate.       LAB:  All pertinent labs reviewed and interpreted.       RADIOLOGY:  Reviewed all pertinent imaging. Please see official radiology report.  No orders to display           Ralph Foote MD  Emergency Medicine  [facility] EMERGENCY ROOM  [address] [place] [id number] [phone]       Ralph Foote MD  06/09/22 5928

## 2022-06-09 NOTE — ED TRIAGE NOTES
Penis pain, has suprapubic cath but pain is in penis. States may have gas or constipation. Grand Daughter interpreting.      Triage Assessment     Row Name 06/09/22 1116       Triage Assessment (Adult)    Airway WDL WDL       Respiratory WDL    Respiratory WDL WDL       Skin Circulation/Temperature WDL    Skin Circulation/Temperature WDL WDL       Cardiac WDL    Cardiac WDL WDL       Peripheral/Neurovascular WDL    Peripheral Neurovascular WDL WDL       Cognitive/Neuro/Behavioral WDL    Cognitive/Neuro/Behavioral WDL WDL

## 2022-06-13 ENCOUNTER — PATIENT OUTREACH (OUTPATIENT)
Dept: GERIATRIC MEDICINE | Facility: CLINIC | Age: 87
End: 2022-06-13
Payer: COMMERCIAL

## 2022-06-18 DIAGNOSIS — B02.9 HERPES ZOSTER WITHOUT COMPLICATION: ICD-10-CM

## 2022-06-18 NOTE — TELEPHONE ENCOUNTER
Routing refill request to provider for review/approval because:  Drug not on the [facility] [place] protocol     Last Written Prescription Date:  1/31/22  Last Fill Quantity: 30,  # refills: 3   Last office visit provider:  12/8/21     Requested Prescriptions   Pending Prescriptions Disp Refills     gabapentin (NEURONTIN) 100 MG capsule [Pharmacy Med Name: Gabapentin 100 MG Oral Capsule] 30 capsule 0     Sig: TAKE 1 CAPSULE BY MOUTH EVERY 8 TO 12 HOURS AS NEEDED FOR NERVE PAIN       There is no refill protocol information for this order          Lashae Torres, RN 06/18/22 5:29 PM

## 2022-06-19 RX ORDER — GABAPENTIN 100 MG/1
CAPSULE ORAL
Qty: 30 CAPSULE | Refills: 0 | Status: SHIPPED | OUTPATIENT
Start: 2022-06-19 | End: 2022-07-18

## 2022-06-20 ENCOUNTER — ANTICOAGULATION THERAPY VISIT (OUTPATIENT)
Dept: ANTICOAGULATION | Facility: CLINIC | Age: 87
End: 2022-06-20
Payer: COMMERCIAL

## 2022-06-20 DIAGNOSIS — N28.0 RENAL INFARCT (H): ICD-10-CM

## 2022-06-20 DIAGNOSIS — I48.20 CHRONIC ATRIAL FIBRILLATION (H): ICD-10-CM

## 2022-06-20 DIAGNOSIS — Z86.73 HISTORY OF CVA (CEREBROVASCULAR ACCIDENT): ICD-10-CM

## 2022-06-20 DIAGNOSIS — I48.91 ATRIAL FIBRILLATION (H): Primary | ICD-10-CM

## 2022-06-20 LAB — INR (EXTERNAL): 2.8 (ref 0.9–1.1)

## 2022-06-20 NOTE — PROGRESS NOTES
[facility] Care Coordination Contact  CC received notification of Emergency Room visit.  ER visit occurred on 6/9/2022 at [facility] with Dx of penile pain.    CC contacted adult son Steven and reviewed discharge summary.  Member has a follow-up appointment with PCP: No: Offered Assistance with setting up a follow up appointment. Declined will set up if needed.   Member has had a change in condition: No  New referrals placed: No  Home Visit Needed: No  Care plan reviewed and updated.  PCP notified of ED visit via EMR.    KESHA Sun  [facility]  [phone]

## 2022-06-20 NOTE — PROGRESS NOTES
ANTICOAGULATION MANAGEMENT     Benny Carrillo 89 year old male is on warfarin with therapeutic INR result. (Goal INR 2.0-3.0)    Recent labs: (last 7 days)     06/20/22  0951   INR 2.8*       ASSESSMENT       Source(s): Chart Review and Home Care/Facility Nurse       Warfarin doses taken: Warfarin taken as instructed    Diet: No new diet changes identified    New illness, injury, or hospitalization: Yes: patient was seen in the ER on 6/9/22 for penile pain, thought to be balanitis. Symptoms have resolved.    Medication/supplement changes: clotriamzole cream through 6/20/22    Signs or symptoms of bleeding or clotting: No    Previous INR: Therapeutic last 2(+) visits    Additional findings: None       PLAN     Recommended plan for no diet, medication or health factor changes affecting INR     Dosing Instructions: continue your current warfarin dose with next INR in 4 weeks when home care returns    Summary  As of 6/20/2022    Full warfarin instructions:  2.5 mg every day   Next INR check:  7/18/2022             Telephone call with [facility] home care/facility nurse who agrees to plan and repeated back plan correctly    Orders given to  Homecare nurse/facility to recheck    Education provided: Please call back if any changes to your diet, medications or how you've been taking warfarin, Monitoring for bleeding signs and symptoms and Monitoring for clotting signs and symptoms    Plan made per [facility] anticoagulation protocol    Clair Tapia RN  Anticoagulation Clinic  6/20/2022    _______________________________________________________________________     Anticoagulation Episode Summary     Current INR goal:  2.0-3.0   TTR:  74.8 % (1 y)   Target end date:  Indefinite   Send INR reminders to:  CARLOS CERVANTES    Indications    Atrial fibrillation (H) [I48.91]  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

## 2022-06-21 NOTE — PROGRESS NOTES
[facility] Care Coordination Contact    [facility] Six-Month Telephone Assessment    6 month telephone assessment completed on 6/13/2022.    ER visits: Yes -  [facility]- Follow up completed. See below for notes.   Hospitalizations: No  TCU stays: No  Significant health status changes: No significant changes noted. Less of an appetite. Family continues to encourage eating.   Falls/Injuries: No  ADL/IADL changes: No  Changes in services: No changes-PCA services continue along with SN visits.     Caregiver Assessment follow up:  n/a    Goals: See POC in chart for goal progress documentation.      Will see member in 6 months for an annual health risk assessment.   Encouraged member to call CC with any questions or concerns in the meantime.

## 2022-06-22 DIAGNOSIS — K21.9 GASTROESOPHAGEAL REFLUX DISEASE WITHOUT ESOPHAGITIS: ICD-10-CM

## 2022-06-22 DIAGNOSIS — I48.20 CHRONIC ATRIAL FIBRILLATION (H): Primary | ICD-10-CM

## 2022-06-23 RX ORDER — FAMOTIDINE 20 MG/1
TABLET, FILM COATED ORAL
Qty: 90 TABLET | Refills: 0 | Status: SHIPPED | OUTPATIENT
Start: 2022-06-23 | End: 2022-09-21

## 2022-06-23 NOTE — TELEPHONE ENCOUNTER
"Last Written Prescription Date:  12/27/21  Last Fill Quantity: 90,  # refills: 1   Last office visit provider: 12/8/21    Routing refill request to provider for review/approval because:  Age of patient.        Requested Prescriptions   Pending Prescriptions Disp Refills     famotidine (PEPCID) 20 MG tablet [Pharmacy Med Name: Famotidine 20 MG Oral Tablet] 90 tablet 0     Sig: Take 1 tablet by mouth once daily       H2 Blockers Protocol Passed - 6/22/2022  9:03 AM        Passed - Patient is age 12 or older        Passed - Recent (12 mo) or future (30 days) visit within the authorizing provider's specialty     Patient has had an office visit with the authorizing provider or a provider within the authorizing providers department within the previous 12 mos or has a future within next 30 days. See \"Patient Info\" tab in inbasket, or \"Choose Columns\" in Meds & Orders section of the refill encounter.              Passed - Medication is active on med list             Annie Corona RN 06/22/22 7:32 PM  "

## 2022-06-24 DIAGNOSIS — K21.9 GASTROESOPHAGEAL REFLUX DISEASE WITHOUT ESOPHAGITIS: ICD-10-CM

## 2022-06-24 RX ORDER — PANTOPRAZOLE SODIUM 40 MG/1
TABLET, DELAYED RELEASE ORAL
Qty: 90 TABLET | Refills: 1 | Status: SHIPPED | OUTPATIENT
Start: 2022-06-24 | End: 2022-10-21

## 2022-06-25 NOTE — TELEPHONE ENCOUNTER
"Last Written Prescription Date:  12/8/21  Last Fill Quantity: 90,  # refills: 1   Last office visit provider:  12/8/21     Requested Prescriptions   Pending Prescriptions Disp Refills     pantoprazole (PROTONIX) 40 MG EC tablet [Pharmacy Med Name: Pantoprazole Sodium 40 MG Oral Tablet Delayed Release] 90 tablet 0     Sig: Take 1 tablet by mouth once daily       PPI Protocol Passed - 6/24/2022 10:28 AM        Passed - Not on Clopidogrel (unless Pantoprazole ordered)        Passed - No diagnosis of osteoporosis on record        Passed - Recent (12 mo) or future (30 days) visit within the authorizing provider's specialty     Patient has had an office visit with the authorizing provider or a provider within the authorizing providers department within the previous 12 mos or has a future within next 30 days. See \"Patient Info\" tab in inbasket, or \"Choose Columns\" in Meds & Orders section of the refill encounter.              Passed - Medication is active on med list        Passed - Patient is age 18 or older             Lashae Torres RN 06/24/22 8:08 PM  "

## 2022-06-27 ENCOUNTER — HOSPITAL ENCOUNTER (EMERGENCY)
Facility: CLINIC | Age: 87
Discharge: HOME | End: 2022-06-27
Attending: STUDENT IN AN ORGANIZED HEALTH CARE EDUCATION/TRAINING PROGRAM | Admitting: STUDENT IN AN ORGANIZED HEALTH CARE EDUCATION/TRAINING PROGRAM
Payer: COMMERCIAL

## 2022-06-27 ENCOUNTER — APPOINTMENT (OUTPATIENT)
Dept: CT IMAGING | Facility: CLINIC | Age: 87
End: 2022-06-27
Attending: STUDENT IN AN ORGANIZED HEALTH CARE EDUCATION/TRAINING PROGRAM
Payer: COMMERCIAL

## 2022-06-27 ENCOUNTER — DOCUMENTATION ONLY (OUTPATIENT)
Dept: ANTICOAGULATION | Facility: CLINIC | Age: 87
End: 2022-06-27

## 2022-06-27 VITALS
RESPIRATION RATE: 29 BRPM | WEIGHT: 104 LBS | DIASTOLIC BLOOD PRESSURE: 76 MMHG | HEIGHT: 66 IN | OXYGEN SATURATION: 94 % | SYSTOLIC BLOOD PRESSURE: 142 MMHG | TEMPERATURE: 97.1 F | BODY MASS INDEX: 16.71 KG/M2 | HEART RATE: 67 BPM

## 2022-06-27 DIAGNOSIS — N28.0 RENAL INFARCT (H): ICD-10-CM

## 2022-06-27 DIAGNOSIS — Z86.73 HISTORY OF CVA (CEREBROVASCULAR ACCIDENT): ICD-10-CM

## 2022-06-27 DIAGNOSIS — R10.84 ABDOMINAL PAIN, GENERALIZED: ICD-10-CM

## 2022-06-27 DIAGNOSIS — I48.91 ATRIAL FIBRILLATION (H): Primary | ICD-10-CM

## 2022-06-27 DIAGNOSIS — I48.20 CHRONIC ATRIAL FIBRILLATION (H): ICD-10-CM

## 2022-06-27 LAB
ALBUMIN SERPL-MCNC: 3.6 G/DL (ref 3.5–5)
ALP SERPL-CCNC: 64 U/L (ref 45–120)
ALT SERPL W P-5'-P-CCNC: 14 U/L (ref 0–45)
ANION GAP SERPL CALCULATED.3IONS-SCNC: 7 MMOL/L (ref 5–18)
AST SERPL W P-5'-P-CCNC: 31 U/L (ref 0–40)
ATRIAL RATE - MUSE: 214 BPM
BASOPHILS # BLD AUTO: 0 10E3/UL (ref 0–0.2)
BASOPHILS NFR BLD AUTO: 1 %
BILIRUB SERPL-MCNC: 0.7 MG/DL (ref 0–1)
BNP SERPL-MCNC: 84 PG/ML (ref 0–93)
BUN SERPL-MCNC: 8 MG/DL (ref 8–28)
CALCIUM SERPL-MCNC: 8.5 MG/DL (ref 8.5–10.5)
CHLORIDE BLD-SCNC: 99 MMOL/L (ref 98–107)
CO2 SERPL-SCNC: 31 MMOL/L (ref 22–31)
CREAT SERPL-MCNC: 1.05 MG/DL (ref 0.7–1.3)
DIASTOLIC BLOOD PRESSURE - MUSE: 91 MMHG
EOSINOPHIL # BLD AUTO: 0.4 10E3/UL (ref 0–0.7)
EOSINOPHIL NFR BLD AUTO: 5 %
ERYTHROCYTE [DISTWIDTH] IN BLOOD BY AUTOMATED COUNT: 13.7 % (ref 10–15)
GFR SERPL CREATININE-BSD FRML MDRD: 63 ML/MIN/1.73M2
GLUCOSE BLD-MCNC: 136 MG/DL (ref 70–125)
HCT VFR BLD AUTO: 44.4 % (ref 40–53)
HGB BLD-MCNC: 13.7 G/DL (ref 13.3–17.7)
HOLD SPECIMEN: NORMAL
IMM GRANULOCYTES # BLD: 0 10E3/UL
IMM GRANULOCYTES NFR BLD: 0 %
INTERPRETATION ECG - MUSE: NORMAL
LYMPHOCYTES # BLD AUTO: 1.6 10E3/UL (ref 0.8–5.3)
LYMPHOCYTES NFR BLD AUTO: 23 %
MCH RBC QN AUTO: 31.9 PG (ref 26.5–33)
MCHC RBC AUTO-ENTMCNC: 30.9 G/DL (ref 31.5–36.5)
MCV RBC AUTO: 104 FL (ref 78–100)
MONOCYTES # BLD AUTO: 0.8 10E3/UL (ref 0–1.3)
MONOCYTES NFR BLD AUTO: 11 %
NEUTROPHILS # BLD AUTO: 4.2 10E3/UL (ref 1.6–8.3)
NEUTROPHILS NFR BLD AUTO: 60 %
NRBC # BLD AUTO: 0 10E3/UL
NRBC BLD AUTO-RTO: 0 /100
P AXIS - MUSE: NORMAL DEGREES
PLATELET # BLD AUTO: 166 10E3/UL (ref 150–450)
POTASSIUM BLD-SCNC: 4.2 MMOL/L (ref 3.5–5)
PR INTERVAL - MUSE: NORMAL MS
PROT SERPL-MCNC: 7.8 G/DL (ref 6–8)
QRS DURATION - MUSE: 120 MS
QT - MUSE: 378 MS
QTC - MUSE: 401 MS
R AXIS - MUSE: -9 DEGREES
RBC # BLD AUTO: 4.29 10E6/UL (ref 4.4–5.9)
SODIUM SERPL-SCNC: 137 MMOL/L (ref 136–145)
SYSTOLIC BLOOD PRESSURE - MUSE: 147 MMHG
T AXIS - MUSE: 66 DEGREES
TROPONIN I SERPL-MCNC: 0.04 NG/ML (ref 0–0.29)
VENTRICULAR RATE- MUSE: 68 BPM
WBC # BLD AUTO: 7 10E3/UL (ref 4–11)

## 2022-06-27 PROCEDURE — 250N000011 HC RX IP 250 OP 636: Performed by: STUDENT IN AN ORGANIZED HEALTH CARE EDUCATION/TRAINING PROGRAM

## 2022-06-27 PROCEDURE — 80053 COMPREHEN METABOLIC PANEL: CPT | Performed by: STUDENT IN AN ORGANIZED HEALTH CARE EDUCATION/TRAINING PROGRAM

## 2022-06-27 PROCEDURE — 96360 HYDRATION IV INFUSION INIT: CPT | Mod: 59

## 2022-06-27 PROCEDURE — 74174 CTA ABD&PLVS W/CONTRAST: CPT

## 2022-06-27 PROCEDURE — 83880 ASSAY OF NATRIURETIC PEPTIDE: CPT | Performed by: STUDENT IN AN ORGANIZED HEALTH CARE EDUCATION/TRAINING PROGRAM

## 2022-06-27 PROCEDURE — 93005 ELECTROCARDIOGRAM TRACING: CPT | Mod: RTG

## 2022-06-27 PROCEDURE — 36415 COLL VENOUS BLD VENIPUNCTURE: CPT | Performed by: STUDENT IN AN ORGANIZED HEALTH CARE EDUCATION/TRAINING PROGRAM

## 2022-06-27 PROCEDURE — 84484 ASSAY OF TROPONIN QUANT: CPT | Performed by: STUDENT IN AN ORGANIZED HEALTH CARE EDUCATION/TRAINING PROGRAM

## 2022-06-27 PROCEDURE — 258N000003 HC RX IP 258 OP 636: Performed by: STUDENT IN AN ORGANIZED HEALTH CARE EDUCATION/TRAINING PROGRAM

## 2022-06-27 PROCEDURE — 85025 COMPLETE CBC W/AUTO DIFF WBC: CPT | Performed by: STUDENT IN AN ORGANIZED HEALTH CARE EDUCATION/TRAINING PROGRAM

## 2022-06-27 PROCEDURE — 99285 EMERGENCY DEPT VISIT HI MDM: CPT | Mod: 25

## 2022-06-27 RX ORDER — IOPAMIDOL 755 MG/ML
100 INJECTION, SOLUTION INTRAVASCULAR ONCE
Status: COMPLETED | OUTPATIENT
Start: 2022-06-27 | End: 2022-06-27

## 2022-06-27 RX ADMIN — SODIUM CHLORIDE, POTASSIUM CHLORIDE, SODIUM LACTATE AND CALCIUM CHLORIDE 1000 ML: 600; 310; 30; 20 INJECTION, SOLUTION INTRAVENOUS at 08:39

## 2022-06-27 RX ADMIN — IOPAMIDOL 100 ML: 755 INJECTION, SOLUTION INTRAVENOUS at 08:32

## 2022-06-27 NOTE — PROGRESS NOTES
ANTICOAGULATION  MANAGEMENT: Discharge Review    Benny Carrillo chart reviewed for anticoagulation continuity of care    Emergency room visit on 06/27/2022 for abdominal pain. Work up negative for acute findings.    Discharge disposition: Home with Home Care    Results:    No results for input(s): INR, [id number], F2, ALMWH, AAUFH in the last 168 hours.  Anticoagulation inpatient management:     not applicable     Anticoagulation discharge instructions:     Warfarin dosing: home regimen continued   Bridging: No   INR goal change: No      Medication changes affecting anticoagulation: No    Additional factors affecting anticoagulation: Yes: Decreased appetite may cause an increase in INR.     PLAN     Pt was originally supposed to have an INR drawn on 07/18. Would like to get an INR 07/05 instead to make sure INR isn't trending up.    Spoke with  ERAN Ortez    Anticoagulation Calendar updated    Lance Hawthorne RN

## 2022-06-27 NOTE — ED TRIAGE NOTES
Patient family member says since yesterday increased SOB and right flank pain radiating into right lower back.  History of asthma, uses inhaler, increasing weakness and not drinking or eating much.

## 2022-06-27 NOTE — ED PROVIDER NOTES
Emergency Department Encounter         FINAL IMPRESSION:  Abdominal pain        ED COURSE AND MEDICAL DECISION MAKING       ED Course as of 06/27/22 0953   Mon Jun 27, 2022   0733 EKG is A. fib with a rate of 68, nonspecific ST changes anterior leads which are chronic from December 2021, no STEMI today, no significant new depressions or inversions QTC is 401.   0825 Patient is a 89-year-old male here with sudden onset shortness of breath, decreased p.o. over the past week as well as abdominal/flank pain.  No fevers chills nausea or vomiting.  Arrival patient states symptoms are improving.  Vitals otherwise stable on arrival.  Physical examination of the normal heart and lungs.  Mild discomfort to palpation throughout the abdomen.  Has an SP tube that is in place with no signs of infection.  Patient's granddaughter used at bedside as .  Patient with no other complaints.  Plan for labs CT of the chest abdomen pelvis and reevaluate   0952 I rechecked and updated the patient on plan of care with PA Student Anand Quinonez.       -Labs reassuring.  CT scan normal.  Patient states he feels better and would like to be discharged.  Gave return precautions.  Discharged home in stable condition.            PPE: Provider wore paper mask.     EKG\  At the conclusion of the encounter I discussed the results of all the tests and the disposition. The questions were answered. The patient or family acknowledged understanding and was agreeable with the care plan.        MEDICATIONS GIVEN IN THE EMERGENCY DEPARTMENT:  Medications   lactated ringers BOLUS 1,000 mL (0 mLs Intravenous Stopped 6/27/22 0946)   iopamidol (ISOVUE-370) solution 100 mL (100 mLs Intravenous Given 6/27/22 0832)       NEW PRESCRIPTIONS STARTED AT TODAY'S ED VISIT:  New Prescriptions    No medications on file       HPI     Patient information obtained from: The patient and his granddaughter     Use of Interpretor: Yes (In Person, family member) -  "Jourdan Carrillo is a 89 year old male with a pertinent history of atrial fibrillation, hypertension, stroke, GERD, and bradycardia who presents to this ED for evaluation of abdominal pain and shortness of breath.     The patient reports that he felt very nauseous in bed this morning, which led him into a panic in which be believed it was his \"time to die.\" The patient denies chest pain, fever, cough, shortness of breath, vomiting, diarrhea, rash, dysuria, recent history of trauma, and constipation. The patient endorses abdominal pain with cramping and lower back pain. He has had appetite changes in the last week. He has a history of bloating for which he takes Omeprazole.     REVIEW OF SYSTEMS:  Review of Systems   Constitutional: Negative for fever, malaise  HEENT: Negative runny nose, sore throat, ear pain, neck pain  Respiratory: Negative for shortness of breath, cough, congestion  Cardiovascular: Negative for chest pain, leg edema  Gastrointestinal: Negative for abdominal distention, constipation, vomiting, nausea, diarrhea. Positive for abdominal pain and cramping.  Genitourinary: Negative for dysuria and hematuria.   Integument: Negative for rash, skin breakdown  Neurological: Negative for paresthesias, weakness, headache.  Musculoskeletal: Negative for joint pain, joint swelling. Positive for lower back pain.       All other systems reviewed and are negative.      MEDICAL HISTORY     Past Medical History:   Diagnosis Date     Asthma      Atrial fibrillation (H)      Benign prostatic hyperplasia      Bradycardia 7/25/2015     Cataract      Chronic obstructive pulmonary disease, unspecified COPD type (H) 7/21/2020     Citrobacter infection 7/25/2015     Citrobacter infection 7/25/2015     Constipation 1/13/2015     Elevated digoxin level 7/24/2015     GERD (gastroesophageal reflux disease)      Hypertension      Lactic acidosis      Lower urinary tract infectious disease      Muscle weakness      SBO " "(small bowel obstruction) (H)      Small bowel obstruction (H) 7/23/2015     Stroke (H)      Underweight due to inadequate caloric intake 7/28/2015     Urinary obstruction        Past Surgical History:   Procedure Laterality Date     CHOLECYSTECTOMY       CYSTOTOMY       EYE SURGERY Bilateral     cataract removal     [facility]  5/28/2019            Social History     Tobacco Use     Smoking status: Never Smoker     Smokeless tobacco: Never Used   Substance Use Topics     Alcohol use: No     Drug use: No       EQ LORATADINE 10 MG tablet  albuterol (PROAIR HFA;PROVENTIL HFA;VENTOLIN HFA) 90 mcg/actuation inhaler  amLODIPine (NORVASC) 5 MG tablet  ARTIFICIAL TEARS, POLYVIN ALC, 1.4 % ophthalmic solution  digoxin (LANOXIN) 125 MCG tablet  EQ ACETAMINOPHEN 500 MG tablet  famotidine (PEPCID) 20 MG tablet  gabapentin (NEURONTIN) 100 MG capsule  guaiFENesin (ROBITUSSIN) 100 MG/5ML SYRP  hydrocortisone 2.5 % cream  MEDICATION CANNOT BE REORDERED - PLEASE MANUALLY REORDER AND DISCONTINUE THE OLD ORDER  melatonin 5 MG tablet  Multiple Vitamins-Minerals (GNP THERAPEUTIC-M) TABS  pantoprazole (PROTONIX) 40 MG EC tablet  polyvinyl alcohol-povidone (ARTIFICIAL TEARS,PVALCH-POVID,) 0.5-0.6 % Drop  SM TUSSIN MUCUS+CHEST CONGEST 100 MG/5ML liquid  warfarin ANTICOAGULANT (COUMADIN) 1 MG tablet  warfarin ANTICOAGULANT (COUMADIN) 2.5 MG tablet            PHYSICAL EXAM     /67   Pulse 67   Temp 97.1  F (36.2  C) (Temporal)   Resp 27   Ht 1.676 m (5' 6\")   Wt 47.2 kg (104 lb)   SpO2 93%   BMI 16.79 kg/m        PHYSICAL EXAM:     General: Patient appears well, nontoxic, comfortable  HEENT: Moist mucous membranes, no tongue swelling.  No head trauma.  No midline neck pain.  Cardiovascular: Normal rate, normal rhythm, no extremity edema.  No appreciable murmur.  Respiratory: No signs of respiratory distress, lungs are clear to auscultation bilaterally with no wheezes rhonchi or rales.  Abdominal: Soft, nontender, nondistended, no " palpable masses, no guarding, no rebound  Musculoskeletal: Full range of motion of joints, no deformities appreciated.  Neurological: Alert and oriented, grossly neurologically intact.  Psychological: Normal affect and mood.  Integument: No rashes appreciated          RESULTS       Labs Ordered and Resulted from Time of ED Arrival to Time of ED Departure   COMPREHENSIVE METABOLIC PANEL - Abnormal       Result Value    Sodium 137      Potassium 4.2      Chloride 99      Carbon Dioxide (CO2) 31      Anion Gap 7      Urea Nitrogen 8      Creatinine 1.05      Calcium 8.5      Glucose 136 (*)     Alkaline Phosphatase 64      AST 31      ALT 14      Protein Total 7.8      Albumin 3.6      Bilirubin Total 0.7      GFR Estimate 63     CBC WITH PLATELETS AND DIFFERENTIAL - Abnormal    WBC Count 7.0      RBC Count 4.29 (*)     Hemoglobin 13.7      Hematocrit 44.4       (*)     MCH 31.9      MCHC 30.9 (*)     RDW 13.7      Platelet Count 166      % Neutrophils 60      % Lymphocytes 23      % Monocytes 11      % Eosinophils 5      % Basophils 1      % Immature Granulocytes 0      NRBCs per 100 WBC 0      Absolute Neutrophils 4.2      Absolute Lymphocytes 1.6      Absolute Monocytes 0.8      Absolute Eosinophils 0.4      Absolute Basophils 0.0      Absolute Immature Granulocytes 0.0      Absolute NRBCs 0.0     TROPONIN I - Normal    Troponin I 0.04     B-TYPE NATRIURETIC PEPTIDE ([facility] ONLY) - Normal    BNP 84         CTA Chest Abdomen Pelvis w Contrast   Final Result   IMPRESSION:   1.  No evidence of thoracoabdominal aortic dissection. Ectatic ascending thoracic aorta measuring 3.6 cm in maximum diameter.   2.  Abdominal aorta is patent and of normal size. No evidence of dissection. 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               PROCEDURES:  Procedures:  Ann Osborne am serving as a scribe to document services personally performed by Rudy Alexander DO, based on my  observations and the provider's statements to me.  I, Rudy Alexander DO, attest that Ann Olea is acting in a scribe capacity, has observed my performance of the services and has documented them in accordance with my direction.    Rudy Alexander DO  Emergency Medicine  [facility] EMERGENCY ROOM     Benjamin, Rudy Gentile DO  06/27/22 1256

## 2022-06-28 ENCOUNTER — PATIENT OUTREACH (OUTPATIENT)
Dept: GERIATRIC MEDICINE | Facility: CLINIC | Age: 87
End: 2022-06-28

## 2022-06-28 NOTE — PROGRESS NOTES
[facility] Care Coordination Contact  CC received notification of Emergency Room visit.  ER visit occurred on 6/27/22 at [facility] with Dx of abdominal pain.    CC contacted member and left a message requesting a return call.  Member has a follow-up appointment with PCP: No: Offered Assistance with setting up a follow up appointment  Member has had a change in condition: No  New referrals placed: No  Home Visit Needed: No  Care plan reviewed and updated.  PCP notified of ED visit via EMR.    KESHA Villaseñor  [facility]  [phone]

## 2022-07-05 ENCOUNTER — ANTICOAGULATION THERAPY VISIT (OUTPATIENT)
Dept: ANTICOAGULATION | Facility: CLINIC | Age: 87
End: 2022-07-05

## 2022-07-05 DIAGNOSIS — I48.20 CHRONIC ATRIAL FIBRILLATION (H): ICD-10-CM

## 2022-07-05 DIAGNOSIS — N28.0 RENAL INFARCT (H): ICD-10-CM

## 2022-07-05 DIAGNOSIS — I48.91 ATRIAL FIBRILLATION (H): Primary | ICD-10-CM

## 2022-07-05 DIAGNOSIS — Z86.73 HISTORY OF CVA (CEREBROVASCULAR ACCIDENT): ICD-10-CM

## 2022-07-05 LAB — INR (EXTERNAL): 4.1 (ref 0.9–1.1)

## 2022-07-05 NOTE — PROGRESS NOTES
ANTICOAGULATION MANAGEMENT     Benny Carrillo 89 year old male is on warfarin with supratherapeutic INR result. (Goal INR 2.0-3.0)    Recent labs: (last 7 days)     07/05/22  0946   INR 4.1*       ASSESSMENT       Source(s): Chart Review and Home Care/Facility Nurse       Warfarin doses taken: Warfarin taken as instructed    Diet: Appetite improved since ED visit.    New illness, injury, or hospitalization: Yes: ED visit 06/27 for abdominal pain. Work up was negative.     Medication/supplement changes: None noted    Signs or symptoms of bleeding or clotting: No    Previous INR: Therapeutic last 2(+) visits    Additional findings: None       PLAN     Recommended plan for no diet, medication or health factor changes affecting INR     Dosing Instructions: hold dose then decrease your warfarin dose (14% change) with next INR in 6 days       Summary  As of 7/5/2022    Full warfarin instructions:  7/5: Hold; Otherwise 1.25 mg every Tue, Fri; 2.5 mg all other days   Next INR check:  7/11/2022             Telephone call with [facility]el[facility] home care/facility nurse who verbalizes understanding and agrees to plan    Orders given to  Homecare nurse/facility to recheck    Education provided: Goal range and significance of current result    Plan made per [facility] anticoagulation protocol    Lance Hawthorne RN  Anticoagulation Clinic  7/5/2022    _______________________________________________________________________     Anticoagulation Episode Summary     Current INR goal:  2.0-3.0   TTR:  72.7 % (1 y)   Target end date:  Indefinite   Send INR reminders to:  [facility]    Indications    Atrial fibrillation (H) [I48.91]  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

## 2022-07-09 DIAGNOSIS — E63.9 POOR NUTRITION: ICD-10-CM

## 2022-07-10 RX ORDER — ASPIRIN 81 MG
TABLET, DELAYED RELEASE (ENTERIC COATED) ORAL
Qty: 90 TABLET | Refills: 1 | Status: SHIPPED | OUTPATIENT
Start: 2022-07-10 | End: 2022-10-21

## 2022-07-10 NOTE — TELEPHONE ENCOUNTER
"Last Written Prescription Date:  12/8/21  Last Fill Quantity: 90,  # refills: 1   Last office visit provider:  12/8/21     Requested Prescriptions   Pending Prescriptions Disp Refills     MULTIVITAMIN, THERAPEUTIC WITH MINERALS tablet [Pharmacy Med Name: Thera-M Oral Tablet] 90 tablet 0     Sig: Take 1 tablet by mouth once daily       Vitamin Supplements (Adult) Protocol Passed - 7/9/2022 11:19 AM        Passed - High dose Vitamin D not ordered        Passed - Recent (12 mo) or future (30 days) visit within the authorizing provider's specialty     Patient has had an office visit with the authorizing provider or a provider within the authorizing providers department within the previous 12 mos or has a future within next 30 days. See \"Patient Info\" tab in inbasket, or \"Choose Columns\" in Meds & Orders section of the refill encounter.              Passed - Medication is active on med list             Lashae Torres RN 07/10/22 2:54 PM  "

## 2022-07-11 ENCOUNTER — ANTICOAGULATION THERAPY VISIT (OUTPATIENT)
Dept: ANTICOAGULATION | Facility: CLINIC | Age: 87
End: 2022-07-11

## 2022-07-11 DIAGNOSIS — Z86.73 HISTORY OF CVA (CEREBROVASCULAR ACCIDENT): ICD-10-CM

## 2022-07-11 DIAGNOSIS — N28.0 RENAL INFARCT (H): Primary | ICD-10-CM

## 2022-07-11 DIAGNOSIS — I48.20 CHRONIC ATRIAL FIBRILLATION (H): ICD-10-CM

## 2022-07-11 LAB — INR (EXTERNAL): 2.3 (ref 2–3)

## 2022-07-11 NOTE — PROGRESS NOTES
ANTICOAGULATION MANAGEMENT     Benny Carrillo 89 year old male is on warfarin with therapeutic INR result. (Goal INR 2.0-3.0)    Recent labs: (last 7 days)     07/11/22  1029   INR 2.3       ASSESSMENT       Source(s): Chart Review and Home Care/Facility Nurse       Warfarin doses taken: Warfarin taken as instructed    Diet: No new diet changes identified    New illness, injury, or hospitalization: No    Medication/supplement changes: None noted    Signs or symptoms of bleeding or clotting: No    Previous INR: Supratherapeutic    Additional findings: None       PLAN     Recommended plan for no diet, medication or health factor changes affecting INR     Dosing Instructions: continue your current warfarin dose with next INR in 1 week       Summary  As of 7/11/2022    Full warfarin instructions:  1.25 mg every Tue, Fri; 2.5 mg all other days   Next INR check:  7/18/2022             Telephone call with Shelia home care/facility nurse who verbalizes understanding and agrees to plan and who agrees to plan and repeated back plan correctly    Orders given to  Homecare nurse/facility to recheck    Education provided: Please call back if any changes to your diet, medications or how you've been taking warfarin    Plan made per [facility] anticoagulation protocol    Mónica Sanchez, RN  Anticoagulation Clinic  7/11/2022    _______________________________________________________________________     Anticoagulation Episode Summary     Current INR goal:  2.0-3.0   TTR:  72.4 % (1 y)   Target end date:  Indefinite   Send INR reminders to:  [facility]    Indications    Atrial fibrillation (H) [I48.91]  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

## 2022-07-17 DIAGNOSIS — I48.20 CHRONIC ATRIAL FIBRILLATION (H): ICD-10-CM

## 2022-07-18 ENCOUNTER — ANTICOAGULATION THERAPY VISIT (OUTPATIENT)
Dept: ANTICOAGULATION | Facility: CLINIC | Age: 87
End: 2022-07-18

## 2022-07-18 DIAGNOSIS — Z86.73 HISTORY OF CVA (CEREBROVASCULAR ACCIDENT): ICD-10-CM

## 2022-07-18 DIAGNOSIS — I48.91 ATRIAL FIBRILLATION (H): Primary | ICD-10-CM

## 2022-07-18 DIAGNOSIS — I48.20 CHRONIC ATRIAL FIBRILLATION (H): ICD-10-CM

## 2022-07-18 DIAGNOSIS — B02.9 HERPES ZOSTER WITHOUT COMPLICATION: ICD-10-CM

## 2022-07-18 DIAGNOSIS — N28.0 RENAL INFARCT (H): ICD-10-CM

## 2022-07-18 LAB — INR (EXTERNAL): 2.2 (ref 0.9–1.1)

## 2022-07-18 RX ORDER — GABAPENTIN 100 MG/1
CAPSULE ORAL
Qty: 30 CAPSULE | Refills: 0 | Status: SHIPPED | OUTPATIENT
Start: 2022-07-18 | End: 2022-08-19

## 2022-07-18 RX ORDER — DIGOXIN 125 MCG
TABLET ORAL
Qty: 90 TABLET | Refills: 0 | Status: SHIPPED | OUTPATIENT
Start: 2022-07-18 | End: 2022-10-19

## 2022-07-18 NOTE — TELEPHONE ENCOUNTER
"Routing refill request to provider for review/approval because:  Patient needs to be seen because:  It's been more than 6 months since last office visit    Last Written Prescription Date:  12/8/2021   Last Fill Quantity: 90,  # refills: 1   Last office visit provider:  12/8/2021     Requested Prescriptions   Pending Prescriptions Disp Refills     digoxin (LANOXIN) 125 MCG tablet [Pharmacy Med Name: Digoxin 125 MCG Oral Tablet] 90 tablet 0     Sig: Take 1 tablet by mouth once daily       Cardiac Glycoside Agents Protocol Failed - 7/18/2022  2:09 AM        Failed - Recent (6 mo) or future (30 days) visit within the authorizing provider's specialty     Patient had office visit in the last 6 months or has a visit in the next 30 days with authorizing provider or within the authorizing provider's specialty.  See \"Patient Info\" tab in inbasket, or \"Choose Columns\" in Meds & Orders section of the refill encounter.            Passed - Normal digoxin level on file in past 12 mos     Recent Labs   Lab Test 09/15/21  1433   DIGOXIN 0.8             Passed - Medication is active on med list        Passed - Patient is 18 years of age or older        Passed - Normal serum creatinine on file in past 12 mos     Recent Labs   Lab Test 06/27/22  0737   CR 1.05       Ok to refill medication if creatinine is low               Dahlia Ceja RN 07/18/22 2:10 AM  "

## 2022-07-18 NOTE — PROGRESS NOTES
ANTICOAGULATION MANAGEMENT     Benny Carrillo 89 year old male is on warfarin with therapeutic INR result. (Goal INR 2.0-3.0)    Recent labs: (last 7 days)     07/18/22  0945   INR 2.2*       ASSESSMENT       Source(s): Chart Review and Home Care/Facility Nurse       Warfarin doses taken: Warfarin taken as instructed    Diet: No new diet changes identified    New illness, injury, or hospitalization: No    Medication/supplement changes: None noted    Signs or symptoms of bleeding or clotting: No    Previous INR: Therapeutic last visit; previously outside of goal range    Additional findings: None       PLAN     Recommended plan for no diet, medication or health factor changes affecting INR     Dosing Instructions: continue your current warfarin dose with next INR in 2 weeks       Summary  As of 7/18/2022    Full warfarin instructions:  1.25 mg every Tue, Fri; 2.5 mg all other days   Next INR check:  8/1/2022             Telephone call with [facility] home care/facility nurse who verbalizes understanding and agrees to plan    Orders given to  Homecare nurse/facility to recheck    Education provided: Please call back if any changes to your diet, medications or how you've been taking warfarin    Plan made per [facility] anticoagulation protocol    Clair Tapia RN  Anticoagulation Clinic  7/18/2022    _______________________________________________________________________     Anticoagulation Episode Summary     Current INR goal:  2.0-3.0   TTR:  73.9 % (1 y)   Target end date:  Indefinite   Send INR reminders to:  [facility]    Indications    Atrial fibrillation (H) [I48.91]  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

## 2022-07-31 DIAGNOSIS — Z91.09 ENVIRONMENTAL ALLERGIES: ICD-10-CM

## 2022-08-01 ENCOUNTER — ANTICOAGULATION THERAPY VISIT (OUTPATIENT)
Dept: ANTICOAGULATION | Facility: CLINIC | Age: 87
End: 2022-08-01

## 2022-08-01 DIAGNOSIS — Z86.73 HISTORY OF CVA (CEREBROVASCULAR ACCIDENT): ICD-10-CM

## 2022-08-01 DIAGNOSIS — N28.0 RENAL INFARCT (H): ICD-10-CM

## 2022-08-01 DIAGNOSIS — I48.91 ATRIAL FIBRILLATION, UNSPECIFIED TYPE (H): Primary | ICD-10-CM

## 2022-08-01 DIAGNOSIS — I48.20 CHRONIC ATRIAL FIBRILLATION (H): ICD-10-CM

## 2022-08-01 LAB — INR (EXTERNAL): 2.1 (ref 0.8–1.1)

## 2022-08-01 RX ORDER — LORATADINE 10 MG/1
TABLET ORAL
Qty: 90 TABLET | Refills: 0 | Status: SHIPPED | OUTPATIENT
Start: 2022-08-01 | End: 2022-10-27

## 2022-08-01 NOTE — PROGRESS NOTES
ANTICOAGULATION MANAGEMENT     Benny Carrillo 89 year old male is on warfarin with therapeutic INR result. (Goal INR 2.0-3.0)    Recent labs: (last 7 days)     08/01/22  0000   INR 2.1*       ASSESSMENT       Source(s): Chart Review and Home Care/Facility Nurse       Warfarin doses taken: Warfarin taken as instructed    Diet: No new diet changes identified    New illness, injury, or hospitalization: No    Medication/supplement changes: None noted    Signs or symptoms of bleeding or clotting: No    Previous INR: Therapeutic last 2(+) visits    Additional findings: None       PLAN     Recommended plan for no diet, medication or health factor changes affecting INR     Dosing Instructions: Continue your current warfarin dose with next INR in 2 weeks       Summary  As of 8/1/2022    Full warfarin instructions:  1.25 mg every Tue, Fri; 2.5 mg all other days   Next INR check:  8/15/2022             Telephone call with Neel[facility] home care/facility nurse who verbalizes understanding and agrees to plan and who agrees to plan and repeated back plan correctly    Orders given to  Homecare nurse/facility to recheck    Education provided: Contact [phone]  with any changes, questions or concerns.     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

## 2022-08-01 NOTE — TELEPHONE ENCOUNTER
"Former patient of Anna & has not established care with another provider.  Please assign refill request to covering provider per clinic standard process.      Routing refill request to provider for review/approval because:  age    Last Written Prescription Date:  4/24/22  Last Fill Quantity: 90,  # refills: 0   Last office visit provider:  12/8/21     Requested Prescriptions   Pending Prescriptions Disp Refills     EQ ALLERGY RELIEF 10 MG tablet [Pharmacy Med Name: EQ Allergy Relief 10 MG Oral Tablet] 90 tablet 0     Sig: Take 1 tablet by mouth once daily       Antihistamines Protocol Failed - 7/31/2022 10:38 AM        Failed - Patient is 3-64 years of age     Apply weight-based dosing for peds patients age 3 - 12 years of age.    Forward request to provider for patients under the age of 3 or over the age of 64.          Passed - Recent (12 mo) or future (30 days) visit within the authorizing provider's specialty     Patient has had an office visit with the authorizing provider or a provider within the authorizing providers department within the previous 12 mos or has a future within next 30 days. See \"Patient Info\" tab in inbasket, or \"Choose Columns\" in Meds & Orders section of the refill encounter.              Passed - Medication is active on med list             Lashae Torres RN 08/01/22 8:48 AM  "

## 2022-08-15 ENCOUNTER — ANTICOAGULATION THERAPY VISIT (OUTPATIENT)
Dept: ANTICOAGULATION | Facility: CLINIC | Age: 87
End: 2022-08-15

## 2022-08-15 DIAGNOSIS — I48.20 CHRONIC ATRIAL FIBRILLATION (H): ICD-10-CM

## 2022-08-15 DIAGNOSIS — N28.0 RENAL INFARCT (H): ICD-10-CM

## 2022-08-15 DIAGNOSIS — Z86.73 HISTORY OF CVA (CEREBROVASCULAR ACCIDENT): ICD-10-CM

## 2022-08-15 DIAGNOSIS — I48.91 ATRIAL FIBRILLATION (H): Primary | ICD-10-CM

## 2022-08-15 LAB — INR (EXTERNAL): 2 (ref 0.9–1.1)

## 2022-08-15 NOTE — PROGRESS NOTES
ANTICOAGULATION MANAGEMENT     Benny Carrillo 89 year old male is on warfarin with therapeutic INR result. (Goal INR 2.0-3.0)    Recent labs: (last 7 days)     08/15/22  0957   INR 2.0*       ASSESSMENT       Source(s): Chart Review and Home Care/Facility Nurse       Warfarin doses taken: Warfarin taken as instructed    Diet: No new diet changes identified    New illness, injury, or hospitalization: No    Medication/supplement changes: None noted    Signs or symptoms of bleeding or clotting: No    Previous INR: Therapeutic last 2(+) visits    Additional findings: None       PLAN     Recommended plan for no diet, medication or health factor changes affecting INR     Dosing Instructions: Continue your current warfarin dose with next INR in 2 weeks       Summary  As of 8/15/2022    Full warfarin instructions:  1.25 mg every Tue, Fri; 2.5 mg all other days   Next INR check:  8/29/2022             Telephone call with [facility] home care/facility nurse who verbalizes understanding and agrees to plan    Orders given to  Homecare nurse/facility to recheck    Education provided: Please call back if any changes to your diet, medications or how you've been taking warfarin    Plan made per [facility] anticoagulation protocol    Montse Riley RN  Anticoagulation Clinic  8/15/2022    _______________________________________________________________________     Anticoagulation Episode Summary     Current INR goal:  2.0-3.0   TTR:  80.4 % (1 y)   Target end date:  Indefinite   Send INR reminders to:  CARLOS CERVANTES    Indications    Atrial fibrillation (H) [I48.91]  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

## 2022-08-17 ENCOUNTER — PATIENT OUTREACH (OUTPATIENT)
Dept: GERIATRIC MEDICINE | Facility: CLINIC | Age: 87
End: 2022-08-17

## 2022-08-17 NOTE — PROGRESS NOTES
CC updated program tasks and targets for [employer] launch.    KESHA Sun  [employer]  [phone]

## 2022-08-19 DIAGNOSIS — B02.9 HERPES ZOSTER WITHOUT COMPLICATION: ICD-10-CM

## 2022-08-19 RX ORDER — GABAPENTIN 100 MG/1
100 CAPSULE ORAL 2 TIMES DAILY PRN
Qty: 30 CAPSULE | Refills: 3 | Status: SHIPPED | OUTPATIENT
Start: 2022-08-19 | End: 2022-12-22

## 2022-08-19 NOTE — TELEPHONE ENCOUNTER
Former patient of Anna & has not established care with another provider.  Please assign refill request to covering provider per clinic standard process.      Routing refill request to provider for review/approval because:  Drug not on the [facility] refill protocol     Last Written Prescription Date:  7/18/22  Last Fill Quantity: 30,  # refills: 0   Last office visit provider:  12/8/21     Requested Prescriptions   Pending Prescriptions Disp Refills     gabapentin (NEURONTIN) 100 MG capsule 30 capsule 0       There is no refill protocol information for this order          Lashae Torres, RN 08/19/22 3:13 PM

## 2022-08-29 ENCOUNTER — ANTICOAGULATION THERAPY VISIT (OUTPATIENT)
Dept: ANTICOAGULATION | Facility: CLINIC | Age: 87
End: 2022-08-29

## 2022-08-29 DIAGNOSIS — Z86.73 HISTORY OF CVA (CEREBROVASCULAR ACCIDENT): ICD-10-CM

## 2022-08-29 DIAGNOSIS — I48.20 CHRONIC ATRIAL FIBRILLATION (H): ICD-10-CM

## 2022-08-29 DIAGNOSIS — N28.0 RENAL INFARCT (H): ICD-10-CM

## 2022-08-29 DIAGNOSIS — I48.91 ATRIAL FIBRILLATION (H): Primary | ICD-10-CM

## 2022-08-29 LAB — INR (EXTERNAL): 2.3 (ref 0.9–1.1)

## 2022-08-29 NOTE — PROGRESS NOTES
ANTICOAGULATION MANAGEMENT     Benny Carrillo 89 year old male is on warfarin with therapeutic INR result. (Goal INR 2.0-3.0)    Recent labs: (last 7 days)     08/29/22  0959   INR 2.3*       ASSESSMENT       Source(s): Chart Review and Home Care/Facility Nurse       Warfarin doses taken: Warfarin taken as instructed    Diet: No new diet changes identified    New illness, injury, or hospitalization: No    Medication/supplement changes: None noted    Signs or symptoms of bleeding or clotting: No    Previous INR: Therapeutic last 2(+) visits    Additional findings: None       PLAN     Recommended plan for no diet, medication or health factor changes affecting INR     Dosing Instructions: Continue your current warfarin dose with next INR in 3 weeks       Summary  As of 8/29/2022    Full warfarin instructions:  1.25 mg every Tue, Fri; 2.5 mg all other days   Next INR check:  9/19/2022             Telephone call with [facility] home care nurse who verbalizes understanding and agrees to plan    Orders given to  Homecare nurse/facility to recheck    Education provided: Please call back if any changes to your diet, medications or how you've been taking warfarin    Plan made per [facility] anticoagulation protocol    Clair Tapia RN  Anticoagulation Clinic  8/29/2022    _______________________________________________________________________     Anticoagulation Episode Summary     Current INR goal:  2.0-3.0   TTR:  80.4 % (1 y)   Target end date:  Indefinite   Send INR reminders to:  HALI HELEN    Indications    Atrial fibrillation (H) [I48.91]  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

## 2022-09-16 ENCOUNTER — MEDICAL CORRESPONDENCE (OUTPATIENT)
Dept: HEALTH INFORMATION MANAGEMENT | Facility: CLINIC | Age: 87
End: 2022-09-16

## 2022-09-19 ENCOUNTER — ANTICOAGULATION THERAPY VISIT (OUTPATIENT)
Dept: ANTICOAGULATION | Facility: CLINIC | Age: 87
End: 2022-09-19

## 2022-09-19 DIAGNOSIS — K21.9 GASTROESOPHAGEAL REFLUX DISEASE WITHOUT ESOPHAGITIS: ICD-10-CM

## 2022-09-19 DIAGNOSIS — I48.20 CHRONIC ATRIAL FIBRILLATION (H): ICD-10-CM

## 2022-09-19 DIAGNOSIS — I48.91 ATRIAL FIBRILLATION (H): Primary | ICD-10-CM

## 2022-09-19 DIAGNOSIS — N28.0 RENAL INFARCT (H): ICD-10-CM

## 2022-09-19 DIAGNOSIS — Z86.73 HISTORY OF CVA (CEREBROVASCULAR ACCIDENT): ICD-10-CM

## 2022-09-19 LAB — INR (EXTERNAL): 2.3 (ref 0.9–1.1)

## 2022-09-19 NOTE — PROGRESS NOTES
ANTICOAGULATION MANAGEMENT     Benny Carrillo 89 year old male is on warfarin with therapeutic INR result. (Goal INR 2.0-3.0)    Recent labs: (last 7 days)     09/19/22  0951   INR 2.3*       ASSESSMENT       Source(s): Chart Review and Home Care/Facility Nurse       Warfarin doses taken: Warfarin taken as instructed    Diet: No new diet changes identified    New illness, injury, or hospitalization: No    Medication/supplement changes: None noted    Signs or symptoms of bleeding or clotting: No    Previous INR: Therapeutic last 2(+) visits    Additional findings: None       PLAN     Recommended plan for no diet, medication or health factor changes affecting INR     Dosing Instructions: Continue your current warfarin dose with next INR in 4 weeks       Summary  As of 9/19/2022    Full warfarin instructions:  1.25 mg every Tue, Fri; 2.5 mg all other days   Next INR check:  10/17/2022             Telephone call with [facility] Ortez, home care nurse who verbalizes understanding and agrees to plan    Orders given to  Homecare nurse/facility to recheck    Education provided: None required    Plan made per [facility] anticoagulation protocol    Candida Melendez, RN  Anticoagulation Clinic  9/19/2022    _______________________________________________________________________     Anticoagulation Episode Summary     Current INR goal:  2.0-3.0   TTR:  83.2 % (1 y)   Target end date:  Indefinite   Send INR reminders to:  HALI KASTONY    Indications    Atrial fibrillation (H) [I48.91]  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

## 2022-09-21 RX ORDER — FAMOTIDINE 20 MG/1
TABLET, FILM COATED ORAL
Qty: 90 TABLET | Refills: 0 | Status: SHIPPED | OUTPATIENT
Start: 2022-09-21 | End: 2022-12-28

## 2022-09-21 NOTE — TELEPHONE ENCOUNTER
"Former patient of Anna & has not established care with another provider.  Please assign refill request to covering provider per clinic standard process.    Routing refill request to provider for review/approval because:  No PCP    Last Written Prescription Date:  6/23/22  Last Fill Quantity: 90,  # refills: 0   Last office visit provider:  12/8/21     Requested Prescriptions   Pending Prescriptions Disp Refills     famotidine (PEPCID) 20 MG tablet [Pharmacy Med Name: Famotidine 20 MG Oral Tablet] 90 tablet 0     Sig: Take 1 tablet by mouth once daily       H2 Blockers Protocol Passed - 9/21/2022  8:24 AM        Passed - Patient is age 12 or older        Passed - Recent (12 mo) or future (30 days) visit within the authorizing provider's specialty     Patient has had an office visit with the authorizing provider or a provider within the authorizing providers department within the previous 12 mos or has a future within next 30 days. See \"Patient Info\" tab in inbasket, or \"Choose Columns\" in Meds & Orders section of the refill encounter.              Passed - Medication is active on med list             Juan Jose Barker RN 09/21/22 8:24 AM  "

## 2022-09-26 ENCOUNTER — TELEPHONE (OUTPATIENT)
Dept: FAMILY MEDICINE | Facility: CLINIC | Age: 87
End: 2022-09-26

## 2022-09-26 NOTE — TELEPHONE ENCOUNTER
Reason for Call:  Appointment Request    Patient requesting this type of appt:  Former pt of Dr. Castillo - needs to establish care ASAP for [facility] N[facility] to follow him.     Requesting SNV for ONCE A WK TO DRAW INR, MGR CATHETER - CHG MONTHLY, SET UP MEDS, MONITOR HEALTH.     Pt had previously been unable to schedule appt.      Requested provider: no preference    Reason patient unable to be scheduled: pt scheduled with [facility] on 10/05/22 at 320 pm     Althea requested that current med list and immunization records be faxed to them at [phone]    Okay to leave a detailed message?: Yes  Althea Garcia DON at [facility] [phone]  Ext 2022    Call taken on 9/26/2022 at 3:38 PM by Pat Boyle CMA TC

## 2022-10-17 ENCOUNTER — ANTICOAGULATION THERAPY VISIT (OUTPATIENT)
Dept: ANTICOAGULATION | Facility: CLINIC | Age: 87
End: 2022-10-17

## 2022-10-17 ENCOUNTER — PATIENT OUTREACH (OUTPATIENT)
Dept: GERIATRIC MEDICINE | Facility: CLINIC | Age: 87
End: 2022-10-17

## 2022-10-17 DIAGNOSIS — Z86.73 HISTORY OF CVA (CEREBROVASCULAR ACCIDENT): ICD-10-CM

## 2022-10-17 DIAGNOSIS — I48.91 ATRIAL FIBRILLATION (H): Primary | ICD-10-CM

## 2022-10-17 DIAGNOSIS — N28.0 RENAL INFARCT (H): ICD-10-CM

## 2022-10-17 DIAGNOSIS — I48.20 CHRONIC ATRIAL FIBRILLATION (H): ICD-10-CM

## 2022-10-17 LAB — INR (EXTERNAL): 2.7 (ref 0.9–1.1)

## 2022-10-17 RX ORDER — WARFARIN SODIUM 2.5 MG/1
TABLET ORAL
Qty: 90 TABLET | Refills: 1 | Status: CANCELLED | OUTPATIENT
Start: 2022-10-17

## 2022-10-17 RX ORDER — WARFARIN SODIUM 2.5 MG/1
TABLET ORAL
Qty: 90 TABLET | Refills: 1 | Status: SHIPPED | OUTPATIENT
Start: 2022-10-17 | End: 2022-10-21

## 2022-10-17 NOTE — PROGRESS NOTES
[facility] Care Coordination Contact    Encounter opened due to Regulatory [employer] Update to close FVP Program.    Aruna Christian  Care Management Specialist  [facility]  [phone]

## 2022-10-17 NOTE — PROGRESS NOTES
[facility] [place] Care Coordination Contact    Encounter opened due to Regulatory [employer] Update to open FVP Program.    Aruna Christian  Care Management Specialist  [facility] [place]  [phone]

## 2022-10-17 NOTE — PROGRESS NOTES
ANTICOAGULATION MANAGEMENT     Benny Carrillo 89 year old male is on warfarin with therapeutic INR result. (Goal INR 2.0-3.0)    Recent labs: (last 7 days)     10/17/22  1002   INR 2.7*       ASSESSMENT       Source(s): Chart Review and Home Care/Facility Nurse       Warfarin doses taken: Warfarin taken as instructed    Diet: No new diet changes identified    New illness, injury, or hospitalization: No    Medication/supplement changes: None noted    Signs or symptoms of bleeding or clotting: No    Previous INR: Therapeutic last 2(+) visits  Additional findings:  ANTICOAGULATION MANAGEMENT:  Medication Refill    Visit with referring provider/group within last year: Yes    [facility] referral signed within last year: Yes    Benny meets all criteria for refill (current [facility] referral, office visit with referring provider/group in last year, lab monitoring up to date or not exceeding 2 weeks overdue). Rx instructions and quantity supplied updated to match patient's current dosing plan. Warfarin 90 day supply with 1 refill granted per [facility] protocol        PLAN     Recommended plan for no diet, medication or health factor changes affecting INR     Dosing Instructions: Continue your current warfarin dose with next INR in 4 weeks       Summary  As of 10/17/2022    Full warfarin instructions:  1.25 mg every Tue, Fri; 2.5 mg all other days   Next INR check:  11/14/2022             Telephone call with [facility] Ortez, home care nurse who verbalizes understanding and agrees to plan    Orders given to  Homecare nurse/facility to recheck    Education provided: None required    Plan made per [facility] anticoagulation protocol    Candida Melendez, RN  Anticoagulation Clinic  10/17/2022    _______________________________________________________________________     Anticoagulation Episode Summary     Current INR goal:  2.0-3.0   TTR:  85.9 % (1 y)   Target end date:  Indefinite   Send INR reminders to:  CARLOS Lynch     Atrial fibrillation (H) [I48.91]  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

## 2022-10-19 DIAGNOSIS — I48.20 CHRONIC ATRIAL FIBRILLATION (H): ICD-10-CM

## 2022-10-20 DIAGNOSIS — I48.20 CHRONIC ATRIAL FIBRILLATION (H): ICD-10-CM

## 2022-10-20 RX ORDER — DIGOXIN 125 MCG
125 TABLET ORAL DAILY
Qty: 90 TABLET | Refills: 3 | Status: SHIPPED | OUTPATIENT
Start: 2022-10-20 | End: 2022-10-21

## 2022-10-20 NOTE — TELEPHONE ENCOUNTER
"Former patient of SIOBHAN Castillo & has not established care with another provider.  Please assign refill request to covering provider per Clinic standard process.    Routing refill request to provider for review/approval because:  Labs not current:  Digoxin level  Patient needs to be seen because it has been more than 6 month since last office visit.    Last Written Prescription Date:  7/18/22  Last Fill Quantity: 90,  # refills: 0   Last office visit provider:  12/8/21     Requested Prescriptions   Pending Prescriptions Disp Refills     digoxin (LANOXIN) 125 MCG tablet 90 tablet 0     Sig: Take 1 tablet (125 mcg) by mouth daily       Cardiac Glycoside Agents Protocol Failed - 10/19/2022  2:47 PM        Failed - Normal digoxin level on file in past 12 mos     Recent Labs   Lab Test 09/15/21  1433   DIGOXIN 0.8             Failed - Recent (6 mo) or future (30 days) visit within the authorizing provider's specialty     Patient had office visit in the last 6 months or has a visit in the next 30 days with authorizing provider or within the authorizing provider's specialty.  See \"Patient Info\" tab in inbasket, or \"Choose Columns\" in Meds & Orders section of the refill encounter.            Passed - Medication is active on med list        Passed - Patient is 18 years of age or older        Passed - Normal serum creatinine on file in past 12 mos     Recent Labs   Lab Test 06/27/22  0737   CR 1.05       Ok to refill medication if creatinine is low               Komal Cheng RN 10/20/22 12:40 PM  "

## 2022-10-21 ENCOUNTER — OFFICE VISIT (OUTPATIENT)
Dept: FAMILY MEDICINE | Facility: CLINIC | Age: 87
End: 2022-10-21
Payer: COMMERCIAL

## 2022-10-21 VITALS — OXYGEN SATURATION: 90 % | HEART RATE: 91 BPM | SYSTOLIC BLOOD PRESSURE: 100 MMHG | DIASTOLIC BLOOD PRESSURE: 70 MMHG

## 2022-10-21 DIAGNOSIS — Z23 HIGH PRIORITY FOR 2019-NCOV VACCINE: ICD-10-CM

## 2022-10-21 DIAGNOSIS — Z93.59 SUPRAPUBIC CATHETER (H): ICD-10-CM

## 2022-10-21 DIAGNOSIS — I48.20 CHRONIC ATRIAL FIBRILLATION (H): ICD-10-CM

## 2022-10-21 DIAGNOSIS — K21.9 GASTROESOPHAGEAL REFLUX DISEASE WITHOUT ESOPHAGITIS: ICD-10-CM

## 2022-10-21 DIAGNOSIS — Z00.00 ENCOUNTER FOR MEDICARE ANNUAL WELLNESS EXAM: Primary | ICD-10-CM

## 2022-10-21 DIAGNOSIS — E44.0 MODERATE MALNUTRITION (H): ICD-10-CM

## 2022-10-21 DIAGNOSIS — I10 BENIGN ESSENTIAL HYPERTENSION: ICD-10-CM

## 2022-10-21 DIAGNOSIS — E63.9 POOR NUTRITION: ICD-10-CM

## 2022-10-21 DIAGNOSIS — D53.9 MACROCYTIC ANEMIA: ICD-10-CM

## 2022-10-21 DIAGNOSIS — J44.9 CHRONIC OBSTRUCTIVE PULMONARY DISEASE, UNSPECIFIED COPD TYPE (H): ICD-10-CM

## 2022-10-21 LAB
ANION GAP SERPL CALCULATED.3IONS-SCNC: 8 MMOL/L (ref 7–15)
BUN SERPL-MCNC: 10.6 MG/DL (ref 8–23)
CALCIUM SERPL-MCNC: 8.8 MG/DL (ref 8.2–9.6)
CHLORIDE SERPL-SCNC: 98 MMOL/L (ref 98–107)
CREAT SERPL-MCNC: 1.14 MG/DL (ref 0.67–1.17)
DEPRECATED HCO3 PLAS-SCNC: 28 MMOL/L (ref 22–29)
ERYTHROCYTE [DISTWIDTH] IN BLOOD BY AUTOMATED COUNT: 13.3 % (ref 10–15)
FOLATE SERPL-MCNC: 27.3 NG/ML (ref 4.6–34.8)
GFR SERPL CREATININE-BSD FRML MDRD: 57 ML/MIN/1.73M2
GLUCOSE SERPL-MCNC: 113 MG/DL (ref 70–99)
HCT VFR BLD AUTO: 44 % (ref 40–53)
HGB BLD-MCNC: 14.1 G/DL (ref 13.3–17.7)
MAGNESIUM SERPL-MCNC: 2.2 MG/DL (ref 1.7–2.3)
MCH RBC QN AUTO: 32.5 PG (ref 26.5–33)
MCHC RBC AUTO-ENTMCNC: 32 G/DL (ref 31.5–36.5)
MCV RBC AUTO: 101 FL (ref 78–100)
PLATELET # BLD AUTO: 190 10E3/UL (ref 150–450)
POTASSIUM SERPL-SCNC: 4.5 MMOL/L (ref 3.4–5.3)
RBC # BLD AUTO: 4.34 10E6/UL (ref 4.4–5.9)
SODIUM SERPL-SCNC: 134 MMOL/L (ref 136–145)
TSH SERPL DL<=0.005 MIU/L-ACNC: 0.55 UIU/ML (ref 0.3–4.2)
VIT B12 SERPL-MCNC: 1284 PG/ML (ref 232–1245)
WBC # BLD AUTO: 8.3 10E3/UL (ref 4–11)

## 2022-10-21 PROCEDURE — 84443 ASSAY THYROID STIM HORMONE: CPT | Performed by: FAMILY MEDICINE

## 2022-10-21 PROCEDURE — 82607 VITAMIN B-12: CPT | Performed by: FAMILY MEDICINE

## 2022-10-21 PROCEDURE — 0134A COVID-19,PF,MODERNA BIVALENT: CPT | Performed by: FAMILY MEDICINE

## 2022-10-21 PROCEDURE — G0439 PPPS, SUBSEQ VISIT: HCPCS | Performed by: FAMILY MEDICINE

## 2022-10-21 PROCEDURE — 91313 COVID-19,PF,MODERNA BIVALENT: CPT | Performed by: FAMILY MEDICINE

## 2022-10-21 PROCEDURE — 80048 BASIC METABOLIC PNL TOTAL CA: CPT | Performed by: FAMILY MEDICINE

## 2022-10-21 PROCEDURE — 83735 ASSAY OF MAGNESIUM: CPT | Performed by: FAMILY MEDICINE

## 2022-10-21 PROCEDURE — 36415 COLL VENOUS BLD VENIPUNCTURE: CPT | Performed by: FAMILY MEDICINE

## 2022-10-21 PROCEDURE — 99214 OFFICE O/P EST MOD 30 MIN: CPT | Mod: 25 | Performed by: FAMILY MEDICINE

## 2022-10-21 PROCEDURE — 82746 ASSAY OF FOLIC ACID SERUM: CPT | Performed by: FAMILY MEDICINE

## 2022-10-21 PROCEDURE — 85027 COMPLETE CBC AUTOMATED: CPT | Performed by: FAMILY MEDICINE

## 2022-10-21 RX ORDER — MULTIPLE VITAMINS W/ MINERALS TAB 9MG-400MCG
1 TAB ORAL DAILY
Qty: 90 TABLET | Refills: 3 | Status: SHIPPED | OUTPATIENT
Start: 2022-10-21 | End: 2023-06-06

## 2022-10-21 RX ORDER — AMLODIPINE BESYLATE 5 MG/1
5 TABLET ORAL DAILY
Qty: 90 TABLET | Refills: 3 | Status: SHIPPED | OUTPATIENT
Start: 2022-10-21 | End: 2023-09-07

## 2022-10-21 RX ORDER — DIGOXIN 125 MCG
125 TABLET ORAL DAILY
Qty: 90 TABLET | Refills: 3 | Status: SHIPPED | OUTPATIENT
Start: 2022-10-21 | End: 2023-09-07

## 2022-10-21 RX ORDER — WARFARIN SODIUM 1 MG/1
TABLET ORAL
Qty: 150 TABLET | Refills: 1 | Status: CANCELLED | OUTPATIENT
Start: 2022-10-21

## 2022-10-21 RX ORDER — PANTOPRAZOLE SODIUM 40 MG/1
40 TABLET, DELAYED RELEASE ORAL DAILY
Qty: 90 TABLET | Refills: 3 | Status: SHIPPED | OUTPATIENT
Start: 2022-10-21 | End: 2023-09-07

## 2022-10-21 RX ORDER — WARFARIN SODIUM 2.5 MG/1
TABLET ORAL
Qty: 90 TABLET | Refills: 3 | Status: SHIPPED | OUTPATIENT
Start: 2022-10-21 | End: 2023-10-12

## 2022-10-21 ASSESSMENT — ENCOUNTER SYMPTOMS
NAUSEA: 0
MYALGIAS: 0
CHILLS: 0
EYE PAIN: 0
HEARTBURN: 0
HEMATOCHEZIA: 0
SORE THROAT: 0
ABDOMINAL PAIN: 0
HEADACHES: 0
DIZZINESS: 0
DYSURIA: 0
DIARRHEA: 0
COUGH: 0
FEVER: 0
HEMATURIA: 0
ARTHRALGIAS: 0
PALPITATIONS: 0
SHORTNESS OF BREATH: 0
PARESTHESIAS: 0
NERVOUS/ANXIOUS: 0
CONSTIPATION: 0
FREQUENCY: 0
WEAKNESS: 0
JOINT SWELLING: 0

## 2022-10-21 ASSESSMENT — PAIN SCALES - GENERAL: PAINLEVEL: NO PAIN (0)

## 2022-10-21 ASSESSMENT — ACTIVITIES OF DAILY LIVING (ADL)
CURRENT_FUNCTION: HOUSEWORK REQUIRES ASSISTANCE
CURRENT_FUNCTION: BATHING REQUIRES ASSISTANCE
CURRENT_FUNCTION: MEDICATION ADMINISTRATION REQUIRES ASSISTANCE
CURRENT_FUNCTION: LAUNDRY REQUIRES ASSISTANCE
CURRENT_FUNCTION: TRANSPORTATION REQUIRES ASSISTANCE
CURRENT_FUNCTION: PREPARING MEALS REQUIRES ASSISTANCE
CURRENT_FUNCTION: SHOPPING REQUIRES ASSISTANCE
CURRENT_FUNCTION: MONEY MANAGEMENT REQUIRES ASSISTANCE
CURRENT_FUNCTION: TELEPHONE REQUIRES ASSISTANCE

## 2022-10-21 NOTE — PATIENT INSTRUCTIONS
Patient Education   Personalized Prevention Plan  You are due for the preventive services outlined below.  Your care team is available to assist you in scheduling these services.  If you have already completed any of these items, please share that information with your care team to update in your medical record.  Health Maintenance Due   Topic Date Due     Breathing Capacity Test  Never done     ANNUAL REVIEW OF HM ORDERS  Never done     Discuss Advance Care Planning  Never done     COPD Action Plan  Never done     Zoster (Shingles) Vaccine (1 of 2) Never done     Hepatitis B Vaccine (3 of 3 - 3-dose series) 12/16/2009     COVID-19 Vaccine (3 - Booster for Moderna series) 03/31/2022     Flu Vaccine (1) 09/01/2022       Exercise for a Healthier Heart  You may wonder how you can improve the health of your heart. If you re thinking about exercise, you re on the right track. You don t need to become an athlete. But you do need a certain amount of brisk exercise to help strengthen your heart. If you have been diagnosed with a heart condition, your healthcare provider may advise exercise to help stabilize your condition. To help make exercise a habit, choose safe, fun activities.      Exercise with a friend. When activity is fun, you're more likely to stick with it.   Before you start  Check with your healthcare provider before starting an exercise program. This is especially important if you have not been active for a while. It's also important if you have a long-term (chronic) health problem such as heart disease, diabetes, or obesity. Or if you are at high risk for having these problems.   Why exercise?  Exercising regularly offers many healthy rewards. It can help you do all of the following:     Improve your blood cholesterol level to help prevent further heart trouble    Lower your blood pressure to help prevent a stroke or heart attack    Control diabetes, or reduce your risk of getting this disease    Improve  your heart and lung function    Reach and stay at a healthy weight    Make your muscles stronger so you can stay active    Prevent falls and fractures by slowing the loss of bone mass (osteoporosis)    Manage stress better    Reduce your blood pressure    Improve your sense of self and your body image  Exercise tips      Ease into your routine. Set small goals. Then build on them. If you are not sure what your activity level should be, talk with your healthcare provider first before starting an exercise routine.    Exercise on most days. Aim for a total of 150 minutes (2 hours and 30 minutes) or more of moderate-intensity aerobic activity each week. Or 75 minutes (1 hour and 15 minutes) or more of vigorous-intensity aerobic activity each week. Or try for a combination of both. Moderate activity means that you breathe heavier and your heart rate increases but you can still talk. Think about doing 40 minutes of moderate exercise, 3 to 4 times a week. For best results, activity should last for about 40 minutes to lower blood pressure and cholesterol. It's OK to work up to the 40-minute period over time. Examples of moderate-intensity activity are walking 1 mile in 15 minutes. Or doing 30 to 45 minutes of yard work.    Step up your daily activity level.  Along with your exercise program, try being more active the whole day. Walk instead of drive. Or park further away so that you take more steps each day. Do more household tasks or yard work. You may not be able to meet the advised mount of physical activity. But doing some moderate- or vigorous-intensity aerobic activity can help reduce your risk for heart disease. Your healthcare provider can help you figure out what is best for you.    Choose 1 or more activities you enjoy.  Walking is one of the easiest things you can do. You can also try swimming, riding a bike, dancing, or taking an exercise class.    When to call your healthcare provider  Call your healthcare  provider if you have any of these:     Chest pain or feel dizzy or lightheaded    Burning, tightness, pressure, or heaviness in your chest, neck, shoulders, back, or arms    Abnormal shortness of breath    More joint or muscle pain    A very fast or irregular heartbeat (palpitations)  [employer] last reviewed this educational content on 7/1/2019 2000-2021 [employer]. All rights reserved. This information is not intended as a substitute for professional medical care. Always follow your healthcare professional's instructions.        Activities of Daily Living    Your Health Risk Assessment indicates you have difficulties with activities of daily living such as housework, bathing, preparing meals, taking medication, etc. Please make a follow up appointment for us to address this issue in more detail.    Signs of Hearing Loss      Hearing much better with one ear can be a sign of hearing loss.   Hearing loss is a problem shared by many people. In fact, it is one of the most common health problems, particularly as people age. Most people age 65 and older have some hearing loss. By age 80, almost everyone does. Hearing loss often occurs slowly over the years. So you may not realize your hearing has gotten worse.  Have your hearing checked  Call your healthcare provider if you:    Have to strain to hear normal conversation    Have to watch other people s faces very carefully to follow what they re saying    Need to ask people to repeat what they ve said    Often misunderstand what people are saying    Turn the volume of the television or radio up so high that others complain    Feel that people are mumbling when they re talking to you    Find that the effort to hear leaves you feeling tired and irritated    Notice, when using the phone, that you hear better with one ear than the other  [employer]ae last reviewed this educational content on 1/1/2020 2000-2021 [employer]. All rights reserved.  This information is not intended as a substitute for professional medical care. Always follow your healthcare professional's instructions.

## 2022-10-21 NOTE — LETTER
October 22, 2022      Benny Carrillo  [address] [address]        Dear ,    We are writing to inform you of your test results.    Your vitamin B12 level was fine.  It is not low.  You are getting plenty of vitamin B12.    Your folic acid level is normal.    No evidence for low hemoglobin (anemia).    Your kidney function was slightly reduced.  Ensure adequate hydration.  We will continue to follow closely.     Your thyroid screening test (i.e. TSH) was normal.     Your magnesium level is normal.     Continue your current medication as discussed.     It was an honor to meet you :)   Yuly      Resulted Orders   Vitamin B12   Result Value Ref Range    Vitamin B12 1,284 (H) 232 - 1,245 pg/mL   Folate   Result Value Ref Range    Folic Acid 27.3 4.6 - 34.8 ng/mL   CBC with platelets   Result Value Ref Range    WBC Count 8.3 4.0 - 11.0 10e3/uL    RBC Count 4.34 (L) 4.40 - 5.90 10e6/uL    Hemoglobin 14.1 13.3 - 17.7 g/dL    Hematocrit 44.0 40.0 - 53.0 %     (H) 78 - 100 fL    MCH 32.5 26.5 - 33.0 pg    MCHC 32.0 31.5 - 36.5 g/dL    RDW 13.3 10.0 - 15.0 %    Platelet Count 190 150 - 450 10e3/uL   Basic metabolic panel   Result Value Ref Range    Sodium 134 (L) 136 - 145 mmol/L    Potassium 4.5 3.4 - 5.3 mmol/L    Chloride 98 98 - 107 mmol/L    Carbon Dioxide (CO2) 28 22 - 29 mmol/L    Anion Gap 8 7 - 15 mmol/L    Urea Nitrogen 10.6 8.0 - 23.0 mg/dL    Creatinine 1.14 0.67 - 1.17 mg/dL    Calcium 8.8 8.2 - 9.6 mg/dL    Glucose 113 (H) 70 - 99 mg/dL    GFR Estimate 57 (L) >60 mL/min/1.73m2      Comment:      Effective December 21, 2021 eGFRcr in adults is calculated using the 2021 CKD-EPI creatinine equation which includes age and gender (Oral belle al., NEJ, DOI: 10.1056/KWUAvo6078825)   TSH   Result Value Ref Range    TSH 0.55 0.30 - 4.20 uIU/mL   Magnesium   Result Value Ref Range    Magnesium 2.2 1.7 - 2.3 mg/dL       If you have any questions or concerns, please call the clinic at the  number listed above.       Sincerely,      Jb Martínez MD

## 2022-10-21 NOTE — PROGRESS NOTES
SUBJECTIVE:     Benny is a 89 year old who presents for Preventive Visit.      89-year-old male completing annual wellness visit.  Establishing care.  Previously seen by Dr. Alcides Castillo at [facility].  History of atrial fibrillation.  Continues digoxin 0.125 mg daily.  Warfarin anticoagulation 2.5 mg using half tablet on Tuesday and Friday otherwise 1 tablet daily apparently.  Has home nurse checking INR weekly with subsequent dose adjustment as indicated.  PCA services for med set up and catheter changes every month.  Suprapubic catheter in place since  when difficulties with urinary retention noted.  Amlodipine 5 mg daily for hypertension.  Pantoprazole 40 mg daily with famotidine 20 mg daily for reflux.  Previous shingles and does utilize gabapentin 100 mg twice daily with acetaminophen for pain management at times.  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  Does require some assistance with transfers.  Lives with 6 others in a home in [place].  Resides on the lower level of a 3 level home.  Most recent INR 2.74 days ago.  Comprehensive review of systems as above otherwise all negative.       - [place] (passed away in )  Originally from [facility] - kicked out of [facility]...  6 children (4 sons, 2 daughters) - his son is Steven  25 grandchildren - his granddaughter is Mey (Steven's daughter)  Quit  - prior heavy smoker  EtOH:  none  Mom -    Dad -    2 bros -   1 sis -   Surgeries:  suprapubic catheter x  (urinary retension -> switched from [place] to suprapubic catheter while in [place]  Hospitalizations:  h/o CVA      Patient has been advised of split billing requirements and indicates understanding: Yes  Are you in the first 12 months of your Medicare coverage?  No    Healthy Habits:     In general, how would you rate your overall health?  Good    Frequency of exercise:  None    Do you usually eat at least 4 servings  "of fruit and vegetables a day, include whole grains    & fiber and avoid regularly eating high fat or \"junk\" foods?  Yes    Taking medications regularly:  Yes    Medication side effects:  None    Ability to successfully perform activities of daily living:  Telephone requires assistance, transportation requires assistance, shopping requires assistance, preparing meals requires assistance, housework requires assistance, bathing requires assistance, laundry requires assistance, medication administration requires assistance and money management requires assistance    Home Safety:  No safety concerns identified    Hearing Impairment:  Need to ask people to speak up or repeat themselves and difficulty understanding speech on the telephone    In the past 6 months, have you been bothered by leaking of urine?  No    In general, how would you rate your overall mental or emotional health?  Good      PHQ-2 Total Score: 1    Additional concerns today:  No    Do you feel safe in your environment? Yes    Have you ever done Advance Care Planning? (For example, a Health Directive, POLST, or a discussion with a medical provider or your loved ones about your wishes): No, advance care planning information given to patient to review.  Advanced care planning was discussed at today's visit.       Fall risk  Fallen 2 or more times in the past year?: No  Any fall with injury in the past year?: No    Cognitive Screening Not appropriate due to mental handicap    Do you have sleep apnea, excessive snoring or daytime drowsiness?: no    Reviewed and updated as needed this visit by clinical staff   Tobacco   Meds              Reviewed and updated as needed this visit by Provider                 Social History     Tobacco Use     Smoking status: Never     Smokeless tobacco: Never   Substance Use Topics     Alcohol use: No     If you drink alcohol do you typically have >3 drinks per day or >7 drinks per week? No    Alcohol Use 10/21/2022 "   Prescreen: >3 drinks/day or >7 drinks/week? No   Prescreen: >3 drinks/day or >7 drinks/week? -               Current providers sharing in care for this patient include:   Patient Care Team:  Izabella Correa LSW as Lead Care Coordinator (Primary Care - CC)  Sergei Langford, PharmD as Pharmacist (Pharmacist)  Arlette Yang MD as Assigned PCP    The following health maintenance items are reviewed in Epic and correct as of today:  Health Maintenance   Topic Date Due     SPIROMETRY  Never done     ANNUAL REVIEW OF HM ORDERS  Never done     ADVANCE CARE PLANNING  Never done     COPD ACTION PLAN  Never done     ZOSTER IMMUNIZATION (1 of 2) Never done     HEPATITIS B IMMUNIZATION (3 of 3 - 3-dose series) 12/16/2009     COVID-19 Vaccine (3 - Booster for Moderna series) 03/31/2022     INFLUENZA VACCINE (1) 09/01/2022     MEDICARE ANNUAL WELLNESS VISIT  10/21/2023     FALL RISK ASSESSMENT  10/21/2023     DTAP/TDAP/TD IMMUNIZATION (2 - Td or Tdap) 09/09/2029     PHQ-2 (once per calendar year)  Completed     Pneumococcal Vaccine: 65+ Years  Completed     IPV IMMUNIZATION  Aged Out     MENINGITIS IMMUNIZATION  Aged Out     Lab work is in process  Labs reviewed in EPIC  BP Readings from Last 3 Encounters:   10/21/22 100/70   06/27/22 (!) 142/76   06/09/22 130/74    Wt Readings from Last 3 Encounters:   06/27/22 47.2 kg (104 lb)   06/09/22 50.3 kg (111 lb)   12/01/21 52.6 kg (116 lb)                  Patient Active Problem List   Diagnosis     Chronic atrial fibrillation     Muscle Weakness Generalized     Underweight due to inadequate caloric intake     Chest pain     Gastroesophageal reflux disease without esophagitis     Suprapubic catheter (H)     Leukocytosis, unspecified type     Heartburn     Acute cerebral infarction (H)     Accelerated hypertension     Chronic retention of urine     Acute nonintractable headache, unspecified headache type     History of ischemic cerebrovascular accident (CVA) with residual deficit      Acute pyelonephritis     Urinary tract infection associated with cystostomy catheter, initial encounter (H)     Moderate malnutrition (H)     Sepsis secondary to UTI (H)     Renal infarct (H)     Small bowel obstruction (H)     SBO (small bowel obstruction) (H)     Aspiration pneumonia of both lower lobes due to vomit (H)     History of CVA (cerebrovascular accident)     Permanent atrial fibrillation (H)     Incontinence of feces, unspecified fecal incontinence type     Atrial fibrillation (H)     Benign essential hypertension     Chronic obstructive pulmonary disease, unspecified COPD type (H)     Urinary retention     Past Surgical History:   Procedure Laterality Date     CHOLECYSTECTOMY       CYSTOTOMY       EYE SURGERY Bilateral     cataract removal     [facility]  5/28/2019            Social History     Tobacco Use     Smoking status: Never     Smokeless tobacco: Never   Substance Use Topics     Alcohol use: No     Family History   Problem Relation Age of Onset     No Known Problems Mother      No Known Problems Father      Diabetes Other         spouse         Current Outpatient Medications   Medication Sig Dispense Refill     albuterol (PROAIR HFA;PROVENTIL HFA;VENTOLIN HFA) 90 mcg/actuation inhaler [ALBUTEROL (PROAIR HFA;PROVENTIL HFA;VENTOLIN HFA) 90 MCG/ACTUATION INHALER] INHALE 2 PUFFS BY MOUTH EVERY 6 HOURS AS NEEDED FOR WHEEZING 1 each 3     amLODIPine (NORVASC) 5 MG tablet Take 1 tablet (5 mg) by mouth daily 90 tablet 3     ARTIFICIAL TEARS, POLYVIN ALC, 1.4 % ophthalmic solution [ARTIFICIAL TEARS, POLYVIN ALC, 1.4 % OPHTHALMIC SOLUTION] INSTILL 2 DROPS INTO EACH EYE TWICE DAILY AS NEEDED DRY EYES 15 mL 6     digoxin (LANOXIN) 125 MCG tablet Take 1 tablet (125 mcg) by mouth daily 90 tablet 3     EQ ACETAMINOPHEN 500 MG tablet TAKE 1 TO 2 TABLETS BY MOUTH EVERY 6 HOURS AS NEEDED FOR MILD PAIN 360 tablet 2     EQ ALLERGY RELIEF 10 MG tablet Take 1 tablet by mouth once daily 90 tablet 0     famotidine (PEPCID)  20 MG tablet Take 1 tablet by mouth once daily 90 tablet 0     gabapentin (NEURONTIN) 100 MG capsule Take 1 capsule (100 mg) by mouth 2 times daily as needed (pain) 30 capsule 3     guaiFENesin (ROBITUSSIN) 100 MG/5ML SYRP Take 10 mLs by mouth 3 times daily as needed for cough 240 mL 0     hydrocortisone 2.5 % cream [HYDROCORTISONE 2.5 % CREAM] APPLY  CREAM TO AFFECTED AREA ONCE TO TWICE DAILY AS NEEDED 28 g 2     MEDICATION CANNOT BE REORDERED - PLEASE MANUALLY REORDER AND DISCONTINUE THE OLD ORDER [CATHETER 14 FR MISC] Suprapubic catheter. 14 French. 1 each 0     melatonin 5 MG tablet Take 1 tablet (5 mg) by mouth nightly as needed for sleep 30 tablet 3     multivitamin w/minerals (MULTIVITAMIN, THERAPEUTIC WITH MINERALS) tablet Take 1 tablet by mouth daily 90 tablet 3     pantoprazole (PROTONIX) 40 MG EC tablet Take 1 tablet (40 mg) by mouth daily 90 tablet 3     polyvinyl alcohol-povidone (ARTIFICIAL TEARS,PVALCH-POVID,) 0.5-0.6 % Drop [POLYVINYL ALCOHOL-POVIDONE (ARTIFICIAL TEARS,PVALCH-POVID,) 0.5-0.6 % DROP] 1-2 gtts two times a day to each eye prn 15 mL 3     SM TUSSIN MUCUS+CHEST CONGEST 100 MG/5ML liquid TAKE 10 ML BY MOUTH THREE TIMES DAILY AS NEEDED FOR COUGH 240 mL 0     warfarin ANTICOAGULANT (COUMADIN) 2.5 MG tablet Take1.25 mg (2.5 mg x 0.5) every Tue, Fri; 2.5 mg (2.5 mg x 1) all other days or as directed. 90 tablet 3     Allergies   Allergen Reactions     Morphine Itching and Other (See Comments)     Pt received morphine IV on 8/26/18. Pt reported localized itching and discomfort with redness near IV site following administration of morphine.     Elects Covid-19 bivalent booster today.  Will return in a couple weeks for high-dose flu shot.        Review of Systems   Constitutional: Negative for chills and fever.   HENT: Negative for congestion, ear pain, hearing loss and sore throat.    Eyes: Negative for pain and visual disturbance.   Respiratory: Negative for cough and shortness of breath.   "  Cardiovascular: Negative for chest pain, palpitations and peripheral edema.   Gastrointestinal: Negative for abdominal pain, constipation, diarrhea, heartburn, hematochezia and nausea.   Genitourinary: Negative for dysuria, frequency, genital sores, hematuria and urgency.   Musculoskeletal: Negative for arthralgias, joint swelling and myalgias.   Skin: Negative for rash.   Neurological: Negative for dizziness, weakness, headaches and paresthesias.   Psychiatric/Behavioral: Negative for mood changes. The patient is not nervous/anxious.      Constitutional, HEENT, cardiovascular, pulmonary, GI, , musculoskeletal, neuro, skin, endocrine and psych systems are negative, except as otherwise noted.      Answers for HPI/ROS submitted by the patient on 10/21/2022  In general, how would you rate your overall physical health?: good  Frequency of exercise:: None  Do you usually eat at least 4 servings of fruit and vegetables a day, include whole grains & fiber, and avoid regularly eating high fat or \"junk\" foods? : Yes  Taking medications regularly:: Yes  Medication side effects:: None  Activities of Daily Living: telephone requires assistance, transportation requires assistance, shopping requires assistance, preparing meals requires assistance, housework requires assistance, bathing requires assistance, laundry requires assistance, medication administration requires assistance, money management requires assistance  Home safety: no safety concerns identified  Hearing Impairment:: need to ask people to speak up or repeat themselves, difficulty understanding speech on the telephone  In the past 6 months, have you been bothered by leaking of urine?: No  abdominal pain: No  Blood in stool: No  Blood in urine: No  chest pain: No  chills: No  congestion: No  constipation: No  cough: No  diarrhea: No  dizziness: No  ear pain: No  eye pain: No  nervous/anxious: No  fever: No  frequency: No  genital sores: No  headaches: No  hearing " "loss: No  heartburn: No  arthralgias: No  joint swelling: No  peripheral edema: No  mood changes: No  myalgias: No  nausea: No  dysuria: No  palpitations: No  Skin sensation changes: No  sore throat: No  urgency: No  rash: No  shortness of breath: No  visual disturbance: No  weakness: No  In general, how would you rate your overall mental or emotional health?: good  Additional concerns today:: No      OBJECTIVE:   /70 (BP Location: Left arm, Patient Position: Sitting, Cuff Size: Adult Regular)   Pulse 91   SpO2 90%  Estimated body mass index is 16.79 kg/m  as calculated from the following:    Height as of 6/27/22: 1.676 m (5' 6\").    Weight as of 6/27/22: 47.2 kg (104 lb).       Physical Exam  GENERAL: healthy, alert and no distress  EYES: Eyes grossly normal to inspection, PERRL and conjunctivae and sclerae normal  HENT: ear canals and TM's normal, nose and mouth without ulcers or lesions  NECK: no adenopathy, no asymmetry, masses, or scars and thyroid normal to palpation  RESP: lungs clear to auscultation - no rales, rhonchi or wheezes  CV: regular rate and rhythm, normal S1 S2, no S3 or S4, no murmur, click or rub, no peripheral edema and peripheral pulses strong  ABDOMEN: soft, nontender, no hepatosplenomegaly, no masses and bowel sounds normal  MS: no gross musculoskeletal defects noted, no edema  SKIN: no suspicious lesions or rashes  NEURO: Normal strength and tone, mentation intact and speech normal  PSYCH: mentation appears normal, affect normal/bright    Diagnostic Test Results:  Labs reviewed in Epic  No results found for this or any previous visit (from the past 24 hour(s)).    ASSESSMENT / PLAN:     Encounter for Medicare annual wellness exam  Constitutional, HEENT, cardiovascular, pulmonary, GI, , musculoskeletal, neuro, skin, endocrine and psych systems are negative, except as otherwise noted.    Chronic obstructive pulmonary disease, unspecified COPD type (H)  Has utilized inhaler on " "as-needed basis historically.    Chronic atrial fibrillation (H)  Chronic atrial fibrillation.  Continues warfarin 2.5 mg using half tablet on Tuesday and Friday otherwise 1 tablet daily.  Digoxin 0.125 mg daily for rate control.  Reassess at follow-up in 3 months  - digoxin (LANOXIN) 125 MCG tablet  Dispense: 90 tablet; Refill: 3  - warfarin ANTICOAGULANT (COUMADIN) 2.5 MG tablet  Dispense: 90 tablet; Refill: 3  - Basic metabolic panel  - TSH  - Magnesium    Poor nutrition  Continues daily multivitamin  - multivitamin w/minerals (MULTIVITAMIN, THERAPEUTIC WITH MINERALS) tablet  Dispense: 90 tablet; Refill: 3    Benign essential hypertension  Remains on amlodipine 5 mg daily.  Med monitoring completed  - amLODIPine (NORVASC) 5 MG tablet  Dispense: 90 tablet; Refill: 3  - Basic metabolic panel    Gastroesophageal reflux disease without esophagitis  Use pantoprazole 40 mg daily with famotidine 20 mg daily as needed.  - pantoprazole (PROTONIX) 40 MG EC tablet  Dispense: 90 tablet; Refill: 3    Macrocytic anemia  History of macrocytic anemia and will update CBC with vitamin B12 and folate levels.  - Vitamin B12  - Folate  - CBC with platelets    High priority for 2019-nCoV vaccine  Covid-19 bivalent Moderna booster provided today.  - COVID-19,PF,MODERNA BIVALENT 18+Yrs     Suprapubic catheter (H)  Replaced monthly.  PCA services involved.    Moderate malnutrition  Continue healthy diet and current multivitamin    Patient has been advised of split billing requirements and indicates understanding: No      COUNSELING:  Reviewed preventive health counseling, as reflected in patient instructions       Regular exercise       Healthy diet/nutrition       Vision screening       Hearing screening       Dental care       Bladder control       Fall risk prevention    Estimated body mass index is 16.79 kg/m  as calculated from the following:    Height as of 6/27/22: 1.676 m (5' 6\").    Weight as of 6/27/22: 47.2 kg (104 " lb).        He reports that he has never smoked. He has never used smokeless tobacco.      Appropriate preventive services were discussed with this patient, including applicable screening as appropriate for cardiovascular disease, diabetes, osteopenia/osteoporosis, and glaucoma.  As appropriate for age/gender, discussed screening for colorectal cancer, prostate cancer, breast cancer, and cervical cancer. Checklist reviewing preventive services available has been given to the patient.    Reviewed patients plan of care and provided an AVS. The Intermediate Care Plan ( asthma action plan, low back pain action plan, and migraine action plan) for Benny meets the Care Plan requirement. This Care Plan has been established and reviewed with the Patient and son and other:granddaughter.    Counseling Resources:  ATP IV Guidelines  Pooled Cohorts Equation Calculator  Breast Cancer Risk Calculator  Breast Cancer: Medication to Reduce Risk  FRAX Risk Assessment  ICSI Preventive Guidelines  Dietary Guidelines for Americans, 2010  Fwd: [employer] My[employer]ate  ASA Prophylaxis  Lung CA Screening    Jb Martínez MD  [facility]    Identified Health Risks:    He is at risk for lack of exercise and has been provided with information to increase physical activity for the benefit of his well-being.  The patient reports that he has difficulty with activities of daily living. I have asked that the patient make a follow up appointment in 12 weeks where this issue will be further evaluated and addressed.  The patient was provided with written information regarding signs of hearing loss.

## 2022-10-22 NOTE — TELEPHONE ENCOUNTER
"Routing refill request to provider for review/approval because:  Labs not current:  Dig level  Patient needs to be seen because it has been more than 6 months since last office visit.    Last Written Prescription Date:  10/21/22  Last Fill Quantity: 90,  # refills: 3   Last office visit provider:  10/21/22     Requested Prescriptions   Pending Prescriptions Disp Refills     digoxin (LANOXIN) 125 MCG tablet 90 tablet 3     Sig: Take 1 tablet (125 mcg) by mouth daily       Cardiac Glycoside Agents Protocol Failed - 10/20/2022  6:22 PM        Failed - Normal digoxin level on file in past 12 mos     Recent Labs   Lab Test 09/15/21  1433   DIGOXIN 0.8             Failed - Recent (6 mo) or future (30 days) visit within the authorizing provider's specialty     Patient had office visit in the last 6 months or has a visit in the next 30 days with authorizing provider or within the authorizing provider's specialty.  See \"Patient Info\" tab in inbasket, or \"Choose Columns\" in Meds & Orders section of the refill encounter.            Passed - Medication is active on med list        Passed - Patient is 18 years of age or older        Passed - Normal serum creatinine on file in past 12 mos     Recent Labs   Lab Test 10/21/22  1313   CR 1.14       Ok to refill medication if creatinine is low               Francine Covarrubias 10/22/22 6:31 PM  "

## 2022-10-25 RX ORDER — DIGOXIN 125 MCG
125 TABLET ORAL DAILY
Qty: 90 TABLET | Refills: 3 | OUTPATIENT
Start: 2022-10-25

## 2022-10-28 ENCOUNTER — ALLIED HEALTH/NURSE VISIT (OUTPATIENT)
Dept: FAMILY MEDICINE | Facility: CLINIC | Age: 87
End: 2022-10-28
Payer: COMMERCIAL

## 2022-10-28 DIAGNOSIS — Z23 ENCOUNTER FOR IMMUNIZATION: Primary | ICD-10-CM

## 2022-10-28 PROCEDURE — 90662 IIV NO PRSV INCREASED AG IM: CPT

## 2022-10-28 PROCEDURE — G0008 ADMIN INFLUENZA VIRUS VAC: HCPCS

## 2022-10-28 PROCEDURE — 99207 PR NO CHARGE NURSE ONLY: CPT

## 2022-11-09 ENCOUNTER — PATIENT OUTREACH (OUTPATIENT)
Dept: GERIATRIC MEDICINE | Facility: CLINIC | Age: 87
End: 2022-11-09

## 2022-11-09 NOTE — PROGRESS NOTES
[facility] Care Coordination Contact    Called granddaughter/ representative, Brittanie to schedule annual HRA home visit. HRA has been scheduled for 11/9/2022 at 3 pm via telephone..     KESHA Sun  [facility]  [phone]

## 2022-11-10 ENCOUNTER — PATIENT OUTREACH (OUTPATIENT)
Dept: GERIATRIC MEDICINE | Facility: CLINIC | Age: 87
End: 2022-11-10

## 2022-11-10 SDOH — ECONOMIC STABILITY: FOOD INSECURITY: WITHIN THE PAST 12 MONTHS, YOU WORRIED THAT YOUR FOOD WOULD RUN OUT BEFORE YOU GOT MONEY TO BUY MORE.: NEVER TRUE

## 2022-11-10 SDOH — ECONOMIC STABILITY: FOOD INSECURITY: WITHIN THE PAST 12 MONTHS, THE FOOD YOU BOUGHT JUST DIDN'T LAST AND YOU DIDN'T HAVE MONEY TO GET MORE.: NEVER TRUE

## 2022-11-10 SDOH — HEALTH STABILITY: PHYSICAL HEALTH: ON AVERAGE, HOW MANY DAYS PER WEEK DO YOU ENGAGE IN MODERATE TO STRENUOUS EXERCISE (LIKE A BRISK WALK)?: 0 DAYS

## 2022-11-10 SDOH — HEALTH STABILITY: PHYSICAL HEALTH: ON AVERAGE, HOW MANY MINUTES DO YOU ENGAGE IN EXERCISE AT THIS LEVEL?: 0 MIN

## 2022-11-10 ASSESSMENT — SOCIAL DETERMINANTS OF HEALTH (SDOH)
HOW OFTEN DO YOU ATTEND CHURCH OR RELIGIOUS SERVICES?: NEVER
WITHIN THE LAST YEAR, HAVE TO BEEN RAPED OR FORCED TO HAVE ANY KIND OF SEXUAL ACTIVITY BY YOUR PARTNER OR EX-PARTNER?: NO
IN A TYPICAL WEEK, HOW MANY TIMES DO YOU TALK ON THE PHONE WITH FAMILY, FRIENDS, OR NEIGHBORS?: NEVER
WITHIN THE LAST YEAR, HAVE YOU BEEN AFRAID OF YOUR PARTNER OR EX-PARTNER?: NO
WITHIN THE LAST YEAR, HAVE YOU BEEN KICKED, HIT, SLAPPED, OR OTHERWISE PHYSICALLY HURT BY YOUR PARTNER OR EX-PARTNER?: NO
WITHIN THE LAST YEAR, HAVE YOU BEEN HUMILIATED OR EMOTIONALLY ABUSED IN OTHER WAYS BY YOUR PARTNER OR EX-PARTNER?: NO
HOW OFTEN DO YOU ATTENT MEETINGS OF THE CLUB OR ORGANIZATION YOU BELONG TO?: NEVER
HOW OFTEN DO YOU GET TOGETHER WITH FRIENDS OR RELATIVES?: MORE THAN THREE TIMES A WEEK
HOW HARD IS IT FOR YOU TO PAY FOR THE VERY BASICS LIKE FOOD, HOUSING, MEDICAL CARE, AND HEATING?: NOT VERY HARD
DO YOU BELONG TO ANY CLUBS OR ORGANIZATIONS SUCH AS CHURCH GROUPS UNIONS, FRATERNAL OR ATHLETIC GROUPS, OR SCHOOL GROUPS?: NO

## 2022-11-10 ASSESSMENT — LIFESTYLE VARIABLES
AUDIT-C TOTAL SCORE: 0
HOW OFTEN DO YOU HAVE SIX OR MORE DRINKS ON ONE OCCASION: NEVER
HOW MANY STANDARD DRINKS CONTAINING ALCOHOL DO YOU HAVE ON A TYPICAL DAY: PATIENT DOES NOT DRINK
SKIP TO QUESTIONS 9-10: 1
HOW OFTEN DO YOU HAVE A DRINK CONTAINING ALCOHOL: NEVER

## 2022-11-10 NOTE — PROGRESS NOTES
[facility] Care Coordination Contact    [facility] Home Visit Assessment     Home visit for Health Risk Assessment with Benny Carrillo completed on November 9, 2022    Type of residence: Private home - no stairs  Current living arrangement: I live in a private home with family     Assessment completed with: Patient    Current Care Plan  Member currently receiving the following home care services: Skilled Nursing   Member currently receiving the following community resources: PCA    Medication Review  Medication reconciliation completed in Epic: Yes  Medication set-up & administration: Family/informal caregiver sets up weekly.  Family caregiver administers medications.  Medication Risk Assessment Medication (1 or more, place referral to MTM): N/A: No risk factors identified  MTM Referral Placed: No: No risk factors idenified    Mental/Behavioral Health   Depression Screening: Denies concerns.   PHQ-2 Total Score (Adult) - Positive if 3 or more points; Administer PHQ-9 if positive: 0  Mental health DX: No        Falls Assessment:   Fallen 2 or more times in the past year?: No   Any fall with injury in the past year?: No    ADL/IADL Dependencies:   Dependent ADLs: Bathing, Dressing, Eating, Grooming, Incontinence, Positioning, Transfers, Wheelchair-with assist, Toileting  Dependent IADLs: Cleaning, Cooking, Laundry, Shopping, Meal Preparation, Medication Management, Money Management, Transportation, Incontinence    [facility] sponsored benefits: Shared information re: Silver Sneakers/gym memberships, ASA, Calcium +D.    PCA Assessment completed at visit: Yes Annual PCA assessment indicated 50 units per day of PCA. This is an increase from the previous assessment.  12.5 hours per day    Elderly Waiver Eligibility: No-does not meet criteria    Care Plan & Recommendations: Benny Carrillo is a 89 y.o. old male with past medical history of recurrent bowel obstruction, atrial fibrillation  managed with  coumadin, cholecystectomy, hypertension, acute cerebral infarction, and a chronic supra-pubic catheter.     Benny resides in a home with his son Steven, daughter in law, Owen, and grand daughter, Brittanie. Brittanie is the PCA representative. Grandsons are available as well to assist with transportation, appointments, and translating.     Benny receives PCA services to keep him supported in the home as well as SN visits to assist with managing suprabupic catheter and INR w/ associated medications. No additional services are being requested at this time. Slight increase in PCA hours related to behavioral changes related to physical aggression.    See [facility]CC for detailed assessment information.    Follow-Up Plan: Member informed of future contact, plan to f/u with member with a 6 month telephone assessment.  Contact information shared with member and family, encouraged member to call with any questions or concerns at any time.    [facility] care continuum providers: Please see Snapshot and Care Management Flowsheets for Specific details of care plan.    This CC note routed to PCP.    KESHA Sun  [facility] [employer]  [phone]

## 2022-11-14 ENCOUNTER — ANTICOAGULATION THERAPY VISIT (OUTPATIENT)
Dept: ANTICOAGULATION | Facility: CLINIC | Age: 87
End: 2022-11-14

## 2022-11-14 DIAGNOSIS — Z86.73 HISTORY OF CVA (CEREBROVASCULAR ACCIDENT): ICD-10-CM

## 2022-11-14 DIAGNOSIS — I48.11 LONGSTANDING PERSISTENT ATRIAL FIBRILLATION (H): Primary | ICD-10-CM

## 2022-11-14 DIAGNOSIS — N28.0 RENAL INFARCT (H): ICD-10-CM

## 2022-11-14 DIAGNOSIS — I48.20 CHRONIC ATRIAL FIBRILLATION (H): ICD-10-CM

## 2022-11-14 LAB — INR (EXTERNAL): 1.3 (ref 0.9–1.1)

## 2022-11-14 NOTE — PROGRESS NOTES
ANTICOAGULATION MANAGEMENT     Benny Carrillo 89 year old male is on warfarin with subtherapeutic INR result. (Goal INR 2.0-3.0)    Recent labs: (last 7 days)     11/14/22  1031   INR 1.3*       ASSESSMENT       Source(s): Chart Review and Home Care/Facility Nurse       Warfarin doses taken: Warfarin taken as instructed. Home care sets up patient's warfarin, and confirm it appears that all doses have been taken since last visit.    Diet: No new diet changes identified. Patient lives with family. Family states there has been no changes made to diet. Patient does not drink ensure, and has not in the recent past.    New illness, injury, or hospitalization: No    Medication/supplement changes: None noted    Signs or symptoms of bleeding or clotting: No    Previous INR: Therapeutic last 2(+) visits    Additional findings: None       PLAN     Recommended plan for no diet, medication or health factor changes affecting INR     Dosing Instructions: booster dose then Increase your warfarin dose (17% change) with next INR in 5 days       Summary  As of 11/14/2022    Full warfarin instructions:  11/14: 5 mg; Otherwise 2.5 mg every day; Starting 11/14/2022   Next INR check:  11/18/2022             Telephone call with Neelima home care nurse who verbalizes understanding and agrees to plan    Orders given to  Homecare nurse/facility to recheck    Education provided:     Please call back if any changes to your diet, medications or how you've been taking warfarin    Symptom monitoring: monitoring for bleeding signs and symptoms, monitoring for clotting signs and symptoms and when to seek medical attention/emergency care    Importance of notifying anticoagulation clinic for: changes in medications; a sooner lab recheck maybe needed and diarrhea, nausea/vomiting, reduced intake, cold/flu, and/or infections; a sooner lab recheck maybe needed    Plan made per [facility] anticoagulation protocol    Clair Tapia RN  Anticoagulation  Clinic  11/14/2022    _______________________________________________________________________     Anticoagulation Episode Summary     Current INR goal:  2.0-3.0   TTR:  82.1 % (1 y)   Target end date:  Indefinite   Send INR reminders to:  [facility]    Indications    Atrial fibrillation (H) [I48.91]  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

## 2022-11-16 ENCOUNTER — PATIENT OUTREACH (OUTPATIENT)
Dept: GERIATRIC MEDICINE | Facility: CLINIC | Age: 87
End: 2022-11-16

## 2022-11-16 NOTE — PROGRESS NOTES
[facility] Care Coordination Contact    Received after visit chart from care coordinator.  Completed following tasks: Mailed copy of care plan to client, Updated services in Database, Mailed copy of POC signature sheet for member to sign and return in SASE  and Mailed [facility] Safe Medication Disposal   , Provider Signature - No POC Shared:  Member indicates that they do not want their POC shared with any [facility] providers.    [facility]:  Emailed completed PCA assessment to [facility].  Faxed copy of PCA assessment to [facility] Agency and mailed copy to member.  Faxed MD Communication to PCP. Also sent PCA sig page to member.     Aruna Christian  Care Management Specialist  [facility]  [phone]

## 2022-11-16 NOTE — LETTER
November 16, 2022      EDUARDO STATON  [address] [address]      Dear Eduardo:    At [facility], we re dedicated to improving your health and wellness. Enclosed is the Care Plan developed with you on 11/9/2022. Please review the Care Plan carefully.    As a reminder, during your visit we talked about:    Ways to manage your physical and mental health    Using health care to maintain and improve your health     Your preventive care needs     Remember to contact your care coordinator if you:    Are hospitalized, or plan to be hospitalized     Have a fall      Have a change in your physical or mental health    Need help finding support or services    If you have questions, or don t agree with your Care Plan, call me at [phone]. You can also call me if your needs change. TTY users, call the [place] [employer] at (342) or 1-229.691.3986 (epkcig-bv-sdqswx relay service).    Sincerely,    KESHA Sun  [phone]  [email or web address] ([facility]) is a health plan that contracts with both Medicare and the [place] Medical Assistance (Medicaid) program to provide benefits of both programs to enrollees. Enrollment in [facility] depends on contract renewal.    [id number] accepted    [id number] (07/2022)

## 2022-11-18 ENCOUNTER — ANTICOAGULATION THERAPY VISIT (OUTPATIENT)
Dept: ANTICOAGULATION | Facility: CLINIC | Age: 87
End: 2022-11-18

## 2022-11-18 DIAGNOSIS — Z86.73 HISTORY OF CVA (CEREBROVASCULAR ACCIDENT): ICD-10-CM

## 2022-11-18 DIAGNOSIS — I48.20 CHRONIC ATRIAL FIBRILLATION (H): ICD-10-CM

## 2022-11-18 DIAGNOSIS — I48.11 LONGSTANDING PERSISTENT ATRIAL FIBRILLATION (H): Primary | ICD-10-CM

## 2022-11-18 DIAGNOSIS — N28.0 RENAL INFARCT (H): ICD-10-CM

## 2022-11-18 LAB — INR (EXTERNAL): 2.6 (ref 0.9–1.1)

## 2022-11-18 NOTE — PROGRESS NOTES
ANTICOAGULATION MANAGEMENT     Benny Carrillo 89 year old male is on warfarin with therapeutic INR result. (Goal INR 2.0-3.0)    Recent labs: (last 7 days)     11/18/22  1046   INR 2.6*       ASSESSMENT       Source(s): Chart Review and Home Care/Facility Nurse       Warfarin doses taken: Warfarin taken as instructed    Diet: No new diet changes identified    New illness, injury, or hospitalization: No    Medication/supplement changes: None noted    Signs or symptoms of bleeding or clotting: No    Previous INR: Subtherapeutic    Additional findings: INR was 1.3 on Monday. No factors identified that caused the number to be low. Maintenance dose was increased 17% at that time. If INR continues to rise over the weekend, may need to consider going back to dose that patient was stable on for weeks prior.       PLAN     Recommended plan for no diet, medication or health factor changes affecting INR     Dosing Instructions: Continue your current warfarin dose with next INR in 3 days       Summary  As of 11/18/2022    Full warfarin instructions:  2.5 mg every day; Starting 11/18/2022   Next INR check:  11/21/2022             Telephone call with Maru home care nurse who verbalizes understanding and agrees to plan    Orders given to  Homecare nurse/facility to recheck    Education provided:     Goal range and lab monitoring: goal range and significance of current result    Plan made per [facility] anticoagulation protocol    Lance Hawthorne RN  Anticoagulation Clinic  11/18/2022    _______________________________________________________________________     Anticoagulation Episode Summary     Current INR goal:  2.0-3.0   TTR:  81.5 % (1 y)   Target end date:  Indefinite   Send INR reminders to:  [facility]    Indications    Atrial fibrillation (H) [I48.91]  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

## 2022-11-21 ENCOUNTER — ANTICOAGULATION THERAPY VISIT (OUTPATIENT)
Dept: ANTICOAGULATION | Facility: CLINIC | Age: 87
End: 2022-11-21

## 2022-11-21 DIAGNOSIS — I48.20 CHRONIC ATRIAL FIBRILLATION (H): ICD-10-CM

## 2022-11-21 DIAGNOSIS — I48.11 LONGSTANDING PERSISTENT ATRIAL FIBRILLATION (H): Primary | ICD-10-CM

## 2022-11-21 DIAGNOSIS — N28.0 RENAL INFARCT (H): ICD-10-CM

## 2022-11-21 DIAGNOSIS — Z86.73 HISTORY OF CVA (CEREBROVASCULAR ACCIDENT): ICD-10-CM

## 2022-11-21 LAB — INR (EXTERNAL): 2.9 (ref 0.9–1.1)

## 2022-11-21 NOTE — PROGRESS NOTES
ANTICOAGULATION MANAGEMENT     Benny Carrillo 89 year old male is on warfarin with therapeutic INR result. (Goal INR 2.0-3.0)    Recent labs: (last 7 days)     11/21/22  0950   INR 2.9*       ASSESSMENT       Source(s): Chart Review and Home Care/Facility Nurse       Warfarin doses taken: Warfarin taken as instructed    Diet: No new diet changes identified    New illness, injury, or hospitalization: No    Medication/supplement changes: None noted    Signs or symptoms of bleeding or clotting: No    Previous INR: Therapeutic last visit; previously outside of goal range    Additional findings: None       PLAN     Recommended plan for no diet, medication or health factor changes affecting INR     Dosing Instructions: Continue your current warfarin dose with next INR in 1 week       Summary  As of 11/21/2022    Full warfarin instructions:  2.5 mg every day; Starting 11/21/2022   Next INR check:  11/28/2022             Telephone call with Neelima home care nurse who verbalizes understanding and agrees to plan    Orders given to  Homecare nurse/facility to recheck    Education provided:     Please call back if any changes to your diet, medications or how you've been taking warfarin    Plan made per [facility] anticoagulation protocol    Lilli Red, RN  Anticoagulation Clinic  11/21/2022    _______________________________________________________________________     Anticoagulation Episode Summary     Current INR goal:  2.0-3.0   TTR:  81.4 % (1 y)   Target end date:  Indefinite   Send INR reminders to:  [facility]    Indications    Atrial fibrillation (H) [I48.91]  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

## 2022-11-28 ENCOUNTER — ANTICOAGULATION THERAPY VISIT (OUTPATIENT)
Dept: ANTICOAGULATION | Facility: CLINIC | Age: 87
End: 2022-11-28

## 2022-11-28 DIAGNOSIS — N28.0 RENAL INFARCT (H): ICD-10-CM

## 2022-11-28 DIAGNOSIS — Z86.73 HISTORY OF CVA (CEREBROVASCULAR ACCIDENT): ICD-10-CM

## 2022-11-28 DIAGNOSIS — I48.20 CHRONIC ATRIAL FIBRILLATION (H): ICD-10-CM

## 2022-11-28 DIAGNOSIS — I48.11 LONGSTANDING PERSISTENT ATRIAL FIBRILLATION (H): Primary | ICD-10-CM

## 2022-11-28 LAB — INR (EXTERNAL): 2.3 (ref 0.9–1.1)

## 2022-11-28 NOTE — PROGRESS NOTES
ANTICOAGULATION MANAGEMENT     Benny Carrillo 89 year old male is on warfarin with therapeutic INR result. (Goal INR 2.0-3.0)    Recent labs: (last 7 days)     11/28/22  1003   INR 2.3*       ASSESSMENT       Source(s): Chart Review and Home Care/Facility Nurse       Warfarin doses taken: Warfarin taken as instructed    Diet: No new diet changes identified    New illness, injury, or hospitalization: No    Medication/supplement changes: None noted    Signs or symptoms of bleeding or clotting: No    Previous INR: Therapeutic last 2(+) visits    Additional findings: None       PLAN     Recommended plan for no diet, medication or health factor changes affecting INR     Dosing Instructions: Continue your current warfarin dose with next INR in 2 weeks       Summary  As of 11/28/2022    Full warfarin instructions:  2.5 mg every day; Starting 11/28/2022   Next INR check:  12/12/2022             Telephone call with Neelima home care nurse who verbalizes understanding and agrees to plan    Orders given to  Homecare nurse/facility to recheck    Education provided:     None required    Plan made per [facility] anticoagulation protocol    Rdahika Crooks RN  Anticoagulation Clinic  11/28/2022    _______________________________________________________________________     Anticoagulation Episode Summary     Current INR goal:  2.0-3.0   TTR:  82.0 % (1 y)   Target end date:  Indefinite   Send INR reminders to:  [facility]    Indications    Atrial fibrillation (H) [I48.91]  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

## 2022-12-12 ENCOUNTER — ANTICOAGULATION THERAPY VISIT (OUTPATIENT)
Dept: ANTICOAGULATION | Facility: CLINIC | Age: 87
End: 2022-12-12

## 2022-12-12 DIAGNOSIS — Z86.73 HISTORY OF CVA (CEREBROVASCULAR ACCIDENT): ICD-10-CM

## 2022-12-12 DIAGNOSIS — N28.0 RENAL INFARCT (H): ICD-10-CM

## 2022-12-12 DIAGNOSIS — I48.11 LONGSTANDING PERSISTENT ATRIAL FIBRILLATION (H): Primary | ICD-10-CM

## 2022-12-12 DIAGNOSIS — I48.20 CHRONIC ATRIAL FIBRILLATION (H): ICD-10-CM

## 2022-12-12 LAB — INR (EXTERNAL): 2.3 (ref 0.9–1.1)

## 2022-12-12 NOTE — PROGRESS NOTES
ANTICOAGULATION MANAGEMENT     Benny Carrillo 89 year old male is on warfarin with therapeutic INR result. (Goal INR 2.0-3.0)    Recent labs: (last 7 days)     12/12/22  0930   INR 2.3*       ASSESSMENT       Source(s): Chart Review and Home Care/Facility Nurse       Warfarin doses taken: Warfarin taken as instructed    Diet: No new diet changes identified    New illness, injury, or hospitalization: No    Medication/supplement changes: None noted    Signs or symptoms of bleeding or clotting: No    Previous INR: Therapeutic last 2(+) visits    Additional findings: None       PLAN     Recommended plan for no diet, medication or health factor changes affecting INR     Dosing Instructions: Continue your current warfarin dose with next INR in 3 weeks       Summary  As of 12/12/2022    Full warfarin instructions:  2.5 mg every day; Starting 12/12/2022   Next INR check:               Telephone call with [facility] home care nurse who verbalizes understanding and agrees to plan    Orders given to  Homecare nurse/facility to recheck    Education provided:     Please call back if any changes to your diet, medications or how you've been taking warfarin    Plan made per [facility] anticoagulation protocol    Lilli Red, RN  Anticoagulation Clinic  12/12/2022    _______________________________________________________________________     Anticoagulation Episode Summary     Current INR goal:  2.0-3.0   TTR:  82.9 % (1 y)   Target end date:  Indefinite   Send INR reminders to:  [facility]    Indications    Atrial fibrillation (H) [I48.91]  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

## 2022-12-27 DIAGNOSIS — K21.9 GASTROESOPHAGEAL REFLUX DISEASE WITHOUT ESOPHAGITIS: ICD-10-CM

## 2022-12-28 RX ORDER — FAMOTIDINE 20 MG/1
TABLET, FILM COATED ORAL
Qty: 90 TABLET | Refills: 3 | Status: SHIPPED | OUTPATIENT
Start: 2022-12-28 | End: 2023-10-12

## 2022-12-28 NOTE — TELEPHONE ENCOUNTER
"Last Written Prescription Date:  9/21/22  Last Fill Quantity: 90,  # refills: 0   Last office visit provider:  10/21/22     Requested Prescriptions   Pending Prescriptions Disp Refills     famotidine (PEPCID) 20 MG tablet [Pharmacy Med Name: Famotidine 20 MG Oral Tablet] 90 tablet 0     Sig: Take 1 tablet by mouth once daily       H2 Blockers Protocol Passed - 12/28/2022 11:44 AM        Passed - Patient is age 12 or older        Passed - Recent (12 mo) or future (30 days) visit within the authorizing provider's specialty     Patient has had an office visit with the authorizing provider or a provider within the authorizing providers department within the previous 12 mos or has a future within next 30 days. See \"Patient Info\" tab in inbasket, or \"Choose Columns\" in Meds & Orders section of the refill encounter.              Passed - Medication is active on med list             Juan Jose Barker RN 12/28/22 11:45 AM  "

## 2022-12-29 DIAGNOSIS — K21.9 GASTROESOPHAGEAL REFLUX DISEASE WITHOUT ESOPHAGITIS: ICD-10-CM

## 2022-12-30 RX ORDER — FAMOTIDINE 20 MG/1
TABLET, FILM COATED ORAL
Qty: 90 TABLET | Refills: 3 | OUTPATIENT
Start: 2022-12-30

## 2022-12-30 NOTE — TELEPHONE ENCOUNTER
"Last Written Prescription Date:  12/28/22  Last Fill Quantity: 90,  # refills: 3   Last office visit provider:   10/21/22    Requested Prescriptions   Pending Prescriptions Disp Refills     famotidine (PEPCID) 20 MG tablet 90 tablet 3     Sig: Take 1 tablet by mouth once daily       H2 Blockers Protocol Passed - 12/29/2022 11:45 AM        Passed - Patient is age 12 or older        Passed - Recent (12 mo) or future (30 days) visit within the authorizing provider's specialty     Patient has had an office visit with the authorizing provider or a provider within the authorizing providers department within the previous 12 mos or has a future within next 30 days. See \"Patient Info\" tab in inbasket, or \"Choose Columns\" in Meds & Orders section of the refill encounter.              Passed - Medication is active on med list             Kayce Garcia RN 12/30/22 3:38 PM  "

## 2023-01-02 ENCOUNTER — ANTICOAGULATION THERAPY VISIT (OUTPATIENT)
Dept: ANTICOAGULATION | Facility: CLINIC | Age: 88
End: 2023-01-02

## 2023-01-02 DIAGNOSIS — I48.20 CHRONIC ATRIAL FIBRILLATION (H): ICD-10-CM

## 2023-01-02 DIAGNOSIS — N28.0 RENAL INFARCT (H): ICD-10-CM

## 2023-01-02 DIAGNOSIS — Z86.73 HISTORY OF CVA (CEREBROVASCULAR ACCIDENT): ICD-10-CM

## 2023-01-02 DIAGNOSIS — I48.91 ATRIAL FIBRILLATION (H): Primary | ICD-10-CM

## 2023-01-02 LAB — INR (EXTERNAL): 2.6 (ref 0.9–1.1)

## 2023-01-02 NOTE — PROGRESS NOTES
ANTICOAGULATION MANAGEMENT     Benny Carrillo 89 year old male is on warfarin with therapeutic INR result. (Goal INR 2.0-3.0)    Recent labs: (last 7 days)     01/02/23  1001   INR 2.6*       ASSESSMENT       Source(s): Chart Review and Home Care/Facility Nurse       Warfarin doses taken: Warfarin taken as instructed    Diet: No new diet changes identified    New illness, injury, or hospitalization: No    Medication/supplement changes: None noted    Signs or symptoms of bleeding or clotting: No    Previous INR: Therapeutic last 2(+) visits    Additional findings: None       PLAN     Recommended plan for no diet, medication or health factor changes affecting INR     Dosing Instructions: Continue your current warfarin dose with next INR in 4 weeks       Summary  As of 1/2/2023    Full warfarin instructions:  2.5 mg every day   Next INR check:  1/30/2023             Telephone call with [facility], home care nurse who verbalizes understanding and agrees to plan    Orders given to  Homecare nurse/facility to recheck    Education provided:     None required    Plan made per [facility] anticoagulation protocol    Candida Melendez, RN  Anticoagulation Clinic  1/2/2023    _______________________________________________________________________     Anticoagulation Episode Summary     Current INR goal:  2.0-3.0   TTR:  88.5 % (1 y)   Target end date:  Indefinite   Send INR reminders to:  [facility]    Indications    Atrial fibrillation (H) [I48.91]  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

## 2023-01-17 ENCOUNTER — MEDICAL CORRESPONDENCE (OUTPATIENT)
Dept: HEALTH INFORMATION MANAGEMENT | Facility: CLINIC | Age: 88
End: 2023-01-17

## 2023-01-24 ENCOUNTER — OFFICE VISIT (OUTPATIENT)
Dept: FAMILY MEDICINE | Facility: CLINIC | Age: 88
End: 2023-01-24
Payer: COMMERCIAL

## 2023-01-24 VITALS
WEIGHT: 107 LBS | OXYGEN SATURATION: 92 % | SYSTOLIC BLOOD PRESSURE: 100 MMHG | BODY MASS INDEX: 17.27 KG/M2 | HEART RATE: 59 BPM | DIASTOLIC BLOOD PRESSURE: 70 MMHG

## 2023-01-24 DIAGNOSIS — Z93.59 SUPRAPUBIC CATHETER (H): ICD-10-CM

## 2023-01-24 DIAGNOSIS — N18.31 CHRONIC KIDNEY DISEASE, STAGE 3A (H): ICD-10-CM

## 2023-01-24 DIAGNOSIS — E44.0 MODERATE MALNUTRITION (H): ICD-10-CM

## 2023-01-24 DIAGNOSIS — B36.0 TINEA VERSICOLOR: ICD-10-CM

## 2023-01-24 DIAGNOSIS — I48.11 LONGSTANDING PERSISTENT ATRIAL FIBRILLATION (H): Primary | ICD-10-CM

## 2023-01-24 DIAGNOSIS — N28.0 RENAL INFARCT (H): ICD-10-CM

## 2023-01-24 DIAGNOSIS — J44.9 CHRONIC OBSTRUCTIVE PULMONARY DISEASE, UNSPECIFIED COPD TYPE (H): ICD-10-CM

## 2023-01-24 PROCEDURE — 99214 OFFICE O/P EST MOD 30 MIN: CPT | Performed by: FAMILY MEDICINE

## 2023-01-24 RX ORDER — KETOCONAZOLE 20 MG/ML
SHAMPOO TOPICAL DAILY PRN
Qty: 120 ML | Refills: 1 | Status: SHIPPED | OUTPATIENT
Start: 2023-01-24 | End: 2023-10-12

## 2023-01-24 NOTE — PROGRESS NOTES
Assessment/Plan:    Longstanding persistent atrial fibrillation (H)  Longstanding persistent atrial fibrillation.  Rate controlled.  Continues warfarin anticoagulation currently.  Reassess early June 2023 anticipated.    Chronic kidney disease, stage 3a (H)  History of CKD stage IIIa historically.  Ensure adequate hydration.  We will continue to follow.    Chronic obstructive pulmonary disease, unspecified COPD type (H)  COPD history reviewed.  Disability parking certification paperwork completed due to difficulty with transfers due to shortness of breath with activity.  Utilizes cane to assist with ambulation.    Tinea versicolor  Tinea versicolor present.  Ketoconazole 2% external shampoo apply topically to my skin, lather, remain on 5 minutes then rinse.  Repeat daily x3 days.  - ketoconazole (NIZORAL) 2 % external shampoo  Dispense: 120 mL; Refill: 1    Suprapubic catheter (H)  History of suprapubic catheter stable    Moderate malnutrition (H)  History of moderate malnutrition continue dietary modifications.    Renal infarct (H)  History of renal infarct without residual concern currently.          Subjective:    Benny Carrillo is seen today for follow-up assessment.  Persistent atrial fibrillation historically.  Continues warfarin anticoagulation.  Digoxin 0.125 mg daily as well.  Does not need INR rechecked today.  Shortness of breath with activity associated with underlying history of COPD.  Has quit smoking previously as noted in 2005 with prior heavy smoking history.  Continues amlodipine 5 mg daily for hypertension.  Pantoprazole 40 mg daily along with famotidine 20 mg daily with benefits for reflux management.  History of CVA in 2019 with described left eye blurred vision without residual focal neurologic deficits identified.  Comprehensive review of systems as above otherwise all negative.     - [employer] (passed away in 2017)   Originally from [facility] [place] [birth year][place] 2009 - kicked out of [facility]...   6  children (4 sons, 2 daughters) - his son is Steven   25 grandchildren - his granddaughter is Mey (Steven's daughter)   Quit 2005 - prior heavy smoker   EtOH:  none   Mom -    Dad -    2 bros -    1 sis -    Surgeries:  suprapubic catheter x 2012 (urinary retension -> switched from [place] to suprapubic catheter while in [place]   Hospitalizations:  h/o CVA    Past Surgical History:   Procedure Laterality Date     CHOLECYSTECTOMY       CYSTOTOMY       EYE SURGERY Bilateral     cataract removal     PICC  2019             Family History   Problem Relation Age of Onset     No Known Problems Mother      No Known Problems Father      Diabetes Other         spouse        Past Medical History:   Diagnosis Date     Asthma      Atrial fibrillation (H)      Benign prostatic hyperplasia      Bradycardia 2015     Cataract      Chronic obstructive pulmonary disease, unspecified COPD type (H) 2020     Citrobacter infection 2015    UTI      Citrobacter infection 2015    UTI      Constipation 2015     Elevated digoxin level 2015     GERD (gastroesophageal reflux disease)      Hypertension      Lactic acidosis      Lower urinary tract infectious disease      Replacement Utility updated for latest IMO load     Muscle weakness      SBO (small bowel obstruction) (H)      Small bowel obstruction (H) 2015     Stroke (H)      Underweight due to inadequate caloric intake 2015    Body mass index is 20.25 kg/(m^2).       Urinary obstruction         Social History     Tobacco Use     Smoking status: Never     Smokeless tobacco: Never   Substance Use Topics     Alcohol use: No     Drug use: No        Current Outpatient Medications   Medication Sig Dispense Refill     albuterol (PROAIR HFA;PROVENTIL HFA;VENTOLIN HFA) 90 mcg/actuation inhaler [ALBUTEROL (PROAIR HFA;PROVENTIL HFA;VENTOLIN HFA) 90 MCG/ACTUATION INHALER] INHALE 2 PUFFS BY MOUTH EVERY 6 HOURS AS NEEDED FOR  WHEEZING 1 each 3     amLODIPine (NORVASC) 5 MG tablet Take 1 tablet (5 mg) by mouth daily 90 tablet 3     ARTIFICIAL TEARS, POLYVIN ALC, 1.4 % ophthalmic solution [ARTIFICIAL TEARS, POLYVIN ALC, 1.4 % OPHTHALMIC SOLUTION] INSTILL 2 DROPS INTO EACH EYE TWICE DAILY AS NEEDED DRY EYES 15 mL 6     digoxin (LANOXIN) 125 MCG tablet Take 1 tablet (125 mcg) by mouth daily 90 tablet 3     EQ ACETAMINOPHEN 500 MG tablet TAKE 1 TO 2 TABLETS BY MOUTH EVERY 6 HOURS AS NEEDED FOR MILD PAIN 360 tablet 2     EQ ALLERGY RELIEF 10 MG tablet Take 1 tablet by mouth once daily 90 tablet 3     famotidine (PEPCID) 20 MG tablet Take 1 tablet by mouth once daily 90 tablet 3     gabapentin (NEURONTIN) 100 MG capsule TAKE 1 CAPSULE BY MOUTH TWICE DAILY AS NEEDED FOR PAIN 30 capsule 3     guaiFENesin (ROBITUSSIN) 100 MG/5ML SYRP Take 10 mLs by mouth 3 times daily as needed for cough 240 mL 0     hydrocortisone 2.5 % cream [HYDROCORTISONE 2.5 % CREAM] APPLY  CREAM TO AFFECTED AREA ONCE TO TWICE DAILY AS NEEDED 28 g 2     ketoconazole (NIZORAL) 2 % external shampoo Apply topically daily as needed for itching or irritation Apply topically to moist skin, lather, leave on 5 minutes, then rinse.  Repeat daily x 3 days. 120 mL 1     MEDICATION CANNOT BE REORDERED - PLEASE MANUALLY REORDER AND DISCONTINUE THE OLD ORDER [CATHETER 14 FR MISC] Suprapubic catheter. 14 French. 1 each 0     melatonin 5 MG tablet Take 1 tablet (5 mg) by mouth nightly as needed for sleep 30 tablet 3     multivitamin w/minerals (MULTIVITAMIN, THERAPEUTIC WITH MINERALS) tablet Take 1 tablet by mouth daily 90 tablet 3     pantoprazole (PROTONIX) 40 MG EC tablet Take 1 tablet (40 mg) by mouth daily 90 tablet 3     polyvinyl alcohol-povidone (ARTIFICIAL TEARS,PVALCH-POVID,) 0.5-0.6 % Drop [POLYVINYL ALCOHOL-POVIDONE (ARTIFICIAL TEARS,PVALCH-POVID,) 0.5-0.6 % DROP] 1-2 gtts two times a day to each eye prn 15 mL 3     SM TUSSIN MUCUS+CHEST CONGEST 100 MG/5ML liquid TAKE 10 ML BY  MOUTH THREE TIMES DAILY AS NEEDED FOR COUGH 240 mL 0     warfarin ANTICOAGULANT (COUMADIN) 2.5 MG tablet Take1.25 mg (2.5 mg x 0.5) every Tue, Fri; 2.5 mg (2.5 mg x 1) all other days or as directed. 90 tablet 3          Objective:    Vitals:    01/24/23 0954   BP: 100/70   Pulse: 59   SpO2: 92%   Weight: 48.5 kg (107 lb)      Body mass index is 17.27 kg/m .    Alert.  No apparent distress.  Chest with diminished breath sounds.  Distant S1-S2 heart sounds irregular otherwise rate controlled.  Patchy hypopigmented lesions on trunk consistent with tinea versicolor.      This note has been dictated using voice recognition software and as a result may contain minor grammatical errors and unintended word substitutions.         Answers for HPI/ROS submitted by the patient on 1/24/2023  What is the reason for your visit today? : follow up  How many servings of fruits and vegetables do you eat daily?: 2-3  On average, how many sweetened beverages do you drink each day (Examples: soda, juice, sweet tea, etc.  Do NOT count diet or artificially sweetened beverages)?: 2  How many minutes a day do you exercise enough to make your heart beat faster?: 9 or less  How many days a week do you exercise enough to make your heart beat faster?: 3 or less  How many days per week do you miss taking your medication?: 0

## 2023-01-30 ENCOUNTER — ANTICOAGULATION THERAPY VISIT (OUTPATIENT)
Dept: ANTICOAGULATION | Facility: CLINIC | Age: 88
End: 2023-01-30
Payer: COMMERCIAL

## 2023-01-30 DIAGNOSIS — I48.11 LONGSTANDING PERSISTENT ATRIAL FIBRILLATION (H): Primary | ICD-10-CM

## 2023-01-30 DIAGNOSIS — N28.0 RENAL INFARCT (H): ICD-10-CM

## 2023-01-30 DIAGNOSIS — Z86.73 HISTORY OF CVA (CEREBROVASCULAR ACCIDENT): ICD-10-CM

## 2023-01-30 DIAGNOSIS — I48.20 CHRONIC ATRIAL FIBRILLATION (H): ICD-10-CM

## 2023-01-30 LAB — INR (EXTERNAL): 2.6 (ref 0.9–1.1)

## 2023-01-30 NOTE — PROGRESS NOTES
ANTICOAGULATION MANAGEMENT     Benny Carrillo 89 year old male is on warfarin with therapeutic INR result. (Goal INR 2.0-3.0)    Recent labs: (last 7 days)     01/30/23  1003   INR 2.6*       ASSESSMENT       Source(s): Chart Review and Home Care/Facility Nurse       Warfarin doses taken: Warfarin taken as instructed    Diet: No new diet changes identified    New illness, injury, or hospitalization: Tinea versicolor- treating with external ketoconazole. Should not effect INR.    Medication/supplement changes: None noted    Signs or symptoms of bleeding or clotting: No    Previous INR: Therapeutic last 2(+) visits    Additional findings: None       PLAN     Recommended plan for no diet, medication or health factor changes affecting INR     Dosing Instructions: Continue your current warfarin dose with next INR in 5 weeks       Summary  As of 1/30/2023    Full warfarin instructions:  2.5 mg every day   Next INR check:  3/6/2023             Telephone call with [place] home care nurse who verbalizes understanding and agrees to plan    Orders given to  Homecare nurse/facility to recheck    Education provided:     Goal range and lab monitoring: goal range and significance of current result    Plan made per [facility] anticoagulation protocol    Lance Hawthorne RN  Anticoagulation Clinic  1/30/2023    _______________________________________________________________________     Anticoagulation Episode Summary     Current INR goal:  2.0-3.0   TTR:  88.6 % (1 y)   Target end date:  Indefinite   Send INR reminders to:  CARLOS CERVANTES    Indications    Atrial fibrillation (H) [I48.91]  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

## 2023-02-16 ENCOUNTER — MEDICAL CORRESPONDENCE (OUTPATIENT)
Dept: HEALTH INFORMATION MANAGEMENT | Facility: CLINIC | Age: 88
End: 2023-02-16

## 2023-03-06 ENCOUNTER — ANTICOAGULATION THERAPY VISIT (OUTPATIENT)
Dept: ANTICOAGULATION | Facility: CLINIC | Age: 88
End: 2023-03-06
Payer: COMMERCIAL

## 2023-03-06 DIAGNOSIS — Z86.73 HISTORY OF CVA (CEREBROVASCULAR ACCIDENT): ICD-10-CM

## 2023-03-06 DIAGNOSIS — N28.0 RENAL INFARCT (H): ICD-10-CM

## 2023-03-06 DIAGNOSIS — I48.20 CHRONIC ATRIAL FIBRILLATION (H): ICD-10-CM

## 2023-03-06 DIAGNOSIS — I48.11 LONGSTANDING PERSISTENT ATRIAL FIBRILLATION (H): Primary | ICD-10-CM

## 2023-03-06 LAB — INR (EXTERNAL): 2.3 (ref 0.9–1.1)

## 2023-03-06 NOTE — PROGRESS NOTES
ANTICOAGULATION MANAGEMENT     Benny Carrillo 89 year old male is on warfarin with therapeutic INR result. (Goal INR 2.0-3.0)    Recent labs: (last 7 days)     03/06/23  0942   INR 2.3*       ASSESSMENT       Source(s): Chart Review and Home Care/Facility Nurse       Warfarin doses taken: Warfarin taken as instructed    Diet: No new diet changes identified    New illness, injury, or hospitalization: No    Medication/supplement changes: None noted    Signs or symptoms of bleeding or clotting: No    Previous INR: Therapeutic last 2(+) visits    Additional findings: None         PLAN     Recommended plan for no diet, medication or health factor changes affecting INR     Dosing Instructions: Continue your current warfarin dose with next INR in 6 weeks. Call [facility] in the interim if pt has changes to health or medications.    Summary  As of 3/6/2023    Full warfarin instructions:  2.5 mg every day   Next INR check:  4/17/2023             Telephone call with [facility] home care nurse who verbalizes understanding and agrees to plan    Orders given to  Homecare nurse/facility to recheck    Education provided:     Goal range and lab monitoring: goal range and significance of current result    Plan made per [facility] anticoagulation protocol    Lance Hawthorne RN  Anticoagulation Clinic  3/6/2023    _______________________________________________________________________     Anticoagulation Episode Summary     Current INR goal:  2.0-3.0   TTR:  90.7 % (1 y)   Target end date:  Indefinite   Send INR reminders to:  [facility]    Indications    Atrial fibrillation (H) [I48.91]  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

## 2023-03-29 ENCOUNTER — DOCUMENTATION ONLY (OUTPATIENT)
Dept: ANTICOAGULATION | Facility: CLINIC | Age: 88
End: 2023-03-29
Payer: COMMERCIAL

## 2023-03-29 DIAGNOSIS — Z86.73 HISTORY OF CVA (CEREBROVASCULAR ACCIDENT): ICD-10-CM

## 2023-03-29 DIAGNOSIS — I48.20 CHRONIC ATRIAL FIBRILLATION (H): Primary | ICD-10-CM

## 2023-03-29 NOTE — PROGRESS NOTES
ANTICOAGULATION CLINIC REFERRAL RENEWAL REQUEST       An annual renewal order is required for all patients referred to [facility].?  Please review and sign the pended referral order for Benny Carrillo.       ANTICOAGULATION SUMMARY      Warfarin indication(s)   Atrial Fibrillation    Mechanical heart valve present?  NO       Current goal range   INR: 2.0-3.0     Goal appropriate for indication? Goal INR 2-3, standard for indication(s) above     Time in Therapeutic Range (TTR)  (Goal > 60%) 90.7%       Office visit with referring provider's group within last year yes on 1/24/23       Montse Riley RN  [facility]

## 2023-03-31 ENCOUNTER — TELEPHONE (OUTPATIENT)
Dept: FAMILY MEDICINE | Facility: CLINIC | Age: 88
End: 2023-03-31
Payer: COMMERCIAL

## 2023-03-31 NOTE — TELEPHONE ENCOUNTER
Phone number to reach patient:  Other phone number:  [phone]      [facility] is calling to ask for the new home care order from Face to Face visit to be faxed to this number [phone] please and thank you.

## 2023-04-11 ENCOUNTER — TELEPHONE (OUTPATIENT)
Dept: FAMILY MEDICINE | Facility: CLINIC | Age: 88
End: 2023-04-11
Payer: COMMERCIAL

## 2023-04-11 NOTE — TELEPHONE ENCOUNTER
Reason for Call:  Home Health Care    Xena with [facility] called regarding (reason for call): verbal orders    Orders are needed for this patient. SN    Skilled Nursing: [facility] - soc 4/12    Pt Provider:      Phone Number Homecare Nurse can be reached at: [phone]    Can we leave a detailed message on this number? YES    FYI: Verbal orders given per Dr. Martínez. Please notify us if any adjustments need to be made.

## 2023-04-12 ENCOUNTER — MEDICAL CORRESPONDENCE (OUTPATIENT)
Dept: HEALTH INFORMATION MANAGEMENT | Facility: CLINIC | Age: 88
End: 2023-04-12

## 2023-04-14 ENCOUNTER — ANTICOAGULATION THERAPY VISIT (OUTPATIENT)
Dept: ANTICOAGULATION | Facility: CLINIC | Age: 88
End: 2023-04-14
Payer: COMMERCIAL

## 2023-04-14 DIAGNOSIS — Z86.73 HISTORY OF CVA (CEREBROVASCULAR ACCIDENT): ICD-10-CM

## 2023-04-14 DIAGNOSIS — I48.20 CHRONIC ATRIAL FIBRILLATION (H): ICD-10-CM

## 2023-04-14 DIAGNOSIS — N28.0 RENAL INFARCT (H): ICD-10-CM

## 2023-04-14 DIAGNOSIS — I48.91 ATRIAL FIBRILLATION (H): Primary | ICD-10-CM

## 2023-04-14 LAB — INR (EXTERNAL): 1.7 (ref 0.9–1.1)

## 2023-04-14 NOTE — PROGRESS NOTES
ANTICOAGULATION MANAGEMENT     Benny Carrillo 89 year old male is on warfarin with subtherapeutic INR result. (Goal INR 2.0-3.0)    Recent labs: (last 7 days)     04/14/23  1423   INR 1.7*       ASSESSMENT       Source(s): Chart Review and Home Care/Facility Nurse       Warfarin doses taken: unsure since there was a switch in home care agencies.    Diet: No new diet changes identified    Medication/supplement changes: None noted    New illness, injury, or hospitalization: No    Signs or symptoms of bleeding or clotting: No    Previous INR: Therapeutic last 2(+) visits    Additional findings: None         PLAN     Recommended plan for no diet, medication or health factor changes affecting INR     Dosing Instructions: booster dose then continue your current warfarin dose with next INR in 2 weeks       Summary  As of 4/14/2023    Full warfarin instructions:  4/14: 3.75 mg; Otherwise 2.5 mg every day   Next INR check:  4/28/2023             Telephone call with [facility] home care nurse who agrees to plan and repeated back plan correctly    Orders given to  Homecare nurse/facility to recheck    Education provided:     None required    Plan made per [facility] anticoagulation protocol    Candida Melendez, RN  Anticoagulation Clinic  4/14/2023    _______________________________________________________________________     Anticoagulation Episode Summary     Current INR goal:  2.0-3.0   TTR:  85.7 % (1 y)   Target end date:  Indefinite   Send INR reminders to:  CARLOS CERVANTES    Indications    Atrial fibrillation (H) [I48.91]  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

## 2023-04-20 ENCOUNTER — VIRTUAL VISIT (OUTPATIENT)
Dept: FAMILY MEDICINE | Facility: CLINIC | Age: 88
End: 2023-04-20
Payer: COMMERCIAL

## 2023-04-20 DIAGNOSIS — J44.9 CHRONIC OBSTRUCTIVE PULMONARY DISEASE, UNSPECIFIED COPD TYPE (H): ICD-10-CM

## 2023-04-20 DIAGNOSIS — Z93.59 SUPRAPUBIC CATHETER (H): ICD-10-CM

## 2023-04-20 DIAGNOSIS — R32 URINARY INCONTINENCE, UNSPECIFIED TYPE: ICD-10-CM

## 2023-04-20 DIAGNOSIS — I48.20 CHRONIC ATRIAL FIBRILLATION (H): Primary | ICD-10-CM

## 2023-04-20 PROCEDURE — 99443 PR PHYSICIAN TELEPHONE EVALUATION 21-30 MIN: CPT | Mod: 95 | Performed by: FAMILY MEDICINE

## 2023-04-20 NOTE — PROGRESS NOTES
Benny is a 89 year old who is being evaluated via a billable telephone visit.      What phone number would you like to be contacted at? [phone]  How would you like to obtain your AVS? Yanique    Distant Location (provider location):  On-site      Chronic atrial fibrillation (H)  Face-to-face encounter completed today.  Chronic atrial fibrillation.  Chronic warfarin anticoagulation.  Home health care nurse evaluation for home INR testing.    Suprapubic catheter (H)  Utilizing suprapubic catheter and will need leg bag for catheter plus catheter changes monthly.    Urinary incontinence, unspecified type  Urinary incontinence.  Uses diapers up to 6/day.  Will need prescription refill.    Chronic obstructive pulmonary disease, unspecified COPD type (H)  Known history of COPD.  Unable to leave the house easily based on age and breathing status therefore home health services recommended.           Subjective   Benny is a 89 year old, presenting for the following health issues:  face to face visit for home care services (Need a referral for home care services - for monthly cath changes and inr blood draws)        12/8/2021     1:01 PM   Additional Questions   Roomed by Sonia   Accompanied by Son and Rocío     [facility]       Virtual visit with telephone evaluation.  Needs home health services.  Currently getting through [facility] home care however poor experience and requesting change of home care services.  Needs 6 diapers per day due to urinary incontinence.  Leg bag for catheter.  Has catheter changes once per month.  [facility]  Has INR is due to warfarin anticoagulation for chronic atrial fibrillation.  Like to have this done at home as well due to difficulty leaving his house due to COPD and falls risk etc.  Comprehensive review of systems as above otherwise all negative.              Review of Systems   Constitutional, HEENT, cardiovascular, pulmonary, GI, , musculoskeletal,  neuro, skin, endocrine and psych systems are negative, except as otherwise noted.      Objective           Vitals:  No vitals were obtained today due to virtual visit.    Physical Exam   healthy, alert and no distress  PSYCH: Alert and oriented times 3; coherent speech, normal   rate and volume, able to articulate logical thoughts, able   to abstract reason, no tangential thoughts, no hallucinations   or delusions  His affect is normal  RESP: No cough, no audible wheezing, able to talk in full sentences  Remainder of exam unable to be completed due to telephone visits                Phone call duration: 25 minutes    DME (Durable Medical Equipment) Orders and Documentation  Orders Placed This Encounter   Procedures     Incontinence Supplies Order     Catheterization Supplies Order      The patient was assessed and it was determined the patient is in need of the following listed DME Supplies/Equipment. Please complete supporting documentation below to demonstrate medical necessity.      Incontinence Supplies Documentation  Incontinence supplies are needed due to urinary incontinence.  Chronic.    Catheterization Supplies Documentation  Patient has: urinary retention.  Catheter type: Indwelling (1/month).Patient requires catheterization up to 1 times a day.  Additional supplies needed: leg bag

## 2023-04-27 DIAGNOSIS — N48.1 BALANITIS: Primary | ICD-10-CM

## 2023-04-27 RX ORDER — CLOTRIMAZOLE 1 %
CREAM (GRAM) TOPICAL
Qty: 45 G | Refills: 1 | Status: SHIPPED | OUTPATIENT
Start: 2023-04-27 | End: 2023-10-12

## 2023-04-28 ENCOUNTER — ANTICOAGULATION THERAPY VISIT (OUTPATIENT)
Dept: ANTICOAGULATION | Facility: CLINIC | Age: 88
End: 2023-04-28
Payer: COMMERCIAL

## 2023-04-28 DIAGNOSIS — N28.0 RENAL INFARCT (H): ICD-10-CM

## 2023-04-28 DIAGNOSIS — I48.11 LONGSTANDING PERSISTENT ATRIAL FIBRILLATION (H): Primary | ICD-10-CM

## 2023-04-28 DIAGNOSIS — I48.20 CHRONIC ATRIAL FIBRILLATION (H): ICD-10-CM

## 2023-04-28 DIAGNOSIS — Z86.73 HISTORY OF CVA (CEREBROVASCULAR ACCIDENT): ICD-10-CM

## 2023-04-28 LAB — INR (EXTERNAL): 1.7 (ref 0.9–1.1)

## 2023-04-28 NOTE — PROGRESS NOTES
ANTICOAGULATION MANAGEMENT     Benny Carrillo 89 year old male is on warfarin with subtherapeutic INR result. (Goal INR 2.0-3.0)    Recent labs: (last 7 days)     04/28/23  1240   INR 1.7*       ASSESSMENT       Source(s): Chart Review and Home Care/Facility Nurse       Warfarin doses taken: Warfarin taken as instructed. Home care sets up medications.     Diet: No new diet changes identified    Medication/supplement changes: None noted    New illness, injury, or hospitalization: No    Signs or symptoms of bleeding or clotting: No    Previous result: Subtherapeutic    Additional findings: Patient is switching from [facility] Care to [facility] care. Family is not sure when [facility]     Recommended plan for no diet, medication or health factor changes affecting INR     Dosing Instructions: booster dose then Increase your warfarin dose (7% change) with next INR in 2 weeks. Will have home care nurse set up medications for two weeks since patient will be between switching home care agencies.        Writer spoke with [facility] who states they will be seeing patient on 5/1/23, and they will add an INR onto visit (5/1/23 is likely too soon to recheck INR, but will have home care do it since patient is starting with a new company)    Summary  As of 4/28/2023    Full warfarin instructions:  4/28: 3.75 mg; Otherwise 3.75 mg every Mon; 2.5 mg all other days   Next INR check:  5/1/2023             Telephone call with [facility] home care nurse who verbalizes understanding and agrees to plan    Orders given to  Homecare nurse/facility to recheck    Education provided:     Please call back if any changes to your diet, medications or how you've been taking warfarin    Contact [phone]  with any changes, questions or concerns.     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

## 2023-05-01 ENCOUNTER — TELEPHONE (OUTPATIENT)
Dept: FAMILY MEDICINE | Facility: CLINIC | Age: 88
End: 2023-05-01
Payer: COMMERCIAL

## 2023-05-01 ENCOUNTER — MEDICAL CORRESPONDENCE (OUTPATIENT)
Dept: HEALTH INFORMATION MANAGEMENT | Facility: CLINIC | Age: 88
End: 2023-05-01

## 2023-05-01 NOTE — TELEPHONE ENCOUNTER
Order/Referral Request    Who is requesting: [facility] Nurse    Orders being requested: Skilled Nursing   1/month for 2 mths  Catheter change and mgmt    INR due Friday as PRN by physician     Reason service is needed/diagnosis: Urinary retention   Afib    When are orders needed by: ASAP    Where to send orders: Verbal Order Ok    Okay to leave a detailed message?: Yes at Other phone number:  [phone]    Please review/approve verbal orders if appropriate.  If approve pelase send back to RN nurse pool to call with orders.    Thank you,  Sarahi BUCHANAN RN  [facility]

## 2023-05-02 NOTE — TELEPHONE ENCOUNTER
Outgoing Call:  No answer    Dr. Lucas VO left on Nadya's confidential line  Please notify okay for requested orders.     Sarahi BUCHANAN RN  [facility]

## 2023-05-05 ENCOUNTER — TELEPHONE (OUTPATIENT)
Dept: FAMILY MEDICINE | Facility: CLINIC | Age: 88
End: 2023-05-05
Payer: COMMERCIAL

## 2023-05-05 ENCOUNTER — ANTICOAGULATION THERAPY VISIT (OUTPATIENT)
Dept: ANTICOAGULATION | Facility: CLINIC | Age: 88
End: 2023-05-05
Payer: COMMERCIAL

## 2023-05-05 DIAGNOSIS — I48.91 ATRIAL FIBRILLATION (H): Primary | ICD-10-CM

## 2023-05-05 DIAGNOSIS — N28.0 RENAL INFARCT (H): ICD-10-CM

## 2023-05-05 DIAGNOSIS — Z86.73 HISTORY OF CVA (CEREBROVASCULAR ACCIDENT): ICD-10-CM

## 2023-05-05 DIAGNOSIS — I48.20 CHRONIC ATRIAL FIBRILLATION (H): ICD-10-CM

## 2023-05-05 LAB — INR (EXTERNAL): 3.3 (ref 0.9–1.1)

## 2023-05-05 NOTE — TELEPHONE ENCOUNTER
AVSS.  A&OX4.  VAD #'s WNL.  Upper L chest ICD site discomfort.  Cold packs with relief.   Rhythm: Aflutter  60-80's.  Pt pleasant without distress.  Slept.  Voiding per urinal.  Continue current POC.  Anticipate [facility] pending bed availability.  Notify team with issues/concerns.     The Home Care/Assisted Living/Nursing Facility is calling regarding an established patient.  Has the patient seen Home Care in the past or is currently residing in Assisted Living or Nursing Facility? No.     Manuela, ERAN calling from [facility] requesting the following orders that are NOT within the Home Care, Assisted Living or Nursing Home Eval and Treatment standing order and must be ordered by a Licensed Practitioner.    Preferred Call Back Number: [phone], secure line to leave detailed messages    Heart rate parameter for pt  HR is ranging from 50-53 BPM today, currently asymptomatic per home care RN.  No SOB, difficulty breathing, or chest pain.   No cardiologist that is following pt, so home care RN would like PCP to provide parameter for heart rate.    Vitals today:  /80, T 97.7, RR 18, no pain, O2 sat 94% RA. Immobile, bed-bound.    Last Office visit  1/24/23- Pulse of 59.    INR today is 3.3- dose of coumadin dose will be changed.    Will route to provider regarding home care RN's request for heart rate parameter.    Carlyn Doyle, ANDREIN, RN  [facility]

## 2023-05-06 ENCOUNTER — APPOINTMENT (OUTPATIENT)
Dept: CT IMAGING | Facility: CLINIC | Age: 88
End: 2023-05-06
Attending: STUDENT IN AN ORGANIZED HEALTH CARE EDUCATION/TRAINING PROGRAM
Payer: COMMERCIAL

## 2023-05-06 ENCOUNTER — HOSPITAL ENCOUNTER (EMERGENCY)
Facility: CLINIC | Age: 88
Discharge: HOME | End: 2023-05-06
Attending: STUDENT IN AN ORGANIZED HEALTH CARE EDUCATION/TRAINING PROGRAM | Admitting: STUDENT IN AN ORGANIZED HEALTH CARE EDUCATION/TRAINING PROGRAM
Payer: COMMERCIAL

## 2023-05-06 VITALS
HEIGHT: 66 IN | RESPIRATION RATE: 16 BRPM | WEIGHT: 104 LBS | OXYGEN SATURATION: 94 % | SYSTOLIC BLOOD PRESSURE: 125 MMHG | HEART RATE: 50 BPM | TEMPERATURE: 98.1 F | DIASTOLIC BLOOD PRESSURE: 59 MMHG | BODY MASS INDEX: 16.71 KG/M2

## 2023-05-06 DIAGNOSIS — R10.30 LOWER ABDOMINAL PAIN: ICD-10-CM

## 2023-05-06 LAB
ALBUMIN UR-MCNC: 300 MG/DL
ANION GAP SERPL CALCULATED.3IONS-SCNC: 5 MMOL/L (ref 5–18)
APPEARANCE UR: ABNORMAL
BACTERIA #/AREA URNS HPF: ABNORMAL /HPF
BILIRUB UR QL STRIP: NEGATIVE
BUN SERPL-MCNC: 6 MG/DL (ref 8–28)
CALCIUM SERPL-MCNC: 8.7 MG/DL (ref 8.5–10.5)
CHLORIDE BLD-SCNC: 100 MMOL/L (ref 98–107)
CO2 SERPL-SCNC: 34 MMOL/L (ref 22–31)
COLOR UR AUTO: YELLOW
CREAT SERPL-MCNC: 1.08 MG/DL (ref 0.7–1.3)
ERYTHROCYTE [DISTWIDTH] IN BLOOD BY AUTOMATED COUNT: 13.5 % (ref 10–15)
GFR SERPL CREATININE-BSD FRML MDRD: 61 ML/MIN/1.73M2
GLUCOSE BLD-MCNC: 89 MG/DL (ref 70–125)
GLUCOSE UR STRIP-MCNC: NEGATIVE MG/DL
HCT VFR BLD AUTO: 43.4 % (ref 40–53)
HGB BLD-MCNC: 13.9 G/DL (ref 13.3–17.7)
HGB UR QL STRIP: ABNORMAL
INR PPP: 2.39 (ref 0.85–1.15)
KETONES UR STRIP-MCNC: NEGATIVE MG/DL
LEUKOCYTE ESTERASE UR QL STRIP: ABNORMAL
MCH RBC QN AUTO: 32.4 PG (ref 26.5–33)
MCHC RBC AUTO-ENTMCNC: 32 G/DL (ref 31.5–36.5)
MCV RBC AUTO: 101 FL (ref 78–100)
MUCOUS THREADS #/AREA URNS LPF: PRESENT /LPF
NITRATE UR QL: POSITIVE
PH UR STRIP: 7 [PH] (ref 5–7)
PLATELET # BLD AUTO: 207 10E3/UL (ref 150–450)
POTASSIUM BLD-SCNC: 4.2 MMOL/L (ref 3.5–5)
RBC # BLD AUTO: 4.29 10E6/UL (ref 4.4–5.9)
RBC URINE: 103 /HPF
SODIUM SERPL-SCNC: 139 MMOL/L (ref 136–145)
SP GR UR STRIP: 1.01 (ref 1–1.03)
SQUAMOUS EPITHELIAL: 9 /HPF
UROBILINOGEN UR STRIP-MCNC: <2 MG/DL
WBC # BLD AUTO: 8.1 10E3/UL (ref 4–11)
WBC CLUMPS #/AREA URNS HPF: PRESENT /HPF
WBC URINE: >182 /HPF

## 2023-05-06 PROCEDURE — 36415 COLL VENOUS BLD VENIPUNCTURE: CPT | Performed by: STUDENT IN AN ORGANIZED HEALTH CARE EDUCATION/TRAINING PROGRAM

## 2023-05-06 PROCEDURE — 250N000013 HC RX MED GY IP 250 OP 250 PS 637: Performed by: STUDENT IN AN ORGANIZED HEALTH CARE EDUCATION/TRAINING PROGRAM

## 2023-05-06 PROCEDURE — 99285 EMERGENCY DEPT VISIT HI MDM: CPT | Mod: 25

## 2023-05-06 PROCEDURE — 80048 BASIC METABOLIC PNL TOTAL CA: CPT | Performed by: STUDENT IN AN ORGANIZED HEALTH CARE EDUCATION/TRAINING PROGRAM

## 2023-05-06 PROCEDURE — 81001 URINALYSIS AUTO W/SCOPE: CPT | Performed by: STUDENT IN AN ORGANIZED HEALTH CARE EDUCATION/TRAINING PROGRAM

## 2023-05-06 PROCEDURE — 87086 URINE CULTURE/COLONY COUNT: CPT | Performed by: STUDENT IN AN ORGANIZED HEALTH CARE EDUCATION/TRAINING PROGRAM

## 2023-05-06 PROCEDURE — 250N000011 HC RX IP 250 OP 636: Performed by: STUDENT IN AN ORGANIZED HEALTH CARE EDUCATION/TRAINING PROGRAM

## 2023-05-06 PROCEDURE — 85027 COMPLETE CBC AUTOMATED: CPT | Performed by: STUDENT IN AN ORGANIZED HEALTH CARE EDUCATION/TRAINING PROGRAM

## 2023-05-06 PROCEDURE — 74177 CT ABD & PELVIS W/CONTRAST: CPT

## 2023-05-06 PROCEDURE — 85610 PROTHROMBIN TIME: CPT | Performed by: STUDENT IN AN ORGANIZED HEALTH CARE EDUCATION/TRAINING PROGRAM

## 2023-05-06 RX ORDER — CEPHALEXIN 500 MG/1
500 CAPSULE ORAL 2 TIMES DAILY
Qty: 14 CAPSULE | Refills: 0 | Status: SHIPPED | OUTPATIENT
Start: 2023-05-06 | End: 2023-05-13

## 2023-05-06 RX ORDER — IOPAMIDOL 755 MG/ML
100 INJECTION, SOLUTION INTRAVASCULAR ONCE
Status: COMPLETED | OUTPATIENT
Start: 2023-05-06 | End: 2023-05-06

## 2023-05-06 RX ORDER — ACETAMINOPHEN 325 MG/1
650 TABLET ORAL ONCE
Status: COMPLETED | OUTPATIENT
Start: 2023-05-06 | End: 2023-05-06

## 2023-05-06 RX ADMIN — IOPAMIDOL 100 ML: 755 INJECTION, SOLUTION INTRAVENOUS at 10:07

## 2023-05-06 RX ADMIN — ACETAMINOPHEN 650 MG: 325 TABLET ORAL at 09:07

## 2023-05-06 ASSESSMENT — ACTIVITIES OF DAILY LIVING (ADL)
ADLS_ACUITY_SCORE: 35
ADLS_ACUITY_SCORE: 35

## 2023-05-06 NOTE — DISCHARGE INSTRUCTIONS
CT scan showed enlarged prostate (which is not new). Cathter look like it is in appropriate position  We will treat for possible urinary tract infection. You will be called if the urine culture requires a change in antibiotics  Would recommend 650 mg of tylenol every 6-8 hours for pain (up to 3000 mg in 24 hours)  Follow up closely with his primary care doctor  Return to the Emergency Room with increased abdominal pain, fevers, catheter not working or other worsening symptoms or concerns

## 2023-05-06 NOTE — ED PROVIDER NOTES
NAME: Benny Carrillo  AGE: 89 year old male  YOB: 1921  MRN: [id number]  EVALUATION DATE & TIME: 2023  8:09 AM    PCP: Jb Martínez  ED PROVIDER: Clair Alcazar MD.    Chief Complaint   Patient presents with     Groin Pain       FINAL IMPRESSION:  1. Lower abdominal pain        MEDICAL DECISION MAKIN:17 AM I met with the patient and family, obtained history, performed an initial exam, and discussed options and plan for diagnostics and treatment here in the ED.  11:04 AM I rechecked and updated the patient.      89 y/o M with h/o CVA, urinary retention with suprapubic catheter, renal infarct, atrial fibrillation on warfarin who presents with groin pain (actually lower abdomen on exam). Recently had suprapubic cathter replaced by nurse at home and family wondering if this could have caused the pain. UA with signs of infection vs contamination. Labs are generally reassuring. CT abd/pelvis shows enlarged prostate (has had this on prior reads). He was given tylenol with improvement in pain. Declined having me reposition or exchange the catheter today which I think is reasonable as it is working well and appears in appropriate position on CT. Family would like to treat for possible UTI, will start on keflex and send culture. Presentation not suggestive of pyelonephritis or sepsis. Discussed observation admission and patient declined. Recommended close outpatient follow up. Patient and family in agreement with plan and their questions were answered.     Medical Decision Making    History:    Supplemental history from: Documented in chart, if applicable and Family Member/Significant Other    External Record(s) reviewed: Documented in chart, if applicable.    Work Up:    Chart documentation includes differential considered and any EKGs or imaging interpreted by provider.    In additional to work up documented, I considered the following work up: Documented in chart, if applicable.    External  "consultation:    Discussion of management with another provider: Documented in chart, if applicable    Complicating factors:    Care impacted by chronic illness: Chronic Kidney Disease, Chronic Lung Disease, Heart Disease and Hypertension    Care affected by social determinants of health: N/A    Disposition considerations: Discharge. I prescribed additional prescription strength medication(s) as charted. I discussed admission, but the patient declined.      MEDICATIONS GIVEN IN THE EMERGENCY:  Medications   acetaminophen (TYLENOL) tablet 650 mg (650 mg Oral $Given 5/6/23 0907)   iopamidol (ISOVUE-370) solution 100 mL (100 mLs Intravenous $Given 5/6/23 1007)       NEW PRESCRIPTIONS STARTED AT TODAY'S ER VISIT:  New Prescriptions    No medications on file        =================================================================  HPI    Patient information was obtained from: patient, son, and granddaughter (over the phone)  Use of : Yes ( Phone - granddaughter) - Language Niuean      Benny Carrillo is a 89 year old male with a past medical history of UTIs, sepsis due to UTI, chronic urinary retention, suprapubic catheter in place, atrial fibrillation, hypertension, stage 3 CKD, SBO, stroke, COPD, asthma, and GERD who presents by walk in accompanied by family for evaluation of abdominal pain.     Patient's granddaughter reports that the patient had his suprapubic catheter changed on 4/28/23. Since then, he has had abdominal pain near the catheter site. Patient typically has mild soreness near the site after it is changed, but it does not usually last this long. He has a home nurse come to change his catheter, but his daughter notes that the most recent nurse was new, took longer than normal, and seemed to change it \"recklessly.\" He is still having urine output into his catheter bag. She also notes that the patient has had increased urge to go to the bathroom lately. She denies any fever, vomiting, diarrhea, " or bloody urine. Patient lives with his son. No other complaints or concerns expressed at this time.        REVIEW OF SYSTEMS   All systems reviewed, please see HPI for pertinent findings    PAST MEDICAL HISTORY:  Past Medical History:   Diagnosis Date     Asthma      Atrial fibrillation (H)      Benign prostatic hyperplasia      Bradycardia 7/25/2015     Cataract      Chronic obstructive pulmonary disease, unspecified COPD type (H) 7/21/2020     Citrobacter infection 7/25/2015    UTI      Citrobacter infection 7/25/2015    UTI      Constipation 1/13/2015     Elevated digoxin level 7/24/2015     GERD (gastroesophageal reflux disease)      Hypertension      Lactic acidosis      Lower urinary tract infectious disease      Replacement Utility updated for latest IMO load     Muscle weakness      SBO (small bowel obstruction) (H)      Small bowel obstruction (H) 7/23/2015     Stroke (H)      Underweight due to inadequate caloric intake 7/28/2015    Body mass index is 20.25 kg/(m^2).       Urinary obstruction        PAST SURGICAL HISTORY:  Past Surgical History:   Procedure Laterality Date     CHOLECYSTECTOMY       CYSTOTOMY       EYE SURGERY Bilateral     cataract removal     PICC  5/28/2019            CURRENT MEDICATIONS:    No current facility-administered medications for this encounter.    Current Outpatient Medications:      albuterol (PROAIR HFA;PROVENTIL HFA;VENTOLIN HFA) 90 mcg/actuation inhaler, [ALBUTEROL (PROAIR HFA;PROVENTIL HFA;VENTOLIN HFA) 90 MCG/ACTUATION INHALER] INHALE 2 PUFFS BY MOUTH EVERY 6 HOURS AS NEEDED FOR WHEEZING, Disp: 1 each, Rfl: 3     amLODIPine (NORVASC) 5 MG tablet, Take 1 tablet (5 mg) by mouth daily, Disp: 90 tablet, Rfl: 3     ARTIFICIAL TEARS, POLYVIN ALC, 1.4 % ophthalmic solution, [ARTIFICIAL TEARS, POLYVIN ALC, 1.4 % OPHTHALMIC SOLUTION] INSTILL 2 DROPS INTO EACH EYE TWICE DAILY AS NEEDED DRY EYES, Disp: 15 mL, Rfl: 6     clotrimazole (LOTRIMIN) 1 % external cream, Apply topically  twice daily to glans penis x 15 days, Disp: 45 g, Rfl: 1     digoxin (LANOXIN) 125 MCG tablet, Take 1 tablet (125 mcg) by mouth daily, Disp: 90 tablet, Rfl: 3     EQ ACETAMINOPHEN 500 MG tablet, TAKE 1 TO 2 TABLETS BY MOUTH EVERY 6 HOURS AS NEEDED FOR MILD PAIN, Disp: 360 tablet, Rfl: 2     EQ ALLERGY RELIEF 10 MG tablet, Take 1 tablet by mouth once daily, Disp: 90 tablet, Rfl: 3     famotidine (PEPCID) 20 MG tablet, Take 1 tablet by mouth once daily, Disp: 90 tablet, Rfl: 3     gabapentin (NEURONTIN) 100 MG capsule, TAKE 1 CAPSULE BY MOUTH TWICE DAILY AS NEEDED FOR PAIN, Disp: 60 capsule, Rfl: 5     guaiFENesin (ROBITUSSIN) 100 MG/5ML SYRP, Take 10 mLs by mouth 3 times daily as needed for cough, Disp: 240 mL, Rfl: 0     hydrocortisone 2.5 % cream, [HYDROCORTISONE 2.5 % CREAM] APPLY  CREAM TO AFFECTED AREA ONCE TO TWICE DAILY AS NEEDED, Disp: 28 g, Rfl: 2     ketoconazole (NIZORAL) 2 % external shampoo, Apply topically daily as needed for itching or irritation Apply topically to moist skin, lather, leave on 5 minutes, then rinse.  Repeat daily x 3 days., Disp: 120 mL, Rfl: 1     MEDICATION CANNOT BE REORDERED - PLEASE MANUALLY REORDER AND DISCONTINUE THE OLD ORDER, [CATHETER 14 FR MISC] Suprapubic catheter. 14 French., Disp: 1 each, Rfl: 0     melatonin 5 MG tablet, Take 1 tablet (5 mg) by mouth nightly as needed for sleep, Disp: 30 tablet, Rfl: 3     multivitamin w/minerals (MULTIVITAMIN, THERAPEUTIC WITH MINERALS) tablet, Take 1 tablet by mouth daily, Disp: 90 tablet, Rfl: 3     pantoprazole (PROTONIX) 40 MG EC tablet, Take 1 tablet (40 mg) by mouth daily, Disp: 90 tablet, Rfl: 3     polyvinyl alcohol-povidone (ARTIFICIAL TEARS,PVALCH-POVID,) 0.5-0.6 % Drop, [POLYVINYL ALCOHOL-POVIDONE (ARTIFICIAL TEARS,PVALCH-POVID,) 0.5-0.6 % DROP] 1-2 gtts two times a day to each eye prn, Disp: 15 mL, Rfl: 3     SM TUSSIN MUCUS+CHEST CONGEST 100 MG/5ML liquid, TAKE 10 ML BY MOUTH THREE TIMES DAILY AS NEEDED FOR COUGH, Disp: 240  mL, Rfl: 0     warfarin ANTICOAGULANT (COUMADIN) 2.5 MG tablet, Take1.25 mg (2.5 mg x 0.5) every Tue, Fri; 2.5 mg (2.5 mg x 1) all other days or as directed., Disp: 90 tablet, Rfl: 3    ALLERGIES:  Allergies   Allergen Reactions     Morphine Itching and Other (See Comments)     Pt received morphine IV on 8/26/18. Pt reported localized itching and discomfort with redness near IV site following administration of morphine.       FAMILY HISTORY:  Family History   Problem Relation Age of Onset     No Known Problems Mother      No Known Problems Father      Diabetes Other         spouse       SOCIAL HISTORY:   Social History     Socioeconomic History     Marital status:    Tobacco Use     Smoking status: Never     Smokeless tobacco: Never   Substance and Sexual Activity     Alcohol use: No     Drug use: No     Social Determinants of Health     Financial Resource Strain: Low Risk  (11/10/2022)    Overall Financial Resource Strain (CARDIA)      Difficulty of Paying Living Expenses: Not very hard   Food Insecurity: No Food Insecurity (11/10/2022)    Hunger Vital Sign      Worried About Running Out of Food in the Last Year: Never true      Ran Out of Food in the Last Year: Never true   Transportation Needs: No Transportation Needs (11/10/2022)    PRAPARE - Transportation      Lack of Transportation (Medical): No      Lack of Transportation (Non-Medical): No   Physical Activity: Inactive (11/10/2022)    Exercise Vital Sign      Days of Exercise per Week: 0 days      Minutes of Exercise per Session: 0 min   Stress: No Stress Concern Present (11/10/2022)    [place] [employer] - Occupational Stress Questionnaire      Feeling of Stress : Only a little   Social Connections: Socially Isolated (11/10/2022)    Social Connection and Isolation Panel [NHANES]      Frequency of Communication with Friends and Family: Never      Frequency of Social Gatherings with Friends and Family: More than three times a week  "     Attends Shinto Services: Never      Active Member of Clubs or Organizations: No      Attends Club or Organization Meetings: Never      Marital Status:    Intimate Partner Violence: Not At Risk (11/10/2022)    Humiliation, Afraid, Rape, and Kick questionnaire      Fear of Current or Ex-Partner: No      Emotionally Abused: No      Physically Abused: No      Sexually Abused: No   Housing Stability: Unknown (12/16/2021)    Housing Stability Vital Sign      Unable to Pay for Housing in the Last Year: No      Unstable Housing in the Last Year: No       PHYSICAL EXAM:    Vitals: /74   Pulse 52   Temp 97.4  F (36.3  C) (Oral)   Resp 16   Ht 1.676 m (5' 6\")   Wt 47.2 kg (104 lb)   SpO2 91%   BMI 16.79 kg/m     Constitutional: well nourished elderly male in no acute distress  HENT: Normocephalic, atraumatic. Neck-gross ROM intact.   Eyes: Pupils mid-range, sclera white  Respiratory: CTAB, no respiratory distress, speaks full sentences easily.  Cardiovascular: Normal heart rate, regular rhythm  GI: Soft, not distended, tender to palpation throughout the lower abdomen, suprapubic catheter in place with yellow urine in bag, no surrounding erythema or drainage  Musculoskeletal: Moving all 4 extremities intentionally and without pain. No obvious deformity.  Skin: Warm, dry  Neurologic: Alert & oriented, speech clear, face symmetric, moving all extremities    LAB:  All pertinent labs reviewed and interpreted.  Labs Ordered and Resulted from Time of ED Arrival to Time of ED Departure   CBC WITH PLATELETS - Abnormal       Result Value    WBC Count 8.1      RBC Count 4.29 (*)     Hemoglobin 13.9      Hematocrit 43.4       (*)     MCH 32.4      MCHC 32.0      RDW 13.5      Platelet Count 207     BASIC METABOLIC PANEL - Abnormal    Sodium 139      Potassium 4.2      Chloride 100      Carbon Dioxide (CO2) 34 (*)     Anion Gap 5      Urea Nitrogen 6 (*)     Creatinine 1.08      Calcium 8.7      Glucose " 89      GFR Estimate 61     INR - Abnormal    INR 2.39 (*)    ROUTINE UA WITH MICROSCOPIC REFLEX TO CULTURE - Abnormal    Color Urine Yellow      Appearance Urine Cloudy (*)     Glucose Urine Negative      Bilirubin Urine Negative      Ketones Urine Negative      Specific Gravity Urine 1.012      Blood Urine 0.5 mg/dL (*)     pH Urine 7.0      Protein Albumin Urine 300 (*)     Urobilinogen Urine <2.0      Nitrite Urine Positive (*)     Leukocyte Esterase Urine 500 Eliu/uL (*)     Bacteria Urine Many (*)     WBC Clumps Urine Present (*)     Mucus Urine Present (*)     RBC Urine 103 (*)     WBC Urine >182 (*)     Squamous Epithelials Urine 9 (*)    URINE CULTURE       RADIOLOGY:  CT Abdomen Pelvis w Contrast   Preliminary Result   IMPRESSION:    1.  Significantly enlarged prostate gland bulging into the base of the urinary bladder. The urinary bladder is decompressed with Taylor's catheter.   2.  Colonic diverticulosis without CT evidence of acute diverticulitis.          EKG:   N/A    PROCEDURES:   Procedures     I, Yanique Pina, am serving as a scribe to document services personally performed by Dr. Clair Alcazar based on my observation and the provider's statements to me. I, Clair Alcazar MD attest that Yanique Pina is acting in a scribe capacity, has observed my performance of the services and has documented them in accordance with my direction.    Clair Alcazar M.D.  Emergency Medicine  [facility] EMERGENCY ROOM  [address] [id number] [phone]  Dept: [phone]     Clair Alcazar MD  05/06/23 1123

## 2023-05-07 LAB — BACTERIA UR CULT: NORMAL

## 2023-05-08 ENCOUNTER — DOCUMENTATION ONLY (OUTPATIENT)
Dept: ANTICOAGULATION | Facility: CLINIC | Age: 88
End: 2023-05-08
Payer: COMMERCIAL

## 2023-05-08 NOTE — TELEPHONE ENCOUNTER
Informed Home Care RN Manuela of info and she verbalized understanding. Advised her to call if pt is symptomatic though and she verbalized understanding.    MELONY Dee, RN  [facility]

## 2023-05-08 NOTE — PROGRESS NOTES
ANTICOAGULATION  MANAGEMENT: Discharge Review    Benny Carrillo chart reviewed for anticoagulation continuity of care    Emergency room visit on 5/6/23 for Lower Abdominal Pain.    Discharge disposition: Home with Home Care    Results:    Recent labs: (last 7 days)     05/05/23  0902 05/06/23  0903   INR 3.3* 2.39*     Anticoagulation inpatient management:     not applicable     Anticoagulation discharge instructions:     Warfarin dosing: home regimen continued   Bridging: No   INR goal change: No      Medication changes affecting anticoagulation: Yes: Keflex; 1 Cap BID x 7 days 5/6-5/13 (increased risk for bleeding    Additional factors affecting anticoagulation: Suprapubic Catheter Changed 4/28 by Home Care     PLAN     No adjustment to anticoagulation plan needed    Patient not contacted (Home care already setup to see patient 5/12 this week). INR done 5/6 at 2.39.    No adjustment to Anticoagulation Calendar was required    Megan Jones RN

## 2023-05-12 ENCOUNTER — ANTICOAGULATION THERAPY VISIT (OUTPATIENT)
Dept: ANTICOAGULATION | Facility: CLINIC | Age: 88
End: 2023-05-12
Payer: COMMERCIAL

## 2023-05-12 DIAGNOSIS — Z91.09 ENVIRONMENTAL ALLERGIES: ICD-10-CM

## 2023-05-12 LAB — INR (EXTERNAL): 3.1 (ref 0.9–1.1)

## 2023-05-12 RX ORDER — GUAIFENESIN 200 MG/10ML
SOLUTION ORAL
Qty: 240 ML | Refills: 0 | Status: SHIPPED | OUTPATIENT
Start: 2023-05-12 | End: 2023-05-18

## 2023-05-12 NOTE — PROGRESS NOTES
ANTICOAGULATION MANAGEMENT     Benny Carrillo 89 year old male is on warfarin with supratherapeutic INR result. (Goal INR 2.0-3.0)    Recent labs: (last 7 days)     05/12/23  0852   INR 3.1*       ASSESSMENT       Source(s): Chart Review and Home Care/Facility Nurse       Warfarin doses taken: Warfarin taken as instructed    Diet: No new diet changes identified    Medication/supplement changes: Keflex 5/6-5/13, patient on schedule to complete tomorrow.    New illness, injury, or hospitalization: Yes: ED on 5/6 for lower abdominal pain,  Possibly related to catheter change 4/28.    Signs or symptoms of bleeding or clotting: No    Previous result: Therapeutic last visit; previously outside of goal range    Additional findings: INR expected to stabilize following completion of Keflex. No change in dosing at this time. If  INR elevated at next result adjustment may be advised.         PLAN     Recommended plan for temporary change(s) affecting INR     Dosing Instructions: Continue your current warfarin dose with next INR in 1 week       Summary  As of 5/12/2023    Full warfarin instructions:  3.75 mg every Mon; 2.5 mg all other days   Next INR check:               Telephone call with [facility] home care nurse who agrees to plan and repeated back plan correctly    Orders given to  Homecare nurse/facility to recheck    Education provided:     Please call back if any changes to your diet, medications or how you've been taking warfarin    Goal range and lab monitoring: goal range and significance of current result    Plan made per [facility] anticoagulation protocol    Rosy Siegel RN  Anticoagulation Clinic  5/12/2023    _______________________________________________________________________     Anticoagulation Episode Summary     Current INR goal:  2.0-3.0   TTR:  80.9 % (1 y)   Target end date:  Indefinite   Send INR reminders to:  CARLOS CERVANTES    Indications    Atrial fibrillation (H) [I48.91]  Renal infarct (H)  [N28.0]  Chronic atrial fibrillation [I48.20]  History of CVA (cerebrovascular accident) [Z86.73]           Comments:           Anticoagulation Care Providers     Provider Role Specialty Phone number    Alcides Castillo MD Referring Family Medicine [phone]    Jb Martínez MD Referring [facility] [phone]

## 2023-05-12 NOTE — TELEPHONE ENCOUNTER
Pending Prescriptions:                       Disp   Refills    SM TUSSIN MUCUS+CHEST CONGEST 100 MG/5ML *240 mL 0            Sig: TAKE 10 ML BY MOUTH THREE TIMES DAILY AS NEEDED           FOR COUGH     Completely out of medication - please send to pharmacy as soon as possible.

## 2023-05-16 ENCOUNTER — TELEPHONE (OUTPATIENT)
Dept: FAMILY MEDICINE | Facility: CLINIC | Age: 88
End: 2023-05-16
Payer: COMMERCIAL

## 2023-05-18 ENCOUNTER — VIRTUAL VISIT (OUTPATIENT)
Dept: FAMILY MEDICINE | Facility: CLINIC | Age: 88
End: 2023-05-18
Payer: COMMERCIAL

## 2023-05-18 DIAGNOSIS — N39.0 URINARY TRACT INFECTION ASSOCIATED WITH CATHETERIZATION OF URINARY TRACT, UNSPECIFIED INDWELLING URINARY CATHETER TYPE, SUBSEQUENT ENCOUNTER: ICD-10-CM

## 2023-05-18 DIAGNOSIS — R32 URINARY INCONTINENCE, UNSPECIFIED TYPE: ICD-10-CM

## 2023-05-18 DIAGNOSIS — I48.20 CHRONIC ATRIAL FIBRILLATION (H): Primary | ICD-10-CM

## 2023-05-18 DIAGNOSIS — Z91.09 ENVIRONMENTAL ALLERGIES: ICD-10-CM

## 2023-05-18 DIAGNOSIS — T83.511D URINARY TRACT INFECTION ASSOCIATED WITH CATHETERIZATION OF URINARY TRACT, UNSPECIFIED INDWELLING URINARY CATHETER TYPE, SUBSEQUENT ENCOUNTER: ICD-10-CM

## 2023-05-18 DIAGNOSIS — Z93.59 SUPRAPUBIC CATHETER (H): ICD-10-CM

## 2023-05-18 PROCEDURE — 99214 OFFICE O/P EST MOD 30 MIN: CPT | Mod: VID | Performed by: FAMILY MEDICINE

## 2023-05-18 RX ORDER — CEPHALEXIN 500 MG/1
500 CAPSULE ORAL 2 TIMES DAILY
COMMUNITY
End: 2023-05-18

## 2023-05-18 RX ORDER — GUAIFENESIN 200 MG/10ML
SOLUTION ORAL
Qty: 240 ML | Refills: 0 | Status: SHIPPED | OUTPATIENT
Start: 2023-05-18

## 2023-05-18 RX ORDER — CEPHALEXIN 500 MG/1
500 CAPSULE ORAL 2 TIMES DAILY
Qty: 14 CAPSULE | Refills: 0 | Status: SHIPPED | OUTPATIENT
Start: 2023-05-18 | End: 2023-05-25

## 2023-05-18 NOTE — PROGRESS NOTES
Benny is a 89 year old who is being evaluated via a billable video visit.      How would you like to obtain your AVS? Mail a copy  If the video visit is dropped, the invitation should be resent by: Text to cell phone: [phone]  Will anyone else be joining your video visit? No          Chronic atrial fibrillation (H)  Face-to-face encounter completed today.  Chronic atrial fibrillation.  Warfarin anticoagulation continuing.  Patient completing weekly INR is in mild Taylor catheter changes with [facility] home care.    Suprapubic catheter (H)  As above, monthly catheter changes to continue with [facility] care.    Urinary incontinence, unspecified type  Urinary incontinence concerns.  Patient utilizing diapers up to 6/day.    Urinary tract infection associated with catheterization of urinary tract, unspecified indwelling urinary catheter type, subsequent encounter  Urinary tract infection with suprapubic catheter in place.  Cephalexin 500 mg twice daily x7 days was provided for refill if future concerns noted  - cephALEXin (KEFLEX) 500 MG capsule  Dispense: 14 capsule; Refill: 0    Environmental allergies  Refill on medication for chest congestion noted  - SM TUSSIN MUCUS+CHEST CONGEST 100 MG/5ML liquid  Dispense: 240 mL; Refill: 0       Subjective   Benny is a 89 year old, presenting for the following health issues:  face to face visit for home care ([facility] - come weekly to check inr and monthly cath changes)        12/8/2021     1:01 PM   Additional Questions   Roomed by Sonia   Accompanied by Son and Rocío     HPI     Face-to-face encounter completed.  Needs home health services to continue after June 1.  Switch from [facility] home care instead to [facility] care.  Continues weekly INR monitoring while on warfarin for atrial fibrillation, chronic.  Urinary incontinence and does utilize up to 6 diapers per day.  Has a leg bag with catheter and has catheter changes once a month.  Supplies  previously sent to [facility].  Seen through ER recently with concern for UTI.  Giving cephalexin and would like refill for future use due to indwelling catheter and prior UTI history.  Is scheduled for annual wellness visit June 6, 2023.  Comprehensive review of systems as above otherwise all negative.        Review of Systems   Constitutional, HEENT, cardiovascular, pulmonary, GI, , musculoskeletal, neuro, skin, endocrine and psych systems are negative, except as otherwise noted.      Objective           Vitals:  No vitals were obtained today due to virtual visit.    Physical Exam   GENERAL: Healthy, alert and no distress  EYES: Eyes grossly normal to inspection.  No discharge or erythema, or obvious scleral/conjunctival abnormalities.  RESP: No audible wheeze, cough, or visible cyanosis.  No visible retractions or increased work of breathing.    SKIN: Visible skin clear. No significant rash, abnormal pigmentation or lesions.  NEURO: Cranial nerves grossly intact.  Mentation and speech appropriate for age.  PSYCH: Mentation appears normal, affect normal/bright, judgement and insight intact, normal speech and appearance well-groomed.                Video-Visit Details    Type of service:  Video Visit   Video Start Time: 5:38 PM  Video End Time:5:45 PM    Originating Location (pt. Location): Home  Distant Location (provider location):  On-site  Platform used for Video Visit: Brayden

## 2023-05-19 ENCOUNTER — ANTICOAGULATION THERAPY VISIT (OUTPATIENT)
Dept: ANTICOAGULATION | Facility: CLINIC | Age: 88
End: 2023-05-19
Payer: COMMERCIAL

## 2023-05-19 DIAGNOSIS — N28.0 RENAL INFARCT (H): ICD-10-CM

## 2023-05-19 DIAGNOSIS — I48.20 CHRONIC ATRIAL FIBRILLATION (H): ICD-10-CM

## 2023-05-19 DIAGNOSIS — Z86.73 HISTORY OF CVA (CEREBROVASCULAR ACCIDENT): ICD-10-CM

## 2023-05-19 DIAGNOSIS — I48.91 ATRIAL FIBRILLATION (H): Primary | ICD-10-CM

## 2023-05-19 LAB — INR (EXTERNAL): 3.3 (ref 0.9–1.1)

## 2023-05-23 ENCOUNTER — PATIENT OUTREACH (OUTPATIENT)
Dept: GERIATRIC MEDICINE | Facility: CLINIC | Age: 88
End: 2023-05-23
Payer: COMMERCIAL

## 2023-05-23 NOTE — PROGRESS NOTES
[facility] Care Coordination Contact      [facility] Six-Month Telephone Assessment    6 month telephone assessment completed on 5/23/2023.    ER visits: Yes, reviewed recent ED visit regarding concerns with groin pain. Family reports that Benny had a new home care nurse that had difficulty replacing the catheter that was placed during a visit and shortly after he started complaining of pain. He was treated in the ED and prescribed antibiotics for a UTI treatment. Family followed up with PCP shortly after due to ongoing complaints of pain and received a refill. Family provides excellent care for Benny and is well informed on how to manage medication.   Hospitalizations: No  TCU stays: No  Significant health status changes: No, significant changes noted. Pain and behavior has improved with medication intervention.   Falls/Injuries: No  ADL/IADL changes: No  Changes in services: No, change in service provider following catheter incident. Care Plan updated.     Caregiver Assessment follow up:  n/a    Goals: See POC in chart for goal progress documentation.      Will see member in 6 months for an annual health risk assessment.   Encouraged member to call CC with any questions or concerns in the meantime.     KESHA Sun  [facility]  [phone]

## 2023-05-25 ENCOUNTER — ANTICOAGULATION THERAPY VISIT (OUTPATIENT)
Dept: ANTICOAGULATION | Facility: CLINIC | Age: 88
End: 2023-05-25
Payer: COMMERCIAL

## 2023-05-25 DIAGNOSIS — Z86.73 HISTORY OF CVA (CEREBROVASCULAR ACCIDENT): ICD-10-CM

## 2023-05-25 DIAGNOSIS — N28.0 RENAL INFARCT (H): ICD-10-CM

## 2023-05-25 DIAGNOSIS — Z91.09 ENVIRONMENTAL ALLERGIES: ICD-10-CM

## 2023-05-25 DIAGNOSIS — I48.91 ATRIAL FIBRILLATION (H): Primary | ICD-10-CM

## 2023-05-25 DIAGNOSIS — I48.20 CHRONIC ATRIAL FIBRILLATION (H): ICD-10-CM

## 2023-05-25 LAB — INR (EXTERNAL): 3.4 (ref 0.9–1.1)

## 2023-05-25 RX ORDER — ALBUTEROL SULFATE 90 UG/1
AEROSOL, METERED RESPIRATORY (INHALATION)
Qty: 18 G | Refills: 5 | Status: SHIPPED | OUTPATIENT
Start: 2023-05-25 | End: 2023-10-12

## 2023-05-25 NOTE — PROGRESS NOTES
ANTICOAGULATION MANAGEMENT     Benny Carrillo 89 year old male is on warfarin with supratherapeutic INR result. (Goal INR 2.0-3.0)    Recent labs: (last 7 days)     05/25/23  0854   INR 3.4*       ASSESSMENT       Source(s): Chart Review and Home Care/Facility Nurse       Warfarin doses taken: Warfarin taken as instructed    Diet: No new diet changes identified    Medication/supplement changes: None noted    New illness, injury, or hospitalization: No    Signs or symptoms of bleeding or clotting: No    Previous result: Supratherapeutic    Additional findings: None         PLAN     Recommended plan for no diet, medication or health factor changes affecting INR     Dosing Instructions: decrease your warfarin dose (7.1% change) with next INR in 1 week       Summary  As of 5/25/2023    Full warfarin instructions:  1.25 mg every Thu; 2.5 mg all other days   Next INR check:  6/1/2023             Telephone call with Vickie Roy home care nurse who agrees to plan and repeated back plan correctly    Orders given to  Homecare nurse/facility to recheck    Education provided:     None required    Plan made per [facility] anticoagulation protocol    Candida Melendez, RN  Anticoagulation Clinic  5/25/2023    _______________________________________________________________________     Anticoagulation Episode Summary     Current INR goal:  2.0-3.0   TTR:  77.3 % (1 y)   Target end date:  Indefinite   Send INR reminders to:  [facility]    Indications    Atrial fibrillation (H) [I48.91]  Renal infarct (H) [N28.0]  Chronic atrial fibrillation [I48.20]  History of CVA (cerebrovascular accident) [Z86.73]           Comments:           Anticoagulation Care Providers     Provider Role Specialty Phone number    Alcides Castillo MD Referring Family Medicine [phone]    Jb Martínez MD Referring Family Medicine [phone]

## 2023-05-25 NOTE — TELEPHONE ENCOUNTER
Home Health Care  Reason for call:    ERAN Roy with [facility] calling to report that pt has wheezing in left lower lobe, but not other symptoms. Denied SOB or difficulty breathing or other symptoms.     Patient has COPD and is not on any other meds currently. Home care RN was wondering if pt could get a refill on pt's albuterol inhaler as she can see is listed, but pt does not have one at home. Irene is currently just taking Guaifenesin adult tussin for cough.    INR was 3.4 today and they did adjust his dose of his coumadin.    New cath change today- supra pubic cath, and pt tolerated well.     Please let Manuela know of provider's advise and if albuterol inhaler could be refilled.    Routing refill request to provider for review/approval because:  A break in medication    Pending Prescriptions:                       Disp   Refills    albuterol (PROAIR HFA/PROVENTIL HFA/CARSON*                    Sig: [ALBUTEROL (PROAIR HFA;PROVENTIL HFA;VENTOLIN           HFA) 90 MCG/ACTUATION INHALER] INHALE 2 PUFFS BY           MOUTH EVERY 6 HOURS AS NEEDED FOR WHEEZING    Last Written Prescription Date:  1/13/2021  Last Fill Quantity: 1,  # refills: 3   Last office visitwith prescribing provider: 1/24/2023   Future Office Visit:   Appointments in Next Year    Jun 06, 2023  8:20 AM  (Arrive by 8:00 AM)  Annual Wellness Visit with Jb Martínez MD  [facility] ([facility]) [phone]         Orders are needed for this patient.  Refill for albuterol inhaler    Pt Provider: Dr Martínez    Phone Number Homecare Nurse can be reached at: [phone]    Can we leave a detailed message on this number? YES    MELONY Dee, RN  [facility]

## 2023-05-26 ENCOUNTER — MEDICAL CORRESPONDENCE (OUTPATIENT)
Dept: HEALTH INFORMATION MANAGEMENT | Facility: CLINIC | Age: 88
End: 2023-05-26
Payer: COMMERCIAL

## 2023-06-01 ENCOUNTER — ANTICOAGULATION THERAPY VISIT (OUTPATIENT)
Dept: ANTICOAGULATION | Facility: CLINIC | Age: 88
End: 2023-06-01
Payer: COMMERCIAL

## 2023-06-01 DIAGNOSIS — N28.0 RENAL INFARCT (H): ICD-10-CM

## 2023-06-01 DIAGNOSIS — I48.11 LONGSTANDING PERSISTENT ATRIAL FIBRILLATION (H): Primary | ICD-10-CM

## 2023-06-01 DIAGNOSIS — Z86.73 HISTORY OF CVA (CEREBROVASCULAR ACCIDENT): ICD-10-CM

## 2023-06-01 DIAGNOSIS — I48.20 CHRONIC ATRIAL FIBRILLATION (H): ICD-10-CM

## 2023-06-01 LAB — INR (EXTERNAL): 2.3 (ref 0.9–1.1)

## 2023-06-01 NOTE — PROGRESS NOTES
ANTICOAGULATION MANAGEMENT     Benny Carrillo 89 year old male is on warfarin with therapeutic INR result. (Goal INR 2.0-3.0)    Recent labs: (last 7 days)     06/01/23  1300   INR 2.3*       ASSESSMENT       Source(s): Chart Review and Home Care/Facility Nurse       Warfarin doses taken: Warfarin taken as instructed    Diet: No new diet changes identified    Medication/supplement changes: Albuterol new rx No interaction anticipated    New illness, injury, or hospitalization: No    Signs or symptoms of bleeding or clotting: No    Previous result: Supratherapeutic    Additional findings: None         PLAN     Recommended plan for no diet, medication or health factor changes affecting INR     Dosing Instructions: Continue your current warfarin dose with next INR in 1 week       Summary  As of 6/1/2023    Full warfarin instructions:  1.25 mg every Thu; 2.5 mg all other days   Next INR check:  6/8/2023             Telephone call with [facility] home care nurse who verbalizes understanding and agrees to plan and who agrees to plan and repeated back plan correctly    Orders given to  Homecare nurse/facility to recheck    Education provided:     Goal range and lab monitoring: Importance of therapeutic range and Importance of following up at instructed interval    Plan made per [facility] anticoagulation protocol    Clair Gonzalez RN  Anticoagulation Clinic  6/1/2023    _______________________________________________________________________     Anticoagulation Episode Summary     Current INR goal:  2.0-3.0   TTR:  76.6 % (1 y)   Target end date:  Indefinite   Send INR reminders to:  HALI HELEN    Indications    Atrial fibrillation (H) [I48.91]  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

## 2023-06-06 ENCOUNTER — OFFICE VISIT (OUTPATIENT)
Dept: FAMILY MEDICINE | Facility: CLINIC | Age: 88
End: 2023-06-06
Payer: COMMERCIAL

## 2023-06-06 VITALS
OXYGEN SATURATION: 90 % | DIASTOLIC BLOOD PRESSURE: 70 MMHG | SYSTOLIC BLOOD PRESSURE: 110 MMHG | BODY MASS INDEX: 16.62 KG/M2 | HEART RATE: 62 BPM | WEIGHT: 103 LBS | RESPIRATION RATE: 17 BRPM | TEMPERATURE: 98.6 F

## 2023-06-06 DIAGNOSIS — J43.9 MILD EMPHYSEMA (H): ICD-10-CM

## 2023-06-06 DIAGNOSIS — R33.9 CHRONIC RETENTION OF URINE: ICD-10-CM

## 2023-06-06 DIAGNOSIS — N18.31 CHRONIC KIDNEY DISEASE, STAGE 3A (H): ICD-10-CM

## 2023-06-06 DIAGNOSIS — E63.9 POOR NUTRITION: ICD-10-CM

## 2023-06-06 DIAGNOSIS — I48.20 CHRONIC ATRIAL FIBRILLATION (H): ICD-10-CM

## 2023-06-06 DIAGNOSIS — D75.89 MACROCYTOSIS: ICD-10-CM

## 2023-06-06 DIAGNOSIS — Z86.73 HISTORY OF CVA (CEREBROVASCULAR ACCIDENT): ICD-10-CM

## 2023-06-06 DIAGNOSIS — I10 BENIGN ESSENTIAL HYPERTENSION: ICD-10-CM

## 2023-06-06 DIAGNOSIS — J44.9 CHRONIC OBSTRUCTIVE PULMONARY DISEASE, UNSPECIFIED COPD TYPE (H): ICD-10-CM

## 2023-06-06 DIAGNOSIS — Z00.00 ENCOUNTER FOR MEDICARE ANNUAL WELLNESS EXAM: Primary | ICD-10-CM

## 2023-06-06 LAB
ANION GAP SERPL CALCULATED.3IONS-SCNC: 10 MMOL/L (ref 7–15)
BUN SERPL-MCNC: 10.3 MG/DL (ref 8–23)
CALCIUM SERPL-MCNC: 8.9 MG/DL (ref 8.2–9.6)
CHLORIDE SERPL-SCNC: 99 MMOL/L (ref 98–107)
CHOLEST SERPL-MCNC: 169 MG/DL
CREAT SERPL-MCNC: 1.16 MG/DL (ref 0.67–1.17)
DEPRECATED HCO3 PLAS-SCNC: 29 MMOL/L (ref 22–29)
ERYTHROCYTE [DISTWIDTH] IN BLOOD BY AUTOMATED COUNT: 13.2 % (ref 10–15)
GFR SERPL CREATININE-BSD FRML MDRD: 56 ML/MIN/1.73M2
GLUCOSE SERPL-MCNC: 88 MG/DL (ref 70–99)
HCT VFR BLD AUTO: 43.7 % (ref 40–53)
HDLC SERPL-MCNC: 33 MG/DL
HGB BLD-MCNC: 13.8 G/DL (ref 13.3–17.7)
LDLC SERPL CALC-MCNC: 90 MG/DL
MCH RBC QN AUTO: 32.7 PG (ref 26.5–33)
MCHC RBC AUTO-ENTMCNC: 31.6 G/DL (ref 31.5–36.5)
MCV RBC AUTO: 104 FL (ref 78–100)
NONHDLC SERPL-MCNC: 136 MG/DL
PLATELET # BLD AUTO: 188 10E3/UL (ref 150–450)
POTASSIUM SERPL-SCNC: 4.5 MMOL/L (ref 3.4–5.3)
RBC # BLD AUTO: 4.22 10E6/UL (ref 4.4–5.9)
SODIUM SERPL-SCNC: 138 MMOL/L (ref 136–145)
TRIGL SERPL-MCNC: 232 MG/DL
WBC # BLD AUTO: 9.3 10E3/UL (ref 4–11)

## 2023-06-06 PROCEDURE — G0439 PPPS, SUBSEQ VISIT: HCPCS | Performed by: FAMILY MEDICINE

## 2023-06-06 PROCEDURE — 80061 LIPID PANEL: CPT | Performed by: FAMILY MEDICINE

## 2023-06-06 PROCEDURE — 36415 COLL VENOUS BLD VENIPUNCTURE: CPT | Performed by: FAMILY MEDICINE

## 2023-06-06 PROCEDURE — 99214 OFFICE O/P EST MOD 30 MIN: CPT | Mod: 25 | Performed by: FAMILY MEDICINE

## 2023-06-06 PROCEDURE — 80048 BASIC METABOLIC PNL TOTAL CA: CPT | Performed by: FAMILY MEDICINE

## 2023-06-06 PROCEDURE — 85027 COMPLETE CBC AUTOMATED: CPT | Performed by: FAMILY MEDICINE

## 2023-06-06 RX ORDER — MULTIPLE VITAMINS W/ MINERALS TAB 9MG-400MCG
1 TAB ORAL DAILY
Qty: 90 TABLET | Refills: 3 | Status: SHIPPED | OUTPATIENT
Start: 2023-06-06

## 2023-06-06 ASSESSMENT — ENCOUNTER SYMPTOMS
COUGH: 1
SORE THROAT: 0
DIARRHEA: 0
FREQUENCY: 0
EYE PAIN: 0
CHILLS: 0
NERVOUS/ANXIOUS: 0
PARESTHESIAS: 0
FEVER: 0
DYSURIA: 0
JOINT SWELLING: 0
MYALGIAS: 0
HEMATURIA: 0
HEMATOCHEZIA: 0
DIZZINESS: 0
PALPITATIONS: 0
CONSTIPATION: 0
ARTHRALGIAS: 0
HEADACHES: 0
WEAKNESS: 0
ABDOMINAL PAIN: 0
HEARTBURN: 0
NAUSEA: 0
SHORTNESS OF BREATH: 0

## 2023-06-06 ASSESSMENT — ACTIVITIES OF DAILY LIVING (ADL)
CURRENT_FUNCTION: BATHING REQUIRES ASSISTANCE
CURRENT_FUNCTION: HOUSEWORK REQUIRES ASSISTANCE
CURRENT_FUNCTION: SHOPPING REQUIRES ASSISTANCE
CURRENT_FUNCTION: PREPARING MEALS REQUIRES ASSISTANCE
CURRENT_FUNCTION: MEDICATION ADMINISTRATION REQUIRES ASSISTANCE
CURRENT_FUNCTION: LAUNDRY REQUIRES ASSISTANCE
CURRENT_FUNCTION: TRANSPORTATION REQUIRES ASSISTANCE
CURRENT_FUNCTION: TELEPHONE REQUIRES ASSISTANCE
CURRENT_FUNCTION: MONEY MANAGEMENT REQUIRES ASSISTANCE

## 2023-06-06 ASSESSMENT — PAIN SCALES - GENERAL: PAINLEVEL: NO PAIN (0)

## 2023-06-06 NOTE — PATIENT INSTRUCTIONS
Patient Education   Personalized Prevention Plan  You are due for the preventive services outlined below.  Your care team is available to assist you in scheduling these services.  If you have already completed any of these items, please share that information with your care team to update in your medical record.  Health Maintenance Due   Topic Date Due     Kidney Microalbumin Urine Test  Never done     Breathing Capacity Test  Never done     COPD Action Plan  Never done     Zoster (Shingles) Vaccine (1 of 2) Never done     Cholesterol Lab  09/15/2017     Diptheria Tetanus Pertussis (DTAP/TDAP/TD) Vaccine (1 - Tdap) 09/10/2019     COVID-19 Vaccine (4 - Moderna series) 02/21/2023       Exercise for a Healthier Heart  You may wonder how you can improve the health of your heart. If you re thinking about exercise, you re on the right track. You don t need to become an athlete. But you do need a certain amount of brisk exercise to help strengthen your heart. If you have been diagnosed with a heart condition, your healthcare provider may advise exercise to help your condition. To help make exercise a habit, choose safe, fun activities.      Exercise with a friend. When activity is fun, you're more likely to stick with it.     Before you start  Check with your healthcare provider before starting an exercise program. This is especially important if you haven't been active for a while. It's also important if you have a long-term (chronic) health problem such as heart disease, diabetes, or obesity. Also check with your provider if you're at high risk for having these problems.   Why exercise?  Exercising regularly offers many healthy rewards. It can help you do all of these:     Improve your blood cholesterol level to help prevent further heart trouble.    Lower your blood pressure to help prevent a stroke or heart attack.    Control diabetes or reduce your risk of getting this disease.    Improve your heart and lung  function.    Reach and stay at a healthy weight.    Make your muscles stronger so you can stay active.    Prevent falls and fractures by slowing the loss of bone mass (osteoporosis).    Manage stress better.    Improve your sense of self and your body image.  Exercise tips      Ease into your routine. Set small goals. Then build on them. Talk with your healthcare provider first before starting an exercise routine if you're not sure what your activity level should be.    Exercise on most days. Aim for a total of at least 150 minutes (2 hours and 30 minutes) or more of moderate-intensity aerobic activity each week. You could also do 75 minutes (1 hour and 15 minutes) or more of vigorous-intensity aerobic activity each week. Or try for a combination of both. Moderate activity means that you breathe heavier and your heart rate increases, but you can still talk. Think about doing at least 30 minutes of moderate exercise, 5 times a week. It's OK to work up to the 30-minute period over time. Examples of moderate-intensity activity are brisk walking, gardening, and water aerobics.    Step up your daily activity level.  Along with your exercise program, try being more active the whole day. Walk instead of drive. Or park further away so that you take more steps each day. Do more household tasks or yard work. You may not be able to meet the advised amount of physical activity. But doing some moderate- or vigorous-intensity aerobic activity can help reduce your risk for heart disease. Your healthcare provider can help you figure out what is best for you.    Choose 1 or more activities you enjoy.  Walking is one of the easiest things you can do. You can also try swimming, riding a bike, dancing, or taking an exercise class.    Call 911  Call 911 right away if any of these occur:     Chest pain that doesn't go away quickly with rest    New burning, tightness, pressure, or heaviness in your chest, neck, shoulders, back, or  arms    Abnormal or severe shortness of breath    A very fast or irregular heartbeat (palpitations)    Fainting  When to call your healthcare provider  Call your healthcare provider if you have any of these:     Dizziness or lightheadedness    Mild shortness of breath or chest pain    Increased or new joint or muscle pain    Nae last reviewed this educational content on 7/1/2022 2000-2022 [employer]. All rights reserved. This information is not intended as a substitute for professional medical care. Always follow your healthcare professional's instructions.        Activities of Daily Living    Your Health Risk Assessment indicates you have difficulties with activities of daily living such as housework, bathing, preparing meals, taking medication, etc. Please make a follow up appointment for us to address this issue in more detail.    Signs of Hearing Loss  Hearing loss is a problem shared by many people. In fact, it's one of the most common health problems, particularly as people age. Most people aged 65 and older have some hearing loss. By age 80, almost everyone does. Hearing loss often occurs slowly over the years. So, you may not realize your hearing has gotten worse.   When sudden hearing loss occurs, it's important to contact your healthcare provider right away. Your provider will do a medical exam and a hearing exam as soon as possible. This is to help find the cause and type of your sudden hearing loss. Based on your diagnosis, your healthcare provider will discuss possible treatments.      Hearing much better with one ear can be a sign of hearing loss.     Have your hearing checked  Call your healthcare provider if you:     Have to strain to hear normal conversation    Have to watch other people s faces very carefully to follow what they re saying    Need to ask people to repeat what they ve said    Often misunderstand what people are saying    Turn the volume of the television or  radio up so high that others complain    Feel that people are mumbling when they re talking to you    Find that the effort to hear leaves you feeling tired and irritated    Notice, when using the phone, that you hear better with one ear than the other  [employer] last reviewed this educational content on 6/1/2022 2000-2022 [employer]. All rights reserved. This information is not intended as a substitute for professional medical care. Always follow your healthcare professional's instructions.

## 2023-06-06 NOTE — LETTER
June 7, 2023      Benny Carrillo  [address] [address]        Dear ,    We are writing to inform you of your test results.    Your cholesterol results remain mildly elevated.   Ensure ongoing healthy dietary modifications in order to further improve.  We will plan to recheck your labs while fasting in the next 12 months to ensure desired improvement.      No evidence for diabetes noted.      Your kidney function is mildly reduced.  No current concern.  Ensure adequate hydration.    Your complete blood count results were fine.  No significant abnormalities.  No evidence for anemia, etc.    Resulted Orders   Lipid panel reflex to direct LDL Non-fasting   Result Value Ref Range    Cholesterol 169 <200 mg/dL    Triglycerides 232 (H) <150 mg/dL    Direct Measure HDL 33 (L) >=40 mg/dL    LDL Cholesterol Calculated 90 <=100 mg/dL    Non HDL Cholesterol 136 (H) <130 mg/dL    Narrative    Cholesterol  Desirable:  <200 mg/dL    Triglycerides  Normal:  Less than 150 mg/dL  Borderline High:  150-199 mg/dL  High:  200-499 mg/dL  Very High:  Greater than or equal to 500 mg/dL    Direct Measure HDL  Female:  Greater than or equal to 50 mg/dL   Male:  Greater than or equal to 40 mg/dL    LDL Cholesterol  Desirable:  <100mg/dL  Above Desirable:  100-129 mg/dL   Borderline High:  130-159 mg/dL   High:  160-189 mg/dL   Very High:  >= 190 mg/dL    Non HDL Cholesterol  Desirable:  130 mg/dL  Above Desirable:  130-159 mg/dL  Borderline High:  160-189 mg/dL  High:  190-219 mg/dL  Very High:  Greater than or equal to 220 mg/dL   Basic metabolic panel   Result Value Ref Range    Sodium 138 136 - 145 mmol/L    Potassium 4.5 3.4 - 5.3 mmol/L    Chloride 99 98 - 107 mmol/L    Carbon Dioxide (CO2) 29 22 - 29 mmol/L    Anion Gap 10 7 - 15 mmol/L    Urea Nitrogen 10.3 8.0 - 23.0 mg/dL    Creatinine 1.16 0.67 - 1.17 mg/dL    Calcium 8.9 8.2 - 9.6 mg/dL    Glucose 88 70 - 99 mg/dL    GFR Estimate 56 (L) >60  mL/min/1.73m2      Comment:      eGFR calculated using 2021 CKD-EPI equation.   CBC with platelets   Result Value Ref Range    WBC Count 9.3 4.0 - 11.0 10e3/uL    RBC Count 4.22 (L) 4.40 - 5.90 10e6/uL    Hemoglobin 13.8 13.3 - 17.7 g/dL    Hematocrit 43.7 40.0 - 53.0 %     (H) 78 - 100 fL    MCH 32.7 26.5 - 33.0 pg    MCHC 31.6 31.5 - 36.5 g/dL    RDW 13.2 10.0 - 15.0 %    Platelet Count 188 150 - 450 10e3/uL       If you have any questions or concerns, please call the clinic at the number listed above.       Sincerely,      Jb Martínez MD

## 2023-06-06 NOTE — PROGRESS NOTES
SUBJECTIVE:     Benny is a 89 year old who presents for Preventive Visit.      2021     1:01 PM   Additional Questions   Roomed by Sonia   Accompanied by Son and Rocío     Are you in the first 12 months of your Medicare coverage?  No      Annual wellness visit completed.  Risk questionnaire reviewed in detail.  History of chronic atrial fibrillation.  Digoxin 0.125 mg daily for rate control.  Warfarin anticoagulation 2.5 mg daily except 3.75 mg on Thursday.  Recent INR 2.3.  Home care checks this.  He works with [employer] currently.  Previously with [employer] apparently.  Suprapubic catheter for urinary retention.  Has catheter changes monthly through home care.  Utilizes perhaps 6 absorbent undergarments daily.  Has cephalexin available 500 mg twice daily x7 days if recurrent UTI concerns apparently.  Amlodipine 5 mg daily for hypertension.  Pantoprazole 40 mg daily and famotidine 20 mg daily for reflux or atypical chest pain with resolution.  Macrocytosis with MCV around 10 1-103 with recent B12 and folic acid levels normal 2022.  Has had occasional CKD 3A findings.  Poor memory based on age but long-term memory seems to be intact.  Had stroke around 2018 or 2019 with residual left eye vision loss otherwise no left-sided body weakness or paresthesias described.  Comprehensive review of systems as above otherwise all negative.       - [employer] (passed away in 2017)   Originally from [facility] - kicked out of [facility]...   6 children (4 sons, 2 daughters) - his son is Steven   25 grandchildren - his granddaughters are Shima, Brittanie and Mey (Steven's daughters)  Quit  - prior heavy smoker   EtOH:  none   Mom -    Dad -    2 bros -    1 sis -    Surgeries:  suprapubic catheter x  (urinary retension -> switched from [place] to suprapubic catheter while in [place]   Hospitalizations:  h/o CVA      Healthy Habits:     In general,  "how would you rate your overall health?  Good    Frequency of exercise:  None    Do you usually eat at least 4 servings of fruit and vegetables a day, include whole grains    & fiber and avoid regularly eating high fat or \"junk\" foods?  Yes    Taking medications regularly:  Yes    Medication side effects:  None    Ability to successfully perform activities of daily living:  Telephone requires assistance, transportation requires assistance, shopping requires assistance, preparing meals requires assistance, housework requires assistance, bathing requires assistance, laundry requires assistance, medication administration requires assistance and money management requires assistance    Home Safety:  No safety concerns identified    Hearing Impairment:  Difficulty following a conversation in a noisy restaurant or crowded room, feel that people are mumbling or not speaking clearly, difficulty following dialogue in the theater and need to ask people to speak up or repeat themselves    In the past 6 months, have you been bothered by leaking of urine?  No    In general, how would you rate your overall mental or emotional health?  Excellent      PHQ-2 Total Score: 0    Additional concerns today:  No        Have you ever done Advance Care Planning? (For example, a Health Directive, POLST, or a discussion with a medical provider or your loved ones about your wishes): No, advance care planning information given to patient to review.  Advanced care planning was discussed at today's visit.       Fall risk  Fallen 2 or more times in the past year?: No  Any fall with injury in the past year?: No    Cognitive Screening   1) Repeat 3 items (Leader, Season, Table)    2) Clock draw: ABNORMAL    3) 3 item recall: Recalls NO objects   Results: 0 items recalled: PROBABLE COGNITIVE IMPAIRMENT, **INFORM PROVIDER**    Mini-CogTM Copyright SHARMIN Collazo. Licensed by the author for use in [facility]; reprinted with permission " (jayme@[facility]). All rights reserved.      Do you have sleep apnea, excessive snoring or daytime drowsiness?: no    Reviewed and updated as needed this visit by clinical staff   Tobacco  Allergies  Meds  Problems             Reviewed and updated as needed this visit by Provider    Allergies  Meds  Problems            Social History     Tobacco Use     Smoking status: Never     Smokeless tobacco: Never   Vaping Use     Vaping status: Not on file   Substance Use Topics     Alcohol use: No             6/6/2023     7:49 AM   Alcohol Use   Prescreen: >3 drinks/day or >7 drinks/week? Yes         6/6/2023     7:49 AM   AUDIT - Alcohol Use Disorders Identification Test - Reproduced from the World Health Organization Audit 2001 (Second Edition)   1.  How often do you have a drink containing alcohol? Never   9.  Have you or someone else been injured because of your drinking? No   10. Has a relative, friend, doctor or other health care worker been concerned about your drinking or suggested you cut down? No     Do you have a current opioid prescription? No  Do you use any other controlled substances or medications that are not prescribed by a provider? None              Current providers sharing in care for this patient include:   Patient Care Team:  Jb Martínez MD as PCP - General (Family Medicine)  Izabella Correa LSW as Lead Care Coordinator (Primary Care - CC)  Sergei Langford, PharmD as Pharmacist (Pharmacist)  Jb Martínez MD as Assigned PCP    The following health maintenance items are reviewed in Epic and correct as of today:  Health Maintenance   Topic Date Due     MICROALBUMIN  Never done     SPIROMETRY  Never done     COPD ACTION PLAN  Never done     ZOSTER IMMUNIZATION (1 of 2) Never done     LIPID  09/15/2017     DTAP/TDAP/TD IMMUNIZATION (1 - Tdap) 09/10/2019     COVID-19 Vaccine (4 - Moderna series) 02/21/2023     BMP  05/06/2024     HEMOGLOBIN  05/06/2024     ANNUAL REVIEW OF HM ORDERS   05/18/2024     MEDICARE ANNUAL WELLNESS VISIT  06/06/2024     FALL RISK ASSESSMENT  06/06/2024     ADVANCE CARE PLANNING  06/06/2028     PHQ-2 (once per calendar year)  Completed     INFLUENZA VACCINE  Completed     Pneumococcal Vaccine: 65+ Years  Completed     URINALYSIS  Completed     IPV IMMUNIZATION  Aged Out     MENINGITIS IMMUNIZATION  Aged Out     Lab work is in process  Labs reviewed in EPIC  BP Readings from Last 3 Encounters:   06/06/23 110/70   05/06/23 125/59   01/24/23 100/70    Wt Readings from Last 3 Encounters:   06/06/23 46.7 kg (103 lb)   05/06/23 47.2 kg (104 lb)   01/24/23 48.5 kg (107 lb)                  Patient Active Problem List   Diagnosis     Chronic atrial fibrillation     Muscle Weakness Generalized     Underweight due to inadequate caloric intake     Chest pain     Gastroesophageal reflux disease without esophagitis     Suprapubic catheter (H)     Leukocytosis, unspecified type     Heartburn     Acute cerebral infarction (H)     Accelerated hypertension     Chronic retention of urine     Acute nonintractable headache, unspecified headache type     History of ischemic cerebrovascular accident (CVA) with residual deficit     Acute pyelonephritis     Urinary tract infection associated with cystostomy catheter, initial encounter (H)     Moderate malnutrition (H)     Sepsis secondary to UTI (H)     Renal infarct (H)     Small bowel obstruction (H)     SBO (small bowel obstruction) (H)     Aspiration pneumonia of both lower lobes due to vomit (H)     History of CVA (cerebrovascular accident)     Permanent atrial fibrillation (H)     Incontinence of feces, unspecified fecal incontinence type     Atrial fibrillation (H)     Benign essential hypertension     Chronic obstructive pulmonary disease, unspecified COPD type (H)     Urinary retention     Chronic kidney disease, stage 3a (H)     Past Surgical History:   Procedure Laterality Date     CHOLECYSTECTOMY       CYSTOTOMY       EYE SURGERY  Bilateral     cataract removal     [facility]  5/28/2019            Social History     Tobacco Use     Smoking status: Never     Smokeless tobacco: Never   Vaping Use     Vaping status: Not on file   Substance Use Topics     Alcohol use: No     Family History   Problem Relation Age of Onset     No Known Problems Mother      No Known Problems Father      Diabetes Other         spouse         Current Outpatient Medications   Medication Sig Dispense Refill     albuterol (PROAIR HFA/PROVENTIL HFA/VENTOLIN HFA) 108 (90 Base) MCG/ACT inhaler [ALBUTEROL (PROAIR HFA;PROVENTIL HFA;VENTOLIN HFA) 90 MCG/ACTUATION INHALER] INHALE 2 PUFFS BY MOUTH EVERY 6 HOURS AS NEEDED FOR WHEEZING 18 g 5     amLODIPine (NORVASC) 5 MG tablet Take 1 tablet (5 mg) by mouth daily 90 tablet 3     ARTIFICIAL TEARS, POLYVIN ALC, 1.4 % ophthalmic solution [ARTIFICIAL TEARS, POLYVIN ALC, 1.4 % OPHTHALMIC SOLUTION] INSTILL 2 DROPS INTO EACH EYE TWICE DAILY AS NEEDED DRY EYES 15 mL 6     clotrimazole (LOTRIMIN) 1 % external cream Apply topically twice daily to glans penis x 15 days 45 g 1     digoxin (LANOXIN) 125 MCG tablet Take 1 tablet (125 mcg) by mouth daily 90 tablet 3     EQ ACETAMINOPHEN 500 MG tablet TAKE 1 TO 2 TABLETS BY MOUTH EVERY 6 HOURS AS NEEDED FOR MILD PAIN 360 tablet 2     EQ ALLERGY RELIEF 10 MG tablet Take 1 tablet by mouth once daily 90 tablet 3     famotidine (PEPCID) 20 MG tablet Take 1 tablet by mouth once daily 90 tablet 3     guaiFENesin (ROBITUSSIN) 100 MG/5ML SYRP Take 10 mLs by mouth 3 times daily as needed for cough 240 mL 0     hydrocortisone 2.5 % cream [HYDROCORTISONE 2.5 % CREAM] APPLY  CREAM TO AFFECTED AREA ONCE TO TWICE DAILY AS NEEDED 28 g 2     ketoconazole (NIZORAL) 2 % external shampoo Apply topically daily as needed for itching or irritation Apply topically to moist skin, lather, leave on 5 minutes, then rinse.  Repeat daily x 3 days. 120 mL 1     MEDICATION CANNOT BE REORDERED - PLEASE MANUALLY REORDER AND  DISCONTINUE THE OLD ORDER [CATHETER 14 FR MISC] Suprapubic catheter. 14 French. 1 each 0     melatonin 5 MG tablet Take 1 tablet (5 mg) by mouth nightly as needed for sleep 30 tablet 3     multivitamin w/minerals (MULTIVITAMIN, THERAPEUTIC WITH MINERALS) tablet Take 1 tablet by mouth daily 90 tablet 3     pantoprazole (PROTONIX) 40 MG EC tablet Take 1 tablet (40 mg) by mouth daily 90 tablet 3     polyvinyl alcohol-povidone (ARTIFICIAL TEARS,PVALCH-POVID,) 0.5-0.6 % Drop [POLYVINYL ALCOHOL-POVIDONE (ARTIFICIAL TEARS,PVALCH-POVID,) 0.5-0.6 % DROP] 1-2 gtts two times a day to each eye prn 15 mL 3     SM TUSSIN MUCUS+CHEST CONGEST 100 MG/5ML liquid TAKE 10 ML BY MOUTH THREE TIMES DAILY AS NEEDED FOR COUGH 240 mL 0     warfarin ANTICOAGULANT (COUMADIN) 2.5 MG tablet Take1.25 mg (2.5 mg x 0.5) every Tue, Fri; 2.5 mg (2.5 mg x 1) all other days or as directed. 90 tablet 3     Allergies   Allergen Reactions     Morphine Itching and Other (See Comments)     Pt received morphine IV on 8/26/18. Pt reported localized itching and discomfort with redness near IV site following administration of morphine.       Declines Tdap and bivalent Moderna booster      Review of Systems   Constitutional: Negative for chills and fever.   HENT: Negative for congestion, ear pain, hearing loss and sore throat.    Eyes: Negative for pain and visual disturbance.   Respiratory: Positive for cough. Negative for shortness of breath.    Cardiovascular: Negative for chest pain, palpitations and peripheral edema.   Gastrointestinal: Negative for abdominal pain, constipation, diarrhea, heartburn, hematochezia and nausea.   Genitourinary: Negative for dysuria, frequency, genital sores, hematuria, impotence, penile discharge and urgency.   Musculoskeletal: Negative for arthralgias, joint swelling and myalgias.   Skin: Negative for rash.   Neurological: Negative for dizziness, weakness, headaches and paresthesias.   Psychiatric/Behavioral: Negative for mood  "changes. The patient is not nervous/anxious.      Constitutional, HEENT, cardiovascular, pulmonary, GI, , musculoskeletal, neuro, skin, endocrine and psych systems are negative, except as otherwise noted.    OBJECTIVE:   /70   Pulse 62   Temp 98.6  F (37  C)   Resp 17   Wt 46.7 kg (103 lb)   SpO2 90%   BMI 16.62 kg/m   Estimated body mass index is 16.62 kg/m  as calculated from the following:    Height as of 5/6/23: 1.676 m (5' 6\").    Weight as of this encounter: 46.7 kg (103 lb).       Physical Exam  GENERAL: healthy, alert and no distress  EYES: Eyes grossly normal to inspection, PERRL and conjunctivae and sclerae normal  HENT: ear canals and TM's normal, nose and mouth without ulcers or lesions  NECK: no adenopathy, no asymmetry, masses, or scars and thyroid normal to palpation  RESP: lungs with diminished breath sounds without current expiratory wheeze - scattered bibasilar inspiratory crackles  CV: irregular rhythm, otherwise rate controlled.  Normal S1 S2, no S3 or S4, no murmur, click or rub, no peripheral edema and peripheral pulses strong  ABDOMEN: soft, nontender, no hepatosplenomegaly, no masses and bowel sounds normal.  Suprapubic catheter in place.   (male): deferred  RECTAL: deferred  MS: no gross musculoskeletal defects noted, no edema  SKIN: no suspicious lesions or rashes  NEURO: Normal strength and tone, mentation intact and speech normal  PSYCH: mentation appears normal, affect normal/bright    Diagnostic Test Results:  Labs reviewed in Epic  No results found for this or any previous visit (from the past 24 hour(s)).    ASSESSMENT / PLAN:     Encounter for Medicare annual wellness exam  Annual wellness visit completed.  Risk questionnaire reviewed.  Memory concerns for short-term memory at times however family feels that this is \"normal for age\" and declines further evaluation at this time.  Also lack of consistent exercise based on age.  Assistance needed with activities of daily " "living.  Hearing loss.  Annual wellness visits to continue.    Mild emphysema (H)  Mild emphysema on prior chest CT as noted.  Quit smoking in 2005 after being a \"heavy smoker\" most of his life.  O2 sats 90% room air.  Will monitor currently to ensure stable.  Has albuterol MDI available if evidence for COPD exacerbation.    Chronic obstructive pulmonary disease, unspecified COPD type (H)  As above.    Chronic atrial fibrillation  Chronic atrial fibrillation rate controlled.  Irregular.  Digoxin 0.125 mg daily and warfarin 2.5 mg daily except 3.75 mg on Thursday.  - Basic metabolic panel    Chronic retention of urine  Suprapubic catheter in place.    Benign essential hypertension  Hypertension appears stable with amlodipine 5 mg daily.  - Lipid panel reflex to direct LDL Non-fasting    History of CVA (cerebrovascular accident)  History of CVA reviewed.  Residual left-sided weakness    Chronic kidney disease, stage 3a (H)  CKD stage III.  Ensure stable renal function.  Ensure adequate hydration.  - Basic metabolic panel    Macrocytosis  History of mild macrocytosis.  Prior B12 and folic acid levels normal.  - CBC with platelets    Poor nutrition  Multivitamin with refill provided per patient request  - multivitamin w/minerals (MULTIVITAMIN, THERAPEUTIC WITH MINERALS) tablet  Dispense: 90 tablet; Refill: 3       Patient has been advised of split billing requirements and indicates understanding: Yes      COUNSELING:  Reviewed preventive health counseling, as reflected in patient instructions       Regular exercise       Healthy diet/nutrition       Vision screening       Hearing screening       Dental care       Bladder control       Fall risk prevention        He reports that he has never smoked. He has never used smokeless tobacco.      Appropriate preventive services were discussed with this patient, including applicable screening as appropriate for cardiovascular disease, diabetes, osteopenia/osteoporosis, and " glaucoma.  As appropriate for age/gender, discussed screening for colorectal cancer, prostate cancer, breast cancer, and cervical cancer. Checklist reviewing preventive services available has been given to the patient.    Reviewed patients plan of care and provided an AVS. The Intermediate Care Plan ( asthma action plan, low back pain action plan, and migraine action plan) for Benny meets the Care Plan requirement. This Care Plan has been established and reviewed with the Patient and son and other:granddaughter (Brittanie).          Jb Martínez MD  [facility]    Identified Health Risks:    I have reviewed Opioid Use Disorder and Substance Use Disorder risk factors and made any needed referrals.       He is at risk for lack of exercise and has been provided with information to increase physical activity for the benefit of his well-being.  The patient reports that he has difficulty with activities of daily living. I have asked that the patient make a follow up appointment in 12 weeks where this issue will be further evaluated and addressed.  The patient was provided with written information regarding signs of hearing loss.

## 2023-06-08 ENCOUNTER — ANTICOAGULATION THERAPY VISIT (OUTPATIENT)
Dept: ANTICOAGULATION | Facility: CLINIC | Age: 88
End: 2023-06-08
Payer: COMMERCIAL

## 2023-06-08 DIAGNOSIS — I48.20 CHRONIC ATRIAL FIBRILLATION (H): ICD-10-CM

## 2023-06-08 DIAGNOSIS — I48.11 LONGSTANDING PERSISTENT ATRIAL FIBRILLATION (H): Primary | ICD-10-CM

## 2023-06-08 DIAGNOSIS — N28.0 RENAL INFARCT (H): ICD-10-CM

## 2023-06-08 DIAGNOSIS — Z86.73 HISTORY OF CVA (CEREBROVASCULAR ACCIDENT): ICD-10-CM

## 2023-06-08 LAB — INR (EXTERNAL): 2.6 (ref 0.9–1.1)

## 2023-06-08 NOTE — PROGRESS NOTES
ANTICOAGULATION MANAGEMENT     Benny Carrillo 89 year old male is on warfarin with therapeutic INR result. (Goal INR 2.0-3.0)    Recent labs: (last 7 days)     06/08/23  1100   INR 2.6*       ASSESSMENT       Source(s): Chart Review and Home Care/Facility Nurse       Warfarin doses taken: Warfarin taken as instructed    Diet: No new diet changes identified    Medication/supplement changes: Reported that patient was on cephalexin for UTI and last dose was taken on 5/30/23    New illness, injury, or hospitalization: Yes: UTI    Signs or symptoms of bleeding or clotting: No    Previous result: Therapeutic last visit; previously outside of goal range    Additional findings: None         PLAN     Recommended plan for temporary change(s) affecting INR     Dosing Instructions: Continue your current warfarin dose with next INR in 1 week       Summary  As of 6/8/2023    Full warfarin instructions:  1.25 mg every Thu; 2.5 mg all other days   Next INR check:  6/15/2023             Telephone call with [facility] home care nurse who verbalizes understanding and agrees to plan and who agrees to plan and repeated back plan correctly    Orders given to  Homecare nurse/facility to recheck    Education provided:     Goal range and lab monitoring: Importance of therapeutic range and Importance of following up at instructed interval    Plan made per [facility] anticoagulation protocol    Clair Gonzalez RN  Anticoagulation Clinic  6/8/2023    _______________________________________________________________________     Anticoagulation Episode Summary     Current INR goal:  2.0-3.0   TTR:  76.6 % (1 y)   Target end date:  Indefinite   Send INR reminders to:  [facility]    Indications    Atrial fibrillation (H) [I48.91]  Renal infarct (H) [N28.0]  Chronic atrial fibrillation [I48.20]  History of CVA (cerebrovascular accident) [Z86.73]           Comments:           Anticoagulation Care Providers     Provider Role Specialty Phone number    Anna  Alcides GARCIA MD Referring Family Medicine [phone]    Jb Martínez MD Referring Family Medicine [phone]

## 2023-06-15 ENCOUNTER — ANTICOAGULATION THERAPY VISIT (OUTPATIENT)
Dept: ANTICOAGULATION | Facility: CLINIC | Age: 88
End: 2023-06-15
Payer: COMMERCIAL

## 2023-06-15 DIAGNOSIS — N28.0 RENAL INFARCT (H): ICD-10-CM

## 2023-06-15 DIAGNOSIS — Z86.73 HISTORY OF CVA (CEREBROVASCULAR ACCIDENT): ICD-10-CM

## 2023-06-15 DIAGNOSIS — I48.11 LONGSTANDING PERSISTENT ATRIAL FIBRILLATION (H): Primary | ICD-10-CM

## 2023-06-15 DIAGNOSIS — I48.20 CHRONIC ATRIAL FIBRILLATION (H): ICD-10-CM

## 2023-06-15 LAB — INR (EXTERNAL): 2.7 (ref 0.9–1.1)

## 2023-06-15 NOTE — PROGRESS NOTES
ANTICOAGULATION MANAGEMENT     Benny Carrillo 89 year old male is on warfarin with therapeutic INR result. (Goal INR 2.0-3.0)    Recent labs: (last 7 days)     06/15/23  0814   INR 2.7*       ASSESSMENT       Source(s): Chart Review and Home Care/Facility Nurse       Warfarin doses taken: Warfarin taken as instructed    Diet: No new diet changes identified    Medication/supplement changes: None noted    New illness, injury, or hospitalization: No    Signs or symptoms of bleeding or clotting: No    Previous result: Therapeutic last 2(+) visits    Additional findings: None         PLAN     Recommended plan for no diet, medication or health factor changes affecting INR     Dosing Instructions: Continue your current warfarin dose with next INR in 2 weeks       Summary  As of 6/15/2023    Full warfarin instructions:  1.25 mg every Thu; 2.5 mg all other days   Next INR check:  6/29/2023             Telephone call with [facility] home care nurse who verbalizes understanding and agrees to plan    Orders given to  Homecare nurse/facility to recheck    Education provided:     Goal range and lab monitoring: goal range and significance of current result    Plan made per [facility] anticoagulation protocol    Lance Hawthorne RN  Anticoagulation Clinic  6/15/2023    _______________________________________________________________________     Anticoagulation Episode Summary     Current INR goal:  2.0-3.0   TTR:  76.6 % (1 y)   Target end date:  Indefinite   Send INR reminders to:  [facility]    Indications    Atrial fibrillation (H) [I48.91]  Renal infarct (H) [N28.0]  Chronic atrial fibrillation [I48.20]  History of CVA (cerebrovascular accident) [Z86.73]           Comments:           Anticoagulation Care Providers     Provider Role Specialty Phone number    Alcides Castillo MD Referring Family Medicine [phone]    Jb Martínez MD Referring Family Medicine [phone]

## 2023-06-29 ENCOUNTER — TELEPHONE (OUTPATIENT)
Dept: FAMILY MEDICINE | Facility: CLINIC | Age: 88
End: 2023-06-29
Payer: COMMERCIAL

## 2023-06-29 ENCOUNTER — ANTICOAGULATION THERAPY VISIT (OUTPATIENT)
Dept: ANTICOAGULATION | Facility: CLINIC | Age: 88
End: 2023-06-29
Payer: COMMERCIAL

## 2023-06-29 DIAGNOSIS — N28.0 RENAL INFARCT (H): ICD-10-CM

## 2023-06-29 DIAGNOSIS — I48.20 CHRONIC ATRIAL FIBRILLATION (H): ICD-10-CM

## 2023-06-29 DIAGNOSIS — I48.11 LONGSTANDING PERSISTENT ATRIAL FIBRILLATION (H): Primary | ICD-10-CM

## 2023-06-29 DIAGNOSIS — Z86.73 HISTORY OF CVA (CEREBROVASCULAR ACCIDENT): ICD-10-CM

## 2023-06-29 LAB — INR (EXTERNAL): 2.2 (ref 0.9–1.1)

## 2023-06-29 NOTE — TELEPHONE ENCOUNTER
Home Care is calling regarding an established patient with [facility].        6/29/2023   Home Care Information   Current following provider Dr Martínez    Name/Phone Number ERAN Dockery Case Manager     Requesting orders from: Jb Martínez  Provider is following patient: Yes  Is this a 60-day recertification request?  Yes    Orders Requested    Skilled Nursing  Request for recertification   Frequency:  Once every 4 weeks for the next cert period (60 days) and 5 PRN for INR monitoring    Information was gathered and will be sent to provider for review.  RN will contact Home Care with information after provider review.  Confirmed ok to leave a detailed message with call back.  Contact information confirmed and updated as needed.    MELONY Dee, RN  [facility]

## 2023-06-29 NOTE — PROGRESS NOTES
ANTICOAGULATION MANAGEMENT     Benny Carrillo 89 year old male is on warfarin with therapeutic INR result. (Goal INR 2.0-3.0)    Recent labs: (last 7 days)     06/29/23  0900   INR 2.2*       ASSESSMENT       Source(s): Chart Review and Home Care/Facility Nurse       Warfarin doses taken: Warfarin taken as instructed    Diet: No new diet changes identified    Medication/supplement changes: None noted    New illness, injury, or hospitalization: No    Signs or symptoms of bleeding or clotting: No    Previous result: Therapeutic last 2(+) visits    Additional findings: None         PLAN     Recommended plan for no diet, medication or health factor changes affecting INR     Dosing Instructions: Continue your current warfarin dose with next INR in 3 weeks       Summary  As of 6/29/2023    Full warfarin instructions:  1.25 mg every Thu; 2.5 mg all other days   Next INR check:  7/20/2023             Telephone call with [facility] home care nurse who verbalizes understanding and agrees to plan    Orders given to  Homecare nurse/facility to recheck    Education provided:     Importance of notifying anticoagulation clinic for: changes in medications; a sooner lab recheck maybe needed    Contact [phone] with any changes, questions or concerns.     Plan made per [facility] anticoagulation protocol    Clair Gonzalez RN  Anticoagulation Clinic  6/29/2023    _______________________________________________________________________     Anticoagulation Episode Summary     Current INR goal:  2.0-3.0   TTR:  78.3 % (1 y)   Target end date:  Indefinite   Send INR reminders to:  [facility]    Indications    Atrial fibrillation (H) [I48.91]  Renal infarct (H) [N28.0]  Chronic atrial fibrillation [I48.20]  History of CVA (cerebrovascular accident) [Z86.73]           Comments:           Anticoagulation Care Providers     Provider Role Specialty Phone number    Alcides Castillo MD Referring Family Medicine [phone]    Jb Martínez MD  [facility] Family Medicine [phone]

## 2023-06-30 ENCOUNTER — MEDICAL CORRESPONDENCE (OUTPATIENT)
Dept: HEALTH INFORMATION MANAGEMENT | Facility: CLINIC | Age: 88
End: 2023-06-30

## 2023-07-20 ENCOUNTER — ANTICOAGULATION THERAPY VISIT (OUTPATIENT)
Dept: ANTICOAGULATION | Facility: CLINIC | Age: 88
End: 2023-07-20
Payer: COMMERCIAL

## 2023-07-20 DIAGNOSIS — I48.91 ATRIAL FIBRILLATION (H): Primary | ICD-10-CM

## 2023-07-20 DIAGNOSIS — Z86.73 HISTORY OF CVA (CEREBROVASCULAR ACCIDENT): ICD-10-CM

## 2023-07-20 DIAGNOSIS — I48.20 CHRONIC ATRIAL FIBRILLATION (H): ICD-10-CM

## 2023-07-20 DIAGNOSIS — N28.0 RENAL INFARCT (H): ICD-10-CM

## 2023-07-20 LAB — INR (EXTERNAL): 2.7 (ref 0.9–1.1)

## 2023-07-20 NOTE — PROGRESS NOTES
ANTICOAGULATION MANAGEMENT     Benny Carrillo 89 year old male is on warfarin with therapeutic INR result. (Goal INR 2.0-3.0)    Recent labs: (last 7 days)     07/20/23  0912   INR 2.7*       ASSESSMENT       Source(s): Chart Review and Home Care/Facility Nurse       Warfarin doses taken: Warfarin taken as instructed    Diet: No new diet changes identified    Medication/supplement changes: None noted    New illness, injury, or hospitalization: No    Signs or symptoms of bleeding or clotting: No    Previous result: Therapeutic last 2(+) visits    Additional findings: None         PLAN     Recommended plan for no diet, medication or health factor changes affecting INR     Dosing Instructions: Continue your current warfarin dose with next INR in 4 weeks       Summary  As of 7/20/2023    Full warfarin instructions:  1.25 mg every Thu; 2.5 mg all other days   Next INR check:  8/17/2023             Telephone call with Vickie Covington home care nurse who agrees to plan and repeated back plan correctly    Orders given to  Homecare nurse/facility to recheck    Education provided:     None required    Plan made per [facility] anticoagulation protocol    Candida Melendez RN  Anticoagulation Clinic  7/20/2023    _______________________________________________________________________     Anticoagulation Episode Summary     Current INR goal:  2.0-3.0   TTR:  81.1 % (1 y)   Target end date:  Indefinite   Send INR reminders to:  HALI HELEN    Indications    Atrial fibrillation (H) [I48.91]  Renal infarct (H) [N28.0]  Chronic atrial fibrillation [I48.20]  History of CVA (cerebrovascular accident) [Z86.73]           Comments:           Anticoagulation Care Providers     Provider Role Specialty Phone number    Alcides Castillo MD Referring Family Medicine [phone]    Jb Martínez MD Referring Family Medicine [phone]

## 2023-08-17 ENCOUNTER — ANTICOAGULATION THERAPY VISIT (OUTPATIENT)
Dept: ANTICOAGULATION | Facility: CLINIC | Age: 88
End: 2023-08-17
Payer: COMMERCIAL

## 2023-08-17 DIAGNOSIS — I48.20 CHRONIC ATRIAL FIBRILLATION (H): ICD-10-CM

## 2023-08-17 DIAGNOSIS — N28.0 RENAL INFARCT (H): ICD-10-CM

## 2023-08-17 DIAGNOSIS — Z86.73 HISTORY OF CVA (CEREBROVASCULAR ACCIDENT): ICD-10-CM

## 2023-08-17 DIAGNOSIS — I48.11 LONGSTANDING PERSISTENT ATRIAL FIBRILLATION (H): Primary | ICD-10-CM

## 2023-08-17 LAB — INR (EXTERNAL): 2.3 (ref 0.9–1.1)

## 2023-08-17 NOTE — PROGRESS NOTES
ANTICOAGULATION MANAGEMENT     Benny Carrillo 89 year old male is on warfarin with therapeutic INR result. (Goal INR 2.0-3.0)    Recent labs: (last 7 days)     08/17/23  0843   INR 2.3*       ASSESSMENT     Source(s): Chart Review and Home Care/Facility Nurse     Warfarin doses taken: Warfarin taken as instructed  Diet: No new diet changes identified  Medication/supplement changes: None noted  New illness, injury, or hospitalization: No  Signs or symptoms of bleeding or clotting: No  Previous result: Therapeutic last 2(+) visits  Additional findings: None       PLAN     Recommended plan for no diet, medication or health factor changes affecting INR     Dosing Instructions: Continue your current warfarin dose with next INR in 4 weeks       Summary  As of 8/17/2023      Full warfarin instructions:  1.25 mg every Thu; 2.5 mg all other days   Next INR check:  9/14/2023               Telephone call with Nadya home care nurse who verbalizes understanding and agrees to plan    Orders given to  Homecare nurse/facility to recheck    Education provided:   Please call back if any changes to your diet, medications or how you've been taking warfarin    Plan made per [facility] anticoagulation protocol    Lilli Red, RN  Anticoagulation Clinic  8/17/2023    _______________________________________________________________________     Anticoagulation Episode Summary       Current INR goal:  2.0-3.0   TTR:  81.1 % (1 y)   Target end date:  Indefinite   Send INR reminders to:  [facility]    Indications    Atrial fibrillation (H) [I48.91]  Renal infarct (H) [N28.0]  Chronic atrial fibrillation [I48.20]  History of CVA (cerebrovascular accident) [Z86.73]             Comments:               Anticoagulation Care Providers       Provider Role Specialty Phone number    Alcides Castillo MD Referring Family Medicine     Jb Martínez MD Referring Family Medicine [phone]

## 2023-08-24 ENCOUNTER — TELEPHONE (OUTPATIENT)
Dept: FAMILY MEDICINE | Facility: CLINIC | Age: 88
End: 2023-08-24
Payer: COMMERCIAL

## 2023-08-24 NOTE — TELEPHONE ENCOUNTER
Home Care is calling regarding an established patient with  [facility].        8/24/2023    10:11 AM 6/29/2023     2:59 PM   Home Care Information   Current following provider Dr. Mauricio Martínez    Name/Phone Number  ERAN Dockery Case Manager   Home Care agency [facility]      Requesting orders from: Jb Martínez  Provider is following patient: Yes  Is this a 60-day recertification request?  Yes    Orders Requested    Skilled Nursing  Request for recertification   Frequency:  1x/month for 2 months for catheter and INR management and 3 PRN visits.       Information was gathered and will be sent to provider for review.  RN will contact Home Care with information after provider review.  Confirmed ok to leave a detailed message with call back.  Contact information confirmed and updated as needed.    Kayce Kwan RN

## 2023-08-25 NOTE — TELEPHONE ENCOUNTER
"See message from Dr. Martínez on 8/24/23 regarding home care order request on 8/24/23:    \"Okay for requested orders.\"    Writer left detailed message on voicemail for ERAN Covington with [facility], giving the permission for ongoing re-certification for skilled nursing home care orders:  1x/month for 2 months for catheter and INR management and 3 PRN visits.    Please route to the RN queue for any questions or concerns if the nurse or the patient return the call.    Swapna Nelson RN, BSN  [facility]     "

## 2023-08-29 DIAGNOSIS — R51.9 ACUTE NONINTRACTABLE HEADACHE, UNSPECIFIED HEADACHE TYPE: Primary | ICD-10-CM

## 2023-08-29 RX ORDER — ACETAMINOPHEN 500 MG
500-1000 TABLET ORAL EVERY 6 HOURS PRN
Qty: 360 TABLET | Refills: 1 | Status: SHIPPED | OUTPATIENT
Start: 2023-08-29

## 2023-09-05 RX ORDER — GABAPENTIN 100 MG/1
100 CAPSULE ORAL 2 TIMES DAILY
COMMUNITY
Start: 2023-08-26 | End: 2023-09-07

## 2023-09-07 ENCOUNTER — OFFICE VISIT (OUTPATIENT)
Dept: FAMILY MEDICINE | Facility: CLINIC | Age: 88
End: 2023-09-07
Payer: COMMERCIAL

## 2023-09-07 VITALS
SYSTOLIC BLOOD PRESSURE: 122 MMHG | OXYGEN SATURATION: 93 % | RESPIRATION RATE: 18 BRPM | HEART RATE: 64 BPM | DIASTOLIC BLOOD PRESSURE: 66 MMHG | TEMPERATURE: 95.4 F

## 2023-09-07 DIAGNOSIS — K21.9 GASTROESOPHAGEAL REFLUX DISEASE WITHOUT ESOPHAGITIS: ICD-10-CM

## 2023-09-07 DIAGNOSIS — I69.30 HISTORY OF ISCHEMIC CEREBROVASCULAR ACCIDENT (CVA) WITH RESIDUAL DEFICIT: ICD-10-CM

## 2023-09-07 DIAGNOSIS — D75.89 MACROCYTOSIS: ICD-10-CM

## 2023-09-07 DIAGNOSIS — N18.31 CHRONIC KIDNEY DISEASE, STAGE 3A (H): ICD-10-CM

## 2023-09-07 DIAGNOSIS — G89.4 CHRONIC PAIN SYNDROME: ICD-10-CM

## 2023-09-07 DIAGNOSIS — J30.89 ENVIRONMENTAL AND SEASONAL ALLERGIES: ICD-10-CM

## 2023-09-07 DIAGNOSIS — Z93.59 SUPRAPUBIC CATHETER (H): ICD-10-CM

## 2023-09-07 DIAGNOSIS — I10 BENIGN ESSENTIAL HYPERTENSION: ICD-10-CM

## 2023-09-07 DIAGNOSIS — J44.9 CHRONIC OBSTRUCTIVE PULMONARY DISEASE, UNSPECIFIED COPD TYPE (H): Primary | ICD-10-CM

## 2023-09-07 DIAGNOSIS — I48.20 CHRONIC ATRIAL FIBRILLATION (H): ICD-10-CM

## 2023-09-07 LAB
ANION GAP SERPL CALCULATED.3IONS-SCNC: 9 MMOL/L (ref 7–15)
BUN SERPL-MCNC: 9.4 MG/DL (ref 8–23)
CALCIUM SERPL-MCNC: 8.6 MG/DL (ref 8.2–9.6)
CHLORIDE SERPL-SCNC: 102 MMOL/L (ref 98–107)
CREAT SERPL-MCNC: 1.02 MG/DL (ref 0.67–1.17)
DEPRECATED HCO3 PLAS-SCNC: 29 MMOL/L (ref 22–29)
EGFRCR SERPLBLD CKD-EPI 2021: 65 ML/MIN/1.73M2
ERYTHROCYTE [DISTWIDTH] IN BLOOD BY AUTOMATED COUNT: 12.9 % (ref 10–15)
GLUCOSE SERPL-MCNC: 118 MG/DL (ref 70–99)
HCT VFR BLD AUTO: 43 % (ref 40–53)
HGB BLD-MCNC: 13.6 G/DL (ref 13.3–17.7)
MCH RBC QN AUTO: 32.7 PG (ref 26.5–33)
MCHC RBC AUTO-ENTMCNC: 31.6 G/DL (ref 31.5–36.5)
MCV RBC AUTO: 103 FL (ref 78–100)
PLATELET # BLD AUTO: 224 10E3/UL (ref 150–450)
POTASSIUM SERPL-SCNC: 4.4 MMOL/L (ref 3.4–5.3)
RBC # BLD AUTO: 4.16 10E6/UL (ref 4.4–5.9)
SODIUM SERPL-SCNC: 140 MMOL/L (ref 136–145)
WBC # BLD AUTO: 8.2 10E3/UL (ref 4–11)

## 2023-09-07 PROCEDURE — 99215 OFFICE O/P EST HI 40 MIN: CPT | Performed by: FAMILY MEDICINE

## 2023-09-07 PROCEDURE — 80048 BASIC METABOLIC PNL TOTAL CA: CPT | Performed by: FAMILY MEDICINE

## 2023-09-07 PROCEDURE — 85027 COMPLETE CBC AUTOMATED: CPT | Performed by: FAMILY MEDICINE

## 2023-09-07 PROCEDURE — 36415 COLL VENOUS BLD VENIPUNCTURE: CPT | Performed by: FAMILY MEDICINE

## 2023-09-07 RX ORDER — GABAPENTIN 100 MG/1
100 CAPSULE ORAL 2 TIMES DAILY
Qty: 180 CAPSULE | Refills: 3 | Status: SHIPPED | OUTPATIENT
Start: 2023-09-07 | End: 2023-10-12

## 2023-09-07 RX ORDER — PANTOPRAZOLE SODIUM 40 MG/1
40 TABLET, DELAYED RELEASE ORAL DAILY
Qty: 90 TABLET | Refills: 3 | Status: SHIPPED | OUTPATIENT
Start: 2023-09-07

## 2023-09-07 RX ORDER — DIGOXIN 125 MCG
125 TABLET ORAL DAILY
Qty: 90 TABLET | Refills: 3 | Status: SHIPPED | OUTPATIENT
Start: 2023-09-07 | End: 2023-10-12

## 2023-09-07 RX ORDER — FLUTICASONE FUROATE, UMECLIDINIUM BROMIDE AND VILANTEROL TRIFENATATE 100; 62.5; 25 UG/1; UG/1; UG/1
1 POWDER RESPIRATORY (INHALATION) DAILY
Qty: 60 EACH | Refills: 6 | Status: SHIPPED | OUTPATIENT
Start: 2023-09-07 | End: 2023-10-12

## 2023-09-07 RX ORDER — AMLODIPINE BESYLATE 5 MG/1
5 TABLET ORAL DAILY
Qty: 90 TABLET | Refills: 3 | Status: SHIPPED | OUTPATIENT
Start: 2023-09-07 | End: 2023-10-12

## 2023-09-07 RX ORDER — LORATADINE 10 MG/1
10 TABLET ORAL DAILY
COMMUNITY
End: 2023-09-07

## 2023-09-07 RX ORDER — LORATADINE 10 MG/1
10 TABLET ORAL DAILY
Qty: 90 TABLET | Refills: 3 | Status: SHIPPED | OUTPATIENT
Start: 2023-09-07

## 2023-09-07 ASSESSMENT — PAIN SCALES - GENERAL: PAINLEVEL: NO PAIN (0)

## 2023-09-07 NOTE — PROGRESS NOTES
Assessment/Plan:    Chronic obstructive pulmonary disease, unspecified COPD type (H)  COPD.  Increase shortness of breath.  Initiate Trelegy 100/62.5/25 use 1 puff daily.  Reassess in office in 3 months to ensure desired improvement.  - Fluticasone-Umeclidin-Vilant (TRELEGY ELLIPTA) 100-62.5-25 MCG/ACT oral inhaler  Dispense: 60 each; Refill: 6    Chronic atrial fibrillation  Chronic atrial fibrillation on digoxin 0.125 mg daily with warfarin anticoagulation 2.5 mg daily except 3.75 mg on Thursday.  Anticoagulation nurse comes to their house and is scheduled for INR check in September 14, 2023.  - digoxin (LANOXIN) 125 MCG tablet  Dispense: 90 tablet; Refill: 3    Benign essential hypertension  Hypertension treated with amlodipine 5 mg daily.  - Basic metabolic panel  - amLODIPine (NORVASC) 5 MG tablet  Dispense: 90 tablet; Refill: 3    Chronic kidney disease, stage 3a (H)  Prior creatinine 1.16 and GFR 56.  - Basic metabolic panel    Suprapubic catheter (H)  Suprapubic catheter in place.  Does utilize gabapentin 100 mg twice daily as needed for discomfort with catheter change etc.    Gastroesophageal reflux disease without esophagitis  Pantoprazole 40 mg daily with famotidine 20 mg twice daily as needed.  - pantoprazole (PROTONIX) 40 MG EC tablet  Dispense: 90 tablet; Refill: 3    History of ischemic cerebrovascular accident (CVA) with residual deficit  History of ischemic CVA, stable without recurrent issues.    Macrocytosis  Macrocytosis historically with prior vitamin B12 and folate lab levels obtained.  Update CBC without recent anemia.  - CBC with platelets    Chronic pain syndrome  As above, refill provided on gabapentin 100 mg twice daily as needed.  - gabapentin (NEURONTIN) 100 MG capsule  Dispense: 180 capsule; Refill: 3    Environmental and seasonal allergies  Loratadine 10 mg daily continues for environmental allergies.  - loratadine (CLARITIN) 10 MG tablet  Dispense: 90 tablet; Refill: 3    40 minutes  total time spent on the date of encounter including patient chart review, counseling and coordination of cares, and documentation.         Subjective:    Benny Carrillo is seen today for follow-up assessment.  COPD.  Mild emphysema previously.  Some shortness of breath slowly worsening.  Patient does have atrial fibrillation historically.  Digoxin 0.125 mg daily with warfarin 2.5 mg daily except 3.75 mg on Thursday.  CKD stage IIIa.  Creatinine 1.16 and GFR 56.  Amlodipine 5 mg daily for hypertension.  Macrocytosis historically with prior B12 and folate levels normal.  Pantoprazole 40 mg daily and famotidine 20 mg twice daily for reflux.  Loratadine 10 mg daily for allergies.  He uses gabapentin 100 mg twice daily as needed for for discomfort associate with catheter changes with chronic suprapubic catheter in place.  Denies recent illness.  No orthopnea or PND type symptoms described.  Anticipates moving to [place] later this year early .  Comprehensive review of systems as above otherwise all negative.       - Belkys (passed away in )   Originally from [facility] [place] - kicked out of [facility]...   6 children (4 sons, 2 daughters) - his son is Steven   25 grandchildren - his granddaughters are Shima, Brittanie and Mey (Steven's daughters)   Quit  - prior heavy smoker   EtOH:  none   Mom -    Dad -    2 bros -    1 sis -    Surgeries:  suprapubic catheter x  (urinary retension -> switched from Taylor to suprapubic catheter while in [place]   Hospitalizations:  h/o CVA around 2018 or 2019 (residual loss of vision in left eye only)       Past Surgical History:   Procedure Laterality Date    CHOLECYSTECTOMY      CYSTOTOMY      EYE SURGERY Bilateral     cataract removal    PICC  2019             Family History   Problem Relation Age of Onset    No Known Problems Mother     No Known Problems Father     Diabetes Other         spouse        Past Medical  History:   Diagnosis Date    Asthma     Atrial fibrillation (H)     Benign prostatic hyperplasia     Bradycardia 7/25/2015    Cataract     Chronic obstructive pulmonary disease, unspecified COPD type (H) 7/21/2020    Citrobacter infection 7/25/2015    UTI     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      Urinary obstruction         Social History     Tobacco Use    Smoking status: Never    Smokeless tobacco: Never   Vaping Use    Vaping Use: Never used   Substance Use Topics    Alcohol use: No    Drug use: No        Current Outpatient Medications   Medication Sig Dispense Refill    acetaminophen (TYLENOL) 500 MG tablet Take 1-2 tablets (500-1,000 mg) by mouth every 6 hours as needed for mild pain 360 tablet 1    albuterol (PROAIR HFA/PROVENTIL HFA/VENTOLIN HFA) 108 (90 Base) MCG/ACT inhaler [ALBUTEROL (PROAIR HFA;PROVENTIL HFA;VENTOLIN HFA) 90 MCG/ACTUATION INHALER] INHALE 2 PUFFS BY MOUTH EVERY 6 HOURS AS NEEDED FOR WHEEZING 18 g 5    amLODIPine (NORVASC) 5 MG tablet Take 1 tablet (5 mg) by mouth daily 90 tablet 3    ARTIFICIAL TEARS, POLYVIN ALC, 1.4 % ophthalmic solution [ARTIFICIAL TEARS, POLYVIN ALC, 1.4 % OPHTHALMIC SOLUTION] INSTILL 2 DROPS INTO EACH EYE TWICE DAILY AS NEEDED DRY EYES 15 mL 6    clotrimazole (LOTRIMIN) 1 % external cream Apply topically twice daily to glans penis x 15 days 45 g 1    digoxin (LANOXIN) 125 MCG tablet Take 1 tablet (125 mcg) by mouth daily 90 tablet 3    EQ ACETAMINOPHEN 500 MG tablet TAKE 1 TO 2 TABLETS BY MOUTH EVERY 6 HOURS AS NEEDED FOR  MILD PAIN 360 tablet 2    EQ ALLERGY RELIEF 10 MG tablet Take 1 tablet by mouth once daily 90 tablet 3    famotidine (PEPCID) 20 MG tablet Take 1 tablet by mouth once daily 90 tablet 3    Fluticasone-Umeclidin-Vilant (TRELEGY ELLIPTA) 100-62.5-25 MCG/ACT oral inhaler Inhale 1 puff into the lungs daily 60 each 6    gabapentin (NEURONTIN) 100 MG capsule Take 1 capsule (100 mg) by mouth 2 times daily 180 capsule 3    guaiFENesin (ROBITUSSIN) 100 MG/5ML SYRP Take 10 mLs by mouth 3 times daily as needed for cough 240 mL 0    hydrocortisone 2.5 % cream [HYDROCORTISONE 2.5 % CREAM] APPLY  CREAM TO AFFECTED AREA ONCE TO TWICE DAILY AS NEEDED 28 g 2    ketoconazole (NIZORAL) 2 % external shampoo Apply topically daily as needed for itching or irritation Apply topically to moist skin, lather, leave on 5 minutes, then rinse.  Repeat daily x 3 days. 120 mL 1    loratadine (CLARITIN) 10 MG tablet Take 1 tablet (10 mg) by mouth daily 90 tablet 3    MEDICATION CANNOT BE REORDERED - PLEASE MANUALLY REORDER AND DISCONTINUE THE OLD ORDER [CATHETER 14 FR MISC] Suprapubic catheter. 14 French. 1 each 0    melatonin 5 MG tablet Take 1 tablet (5 mg) by mouth nightly as needed for sleep 30 tablet 3    multivitamin w/minerals (MULTIVITAMIN, THERAPEUTIC WITH MINERALS) tablet Take 1 tablet by mouth daily 90 tablet 3    pantoprazole (PROTONIX) 40 MG EC tablet Take 1 tablet (40 mg) by mouth daily 90 tablet 3    polyvinyl alcohol-povidone (ARTIFICIAL TEARS,PVALCH-POVID,) 0.5-0.6 % Drop [POLYVINYL ALCOHOL-POVIDONE (ARTIFICIAL TEARS,PVALCH-POVID,) 0.5-0.6 % DROP] 1-2 gtts two times a day to each eye prn 15 mL 3    SM TUSSIN MUCUS+CHEST CONGEST 100 MG/5ML liquid TAKE 10 ML BY MOUTH THREE TIMES DAILY AS NEEDED FOR COUGH 240 mL 0    warfarin ANTICOAGULANT (COUMADIN) 2.5 MG tablet Take1.25 mg (2.5 mg x 0.5) every Tue, Fri; 2.5 mg (2.5 mg x 1) all other days or as directed. 90 tablet 3          Objective:    Vitals:    09/07/23 1436   BP: 122/66    Pulse: 64   Resp: 18   Temp: (!) 95.4  F (35.2  C)   TempSrc: Temporal   SpO2: 93%      There is no height or weight on file to calculate BMI.    Alert.  No apparent distress with diminished breath sounds.  No expiratory wheeze or inspiratory crackle.  Cardiac exam rate controlled.  Extremities warm and dry.  Utilizing wheelchair to assist with transfers currently.  Patient's son is with today.      This note has been dictated using voice recognition software and as a result may contain minor grammatical errors and unintended word substitutions.

## 2023-09-14 ENCOUNTER — ANTICOAGULATION THERAPY VISIT (OUTPATIENT)
Dept: ANTICOAGULATION | Facility: CLINIC | Age: 88
End: 2023-09-14
Payer: COMMERCIAL

## 2023-09-14 DIAGNOSIS — I48.20 CHRONIC ATRIAL FIBRILLATION (H): ICD-10-CM

## 2023-09-14 DIAGNOSIS — N28.0 RENAL INFARCT (H): ICD-10-CM

## 2023-09-14 DIAGNOSIS — Z86.73 HISTORY OF CVA (CEREBROVASCULAR ACCIDENT): ICD-10-CM

## 2023-09-14 DIAGNOSIS — I48.91 ATRIAL FIBRILLATION (H): Primary | ICD-10-CM

## 2023-09-14 LAB — INR (EXTERNAL): 3.3 (ref 0.9–1.1)

## 2023-09-14 NOTE — PROGRESS NOTES
ANTICOAGULATION MANAGEMENT     Benny Carrillo 89 year old male is on warfarin with supratherapeutic INR result. (Goal INR 2.0-3.0)    Recent labs: (last 7 days)     09/14/23  1014   INR 3.3*       ASSESSMENT     Source(s): Chart Review and Home Care/Facility Nurse     Warfarin doses taken: Warfarin taken as instructed  Diet: Decreased greens/vitamin K in diet; plans to resume previous intake  Medication/supplement changes:  Fluticasone-umedlidin-vilant inhaler started on 09/07/2023 No interaction anticipated  New illness, injury, or hospitalization: No  Signs or symptoms of bleeding or clotting: No  Previous result: Therapeutic last 2(+) visits  Additional findings: None       PLAN     Recommended plan for temporary change(s) affecting INR     Dosing Instructions: Continue your current warfarin dose with next INR in 2 weeks       Summary  As of 9/14/2023      Full warfarin instructions:  1.25 mg every Thu; 2.5 mg all other days   Next INR check:  9/28/2023               Telephone call with Vickie Barrow home care nurse who agrees to plan and repeated back plan correctly    Orders given to  Homecare nurse/facility to recheck    Education provided:   Reviewed to go by dosing given and not what is printed on Rx.    Plan made per [facility] anticoagulation protocol    Candida Melendez, RN  Anticoagulation Clinic  9/14/2023    _______________________________________________________________________     Anticoagulation Episode Summary       Current INR goal:  2.0-3.0   TTR:  78.8 % (1 y)   Target end date:  Indefinite   Send INR reminders to:  [facility]    Indications    Atrial fibrillation (H) [I48.91]  Renal infarct (H) [N28.0]  Chronic atrial fibrillation [I48.20]  History of CVA (cerebrovascular accident) [Z86.73]             Comments:               Anticoagulation Care Providers       Provider Role Specialty Phone number    Alcides Castillo MD Referring Family Medicine     Jb Martínez MD Referring Family  Medicine [phone]

## 2023-09-22 ENCOUNTER — PATIENT OUTREACH (OUTPATIENT)
Dept: GERIATRIC MEDICINE | Facility: CLINIC | Age: 88
End: 2023-09-22
Payer: COMMERCIAL

## 2023-09-22 NOTE — PROGRESS NOTES
[facility] Care Coordination Contact    Internal CC change effective 10/01/2023.  Mailed member CC Change letter.  Additional tasks to be completed by CMS include: update database & EPIC, enter CC Change in MMIS, and move member file.

## 2023-09-22 NOTE — LETTER
September 22, 2023    EDUARDO STATON  [address] [address]      Dear Eduardo:    As a member of [facility] ([facility]), you are provided a care coordinator. I will be your new care coordinator as of 10/01/2023. I will be calling you soon to see how you are doing and determine your needs.    If you have any questions, please feel free to call me at [phone]. If you reach my voice mail, please leave a message and your phone number. If you are hearing impaired, please call the [place] [employer] at 899 or [phone] ([email or web address] relay service).    I look forward to speaking with you soon.    Sincerely,      Clair Cunningham RN, BSN, N  [phone]  [email or web address] is a health plan that contracts with both Medicare and the [place] Medical Assistance (Medicaid) program to provide benefits of both programs to enrollees. Enrollment in [facility] depends on contract renewal.      [facility]  [id number] DHS Approved ([id number])  [id number] (11/18)

## 2023-09-24 NOTE — PROGRESS NOTES
ANTICOAGULATION MANAGEMENT     Benny Carrillo 89 year old male is on warfarin with therapeutic INR result. (Goal INR 2.0-3.0)    Recent labs: (last 7 days)     01/10/22  0946   INR 2.2*       ASSESSMENT     Source(s): Chart Review and Home Care/Facility Nurse       Warfarin doses taken: Warfarin taken as instructed    Diet: No new diet changes identified    New illness, injury, or hospitalization: No    Medication/supplement changes: None noted    Signs or symptoms of bleeding or clotting: No    Previous INR: Therapeutic last visit; previously outside of goal range    Additional findings: None     PLAN     Recommended plan for no diet, medication or health factor changes affecting INR     Dosing Instructions: Continue your current warfarin dose with next INR in 2 weeks       Summary  As of 1/10/2022    Full warfarin instructions:  2.5 mg every day   Next INR check:  1/24/2022             Telephone call with home care nurse Neelima who agrees to plan and repeated back plan correctly    Orders given to  Homecare nurse/facility to recheck    Education provided: Please call back if any changes to your diet, medications or how you've been taking warfarin    Plan made per [facility] anticoagulation protocol    Lilli Red, RN  Anticoagulation Clinic  1/10/2022    _______________________________________________________________________     Anticoagulation Episode Summary     Current INR goal:  2.0-3.0   TTR:  60.4 % (1 y)   Target end date:  Indefinite   Send INR reminders to:  HALI [facility]ASTONY    Indications    Atrial fibrillation (H) [I48.91]  Renal infarct (H) [N28.0]           Comments:           Anticoagulation Care Providers     Provider Role Specialty Phone number    Alcides Castillo MD Referring Family Medicine [phone]          
No

## 2023-09-27 ENCOUNTER — MEDICAL CORRESPONDENCE (OUTPATIENT)
Dept: HEALTH INFORMATION MANAGEMENT | Facility: CLINIC | Age: 88
End: 2023-09-27
Payer: COMMERCIAL

## 2023-09-28 ENCOUNTER — MEDICAL CORRESPONDENCE (OUTPATIENT)
Dept: HEALTH INFORMATION MANAGEMENT | Facility: CLINIC | Age: 88
End: 2023-09-28
Payer: COMMERCIAL

## 2023-09-28 ENCOUNTER — ANTICOAGULATION THERAPY VISIT (OUTPATIENT)
Dept: ANTICOAGULATION | Facility: CLINIC | Age: 88
End: 2023-09-28
Payer: COMMERCIAL

## 2023-09-28 DIAGNOSIS — N28.0 RENAL INFARCT (H): ICD-10-CM

## 2023-09-28 DIAGNOSIS — I48.11 LONGSTANDING PERSISTENT ATRIAL FIBRILLATION (H): Primary | ICD-10-CM

## 2023-09-28 DIAGNOSIS — I48.20 CHRONIC ATRIAL FIBRILLATION (H): ICD-10-CM

## 2023-09-28 DIAGNOSIS — Z86.73 HISTORY OF CVA (CEREBROVASCULAR ACCIDENT): ICD-10-CM

## 2023-09-28 LAB — INR (EXTERNAL): 3.1 (ref 0.9–1.1)

## 2023-09-28 NOTE — PROGRESS NOTES
ANTICOAGULATION MANAGEMENT     Benny Carrillo 89 year old male is on warfarin with supratherapeutic INR result. (Goal INR 2.0-3.0)    Recent labs: (last 7 days)     09/28/23  1000   INR 3.1*       ASSESSMENT     Source(s): Chart Review and Home Care/Facility Nurse     Warfarin doses taken: Warfarin taken as instructed  Diet: No new diet changes identified  Medication/supplement changes: None noted  New illness, injury, or hospitalization: No  Signs or symptoms of bleeding or clotting: No  Previous result: Supratherapeutic  Additional findings: None       PLAN     Recommended plan for no diet, medication or health factor changes affecting INR     Dosing Instructions: decrease your warfarin dose (7.7% change) with next INR in 2 weeks       Summary  As of 9/28/2023      Full warfarin instructions:  1.25 mg every Mon, Thu; 2.5 mg all other days   Next INR check:  10/12/2023               Telephone call with Vickie Barrow home care nurse who agrees to plan and repeated back plan correctly     Orders given to  Homecare nurse/facility to recheck    Education provided:   None required  Contact  with any changes, questions or concerns.     Plan made per [facility] anticoagulation protocol    Clair Gonzalez RN  Anticoagulation Clinic  9/28/2023    _______________________________________________________________________     Anticoagulation Episode Summary       Current INR goal:  2.0-3.0   TTR:  75.0 % (1 y)   Target end date:  Indefinite   Send INR reminders to:  CARLOS CERVANTES    Indications    Atrial fibrillation (H) [I48.91]  Renal infarct (H) [N28.0]  Chronic atrial fibrillation [I48.20]  History of CVA (cerebrovascular accident) [Z86.73]             Comments:               Anticoagulation Care Providers       Provider Role Specialty Phone number    Alcides Castillo MD Referring Family Medicine     Jb Martínez MD Referring Family Medicine [phone]

## 2023-10-05 ENCOUNTER — PATIENT OUTREACH (OUTPATIENT)
Dept: GERIATRIC MEDICINE | Facility: CLINIC | Age: 88
End: 2023-10-05
Payer: COMMERCIAL

## 2023-10-05 NOTE — PROGRESS NOTES
[facility] Care Coordination Contact    Called family Sandra  to schedule annual HRA home visit. HRA has been scheduled for 10/17/2023.    Clair Cunningham RN  [facility]  [phone]

## 2023-10-11 NOTE — PROGRESS NOTES
10/11/2023  Spoke with home care nurse Arias Beckford. They discharged Benny from home care today. Pt moved back to [facility].    INR will be done in clinic 10/12/23.    Of note, [place] out of [place] next [place].

## 2023-10-12 ENCOUNTER — OFFICE VISIT (OUTPATIENT)
Dept: FAMILY MEDICINE | Facility: CLINIC | Age: 88
End: 2023-10-12
Payer: COMMERCIAL

## 2023-10-12 ENCOUNTER — DOCUMENTATION ONLY (OUTPATIENT)
Dept: ANTICOAGULATION | Facility: CLINIC | Age: 88
End: 2023-10-12

## 2023-10-12 ENCOUNTER — ANTICOAGULATION THERAPY VISIT (OUTPATIENT)
Dept: ANTICOAGULATION | Facility: CLINIC | Age: 88
End: 2023-10-12

## 2023-10-12 VITALS
DIASTOLIC BLOOD PRESSURE: 60 MMHG | OXYGEN SATURATION: 92 % | SYSTOLIC BLOOD PRESSURE: 100 MMHG | HEART RATE: 51 BPM | RESPIRATION RATE: 17 BRPM | TEMPERATURE: 97.5 F

## 2023-10-12 DIAGNOSIS — K21.9 GASTROESOPHAGEAL REFLUX DISEASE WITHOUT ESOPHAGITIS: ICD-10-CM

## 2023-10-12 DIAGNOSIS — N48.1 BALANITIS: ICD-10-CM

## 2023-10-12 DIAGNOSIS — I48.20 CHRONIC ATRIAL FIBRILLATION (H): ICD-10-CM

## 2023-10-12 DIAGNOSIS — B36.0 TINEA VERSICOLOR: ICD-10-CM

## 2023-10-12 DIAGNOSIS — Z86.73 HISTORY OF CVA (CEREBROVASCULAR ACCIDENT): ICD-10-CM

## 2023-10-12 DIAGNOSIS — G89.4 CHRONIC PAIN SYNDROME: ICD-10-CM

## 2023-10-12 DIAGNOSIS — I10 BENIGN ESSENTIAL HYPERTENSION: ICD-10-CM

## 2023-10-12 DIAGNOSIS — I48.11 LONGSTANDING PERSISTENT ATRIAL FIBRILLATION (H): Primary | ICD-10-CM

## 2023-10-12 DIAGNOSIS — Z91.09 ENVIRONMENTAL ALLERGIES: ICD-10-CM

## 2023-10-12 DIAGNOSIS — N28.0 RENAL INFARCT (H): ICD-10-CM

## 2023-10-12 DIAGNOSIS — J44.9 CHRONIC OBSTRUCTIVE PULMONARY DISEASE, UNSPECIFIED COPD TYPE (H): Primary | ICD-10-CM

## 2023-10-12 DIAGNOSIS — N18.31 CHRONIC KIDNEY DISEASE, STAGE 3A (H): ICD-10-CM

## 2023-10-12 LAB — INR BLD: 2.8 (ref 0.9–1.1)

## 2023-10-12 PROCEDURE — 85610 PROTHROMBIN TIME: CPT | Performed by: FAMILY MEDICINE

## 2023-10-12 PROCEDURE — 99214 OFFICE O/P EST MOD 30 MIN: CPT | Performed by: FAMILY MEDICINE

## 2023-10-12 PROCEDURE — 36415 COLL VENOUS BLD VENIPUNCTURE: CPT | Performed by: FAMILY MEDICINE

## 2023-10-12 RX ORDER — KETOCONAZOLE 20 MG/ML
SHAMPOO TOPICAL DAILY PRN
Qty: 120 ML | Refills: 1 | Status: SHIPPED | OUTPATIENT
Start: 2023-10-12

## 2023-10-12 RX ORDER — WARFARIN SODIUM 2.5 MG/1
TABLET ORAL
Qty: 90 TABLET | Refills: 3 | Status: SHIPPED | OUTPATIENT
Start: 2023-10-12

## 2023-10-12 RX ORDER — RESPIRATORY SYNCYTIAL VIRUS VACCINE 120MCG/0.5
0.5 KIT INTRAMUSCULAR ONCE
Qty: 1 EACH | Refills: 0 | Status: CANCELLED | OUTPATIENT
Start: 2023-10-12 | End: 2023-10-12

## 2023-10-12 RX ORDER — GABAPENTIN 100 MG/1
100 CAPSULE ORAL 2 TIMES DAILY
Qty: 180 CAPSULE | Refills: 3 | Status: SHIPPED | OUTPATIENT
Start: 2023-10-12

## 2023-10-12 RX ORDER — CLOTRIMAZOLE 1 %
CREAM (GRAM) TOPICAL
Qty: 45 G | Refills: 1 | Status: SHIPPED | OUTPATIENT
Start: 2023-10-12

## 2023-10-12 RX ORDER — FLUTICASONE FUROATE, UMECLIDINIUM BROMIDE AND VILANTEROL TRIFENATATE 100; 62.5; 25 UG/1; UG/1; UG/1
1 POWDER RESPIRATORY (INHALATION) DAILY
Qty: 60 EACH | Refills: 6 | Status: SHIPPED | OUTPATIENT
Start: 2023-10-12

## 2023-10-12 RX ORDER — ALBUTEROL SULFATE 90 UG/1
AEROSOL, METERED RESPIRATORY (INHALATION)
Qty: 18 G | Refills: 5 | Status: SHIPPED | OUTPATIENT
Start: 2023-10-12

## 2023-10-12 RX ORDER — AMLODIPINE BESYLATE 5 MG/1
5 TABLET ORAL DAILY
Qty: 90 TABLET | Refills: 3 | Status: SHIPPED | OUTPATIENT
Start: 2023-10-12

## 2023-10-12 RX ORDER — FAMOTIDINE 20 MG/1
TABLET, FILM COATED ORAL
Qty: 90 TABLET | Refills: 3 | Status: SHIPPED | OUTPATIENT
Start: 2023-10-12

## 2023-10-12 RX ORDER — DIGOXIN 125 MCG
125 TABLET ORAL DAILY
Qty: 90 TABLET | Refills: 3 | Status: SHIPPED | OUTPATIENT
Start: 2023-10-12

## 2023-10-12 ASSESSMENT — PAIN SCALES - GENERAL: PAINLEVEL: NO PAIN (0)

## 2023-10-12 NOTE — PROGRESS NOTES
Assessment/Plan:    Chronic obstructive pulmonary disease, unspecified COPD type (H)  Benefits of Trelegy Ellipta 100/62.5/25 using 1 inhalation daily noted.  Tolerating better than prior medications per family.  We will continue this with O2 sats 92% room air currently.  Establish care in [place] once he arrives.  - Fluticasone-Umeclidin-Vilant (TRELEGY ELLIPTA) 100-62.5-25 MCG/ACT oral inhaler  Dispense: 60 each; Refill: 6    Chronic kidney disease, stage 3a (H)  CKD stage IIIa historically with most recent creatinine improving from 1.16 with GFR 56 and set up to 1.02 and GFR 65.  Ensure adequate hydration.    Benign essential hypertension  Refill provided for amlodipine 5 mg daily for hypertension management.  - amLODIPine (NORVASC) 5 MG tablet  Dispense: 90 tablet; Refill: 3    Gastroesophageal reflux disease without esophagitis  Famotidine 20 mg daily for reflux management.  - famotidine (PEPCID) 20 MG tablet  Dispense: 90 tablet; Refill: 3    Chronic pain syndrome  Gabapentin 100 mg twice daily for chronic pain syndrome.  Tolerating well without significant sedative side effects.  - gabapentin (NEURONTIN) 100 MG capsule  Dispense: 180 capsule; Refill: 3    Tinea versicolor  Ketoconazole has been utilized previously for tinea versicolor management.  - ketoconazole (NIZORAL) 2 % external shampoo  Dispense: 120 mL; Refill: 1    Chronic atrial fibrillation (H)  Continues both digoxin 0.125 mg daily for rate control with warfarin anticoagulation as noted.  - warfarin ANTICOAGULANT (COUMADIN) 2.5 MG tablet  Dispense: 90 tablet; Refill: 3  - digoxin (LANOXIN) 125 MCG tablet  Dispense: 90 tablet; Refill: 3    Chronic atrial fibrillation  Asymptomatic currently  - warfarin ANTICOAGULANT (COUMADIN) 2.5 MG tablet  Dispense: 90 tablet; Refill: 3  - digoxin (LANOXIN) 125 MCG tablet  Dispense: 90 tablet; Refill: 3    Balanitis  Clotrimazole as directed as needed balanitis.  - clotrimazole (LOTRIMIN) 1 % external cream   "Dispense: 45 g; Refill: 1    Environmental allergies  Albuterol MDI has been helpful for both allergy and COPD management.  - albuterol (PROAIR HFA/PROVENTIL HFA/VENTOLIN HFA) 108 (90 Base) MCG/ACT inhaler  Dispense: 18 g; Refill: 5               Subjective:    Benny Carrillo is seen today for medication refills.  Traveling to [place] next week where he will then reside ongoing.  Will need to establish care once he arrives.  Underlying history of COPD noted.  Was started on Trelegy 100/62.5/25 using 1 puff daily and tolerating well stating that this has been helpful.  Has albuterol MDI available for allergy and COPD management.  Loratadine 10 mg daily for allergy management as well.  Digoxin 0.125 mg with warfarin anticoagulation typically 2.5 mg daily except 3.75 mg on Thursday as previous dosing with anticoagulation nurse continuing to manage for chronic atrial fibrillation.  Hypertension managed with amlodipine 5 mg daily.  History of CKD stage IIIa with most recent creatinine improving from 1.16 with GFR 56 up to 1.02 and GFR 65.  Has suprapubic catheter in place.  Gabapentin 100 mg twice daily for discomfort with catheter change.  Pantoprazole 40 mg daily famotidine 20 mg twice daily for reflux.  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  Known history of macrocytosis with prior B12 and folate levels normal.  Comprehensive review of systems as above otherwise all negative.      \"Namaste\" - customary courtesy greeting as well as a salutation to end an encounter as well.  \"I bow to you\"      - Belkys (passed away in 2017)   Originally from [facility] [place] - [facility] out [facility]...   6 children (4 sons, 2 daughters) - his son is Steven   25 grandchildren - his granddaughters are Shima, Brittanie and Mey (Steven's daughters)   Quit  - prior heavy smoker   EtOH:  none   Mom -    Dad -    2 bros -    1 sis -    Surgeries:  suprapubic catheter x  " (urinary retension -> switched from [place] to suprapubic catheter while in [place]   Hospitalizations:  h/o CVA around 2018 or 2019 (residual loss of vision in left eye only)     Moving to [place] end of 2023 or early 2024         Past Surgical History:   Procedure Laterality Date    CHOLECYSTECTOMY      CYSTOTOMY      EYE SURGERY Bilateral     cataract removal    PICC  5/28/2019             Family History   Problem Relation Age of Onset    No Known Problems Mother     No Known Problems Father     Diabetes Other         spouse        Past Medical History:   Diagnosis Date    Asthma     Atrial fibrillation (H)     Benign prostatic hyperplasia     Bradycardia 7/25/2015    Cataract     Chronic obstructive pulmonary disease, unspecified COPD type (H) 7/21/2020    Citrobacter infection 7/25/2015    UTI     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      Urinary obstruction         Social History     Tobacco Use    Smoking status: Never    Smokeless tobacco: Never   Vaping Use    Vaping Use: Never used   Substance Use Topics    Alcohol use: No    Drug use: No        Current Outpatient Medications   Medication Sig Dispense Refill    acetaminophen (TYLENOL) 500 MG tablet Take 1-2 tablets (500-1,000 mg) by mouth every 6 hours as needed for mild pain 360 tablet 1    albuterol (PROAIR HFA/PROVENTIL HFA/VENTOLIN HFA) 108 (90 Base) MCG/ACT inhaler [ALBUTEROL (PROAIR HFA;PROVENTIL HFA;VENTOLIN HFA) 90 MCG/ACTUATION INHALER] INHALE 2 PUFFS BY MOUTH EVERY 6 HOURS AS NEEDED FOR WHEEZING 18 g 5     amLODIPine (NORVASC) 5 MG tablet Take 1 tablet (5 mg) by mouth daily 90 tablet 3    ARTIFICIAL TEARS, POLYVIN ALC, 1.4 % ophthalmic solution [ARTIFICIAL TEARS, POLYVIN ALC, 1.4 % OPHTHALMIC SOLUTION] INSTILL 2 DROPS INTO EACH EYE TWICE DAILY AS NEEDED DRY EYES 15 mL 6    clotrimazole (LOTRIMIN) 1 % external cream Apply topically twice daily to glans penis x 15 days 45 g 1    digoxin (LANOXIN) 125 MCG tablet Take 1 tablet (125 mcg) by mouth daily 90 tablet 3    EQ ACETAMINOPHEN 500 MG tablet TAKE 1 TO 2 TABLETS BY MOUTH EVERY 6 HOURS AS NEEDED FOR MILD PAIN 360 tablet 2    EQ ALLERGY RELIEF 10 MG tablet Take 1 tablet by mouth once daily 90 tablet 3    famotidine (PEPCID) 20 MG tablet Take 1 tablet by mouth once daily 90 tablet 3    Fluticasone-Umeclidin-Vilant (TRELEGY ELLIPTA) 100-62.5-25 MCG/ACT oral inhaler Inhale 1 puff into the lungs daily 60 each 6    gabapentin (NEURONTIN) 100 MG capsule Take 1 capsule (100 mg) by mouth 2 times daily 180 capsule 3    guaiFENesin (ROBITUSSIN) 100 MG/5ML SYRP Take 10 mLs by mouth 3 times daily as needed for cough 240 mL 0    hydrocortisone 2.5 % cream [HYDROCORTISONE 2.5 % CREAM] APPLY  CREAM TO AFFECTED AREA ONCE TO TWICE DAILY AS NEEDED 28 g 2    ketoconazole (NIZORAL) 2 % external shampoo Apply topically daily as needed for itching or irritation Apply topically to moist skin, lather, leave on 5 minutes, then rinse.  Repeat daily x 3 days. 120 mL 1    loratadine (CLARITIN) 10 MG tablet Take 1 tablet (10 mg) by mouth daily 90 tablet 3    MEDICATION CANNOT BE REORDERED - PLEASE MANUALLY REORDER AND DISCONTINUE THE OLD ORDER [CATHETER 14 FR MISC] Suprapubic catheter. 14 French. 1 each 0    melatonin 5 MG tablet Take 1 tablet (5 mg) by mouth nightly as needed for sleep 30 tablet 3    multivitamin w/minerals (MULTIVITAMIN, THERAPEUTIC WITH MINERALS) tablet Take 1 tablet by mouth daily 90 tablet 3    pantoprazole (PROTONIX) 40 MG EC tablet Take 1 tablet (40 mg) by mouth daily 90  tablet 3    polyvinyl alcohol-povidone (ARTIFICIAL TEARS,PVALCH-POVID,) 0.5-0.6 % Drop [POLYVINYL ALCOHOL-POVIDONE (ARTIFICIAL TEARS,PVALCH-POVID,) 0.5-0.6 % DROP] 1-2 gtts two times a day to each eye prn 15 mL 3    SM TUSSIN MUCUS+CHEST CONGEST 100 MG/5ML liquid TAKE 10 ML BY MOUTH THREE TIMES DAILY AS NEEDED FOR COUGH 240 mL 0    warfarin ANTICOAGULANT (COUMADIN) 2.5 MG tablet Take1.25 mg (2.5 mg x 0.5) every Tue, Fri; 2.5 mg (2.5 mg x 1) all other days or as directed. 90 tablet 3          Objective:    Vitals:    10/12/23 1247   BP: 100/60   Pulse: 51   Resp: 17   Temp: 97.5  F (36.4  C)   SpO2: 92%      There is no height or weight on file to calculate BMI.    Alert.  No apparent distress.  Chest with mildly diminished breath sounds without inspiratory crackle or expiratory wheeze.  No respiratory distress.  Cardiac exam irregular, rate controlled.  Extremities warm and dry.      This note has been dictated using voice recognition software and as a result may contain minor grammatical errors and unintended word substitutions.           Answers submitted by the patient for this visit:  General Questionnaire (Submitted on 10/12/2023)  Chief Complaint: Chronic problems general questions HPI Form  What is the reason for your visit today? : oxygen tank  How many servings of fruits and vegetables do you eat daily?: 0-1  On average, how many sweetened beverages do you drink each day (Examples: soda, juice, sweet tea, etc.  Do NOT count diet or artificially sweetened beverages)?: 0  How many minutes a day do you exercise enough to make your heart beat faster?: 9 or less  How many days a week do you exercise enough to make your heart beat faster?: 3 or less  How many days per week do you miss taking your medication?: 0

## 2023-10-12 NOTE — PROGRESS NOTES
ANTICOAGULATION MANAGEMENT     Benny Carrillo 89 year old male is on warfarin with therapeutic INR result. (Goal INR 2.0-3.0)    Recent labs: (last 7 days)     10/12/23  1343   INR 2.8*       ASSESSMENT     Source(s): Chart Review and Patient/Caregiver Call     Warfarin doses taken: Warfarin taken as instructed  Diet: No new diet changes identified  Medication/supplement changes: None noted  New illness, injury, or hospitalization: No  Signs or symptoms of bleeding or clotting: No  Previous result: Supratherapeutic  Additional findings:  is moving to [place] next week. Will discharge from [facility]       PLAN     Recommended plan for no diet, medication or health factor changes affecting INR     Dosing Instructions: Continue your current warfarin dose with next INR in 2 weeks       Summary  As of 10/12/2023      Full warfarin instructions:  1.25 mg every Mon, Thu; 2.5 mg all other days   Next INR check:  10/26/2023               Telephone call with Mey who verbalizes understanding and agrees to plan    Contact [phone] to schedule and with any changes, questions or concerns.     Education provided:   None required    Plan made per [facility] anticoagulation protocol    Perlita Brown RN  Anticoagulation Clinic  10/12/2023    _______________________________________________________________________     Anticoagulation Episode Summary       Current INR goal:  2.0-3.0   TTR:  73.7% (1 y)   Target end date:  Indefinite   Send INR reminders to:  [facility]    Indications    Atrial fibrillation (H) [I48.91]  Renal infarct (H24) [N28.0]  Chronic atrial fibrillation [I48.20]  History of CVA (cerebrovascular accident) [Z86.73]             Comments:               Anticoagulation Care Providers       Provider Role Specialty Phone number    Alcides Castillo MD Referring Family Medicine     Jb Martínez MD Referring Family Medicine [phone]

## 2023-10-12 NOTE — PROGRESS NOTES
ANTICOAGULATION  MANAGEMENT    Benny Carrillo is being discharged from the [facility] Anticoagulation Management Program ([facility]).    Reason for discharge: care has been transferred to new pcp in [place]    Anticoagulation episode resolved    If patient needs warfarin management in the future, please send a new referral    Perlita Brown RN

## 2023-10-17 ENCOUNTER — MEDICAL CORRESPONDENCE (OUTPATIENT)
Dept: HEALTH INFORMATION MANAGEMENT | Facility: CLINIC | Age: 88
End: 2023-10-17
Payer: COMMERCIAL

## 2023-10-17 ENCOUNTER — PATIENT OUTREACH (OUTPATIENT)
Dept: GERIATRIC MEDICINE | Facility: CLINIC | Age: 88
End: 2023-10-17
Payer: COMMERCIAL

## 2023-10-17 NOTE — LETTER
October 18, 2023    EDUARDO STATON  [address] [address]        Dear Eduardo:    As a member of [facility], we offer a health risk assessment at no cost to you. I know you don't want to have the assessment right now. If you change your mind, please call me at the number below.    Who performs the health risk assessment?  A [facility] Care Coordinator performs the assessment. Our Care Coordinators can also help you understand your benefits. They can tell you about services to aid you at home, such as managing your care with your doctors if your health worsens.    Our Care Coordinators are here for you if you need:  Support for activities you used to do by yourself (including making meals, bathing, and paying bills)  Equipment for bathroom or home safety  Help finding a new place to live  Information on staying healthy, preventing falls and immunizations    Questions?  If you have questions, or you would like to do the assessment, call me at [phone]. TTY users call [phone]. I'm here from 8am to 5pm. I may reach out to you again soon.      Sincerely,          Clari Cunningham RN, BSN, PHN  [phone]  [email or web address]    <[id number] accepted    [id number] (12/2021)  [id number]>

## 2023-10-17 NOTE — Clinical Note
Attempted home visit and member is moving out of state. Family is going to continue with formal support. Thank you. Clair Cunningham RN [facility] [phone]

## 2023-10-18 NOTE — PROGRESS NOTES
[facility] hospital Care Coordination Contact      [facility] Refusal Telephone Assessment    Member refused home visit HRA on 10/17/23 (reason: moving out of state).    ER visits: No  Hospitalizations: No  Health concerns: none  Falls/Injuries: No  ADL/IADL Dependencies:        Member currently receiving the following home care services:     Member currently receiving the following community resources:    Informal support(s):      Advanced Care Planning discussion, complete code section.    [facility] sponsored benefits: Shared information re: Silver Sneakers/gym memberships, ASA, Calcium +D.    Follow-Up Plan: Member informed of future contact, plan to f/u with member with a 6 month telephone assessment and offer a home visit.  Contact information shared with member and family, encouraged member to call with any questions or concerns at any time.    This CC note routed to PCP, Jb Martínez RN  [facility]  [phone]

## 2023-10-18 NOTE — PROGRESS NOTES
"[facility] Care Coordination Contact    Per CC, mailed client a \"Refusal of Home Visit\" letter.      Kimberly Christian  Care Management Specialist  [facility]  [phone]      "

## 2023-10-30 ENCOUNTER — MEDICAL CORRESPONDENCE (OUTPATIENT)
Dept: HEALTH INFORMATION MANAGEMENT | Facility: CLINIC | Age: 88
End: 2023-10-30
Payer: COMMERCIAL

## 2024-04-12 ENCOUNTER — PATIENT OUTREACH (OUTPATIENT)
Dept: GERIATRIC MEDICINE | Facility: CLINIC | Age: 89
End: 2024-04-12
Payer: COMMERCIAL